# Patient Record
Sex: FEMALE | Race: WHITE | Employment: FULL TIME | URBAN - METROPOLITAN AREA
[De-identification: names, ages, dates, MRNs, and addresses within clinical notes are randomized per-mention and may not be internally consistent; named-entity substitution may affect disease eponyms.]

---

## 2022-10-03 PROBLEM — C79.9 METASTATIC CANCER (HCC): Status: ACTIVE | Noted: 2022-10-03

## 2022-10-10 PROBLEM — C50.919 METASTATIC BREAST CANCER: Status: ACTIVE | Noted: 2022-10-10

## 2022-10-17 PROBLEM — C78.7 LIVER METASTASES: Status: ACTIVE | Noted: 2022-10-17

## 2022-10-17 PROBLEM — R10.84 ABDOMINAL PAIN, GENERALIZED: Status: ACTIVE | Noted: 2022-10-17

## 2022-10-17 PROBLEM — Z79.899 ENCOUNTER FOR MONITORING CARDIOTOXIC DRUG THERAPY: Status: ACTIVE | Noted: 2022-10-17

## 2022-10-17 PROBLEM — N95.9 PREMENOPAUSAL PATIENT: Status: ACTIVE | Noted: 2022-10-17

## 2022-10-17 PROBLEM — R79.89 ELEVATED LFTS: Status: ACTIVE | Noted: 2022-10-17

## 2022-10-17 PROBLEM — C78.02 MALIGNANT NEOPLASM METASTATIC TO BOTH LUNGS (HCC): Status: ACTIVE | Noted: 2022-10-17

## 2022-10-17 PROBLEM — Z51.11 ENCOUNTER FOR ANTINEOPLASTIC CHEMOTHERAPY: Status: ACTIVE | Noted: 2022-10-17

## 2022-10-17 PROBLEM — C78.01 MALIGNANT NEOPLASM METASTATIC TO BOTH LUNGS (HCC): Status: ACTIVE | Noted: 2022-10-17

## 2022-10-17 PROBLEM — Z51.81 ENCOUNTER FOR MONITORING CARDIOTOXIC DRUG THERAPY: Status: ACTIVE | Noted: 2022-10-17

## 2022-10-17 PROBLEM — Z95.828 PORT-A-CATH IN PLACE: Status: ACTIVE | Noted: 2022-10-17

## 2022-10-24 PROBLEM — Z09 CHEMOTHERAPY FOLLOW-UP EXAMINATION: Status: ACTIVE | Noted: 2022-10-24

## 2022-10-24 PROBLEM — G89.3 CANCER RELATED PAIN: Status: ACTIVE | Noted: 2022-10-24

## 2022-10-31 PROBLEM — L27.0 DRUG-INDUCED SKIN RASH: Status: ACTIVE | Noted: 2022-10-31

## 2022-10-31 PROBLEM — R53.83 CHEMOTHERAPY-INDUCED FATIGUE: Status: ACTIVE | Noted: 2022-10-31

## 2022-10-31 PROBLEM — T45.1X5A CHEMOTHERAPY-INDUCED FATIGUE: Status: ACTIVE | Noted: 2022-10-31

## 2022-11-07 PROBLEM — T45.1X5A CHEMOTHERAPY-INDUCED NAUSEA: Status: ACTIVE | Noted: 2022-11-07

## 2022-11-07 PROBLEM — R11.0 CHEMOTHERAPY-INDUCED NAUSEA: Status: ACTIVE | Noted: 2022-11-07

## 2022-11-16 PROBLEM — Z51.11 ENCOUNTER FOR ANTINEOPLASTIC CHEMOTHERAPY: Status: RESOLVED | Noted: 2022-10-17 | Resolved: 2022-11-16

## 2022-11-23 PROBLEM — Z09 CHEMOTHERAPY FOLLOW-UP EXAMINATION: Status: RESOLVED | Noted: 2022-10-24 | Resolved: 2022-11-23

## 2023-01-02 PROBLEM — S72.001A FRACTURE OF FEMORAL NECK, RIGHT (HCC): Status: ACTIVE | Noted: 2023-01-02

## 2023-01-16 PROBLEM — Z87.311: Status: ACTIVE | Noted: 2023-01-16

## 2023-01-16 PROBLEM — C79.51 BONE METASTASES: Status: ACTIVE | Noted: 2023-01-16

## 2023-04-03 PROBLEM — C50.919 PRIMARY MALIGNANT NEOPLASM OF BREAST WITH METASTASIS (HCC): Status: ACTIVE | Noted: 2022-10-10

## 2023-04-10 ENCOUNTER — HOSPITAL ENCOUNTER (OUTPATIENT)
Dept: INFUSION THERAPY | Age: 38
Discharge: HOME | End: 2023-04-10
Payer: COMMERCIAL

## 2023-04-10 VITALS
BODY MASS INDEX: 33.23 KG/M2 | RESPIRATION RATE: 16 BRPM | SYSTOLIC BLOOD PRESSURE: 127 MMHG | TEMPERATURE: 97.9 F | WEIGHT: 180.6 LBS | DIASTOLIC BLOOD PRESSURE: 79 MMHG | HEIGHT: 62 IN | OXYGEN SATURATION: 97 % | HEART RATE: 58 BPM

## 2023-04-10 DIAGNOSIS — C50.919 PRIMARY MALIGNANT NEOPLASM OF BREAST WITH METASTASIS (HCC): Primary | ICD-10-CM

## 2023-04-10 PROBLEM — C79.51 MALIGNANT NEOPLASM METASTATIC TO BONE (HCC): Status: ACTIVE | Noted: 2023-01-16

## 2023-04-10 PROBLEM — M25.551 RIGHT HIP PAIN: Status: ACTIVE | Noted: 2023-04-10

## 2023-04-10 LAB
ALBUMIN SERPL-MCNC: 3.5 G/DL (ref 3.5–5)
ALBUMIN/GLOB SERPL: 1 (ref 1.1–2.2)
ALP SERPL-CCNC: 100 U/L (ref 45–117)
ALT SERPL-CCNC: 28 U/L (ref 12–78)
ANION GAP SERPL CALC-SCNC: 2 MMOL/L (ref 5–15)
AST SERPL-CCNC: 14 U/L (ref 15–37)
BASOPHILS # BLD: 0 K/UL (ref 0–0.1)
BASOPHILS NFR BLD: 0 % (ref 0–1)
BILIRUB SERPL-MCNC: 0.1 MG/DL (ref 0.2–1)
BUN SERPL-MCNC: 12 MG/DL (ref 6–20)
BUN/CREAT SERPL: 16 (ref 12–20)
CALCIUM SERPL-MCNC: 9.3 MG/DL (ref 8.5–10.1)
CHLORIDE SERPL-SCNC: 110 MMOL/L (ref 97–108)
CO2 SERPL-SCNC: 26 MMOL/L (ref 21–32)
CREAT SERPL-MCNC: 0.75 MG/DL (ref 0.55–1.02)
DIFFERENTIAL METHOD BLD: ABNORMAL
EOSINOPHIL # BLD: 0 K/UL (ref 0–0.4)
EOSINOPHIL NFR BLD: 0 % (ref 0–7)
ERYTHROCYTE [DISTWIDTH] IN BLOOD BY AUTOMATED COUNT: 17.5 % (ref 11.5–14.5)
GLOBULIN SER CALC-MCNC: 3.4 G/DL (ref 2–4)
GLUCOSE SERPL-MCNC: 154 MG/DL (ref 65–100)
HCT VFR BLD AUTO: 36.3 % (ref 35–47)
HGB BLD-MCNC: 11.2 G/DL (ref 11.5–16)
IMM GRANULOCYTES # BLD AUTO: 0.4 K/UL (ref 0–0.04)
IMM GRANULOCYTES NFR BLD AUTO: 2 % (ref 0–0.5)
LYMPHOCYTES # BLD: 1.3 K/UL (ref 0.8–3.5)
LYMPHOCYTES NFR BLD: 7 % (ref 12–49)
MCH RBC QN AUTO: 28.7 PG (ref 26–34)
MCHC RBC AUTO-ENTMCNC: 30.9 G/DL (ref 30–36.5)
MCV RBC AUTO: 93.1 FL (ref 80–99)
MONOCYTES # BLD: 0.5 K/UL (ref 0–1)
MONOCYTES NFR BLD: 3 % (ref 5–13)
NEUTS SEG # BLD: 15.8 K/UL (ref 1.8–8)
NEUTS SEG NFR BLD: 88 % (ref 32–75)
NRBC # BLD: 0 K/UL (ref 0–0.01)
NRBC BLD-RTO: 0 PER 100 WBC
PLATELET # BLD AUTO: 395 K/UL (ref 150–400)
PMV BLD AUTO: 9.8 FL (ref 8.9–12.9)
POTASSIUM SERPL-SCNC: 3.9 MMOL/L (ref 3.5–5.1)
PROT SERPL-MCNC: 6.9 G/DL (ref 6.4–8.2)
RBC # BLD AUTO: 3.9 M/UL (ref 3.8–5.2)
SODIUM SERPL-SCNC: 138 MMOL/L (ref 136–145)
WBC # BLD AUTO: 18 K/UL (ref 3.6–11)

## 2023-04-10 PROCEDURE — 74011250636 HC RX REV CODE- 250/636: Performed by: NURSE PRACTITIONER

## 2023-04-10 PROCEDURE — 74011000250 HC RX REV CODE- 250: Performed by: INTERNAL MEDICINE

## 2023-04-10 PROCEDURE — 36415 COLL VENOUS BLD VENIPUNCTURE: CPT

## 2023-04-10 PROCEDURE — 85025 COMPLETE CBC W/AUTO DIFF WBC: CPT

## 2023-04-10 PROCEDURE — 96413 CHEMO IV INFUSION 1 HR: CPT

## 2023-04-10 PROCEDURE — 96375 TX/PRO/DX INJ NEW DRUG ADDON: CPT

## 2023-04-10 PROCEDURE — 74011250636 HC RX REV CODE- 250/636: Performed by: INTERNAL MEDICINE

## 2023-04-10 PROCEDURE — 80053 COMPREHEN METABOLIC PANEL: CPT

## 2023-04-10 PROCEDURE — 96401 CHEMO ANTI-NEOPL SQ/IM: CPT

## 2023-04-10 RX ORDER — SODIUM CHLORIDE 9 MG/ML
5-250 INJECTION, SOLUTION INTRAVENOUS AS NEEDED
Status: DISPENSED | OUTPATIENT
Start: 2023-04-10 | End: 2023-04-10

## 2023-04-10 RX ORDER — ONDANSETRON 2 MG/ML
8 INJECTION INTRAMUSCULAR; INTRAVENOUS AS NEEDED
Status: ACTIVE | OUTPATIENT
Start: 2023-04-10 | End: 2023-04-10

## 2023-04-10 RX ORDER — DIPHENHYDRAMINE HYDROCHLORIDE 50 MG/ML
25 INJECTION, SOLUTION INTRAMUSCULAR; INTRAVENOUS ONCE
Status: COMPLETED | OUTPATIENT
Start: 2023-04-10 | End: 2023-04-10

## 2023-04-10 RX ORDER — EPINEPHRINE 1 MG/ML
0.3 INJECTION, SOLUTION, CONCENTRATE INTRAVENOUS AS NEEDED
Status: ACTIVE | OUTPATIENT
Start: 2023-04-10 | End: 2023-04-10

## 2023-04-10 RX ORDER — HEPARIN 100 UNIT/ML
500 SYRINGE INTRAVENOUS AS NEEDED
Status: ACTIVE | OUTPATIENT
Start: 2023-04-10 | End: 2023-04-10

## 2023-04-10 RX ORDER — SODIUM CHLORIDE 0.9 % (FLUSH) 0.9 %
5-40 SYRINGE (ML) INJECTION AS NEEDED
Status: DISPENSED | OUTPATIENT
Start: 2023-04-10 | End: 2023-04-10

## 2023-04-10 RX ORDER — SODIUM CHLORIDE 9 MG/ML
5-40 INJECTION INTRAVENOUS AS NEEDED
Status: ACTIVE | OUTPATIENT
Start: 2023-04-10 | End: 2023-04-10

## 2023-04-10 RX ORDER — DIPHENHYDRAMINE HYDROCHLORIDE 50 MG/ML
50 INJECTION, SOLUTION INTRAMUSCULAR; INTRAVENOUS AS NEEDED
Status: ACTIVE | OUTPATIENT
Start: 2023-04-10 | End: 2023-04-10

## 2023-04-10 RX ORDER — HYDROCORTISONE SODIUM SUCCINATE 100 MG/2ML
100 INJECTION, POWDER, FOR SOLUTION INTRAMUSCULAR; INTRAVENOUS AS NEEDED
Status: ACTIVE | OUTPATIENT
Start: 2023-04-10 | End: 2023-04-10

## 2023-04-10 RX ORDER — DIPHENHYDRAMINE HYDROCHLORIDE 50 MG/ML
25 INJECTION, SOLUTION INTRAMUSCULAR; INTRAVENOUS AS NEEDED
Status: ACTIVE | OUTPATIENT
Start: 2023-04-10 | End: 2023-04-10

## 2023-04-10 RX ORDER — DEXAMETHASONE SODIUM PHOSPHATE 4 MG/ML
10 INJECTION, SOLUTION INTRA-ARTICULAR; INTRALESIONAL; INTRAMUSCULAR; INTRAVENOUS; SOFT TISSUE ONCE
Status: COMPLETED | OUTPATIENT
Start: 2023-04-10 | End: 2023-04-10

## 2023-04-10 RX ORDER — ALBUTEROL SULFATE 0.83 MG/ML
2.5 SOLUTION RESPIRATORY (INHALATION) AS NEEDED
Status: ACTIVE | OUTPATIENT
Start: 2023-04-10 | End: 2023-04-10

## 2023-04-10 RX ORDER — ACETAMINOPHEN 325 MG/1
650 TABLET ORAL AS NEEDED
Status: ACTIVE | OUTPATIENT
Start: 2023-04-10 | End: 2023-04-10

## 2023-04-10 RX ADMIN — DEXAMETHASONE SODIUM PHOSPHATE 10 MG: 4 INJECTION, SOLUTION INTRAMUSCULAR; INTRAVENOUS at 09:50

## 2023-04-10 RX ADMIN — SODIUM CHLORIDE, PRESERVATIVE FREE 10 ML: 5 INJECTION INTRAVENOUS at 09:50

## 2023-04-10 RX ADMIN — PERTUZUMAB, TRASTUZUMAB, AND HYALURONIDASE-ZZXF 10 ML: 600; 600; 2000 INJECTION, SOLUTION SUBCUTANEOUS at 10:32

## 2023-04-10 RX ADMIN — SODIUM CHLORIDE, PRESERVATIVE FREE 10 ML: 5 INJECTION INTRAVENOUS at 08:10

## 2023-04-10 RX ADMIN — FAMOTIDINE 20 MG: 10 INJECTION, SOLUTION INTRAVENOUS at 09:50

## 2023-04-10 RX ADMIN — SODIUM CHLORIDE 25 ML/HR: 9 INJECTION, SOLUTION INTRAVENOUS at 09:50

## 2023-04-10 RX ADMIN — DOCETAXEL ANHYDROUS 106 MG: 10 INJECTION, SOLUTION INTRAVENOUS at 10:40

## 2023-04-10 RX ADMIN — HEPARIN 500 UNITS: 100 SYRINGE at 11:55

## 2023-04-10 RX ADMIN — SODIUM CHLORIDE, PRESERVATIVE FREE 10 ML: 5 INJECTION INTRAVENOUS at 11:55

## 2023-04-10 RX ADMIN — DIPHENHYDRAMINE HYDROCHLORIDE 25 MG: 50 INJECTION, SOLUTION INTRAMUSCULAR; INTRAVENOUS at 09:50

## 2023-04-10 RX ADMIN — SODIUM CHLORIDE, PRESERVATIVE FREE 10 ML: 5 INJECTION INTRAVENOUS at 08:00

## 2023-04-10 NOTE — PROGRESS NOTES
[facility] Chemotherapy Progress Note  Date: April 10, 2023  Name: Arian Garvin  MRN: [id number]       : 1985    [0750] Pt admit to [facility] for Pertuzumab/Docetaxel   Denies SOB, fever, cough, N/V. Chest port with positive blood return. Labs sent for processing. Ms. Hussein's vitals were reviewed. Patient Vitals for the past 12 hrs:   Temp Pulse Resp BP SpO2   04/10/23 1155 97.9 °F (36.6 °C) (!) 58 16 127/79 97 %   04/10/23 0753 97.3 °F (36.3 °C) (!) 102 17 (!) 149/82 --     Lab results were obtained and reviewed. Recent Results (from the past 12 hour(s))   CBC WITH AUTOMATED DIFF    Collection Time: 04/10/23  7:58 AM   Result Value Ref Range    WBC 18.0 (H) 3.6 - 11.0 K/uL    RBC 3.90 3.80 - 5.20 M/uL    HGB 11.2 (L) 11.5 - 16.0 g/dL    HCT 36.3 35.0 - 47.0 %    MCV 93.1 80.0 - 99.0 FL    MCH 28.7 26.0 - 34.0 PG    MCHC 30.9 30.0 - 36.5 g/dL    RDW 17.5 (H) 11.5 - 14.5 %    PLATELET 179 425 - 476 K/uL    MPV 9.8 8.9 - 12.9 FL    NRBC 0.0 0  WBC    ABSOLUTE NRBC 0.00 0.00 - 0.01 K/uL    NEUTROPHILS 88 (H) 32 - 75 %    LYMPHOCYTES 7 (L) 12 - 49 %    MONOCYTES 3 (L) 5 - 13 %    EOSINOPHILS 0 0 - 7 %    BASOPHILS 0 0 - 1 %    IMMATURE GRANULOCYTES 2 (H) 0.0 - 0.5 %    ABS. NEUTROPHILS 15.8 (H) 1.8 - 8.0 K/UL    ABS. LYMPHOCYTES 1.3 0.8 - 3.5 K/UL    ABS. MONOCYTES 0.5 0.0 - 1.0 K/UL    ABS. EOSINOPHILS 0.0 0.0 - 0.4 K/UL    ABS. BASOPHILS 0.0 0.0 - 0.1 K/UL    ABS. IMM.  GRANS. 0.4 (H) 0.00 - 0.04 K/UL    DF AUTOMATED     METABOLIC PANEL, COMPREHENSIVE    Collection Time: 04/10/23  7:58 AM   Result Value Ref Range    Sodium 138 136 - 145 mmol/L    Potassium 3.9 3.5 - 5.1 mmol/L    Chloride 110 (H) 97 - 108 mmol/L    CO2 26 21 - 32 mmol/L    Anion gap 2 (L) 5 - 15 mmol/L    Glucose 154 (H) 65 - 100 mg/dL    BUN 12 6 - 20 MG/DL    Creatinine 0.75 0.55 - 1.02 MG/DL    BUN/Creatinine ratio 16 12 - 20      eGFR >60 >60 ml/min/1.73m2    Calcium 9.3 8.5 - 10.1 MG/DL    Bilirubin, total 0.1 (L) 0.2 - 1.0 MG/DL ALT (SGPT) 28 12 - 78 U/L    AST (SGOT) 14 (L) 15 - 37 U/L    Alk. phosphatase 100 45 - 117 U/L    Protein, total 6.9 6.4 - 8.2 g/dL    Albumin 3.5 3.5 - 5.0 g/dL    Globulin 3.4 2.0 - 4.0 g/dL    A-G Ratio 1.0 (L) 1.1 - 2.2         Pre-medications  were administered as ordered and chemotherapy was initiated.   Medications Administered       0.9% sodium chloride infusion       Admin Date  04/10/2023 Action  New Bag Dose  25 mL/hr Rate  25 mL/hr Route  IntraVENous Administered By  Bushra Nash RN         0.9% sodium chloride injection 5-40 mL       Admin Date  04/10/2023 Action  Given Dose  10 mL Route  IntraVENous Administered By  Laurita Mejia RN      Admin Date  04/10/2023 Action  Given Dose  10 mL Route  IntraVENous Administered By  Laurita Mejia RN      Admin Date  04/10/2023 Action  Given Dose  10 mL Route  IntraVENous Administered By  Bushra Nash RN      Admin Date  04/10/2023 Action  Given Dose  10 mL Route  IntraVENous Administered By  Bushra Nash RN         dexamethasone (DECADRON) 4 mg/mL injection 10 mg       Admin Date  04/10/2023 Action  Given Dose  10 mg Route  IntraVENous Administered By  Bushra Nash RN         diphenhydrAMINE (BENADRYL) injection 25 mg       Admin Date  04/10/2023 Action  Given Dose  25 mg Route  IntraVENous Administered By  Bushra Nash RN         DOCEtaxeL (TAXOTERE) 106 mg in 0.9% sodium chloride 250 mL, overfill volume 25 mL chemo infusion       Admin Date  04/10/2023 Action  New Bag Dose  106 mg Rate  285.6 mL/hr Route  IntraVENous Administered By  Bushra Nash RN         famotidine (PF) (PEPCID) 20 mg in 0.9% sodium chloride 10 mL injection       Admin Date  04/10/2023 Action  Given Dose  20 mg Route  IntraVENous Administered By  Bushra Nash RN         heparin (porcine) pf 500 Units       Admin Date  04/10/2023 Action  Given Dose  500 Units Route  InterCATHeter Administered By  Bushra Nash RN         pertuzumab 600 mg-trastuzumab 600 mg-hyaluronidase-zzxf 20,000 units/10 mL       Admin Date  04/10/2023 Action  Given Dose  10 mL Route  SubCUTAneous Administered By  Pablo Moulton RN           Prior to chemotherapy/immunotherapy administration the following were verified with a second chemotherapy/immunotherapy certified nurse: Patient name, Patient  or CSN, Drug name, Drug dose, Infusion/drug volume, Rate of administration, Route of administration, Expiration dates/times, Appearance and physical integrity of the drug(s)    Please see MAR for specific drug names and time of administration. Patient was monitored appropriately, and vital signs were obtained. Chest port maintained positive blood return throughout treatment. Flushed, heparinized and de-accessed per protocol. Pt aware of next appointment scheduled. Tolerated infusion well without issue. Declined post infusion monitoring time. Education given and reinforced prior to departure.      Future Appointments   Date Time Provider Kristian Alva   2023  8:00 AM  JOAN LONG [address] [place] [address]   2023  8:15 AM Srikanth Molina,  N Estella Rowe BS AMB     Chase Medina RN  April 10, 2023

## 2023-04-17 ENCOUNTER — HOSPITAL ENCOUNTER (OUTPATIENT)
Dept: NON INVASIVE DIAGNOSTICS | Age: 38
Discharge: HOME | End: 2023-04-17
Attending: INTERNAL MEDICINE
Payer: COMMERCIAL

## 2023-04-17 VITALS
BODY MASS INDEX: 33.13 KG/M2 | HEIGHT: 62 IN | SYSTOLIC BLOOD PRESSURE: 127 MMHG | DIASTOLIC BLOOD PRESSURE: 79 MMHG | WEIGHT: 180 LBS

## 2023-04-17 DIAGNOSIS — C79.81 MALIGNANT NEOPLASM METASTATIC TO BREAST (HCC): ICD-10-CM

## 2023-04-17 LAB
ECHO AO ASC DIAM: 3 CM
ECHO AO ASCENDING AORTA INDEX: 1.64 CM/M2
ECHO AO ROOT DIAM: 2.7 CM
ECHO AO ROOT INDEX: 1.48 CM/M2
ECHO AV AREA PEAK VELOCITY: 2.3 CM2
ECHO AV AREA/BSA PEAK VELOCITY: 1.3 CM2/M2
ECHO AV PEAK GRADIENT: 8 MMHG
ECHO AV PEAK VELOCITY: 1.4 M/S
ECHO AV VELOCITY RATIO: 0.71
ECHO LA DIAMETER INDEX: 1.64 CM/M2
ECHO LA DIAMETER: 3 CM
ECHO LA TO AORTIC ROOT RATIO: 1.11
ECHO LA VOL 2C: 32 ML (ref 22–52)
ECHO LA VOL 4C: 45 ML (ref 22–52)
ECHO LA VOL BP: 39 ML (ref 22–52)
ECHO LA VOL/BSA BIPLANE: 21 ML/M2 (ref 16–34)
ECHO LA VOLUME AREA LENGTH: 41 ML
ECHO LA VOLUME INDEX A2C: 17 ML/M2 (ref 16–34)
ECHO LA VOLUME INDEX A4C: 25 ML/M2 (ref 16–34)
ECHO LA VOLUME INDEX AREA LENGTH: 22 ML/M2 (ref 16–34)
ECHO LV FRACTIONAL SHORTENING: 51 % (ref 28–44)
ECHO LV INTERNAL DIMENSION DIASTOLE INDEX: 2.24 CM/M2
ECHO LV INTERNAL DIMENSION DIASTOLIC: 4.1 CM (ref 3.9–5.3)
ECHO LV INTERNAL DIMENSION SYSTOLIC INDEX: 1.09 CM/M2
ECHO LV INTERNAL DIMENSION SYSTOLIC: 2 CM
ECHO LV IVSD: 0.9 CM (ref 0.6–0.9)
ECHO LV MASS 2D: 123 G (ref 67–162)
ECHO LV MASS INDEX 2D: 67.2 G/M2 (ref 43–95)
ECHO LV POSTERIOR WALL DIASTOLIC: 1 CM (ref 0.6–0.9)
ECHO LV RELATIVE WALL THICKNESS RATIO: 0.49
ECHO LVOT AREA: 3.1 CM2
ECHO LVOT DIAM: 2 CM
ECHO LVOT MEAN GRADIENT: 2 MMHG
ECHO LVOT PEAK GRADIENT: 4 MMHG
ECHO LVOT PEAK VELOCITY: 1 M/S
ECHO LVOT STROKE VOLUME INDEX: 29 ML/M2
ECHO LVOT SV: 53.1 ML
ECHO LVOT VTI: 16.9 CM
ECHO MV A VELOCITY: 0.71 M/S
ECHO MV AREA PHT: 3.4 CM2
ECHO MV E DECELERATION TIME (DT): 223.5 MS
ECHO MV E VELOCITY: 0.7 M/S
ECHO MV E/A RATIO: 0.99
ECHO MV PRESSURE HALF TIME (PHT): 64.8 MS
ECHO PV MAX VELOCITY: 1 M/S
ECHO PV PEAK GRADIENT: 4 MMHG
ECHO RV TAPSE: 1.9 CM (ref 1.7–?)

## 2023-04-17 PROCEDURE — 93306 TTE W/DOPPLER COMPLETE: CPT | Performed by: INTERNAL MEDICINE

## 2023-04-17 PROCEDURE — 93306 TTE W/DOPPLER COMPLETE: CPT

## 2023-04-21 ENCOUNTER — OFFICE VISIT (OUTPATIENT)
Dept: SURGERY | Age: 38
End: 2023-04-21

## 2023-04-21 VITALS — BODY MASS INDEX: 33.13 KG/M2 | HEIGHT: 62 IN | WEIGHT: 180 LBS

## 2023-04-21 DIAGNOSIS — C50.912 INFILTRATING DUCTAL CARCINOMA OF LEFT BREAST (HCC): Primary | ICD-10-CM

## 2023-04-21 NOTE — PROGRESS NOTES
HISTORY OF PRESENT ILLNESS  Patrica Vega is a 45 y.o. female. HPI NEW patient consult referred by Dr. Crow Caballero to discuss BILATERAL palliative mastectomies for stage 4 breast cancer. LEFT breast nodule found by CT when patient went to ER for abdominal pain in October. Patient states she was never able to palpate a mass. Denies breast changes. Currently undergoing neoadjuvant chemotherapy with Dr. Charlene Chin. Plans to take a break in July. Also had XRT of hip/sacrum. History of a bilateral breast reduction in 2004. Family History: mother, skin cancer  Maternal great grandmother, breast cancer        10/04/22: Liver, mass, touch preps and core bx, PATH: Metastatic adenocarcinoma, consistent with breast primary, ER-, [place]-, Her2+, Ki-67(60%). 10/13/22: PAC insertion by Dr. Carolyn Mccollum. 10/17/22 - 10/31/22: Palliative Taxol+Herceptin+Perjeta (Dr. Charlene Chin)  11/7/22 - Current : Switched to Palliative Taxotere+Herceptin+Perjeta    CT C/A/P 12/27/22: Imaging findings consistent with significant interval response to therapy. Significantly diminished pulmonary metastatic disease burden with numerous resolved or nearly resolved pulmonary nodules. Significantly diminished size of hepatic masses. Left breast lesion is not demonstrated on the current exam    Right Hip XRAY 1/2/23: Right basicervical femoral neck fracture with medial angulation    CT Pelv 1/2/23: Re-demonstrated acute right basicervical femoral neck fracture, with findings concerning for pathologic etiology    XR Femur 1/2/23: Mildly displaced right femoral neck fracture    Right Hip IMN with Ortho Dr Radha Raza     Bone Scan 1/5/23: There is expected intense activity at the right hip fracture site. There is focal intense activity in the right sacrum, with correlating subtle osseous lesion on CT. There is small focal mild activity in the posterior approximate left sixth rib without CT correlate.  There is mild activity of the distal right femoral diametaphysis with no radiographic correlate    2/6/23 - 2/10/23: XRT to Right Femur and Sacrum     2/6/23 - current: Xgeva for bones        No prior breast imaging. CT Results (most recent):  Results from Hospital Encounter encounter on 04/05/23    CT ABD PELV W CONT    Narrative  EXAM: CT CHEST W CONT, CT ABD PELV W CONT    INDICATION: 43-year-old woman with metastatic breast cancer    COMPARISON: CT of the chest, abdomen, and pelvis December 27, 2022. CT chest  October 4, 2022. CT abdomen/pelvis October 3, 2022. IV CONTRAST: 100 mL of Iomeron 350. ORAL CONTRAST: Oral contrast was administered to better evaluate the bowel. TECHNIQUE:  Following the uneventful intravenous administration of contrast, thin axial  images were obtained through the chest, abdomen and pelvis. Coronal and sagittal  reformats were generated. CT dose reduction was achieved through use of a  standardized protocol tailored for this examination and automatic exposure  control for dose modulation. FINDINGS:    CHEST WALL: No mass or axillary lymphadenopathy. There is a right-sided  Port-A-Cath in place. Previously seen mass in the inferior aspect of the LEFT  breast in October 2022 is no longer evident by CT.  THYROID: No nodule. MEDIASTINUM: No mass or lymphadenopathy. CHANELLE: No mass or lymphadenopathy. THORACIC AORTA: No dissection or aneurysm. MAIN PULMONARY ARTERY: Normal in caliber. TRACHEA/BRONCHI: Patent. ESOPHAGUS: No wall thickening or dilatation. HEART: Normal in size. PLEURA: No effusion or pneumothorax. Again seen is mild pleural thickening with  nodularity seen along the dependent aspect of the lower lobes which is unchanged  compared to the prior study. LUNGS: There is mild RIGHT lower lobe scarring or atelectasis. No lung  consolidation is seen. On series 3, slice 59, there is a 3 x 5 mm nodular  thickening along the horizontal fissure which is decreased from 4 x 7 mm  previously.  Again noted are scattered punctate nodules of the lungs. There is a  punctate nodule of the RIGHT upper lobe on series 3, slice 42. There is a  punctate nodule of the RIGHT upper lobe on series 3, slice 54. No new lung  nodules are identified. There are small fissural nodes along the LEFT fissure as  seen on series 3, slice 66-19. LIVER: There is continued improvement in appearance of the liver with several  small hypodense lesions which have decreased in size and some of which appear to  have resolved. On series 2, slice 45, there is a 17 x 16 mm subcapsular mass  which is decreased from 20 x 15 mm previously. On series 2, slice 55, there is a  6 x 9 mm mass which is decreased from 13 x 12 mm previously. BILIARY TREE: Gallbladder is within normal limits. CBD is not dilated. SPLEEN: within normal limits. PANCREAS: No mass or ductal dilatation. ADRENALS: Unremarkable. KIDNEYS: There is no hydronephrosis. No complex cystic or solid renal mass is  seen. There are punctate radiodensities of the LEFT kidney middle pole  consistent with stones. STOMACH: Unremarkable. SMALL BOWEL: No dilatation or wall thickening. COLON: No dilatation or wall thickening. APPENDIX: Surgically absent  PERITONEUM: No ascites or pneumoperitoneum. RETROPERITONEUM: No lymphadenopathy or aortic aneurysm. REPRODUCTIVE ORGANS: Status post hysterectomy. No adnexal mass. URINARY BLADDER: No mass or calculus. BONES: Patient has undergone interval RIGHT femur ORIF. IM nail and compression  screw are partially imaged. There is mild heterotopic ossification. No new bony  lytic or blastic lesion is identified. ABDOMINAL WALL: No mass or hernia. ADDITIONAL COMMENTS: N/A    Impression  1. Continued improvement in size and number of multiple hepatic masses with some  small masses which have resolved. A mass in segment 7 of the liver now measures  17 x 16 mm (series 2, slice 45) compared to 20 x 15 mm previously.  A mass in  segment 6 of the liver measures 6 x 9 mm (series 2, slice 55) compared to 13 x  12 mm previously. 2. There are a few scattered residual punctate nodules of the lungs. No new lung  nodules are identified. 3. There is stable, mild pleural thickening and nodularity seen along the lower  lobes of the lungs. 4. Postsurgical changes are seen of the RIGHT femur. No new bony lytic or  blastic lesions are identified. 5. No lymphadenopathy is seen in the chest, abdomen, or pelvis. 6. No CT evidence of breast mass. Consider baseline mammograms. Review of Systems   Constitutional:  Positive for malaise/fatigue. Respiratory:  Positive for cough and shortness of breath. Cardiovascular:  Positive for leg swelling. Gastrointestinal:  Positive for constipation, diarrhea, heartburn and nausea. Musculoskeletal:  Positive for falls and joint pain. Endo/Heme/Allergies:  Bruises/bleeds easily. Psychiatric/Behavioral:  Positive for depression. The patient is nervous/anxious.       Physical Exam    ASSESSMENT and PLAN  {ASSESSMENT/PLAN:[id number]}
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
## 2023-04-23 ENCOUNTER — NURSE NAVIGATOR (OUTPATIENT)
Dept: CASE MANAGEMENT | Age: 38
End: 2023-04-23

## 2023-04-24 ENCOUNTER — TELEPHONE (OUTPATIENT)
Dept: ONCOLOGY | Age: 38
End: 2023-04-24

## 2023-04-25 ENCOUNTER — APPOINTMENT (OUTPATIENT)
Dept: GENERAL RADIOLOGY | Age: 38
DRG: 194 | End: 2023-04-25
Payer: COMMERCIAL

## 2023-04-25 ENCOUNTER — APPOINTMENT (OUTPATIENT)
Dept: VASCULAR SURGERY | Age: 38
DRG: 194 | End: 2023-04-25
Payer: COMMERCIAL

## 2023-04-25 ENCOUNTER — HOSPITAL ENCOUNTER (INPATIENT)
Age: 38
LOS: 3 days | Discharge: HOME | DRG: 194 | End: 2023-04-28
Attending: STUDENT IN AN ORGANIZED HEALTH CARE EDUCATION/TRAINING PROGRAM | Admitting: HOSPITALIST
Payer: COMMERCIAL

## 2023-04-25 ENCOUNTER — APPOINTMENT (OUTPATIENT)
Dept: CT IMAGING | Age: 38
DRG: 194 | End: 2023-04-25
Payer: COMMERCIAL

## 2023-04-25 DIAGNOSIS — J18.9 PNEUMONIA OF RIGHT UPPER LOBE DUE TO INFECTIOUS ORGANISM: Primary | ICD-10-CM

## 2023-04-25 DIAGNOSIS — C79.51 MALIGNANT NEOPLASM METASTATIC TO BONE (HCC): ICD-10-CM

## 2023-04-25 DIAGNOSIS — C78.7 LIVER METASTASES: ICD-10-CM

## 2023-04-25 DIAGNOSIS — C78.01 MALIGNANT NEOPLASM METASTATIC TO BOTH LUNGS (HCC): ICD-10-CM

## 2023-04-25 DIAGNOSIS — M25.551 RIGHT HIP PAIN: ICD-10-CM

## 2023-04-25 DIAGNOSIS — Z87.311: ICD-10-CM

## 2023-04-25 DIAGNOSIS — C78.02 MALIGNANT NEOPLASM METASTATIC TO BOTH LUNGS (HCC): ICD-10-CM

## 2023-04-25 DIAGNOSIS — S72.001S CLOSED FRACTURE OF NECK OF RIGHT FEMUR, SEQUELA: ICD-10-CM

## 2023-04-25 PROBLEM — I63.9 ACUTE ISCHEMIC STROKE (HCC): Status: ACTIVE | Noted: 2023-04-25

## 2023-04-25 LAB
ALBUMIN SERPL-MCNC: 3.3 G/DL (ref 3.5–5)
ALBUMIN/GLOB SERPL: 1.1 (ref 1.1–2.2)
ALP SERPL-CCNC: 109 U/L (ref 45–117)
ALT SERPL-CCNC: 30 U/L (ref 12–78)
ANION GAP SERPL CALC-SCNC: 5 MMOL/L (ref 5–15)
AST SERPL-CCNC: 20 U/L (ref 15–37)
ATRIAL RATE: 100 BPM
BASOPHILS # BLD: 0 K/UL (ref 0–0.1)
BASOPHILS NFR BLD: 1 % (ref 0–1)
BILIRUB SERPL-MCNC: 0.2 MG/DL (ref 0.2–1)
BUN SERPL-MCNC: 6 MG/DL (ref 6–20)
BUN/CREAT SERPL: 8 (ref 12–20)
CALCIUM SERPL-MCNC: 8.7 MG/DL (ref 8.5–10.1)
CALCULATED P AXIS, ECG09: 46 DEGREES
CALCULATED R AXIS, ECG10: 73 DEGREES
CALCULATED T AXIS, ECG11: 53 DEGREES
CHLORIDE SERPL-SCNC: 107 MMOL/L (ref 97–108)
CO2 SERPL-SCNC: 26 MMOL/L (ref 21–32)
CREAT SERPL-MCNC: 0.71 MG/DL (ref 0.55–1.02)
D DIMER PPP FEU-MCNC: 1.68 MG/L FEU (ref 0–0.65)
DIAGNOSIS, 93000: NORMAL
DIFFERENTIAL METHOD BLD: ABNORMAL
EOSINOPHIL # BLD: 0 K/UL (ref 0–0.4)
EOSINOPHIL NFR BLD: 0 % (ref 0–7)
ERYTHROCYTE [DISTWIDTH] IN BLOOD BY AUTOMATED COUNT: 18.3 % (ref 11.5–14.5)
GLOBULIN SER CALC-MCNC: 3.1 G/DL (ref 2–4)
GLUCOSE SERPL-MCNC: 110 MG/DL (ref 65–100)
HCT VFR BLD AUTO: 34.9 % (ref 35–47)
HGB BLD-MCNC: 10.5 G/DL (ref 11.5–16)
IMM GRANULOCYTES # BLD AUTO: 0.1 K/UL (ref 0–0.04)
IMM GRANULOCYTES NFR BLD AUTO: 1 % (ref 0–0.5)
LACTATE SERPL-SCNC: 0.8 MMOL/L (ref 0.4–2)
LYMPHOCYTES # BLD: 1.1 K/UL (ref 0.8–3.5)
LYMPHOCYTES NFR BLD: 21 % (ref 12–49)
MCH RBC QN AUTO: 28.5 PG (ref 26–34)
MCHC RBC AUTO-ENTMCNC: 30.1 G/DL (ref 30–36.5)
MCV RBC AUTO: 94.6 FL (ref 80–99)
MONOCYTES # BLD: 1.1 K/UL (ref 0–1)
MONOCYTES NFR BLD: 20 % (ref 5–13)
NEUTS SEG # BLD: 3 K/UL (ref 1.8–8)
NEUTS SEG NFR BLD: 57 % (ref 32–75)
NRBC # BLD: 0 K/UL (ref 0–0.01)
NRBC BLD-RTO: 0 PER 100 WBC
P-R INTERVAL, ECG05: 132 MS
PLATELET # BLD AUTO: 275 K/UL (ref 150–400)
PMV BLD AUTO: 9.7 FL (ref 8.9–12.9)
POTASSIUM SERPL-SCNC: 3.9 MMOL/L (ref 3.5–5.1)
PROT SERPL-MCNC: 6.4 G/DL (ref 6.4–8.2)
Q-T INTERVAL, ECG07: 320 MS
QRS DURATION, ECG06: 72 MS
QTC CALCULATION (BEZET), ECG08: 412 MS
RBC # BLD AUTO: 3.69 M/UL (ref 3.8–5.2)
SODIUM SERPL-SCNC: 138 MMOL/L (ref 136–145)
TROPONIN I SERPL HS-MCNC: <3 NG/L (ref 0–37)
VENTRICULAR RATE, ECG03: 100 BPM
WBC # BLD AUTO: 5.3 K/UL (ref 3.6–11)

## 2023-04-25 PROCEDURE — 93971 EXTREMITY STUDY: CPT

## 2023-04-25 PROCEDURE — 36415 COLL VENOUS BLD VENIPUNCTURE: CPT

## 2023-04-25 PROCEDURE — 74011000636 HC RX REV CODE- 636: Performed by: STUDENT IN AN ORGANIZED HEALTH CARE EDUCATION/TRAINING PROGRAM

## 2023-04-25 PROCEDURE — 74011250637 HC RX REV CODE- 250/637: Performed by: HOSPITALIST

## 2023-04-25 PROCEDURE — 96374 THER/PROPH/DIAG INJ IV PUSH: CPT

## 2023-04-25 PROCEDURE — 83605 ASSAY OF LACTIC ACID: CPT

## 2023-04-25 PROCEDURE — 84484 ASSAY OF TROPONIN QUANT: CPT

## 2023-04-25 PROCEDURE — 85379 FIBRIN DEGRADATION QUANT: CPT

## 2023-04-25 PROCEDURE — 65270000029 HC RM PRIVATE

## 2023-04-25 PROCEDURE — 74011000250 HC RX REV CODE- 250: Performed by: HOSPITALIST

## 2023-04-25 PROCEDURE — 80053 COMPREHEN METABOLIC PANEL: CPT

## 2023-04-25 PROCEDURE — 74011250636 HC RX REV CODE- 250/636: Performed by: HOSPITALIST

## 2023-04-25 PROCEDURE — 85025 COMPLETE CBC W/AUTO DIFF WBC: CPT

## 2023-04-25 PROCEDURE — 99285 EMERGENCY DEPT VISIT HI MDM: CPT

## 2023-04-25 PROCEDURE — 73502 X-RAY EXAM HIP UNI 2-3 VIEWS: CPT

## 2023-04-25 PROCEDURE — 93005 ELECTROCARDIOGRAM TRACING: CPT

## 2023-04-25 PROCEDURE — 71275 CT ANGIOGRAPHY CHEST: CPT

## 2023-04-25 PROCEDURE — 87040 BLOOD CULTURE FOR BACTERIA: CPT

## 2023-04-25 PROCEDURE — 74011250636 HC RX REV CODE- 250/636

## 2023-04-25 PROCEDURE — 99223 1ST HOSP IP/OBS HIGH 75: CPT | Performed by: INTERNAL MEDICINE

## 2023-04-25 PROCEDURE — 87150 DNA/RNA AMPLIFIED PROBE: CPT

## 2023-04-25 RX ORDER — ONDANSETRON 4 MG/1
4 TABLET, ORALLY DISINTEGRATING ORAL
Status: DISCONTINUED | OUTPATIENT
Start: 2023-04-25 | End: 2023-04-28 | Stop reason: HOSPADM

## 2023-04-25 RX ORDER — BUPROPION HYDROCHLORIDE 150 MG/1
150 TABLET, EXTENDED RELEASE ORAL 2 TIMES DAILY
Status: DISCONTINUED | OUTPATIENT
Start: 2023-04-26 | End: 2023-04-28 | Stop reason: HOSPADM

## 2023-04-25 RX ORDER — NALOXONE HYDROCHLORIDE 0.4 MG/ML
0.4 INJECTION, SOLUTION INTRAMUSCULAR; INTRAVENOUS; SUBCUTANEOUS
Status: DISCONTINUED | OUTPATIENT
Start: 2023-04-25 | End: 2023-04-28 | Stop reason: HOSPADM

## 2023-04-25 RX ORDER — OXYCODONE HYDROCHLORIDE 5 MG/1
10 TABLET ORAL
Status: DISCONTINUED | OUTPATIENT
Start: 2023-04-25 | End: 2023-04-28 | Stop reason: HOSPADM

## 2023-04-25 RX ORDER — KETOROLAC TROMETHAMINE 30 MG/ML
30 INJECTION, SOLUTION INTRAMUSCULAR; INTRAVENOUS
Status: COMPLETED | OUTPATIENT
Start: 2023-04-25 | End: 2023-04-25

## 2023-04-25 RX ORDER — ACETAMINOPHEN 650 MG/1
650 SUPPOSITORY RECTAL
Status: DISCONTINUED | OUTPATIENT
Start: 2023-04-25 | End: 2023-04-28 | Stop reason: HOSPADM

## 2023-04-25 RX ORDER — SODIUM CHLORIDE 0.9 % (FLUSH) 0.9 %
5-40 SYRINGE (ML) INJECTION EVERY 8 HOURS
Status: DISCONTINUED | OUTPATIENT
Start: 2023-04-25 | End: 2023-04-28 | Stop reason: HOSPADM

## 2023-04-25 RX ORDER — ACETAMINOPHEN 325 MG/1
650 TABLET ORAL
Status: DISCONTINUED | OUTPATIENT
Start: 2023-04-25 | End: 2023-04-28 | Stop reason: HOSPADM

## 2023-04-25 RX ORDER — SODIUM CHLORIDE 0.9 % (FLUSH) 0.9 %
5-40 SYRINGE (ML) INJECTION AS NEEDED
Status: DISCONTINUED | OUTPATIENT
Start: 2023-04-25 | End: 2023-04-28 | Stop reason: HOSPADM

## 2023-04-25 RX ORDER — LISINOPRIL 10 MG/1
10 TABLET ORAL DAILY
Status: DISCONTINUED | OUTPATIENT
Start: 2023-04-26 | End: 2023-04-28 | Stop reason: HOSPADM

## 2023-04-25 RX ORDER — POLYETHYLENE GLYCOL 3350 17 G/17G
17 POWDER, FOR SOLUTION ORAL DAILY PRN
Status: DISCONTINUED | OUTPATIENT
Start: 2023-04-25 | End: 2023-04-28 | Stop reason: HOSPADM

## 2023-04-25 RX ORDER — ONDANSETRON 2 MG/ML
4 INJECTION INTRAMUSCULAR; INTRAVENOUS
Status: DISCONTINUED | OUTPATIENT
Start: 2023-04-25 | End: 2023-04-28 | Stop reason: HOSPADM

## 2023-04-25 RX ORDER — ESCITALOPRAM OXALATE 10 MG/1
10 TABLET ORAL DAILY
Status: DISCONTINUED | OUTPATIENT
Start: 2023-04-26 | End: 2023-04-28 | Stop reason: HOSPADM

## 2023-04-25 RX ORDER — SODIUM CHLORIDE 9 MG/ML
75 INJECTION, SOLUTION INTRAVENOUS CONTINUOUS
Status: DISPENSED | OUTPATIENT
Start: 2023-04-25 | End: 2023-04-26

## 2023-04-25 RX ADMIN — KETOROLAC TROMETHAMINE 30 MG: 30 INJECTION, SOLUTION INTRAMUSCULAR at 10:23

## 2023-04-25 RX ADMIN — SODIUM CHLORIDE, PRESERVATIVE FREE 10 ML: 5 INJECTION INTRAVENOUS at 21:57

## 2023-04-25 RX ADMIN — OXYCODONE HYDROCHLORIDE 10 MG: 5 TABLET ORAL at 18:03

## 2023-04-25 RX ADMIN — IOHEXOL 30 ML: 350 INJECTION, SOLUTION INTRAVENOUS at 12:27

## 2023-04-25 RX ADMIN — SODIUM CHLORIDE 1 G: 9 INJECTION INTRAMUSCULAR; INTRAVENOUS; SUBCUTANEOUS at 15:24

## 2023-04-25 RX ADMIN — OXYCODONE HYDROCHLORIDE 10 MG: 5 TABLET ORAL at 22:06

## 2023-04-25 RX ADMIN — IOHEXOL 50 ML: 350 INJECTION, SOLUTION INTRAVENOUS at 12:27

## 2023-04-25 RX ADMIN — SODIUM CHLORIDE 1000 ML: 9 INJECTION, SOLUTION INTRAVENOUS at 15:13
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
## 2023-04-25 NOTE — ED NOTES
TRANSFER - OUT REPORT:    Verbal report given to [facility], RN (name) on Jaymie Munoz  being transferred to [address],   [address] (unit) for routine progression of care       Report consisted of patients Situation, Background, Assessment and   Recommendations(SBAR). Information from the following report(s) SBAR, ED Summary and Procedure Summary was reviewed with the receiving nurse. Lines:   Venous Access Device venous access device, power port 01/02/23 Upper chest (subclavicular area, right (Active)       Venous Access Device port 01/16/23 (Active)       Peripheral IV 04/25/23 Right Forearm (Active)   Site Assessment Clean, dry, & intact 04/25/23 1020   Phlebitis Assessment 0 04/25/23 1020   Infiltration Assessment 0 04/25/23 1020   Dressing Status Clean, dry, & intact 04/25/23 1020   Dressing Type Transparent 04/25/23 1020   Hub Color/Line Status Green;Flushed;Patent 04/25/23 1020   Action Taken Blood drawn 04/25/23 1020        Opportunity for questions and clarification was provided.       Patient transported with:   [employer]

## 2023-04-26 LAB
ACC. NO. FROM MICRO ORDER, ACCP: ABNORMAL
ACINETOBACTER CALCOACETICUS-BAUMANII COMPLEX, ACBCX: NOT DETECTED
ALBUMIN SERPL-MCNC: 2.8 G/DL (ref 3.5–5)
ALBUMIN/GLOB SERPL: 0.9 (ref 1.1–2.2)
ALP SERPL-CCNC: 94 U/L (ref 45–117)
ALT SERPL-CCNC: 21 U/L (ref 12–78)
ANION GAP SERPL CALC-SCNC: 3 MMOL/L (ref 5–15)
AST SERPL-CCNC: 17 U/L (ref 15–37)
B FRAGILIS DNA BLD POS QL NAA+NON-PROBE: NOT DETECTED
BASOPHILS # BLD: 0 K/UL (ref 0–0.1)
BASOPHILS NFR BLD: 1 % (ref 0–1)
BILIRUB SERPL-MCNC: 0.1 MG/DL (ref 0.2–1)
BIOFIRE COMMENT, BCIDPF: ABNORMAL
BUN SERPL-MCNC: 7 MG/DL (ref 6–20)
BUN/CREAT SERPL: 10 (ref 12–20)
C ALBICANS DNA BLD POS QL NAA+NON-PROBE: NOT DETECTED
C AURIS DNA BLD POS QL NAA+NON-PROBE: NOT DETECTED
C GATTII+NEOFOR DNA BLD POS QL NAA+N-PRB: NOT DETECTED
C GLABRATA DNA BLD POS QL NAA+NON-PROBE: NOT DETECTED
C KRUSEI DNA BLD POS QL NAA+NON-PROBE: NOT DETECTED
C PARAP DNA BLD POS QL NAA+NON-PROBE: NOT DETECTED
C TROPICLS DNA BLD POS QL NAA+NON-PROBE: NOT DETECTED
CALCIUM SERPL-MCNC: 8 MG/DL (ref 8.5–10.1)
CHLORIDE SERPL-SCNC: 111 MMOL/L (ref 97–108)
CO2 SERPL-SCNC: 25 MMOL/L (ref 21–32)
CREAT SERPL-MCNC: 0.68 MG/DL (ref 0.55–1.02)
DIFFERENTIAL METHOD BLD: ABNORMAL
E CLOAC COMP DNA BLD POS NAA+NON-PROBE: NOT DETECTED
E COLI DNA BLD POS QL NAA+NON-PROBE: NOT DETECTED
E FAECALIS DNA BLD POS QL NAA+NON-PROBE: NOT DETECTED
E FAECIUM DNA BLD POS QL NAA+NON-PROBE: NOT DETECTED
ENTEROBACTERALES DNA BLD POS NAA+N-PRB: NOT DETECTED
EOSINOPHIL # BLD: 0 K/UL (ref 0–0.4)
EOSINOPHIL NFR BLD: 0 % (ref 0–7)
ERYTHROCYTE [DISTWIDTH] IN BLOOD BY AUTOMATED COUNT: 18.2 % (ref 11.5–14.5)
GLOBULIN SER CALC-MCNC: 3 G/DL (ref 2–4)
GLUCOSE SERPL-MCNC: 103 MG/DL (ref 65–100)
GP B STREP DNA BLD POS QL NAA+NON-PROBE: NOT DETECTED
HAEM INFLU DNA BLD POS QL NAA+NON-PROBE: NOT DETECTED
HCT VFR BLD AUTO: 33.6 % (ref 35–47)
HGB BLD-MCNC: 10.1 G/DL (ref 11.5–16)
IMM GRANULOCYTES # BLD AUTO: 0 K/UL
IMM GRANULOCYTES NFR BLD AUTO: 0 %
K OXYTOCA DNA BLD POS QL NAA+NON-PROBE: NOT DETECTED
KLEBSIELLA SP DNA BLD POS QL NAA+NON-PRB: NOT DETECTED
KLEBSIELLA SP DNA BLD POS QL NAA+NON-PRB: NOT DETECTED
L MONOCYTOG DNA BLD POS QL NAA+NON-PROBE: NOT DETECTED
LYMPHOCYTES # BLD: 1.1 K/UL (ref 0.8–3.5)
LYMPHOCYTES NFR BLD: 32 % (ref 12–49)
MCH RBC QN AUTO: 28.7 PG (ref 26–34)
MCHC RBC AUTO-ENTMCNC: 30.1 G/DL (ref 30–36.5)
MCV RBC AUTO: 95.5 FL (ref 80–99)
MONOCYTES # BLD: 0.8 K/UL (ref 0–1)
MONOCYTES NFR BLD: 23 % (ref 5–13)
N MEN DNA BLD POS QL NAA+NON-PROBE: NOT DETECTED
NEUTS SEG # BLD: 1.6 K/UL (ref 1.8–8)
NEUTS SEG NFR BLD: 44 % (ref 32–75)
NRBC # BLD: 0.02 K/UL (ref 0–0.01)
NRBC BLD-RTO: 0.6 PER 100 WBC
P AERUGINOSA DNA BLD POS NAA+NON-PROBE: NOT DETECTED
PLATELET # BLD AUTO: 269 K/UL (ref 150–400)
PMV BLD AUTO: 9.6 FL (ref 8.9–12.9)
POTASSIUM SERPL-SCNC: 4 MMOL/L (ref 3.5–5.1)
PROT SERPL-MCNC: 5.8 G/DL (ref 6.4–8.2)
PROTEUS SP DNA BLD POS QL NAA+NON-PROBE: NOT DETECTED
RBC # BLD AUTO: 3.52 M/UL (ref 3.8–5.2)
RBC MORPH BLD: ABNORMAL
RESISTANT GENE SPACE, REGENE: ABNORMAL
S AUREUS DNA BLD POS QL NAA+NON-PROBE: NOT DETECTED
S AUREUS+CONS DNA BLD POS NAA+NON-PROBE: DETECTED
S EPIDERMIDIS DNA BLD POS QL NAA+NON-PRB: NOT DETECTED
S LUGDUNENSIS DNA BLD POS QL NAA+NON-PRB: NOT DETECTED
S MALTOPHILIA DNA BLD POS QL NAA+NON-PRB: NOT DETECTED
S MARCESCENS DNA BLD POS NAA+NON-PROBE: NOT DETECTED
S PNEUM DNA BLD POS QL NAA+NON-PROBE: NOT DETECTED
S PYO DNA BLD POS QL NAA+NON-PROBE: NOT DETECTED
SALMONELLA DNA BLD POS QL NAA+NON-PROBE: NOT DETECTED
SODIUM SERPL-SCNC: 139 MMOL/L (ref 136–145)
STREPTOCOCCUS DNA BLD POS NAA+NON-PROBE: NOT DETECTED
WBC # BLD AUTO: 3.5 K/UL (ref 3.6–11)
WBC MORPH BLD: ABNORMAL

## 2023-04-26 PROCEDURE — 74011250636 HC RX REV CODE- 250/636: Performed by: HOSPITALIST

## 2023-04-26 PROCEDURE — 74011000258 HC RX REV CODE- 258: Performed by: HOSPITALIST

## 2023-04-26 PROCEDURE — 80053 COMPREHEN METABOLIC PANEL: CPT

## 2023-04-26 PROCEDURE — 85025 COMPLETE CBC W/AUTO DIFF WBC: CPT

## 2023-04-26 PROCEDURE — 74011250637 HC RX REV CODE- 250/637: Performed by: HOSPITALIST

## 2023-04-26 PROCEDURE — 74011000250 HC RX REV CODE- 250: Performed by: HOSPITALIST

## 2023-04-26 PROCEDURE — 65270000029 HC RM PRIVATE

## 2023-04-26 PROCEDURE — 36415 COLL VENOUS BLD VENIPUNCTURE: CPT

## 2023-04-26 PROCEDURE — 99232 SBSQ HOSP IP/OBS MODERATE 35: CPT | Performed by: INTERNAL MEDICINE

## 2023-04-26 PROCEDURE — 80048 BASIC METABOLIC PNL TOTAL CA: CPT

## 2023-04-26 RX ORDER — VANCOMYCIN 2 GRAM/500 ML IN 0.9 % SODIUM CHLORIDE INTRAVENOUS
2000 ONCE
Status: COMPLETED | OUTPATIENT
Start: 2023-04-26 | End: 2023-04-26

## 2023-04-26 RX ADMIN — OXYCODONE HYDROCHLORIDE 10 MG: 5 TABLET ORAL at 15:08

## 2023-04-26 RX ADMIN — OXYCODONE HYDROCHLORIDE 10 MG: 5 TABLET ORAL at 09:29

## 2023-04-26 RX ADMIN — OXYCODONE HYDROCHLORIDE 10 MG: 5 TABLET ORAL at 20:56

## 2023-04-26 RX ADMIN — LISINOPRIL 10 MG: 10 TABLET ORAL at 09:29

## 2023-04-26 RX ADMIN — SODIUM CHLORIDE, PRESERVATIVE FREE 10 ML: 5 INJECTION INTRAVENOUS at 06:10

## 2023-04-26 RX ADMIN — BUPROPION HYDROCHLORIDE 150 MG: 150 TABLET, EXTENDED RELEASE ORAL at 09:28

## 2023-04-26 RX ADMIN — SODIUM CHLORIDE 75 ML/HR: 9 INJECTION, SOLUTION INTRAVENOUS at 11:20

## 2023-04-26 RX ADMIN — ESCITALOPRAM 10 MG: 10 TABLET, FILM COATED ORAL at 09:29

## 2023-04-26 RX ADMIN — SODIUM CHLORIDE 1 G: 9 INJECTION INTRAMUSCULAR; INTRAVENOUS; SUBCUTANEOUS at 15:08

## 2023-04-26 RX ADMIN — ACETAMINOPHEN 650 MG: 325 TABLET ORAL at 11:26

## 2023-04-26 RX ADMIN — DOXYCYCLINE 100 MG: 100 INJECTION, POWDER, LYOPHILIZED, FOR SOLUTION INTRAVENOUS at 15:09

## 2023-04-26 RX ADMIN — SODIUM CHLORIDE, PRESERVATIVE FREE 10 ML: 5 INJECTION INTRAVENOUS at 15:09

## 2023-04-26 RX ADMIN — OXYCODONE HYDROCHLORIDE 10 MG: 5 TABLET ORAL at 03:34

## 2023-04-26 RX ADMIN — BUPROPION HYDROCHLORIDE 150 MG: 150 TABLET, EXTENDED RELEASE ORAL at 17:34

## 2023-04-26 RX ADMIN — ONDANSETRON 4 MG: 2 INJECTION INTRAMUSCULAR; INTRAVENOUS at 17:35

## 2023-04-26 RX ADMIN — VANCOMYCIN HYDROCHLORIDE 2000 MG: 10 INJECTION, POWDER, LYOPHILIZED, FOR SOLUTION INTRAVENOUS at 17:34

## 2023-04-26 NOTE — PROGRESS NOTES
Problem: Falls - Risk of  Goal: *Absence of Falls  Description: Document Sylvia Benton Fall Risk and appropriate interventions in the flowsheet. Outcome: Progressing Towards Goal  Note: Fall Risk Interventions:        Fall sign on door. Call bell within reach. Bed wheels locked. Gripper socks to bilateral feet when out of bed. Bed alarm on.
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
## 2023-04-26 NOTE — PROGRESS NOTES
[address] [facility]  Hospitalist Group                                                                                          Hospitalist Progress Note  Douglas Brown MD  Answering service: [phone] from in house phone        Date of Service:  2023  NAME:  Phil Ann  :  1985  MRN:  [id number]       Admission Summary:   Phil Ann is a 45 y.o. female who presents with leg pain on the right side. Patient said that recently she had a orthopedic surgery in January and today came to the ED basically because of the pain on that side the right side. She has a past medical history of breast cancer with mets status post chemo every 3 weeks next chemo Monday followed by Dr. Samantha Nunez came with above complaints. CT of chest with and without contrast did not show any PE but found to have right upper lobe infiltrate patient was on chemotherapy so was admitted for further management of the pneumonia because of immunocompromised status       Interval history / Subjective:    Follow-up for pneumonia patient seen by oncology still having knee pain consulted orthopedics     Assessment & Plan:      Bacterial pneumonia community-acquired  -- Patient has immunocompromised status  -- We will admit patient to medical floor  -- Blood cultures --reviewed  STAPMAEGAN will change antibiotic to Vanco and continue ceftriaxone doxycycline and de-escalate as necessary  -- Follow cultures patient is on room air support with nebs as necessary  -- DVT and PE negative  --Orthopedic consult for knee pain     History of breast cancer with mets  -- Follow-up with Dr. Samantha Nunez as an outpatient  -- Undergoing chemo q. 3 weeks     Anxiety depression continue home medication       Code status: Full code  Prophylaxis: SCD  Care Plan discussed with: Patient RN  Anticipated Disposition: 24 hours  Inpatient  Cardiac monitoring: None     Hospital Problems  Date Reviewed: 2023            Codes Class Noted POA Pneumonia ICD-10-CM: J18.9  ICD-9-CM: 962  4/25/2023 Unknown           Social Determinants of Health     Tobacco Use: Low Risk     Smoking Tobacco Use: Never    Smokeless Tobacco Use: Never    Passive Exposure: Not on file   Alcohol Use: Not on file   Financial Resource Strain: Not on file   Food Insecurity: Not on file   Transportation Needs: Not on file   Physical Activity: Not on file   Stress: Not on file   Social Connections: Not on file   Intimate Partner Violence: Not on file   Depression: Not at risk    PHQ-2 Score: 0   Housing Stability: Not on file       Review of Systems:   A comprehensive review of systems was negative. Vital Signs:    Last 24hrs VS reviewed since prior progress note. Most recent are:  Visit Vitals  /67   Pulse 76   Temp 97.6 °F (36.4 °C)   Resp 18   Ht 5' 2\" (1.575 m)   Wt 84.9 kg (187 lb 2.7 oz)   SpO2 97%   BMI 34.23 kg/m²         Intake/Output Summary (Last 24 hours) at 4/26/2023 1824  Last data filed at 4/26/2023 0630  Gross per 24 hour   Intake --   Output 0 ml   Net 0 ml        Physical Examination:     I had a face to face encounter with this patient and independently examined them on 4/26/2023 as outlined below:          General : alert x 3, awake, no acute distress,   HEENT: PEERL, EOMI, moist mucus membrane, TM clear  Neck: supple, no JVD, no meningeal signs  Chest: Clear to auscultation bilaterally   CVS: S1 S2 heard, Capillary refill less than 2 seconds  Abd: soft/ non tender, non distended, BS physiological,   Ext: no clubbing, no cyanosis, no edema, brisk 2+ DP pulses  Neuro/Psych: pleasant mood and affect, CN 2-12 grossly intact, sensory grossly within normal limit, Strength 5/5 in all extremities, DTR 1+ x 4  Skin: warm     Data Review:    I personally reviewed  Image and LABS      I have personally and independently reviewed all pertinent labs, diagnostic studies, imaging, and have provided independent interpretation of the same.      Labs:     Recent Labs 04/26/23  0003 04/25/23  1005   WBC 3.5* 5.3   HGB 10.1* 10.5*   HCT 33.6* 34.9*    275     Recent Labs     04/26/23  0003 04/25/23  1005    138   K 4.0 3.9   * 107   CO2 25 26   BUN 7 6   CREA 0.68 0.71   * 110*   CA 8.0* 8.7     Recent Labs     04/26/23  0003 04/25/23  1005   ALT 21 30   AP 94 109   TBILI 0.1* 0.2   TP 5.8* 6.4   ALB 2.8* 3.3*   GLOB 3.0 3.1     No results for input(s): INR, PTP, APTT, INREXT in the last 72 hours. No results for input(s): FE, TIBC, PSAT, FERR in the last 72 hours. Lab Results   Component Value Date/Time    Folate 9.8 01/03/2023 03:11 AM      No results for input(s): PH, PCO2, PO2 in the last 72 hours. No results for input(s): CPK, CKNDX, TROIQ in the last 72 hours. No lab exists for component: CPKMB  No results found for: CHOL, CHOLX, CHLST, CHOLV, HDL, HDLP, LDL, LDLC, DLDLP, TGLX, TRIGL, TRIGP, CHHD, CHHDX  Lab Results   Component Value Date/Time    Glucose (POC) 85 09/28/2012 02:34 AM    Glucose (POC) 95 09/28/2012 12:26 AM    Glucose (POC) 109 (H) 09/27/2012 09:25 PM     Lab Results   Component Value Date/Time    Color YELLOW/STRAW 01/03/2023 10:31 AM    Appearance CLEAR 01/03/2023 10:31 AM    Specific gravity 1.015 01/03/2023 10:31 AM    pH (UA) 6.0 01/03/2023 10:31 AM    Protein Negative 01/03/2023 10:31 AM    Glucose Negative 01/03/2023 10:31 AM    Ketone Negative 01/03/2023 10:31 AM    Bilirubin Negative 01/03/2023 10:31 AM    Urobilinogen 0.2 01/03/2023 10:31 AM    Nitrites Negative 01/03/2023 10:31 AM    Leukocyte Esterase Negative 01/03/2023 10:31 AM    Epithelial cells FEW 01/03/2023 10:31 AM    Bacteria Negative 01/03/2023 10:31 AM    WBC 0-4 01/03/2023 10:31 AM    RBC 0-5 01/03/2023 10:31 AM       Notes reviewed from all clinical/nonclinical/nursing services involved in patient's clinical care. Care coordination discussions were held with appropriate clinical/nonclinical/ nursing providers based on care coordination needs. Patients current active Medications were reviewed, considered, added and adjusted based on the clinical condition today. Home Medications were reconciled to the best of my ability given all available resources at the time of admission. Route is PO if not otherwise noted.       Admission Status:[id number]:::1}      Medications Reviewed:     Current Facility-Administered Medications   Medication Dose Route Frequency    vancomycin (VANCOCIN) 2000 mg in  ml infusion  2,000 mg IntraVENous ONCE    vancomycin - pharmacy to dose   Other Rx Dosing/Monitoring    [START ON 4/27/2023] vancomycin (VANCOCIN) 1,000 mg in 0.9% sodium chloride 250 mL ([id number])  1,000 mg IntraVENous Q8H    buPROPion SR (WELLBUTRIN SR) tablet 150 mg  150 mg Oral BID    escitalopram oxalate (LEXAPRO) tablet 10 mg  10 mg Oral DAILY    lisinopriL (PRINIVIL, ZESTRIL) tablet 10 mg  10 mg Oral DAILY    sodium chloride (NS) flush 5-40 mL  5-40 mL IntraVENous Q8H    sodium chloride (NS) flush 5-40 mL  5-40 mL IntraVENous PRN    acetaminophen (TYLENOL) tablet 650 mg  650 mg Oral Q6H PRN    Or    acetaminophen (TYLENOL) suppository 650 mg  650 mg Rectal Q6H PRN    polyethylene glycol (MIRALAX) packet 17 g  17 g Oral DAILY PRN    ondansetron (ZOFRAN ODT) tablet 4 mg  4 mg Oral Q8H PRN    Or    ondansetron (ZOFRAN) injection 4 mg  4 mg IntraVENous Q6H PRN    doxycycline (VIBRAMYCIN) 100 mg in 0.9% sodium chloride (MBP/ADV) 100 mL MBP  100 mg IntraVENous Q12H    cefTRIAXone (ROCEPHIN) 1 g in 0.9% sodium chloride 10 mL IV syringe  1 g IntraVENous Q24H    oxyCODONE IR (ROXICODONE) tablet 10 mg  10 mg Oral Q4H PRN    naloxone (NARCAN) injection 0.4 mg  0.4 mg IntraVENous EVERY 2 MINUTES AS NEEDED     ______________________________________________________________________  EXPECTED LENGTH OF STAY: - - -  ACTUAL LENGTH OF STAY:          1                 Mansi Pedersen MD

## 2023-04-26 NOTE — PROGRESS NOTES
TRANSITION OF CARE  RUR--16%  Disposition--Home with family assist.  Consult: medical oncology. Transport--Family    Patient's primary family contact: spouse Fransisco Gramajo Management Interventions  PCP Verified by CM: Yes  Transition of Care Consult (CM Consult): Other  Physical Therapy Consult: No  Occupational Therapy Consult: No  Speech Therapy Consult: No  Support Systems: Spouse/Significant Other  Confirm Follow Up Transport: Family  The Plan for Transition of Care is Related to the Following Treatment Goals : Home with family assistance. Discharge Location  Patient Expects to be Discharged to[de-identified] Home with family assistance    Reason for Admission: Bacterial PNA         History of breast cancer with mets       History of right femoral neck fracture Jan 2023                   RUR Score:          16%              Plan for utilizing home health:        None    PCP: First and Last name:  Cheryl Jackson, [address]   Name of Practice:    Are you a current patient: Yes    Approximate date of last visit:    Can you participate in a virtual visit with your PCP:                     Current Advanced Directive/Advance Care Plan: Full Code    Healthcare Decision Maker:   Click here to complete [address] including selection of the Healthcare Decision Maker Relationship (ie \"Primary\")                         Transition of Care Plan:      CM met with patient. Patient lives with her . Local family support includes 3 siblings. Patient was ambulatory prior to admission. Patient confirmed PCP, health insurance, and prescription coverage. DME :cane, walker, bedside commode.

## 2023-04-26 NOTE — PROGRESS NOTES
MED/ONC  Doppler still pending but looks negative prelim  If negative, would consult Ortho for RLE weakness  And get MRIs if needed

## 2023-04-26 NOTE — PROGRESS NOTES
Pharmacist Note - Vancomycin Dosing    Consult provided for this 45 y.o. female for indication of bacteremia. Antibiotic regimen(s): doxycycline + ceftriaxone  Patient on vancomycin PTA? NO     Recent Labs     23  0003 23  1005   WBC 3.5* 5.3   CREA 0.68 0.71   BUN 7 6     Frequency of BMP: daily x 3   Height: 157.5 cm  Weight: 84.9 kg  Est CrCl: 110 ml/min; UO: -- ml/kg/hr  Temp (24hrs), Av.2 °F (36.8 °C), Min:97.8 °F (36.6 °C), Max:98.8 °F (37.1 °C)    MRSA Swab ordered (if applicable)? N/A    The plan below is expected to result in a target range of AUC/COURTNEY 400-600    Therapy will be initiated with a loading dose of 2000 mg IV x 1 to be followed by a maintenance dose of 1000 mg IV every 8 hours. Pharmacy to follow patient daily and order levels / make dose adjustments as appropriate. *Vancomycin has been dosed used Bayesian kinetics software to target an AUC/COURTNEY of 400-600, which provides adequate exposure for an assumed infection due to MRSA with an COURTNEY of 1 or less while reducing the risk of nephrotoxicity as seen with traditional trough based dosing goals.

## 2023-04-27 LAB
ANION GAP SERPL CALC-SCNC: 4 MMOL/L (ref 5–15)
BACTERIA SPEC CULT: ABNORMAL
BACTERIA SPEC CULT: ABNORMAL
BASOPHILS # BLD: 0 K/UL (ref 0–0.1)
BASOPHILS NFR BLD: 1 % (ref 0–1)
BUN SERPL-MCNC: 7 MG/DL (ref 6–20)
BUN/CREAT SERPL: 11 (ref 12–20)
CALCIUM SERPL-MCNC: 8.8 MG/DL (ref 8.5–10.1)
CHLORIDE SERPL-SCNC: 110 MMOL/L (ref 97–108)
CO2 SERPL-SCNC: 25 MMOL/L (ref 21–32)
CREAT SERPL-MCNC: 0.63 MG/DL (ref 0.55–1.02)
DIFFERENTIAL METHOD BLD: ABNORMAL
EOSINOPHIL # BLD: 0 K/UL (ref 0–0.4)
EOSINOPHIL NFR BLD: 0 % (ref 0–7)
ERYTHROCYTE [DISTWIDTH] IN BLOOD BY AUTOMATED COUNT: 18 % (ref 11.5–14.5)
GLUCOSE SERPL-MCNC: 119 MG/DL (ref 65–100)
HCT VFR BLD AUTO: 33.1 % (ref 35–47)
HGB BLD-MCNC: 10 G/DL (ref 11.5–16)
IMM GRANULOCYTES # BLD AUTO: 0 K/UL
IMM GRANULOCYTES NFR BLD AUTO: 0 %
LYMPHOCYTES # BLD: 1.6 K/UL (ref 0.8–3.5)
LYMPHOCYTES NFR BLD: 40 % (ref 12–49)
MCH RBC QN AUTO: 28.7 PG (ref 26–34)
MCHC RBC AUTO-ENTMCNC: 30.2 G/DL (ref 30–36.5)
MCV RBC AUTO: 95.1 FL (ref 80–99)
MONOCYTES # BLD: 0.7 K/UL (ref 0–1)
MONOCYTES NFR BLD: 17 % (ref 5–13)
NEUTS BAND NFR BLD MANUAL: 4 % (ref 0–6)
NEUTS SEG # BLD: 1.8 K/UL (ref 1.8–8)
NEUTS SEG NFR BLD: 38 % (ref 32–75)
NRBC # BLD: 0.02 K/UL (ref 0–0.01)
NRBC BLD-RTO: 0.5 PER 100 WBC
PLATELET # BLD AUTO: 300 K/UL (ref 150–400)
PMV BLD AUTO: 9.3 FL (ref 8.9–12.9)
POTASSIUM SERPL-SCNC: 4.1 MMOL/L (ref 3.5–5.1)
RBC # BLD AUTO: 3.48 M/UL (ref 3.8–5.2)
RBC MORPH BLD: ABNORMAL
SERVICE CMNT-IMP: ABNORMAL
SODIUM SERPL-SCNC: 139 MMOL/L (ref 136–145)
WBC # BLD AUTO: 4.1 K/UL (ref 3.6–11)

## 2023-04-27 PROCEDURE — 74011250637 HC RX REV CODE- 250/637: Performed by: HOSPITALIST

## 2023-04-27 PROCEDURE — 87040 BLOOD CULTURE FOR BACTERIA: CPT

## 2023-04-27 PROCEDURE — 65270000029 HC RM PRIVATE

## 2023-04-27 PROCEDURE — 77030003560 HC NDL HUBR BARD -A

## 2023-04-27 PROCEDURE — 74011000258 HC RX REV CODE- 258: Performed by: HOSPITALIST

## 2023-04-27 PROCEDURE — 99232 SBSQ HOSP IP/OBS MODERATE 35: CPT | Performed by: INTERNAL MEDICINE

## 2023-04-27 PROCEDURE — 74011000250 HC RX REV CODE- 250: Performed by: HOSPITALIST

## 2023-04-27 PROCEDURE — 74011250636 HC RX REV CODE- 250/636: Performed by: HOSPITALIST

## 2023-04-27 PROCEDURE — 99231 SBSQ HOSP IP/OBS SF/LOW 25: CPT | Performed by: ORTHOPAEDIC SURGERY

## 2023-04-27 RX ADMIN — BUPROPION HYDROCHLORIDE 150 MG: 150 TABLET, EXTENDED RELEASE ORAL at 18:40

## 2023-04-27 RX ADMIN — OXYCODONE HYDROCHLORIDE 10 MG: 5 TABLET ORAL at 02:29

## 2023-04-27 RX ADMIN — VANCOMYCIN HYDROCHLORIDE 1000 MG: 1 INJECTION, POWDER, LYOPHILIZED, FOR SOLUTION INTRAVENOUS at 02:27

## 2023-04-27 RX ADMIN — SODIUM CHLORIDE, PRESERVATIVE FREE 10 ML: 5 INJECTION INTRAVENOUS at 15:26

## 2023-04-27 RX ADMIN — OXYCODONE HYDROCHLORIDE 10 MG: 5 TABLET ORAL at 12:34

## 2023-04-27 RX ADMIN — VANCOMYCIN HYDROCHLORIDE 1000 MG: 1 INJECTION, POWDER, LYOPHILIZED, FOR SOLUTION INTRAVENOUS at 18:41

## 2023-04-27 RX ADMIN — DOXYCYCLINE 100 MG: 100 INJECTION, POWDER, LYOPHILIZED, FOR SOLUTION INTRAVENOUS at 16:50

## 2023-04-27 RX ADMIN — VANCOMYCIN HYDROCHLORIDE 1000 MG: 1 INJECTION, POWDER, LYOPHILIZED, FOR SOLUTION INTRAVENOUS at 09:11

## 2023-04-27 RX ADMIN — SODIUM CHLORIDE 1 G: 9 INJECTION INTRAMUSCULAR; INTRAVENOUS; SUBCUTANEOUS at 15:26

## 2023-04-27 RX ADMIN — ESCITALOPRAM 10 MG: 10 TABLET, FILM COATED ORAL at 09:11

## 2023-04-27 RX ADMIN — DOXYCYCLINE 100 MG: 100 INJECTION, POWDER, LYOPHILIZED, FOR SOLUTION INTRAVENOUS at 04:59

## 2023-04-27 RX ADMIN — LISINOPRIL 10 MG: 10 TABLET ORAL at 09:11

## 2023-04-27 RX ADMIN — OXYCODONE HYDROCHLORIDE 10 MG: 5 TABLET ORAL at 07:50

## 2023-04-27 RX ADMIN — OXYCODONE HYDROCHLORIDE 10 MG: 5 TABLET ORAL at 18:43

## 2023-04-27 RX ADMIN — ACETAMINOPHEN 650 MG: 325 TABLET ORAL at 10:16

## 2023-04-27 RX ADMIN — BUPROPION HYDROCHLORIDE 150 MG: 150 TABLET, EXTENDED RELEASE ORAL at 09:11
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
## 2023-04-27 NOTE — PROGRESS NOTES
Requested order for port access from Attending Oncologist. Patient's IV infiltrated, she requested port access.
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
## 2023-04-27 NOTE — PROGRESS NOTES
[address] [facility]  Hospitalist Group                                                                                          Hospitalist Progress Note  Hamzah Monroy MD  Answering service: [phone] from in house phone        Date of Service:  2023  NAME:  Jaymie Munoz  :  1985  MRN:  [id number]       Admission Summary:   Jaymie Munoz is a 45 y.o. female who presents with leg pain on the right side. Patient said that recently she had a orthopedic surgery in January and today came to the ED basically because of the pain on that side the right side. She has a past medical history of breast cancer with mets status post chemo every 3 weeks next chemo Monday followed by Dr. Lai Landrum came with above complaints. CT of chest with and without contrast did not show any PE but found to have right upper lobe infiltrate patient was on chemotherapy so was admitted for further management of the pneumonia because of immunocompromised status       Interval history / Subjective:   Seen and examined for PNA. Reports hip pain and some cough. No acute events overnight. No acute changes.       Assessment & Plan:      Bacterial pneumonia community-acquired  -- Patient has immunocompromised status  -- We will admit patient to medical floor  -- Blood cultures --reviewed  [facility] will change antibiotic to Vanco and continue ceftriaxone doxycycline and de-escalate as necessary  -- Likely contaminant  -- Follow cultures patient is on room air support with nebs as necessary  -- continue antibiotic  -- potenitally DC on augmentin and doxy when ready     History of breast cancer with mets  -- Follow-up with Dr. Lai Landrum as an outpatient  -- Undergoing chemo q. 3 weeks    Right hip pain s/p surgery in January  Ortho has seen  Pain control  No restrictrions     Anxiety depression continue home medication       Code status: Full code  Prophylaxis: SCD  Care Plan discussed with: Patient RN  Anticipated Disposition: 24 hours  Inpatient  Cardiac monitoring: None     Hospital Problems  Date Reviewed: 4/21/2023            Codes Class Noted POA    Pneumonia ICD-10-CM: J18.9  ICD-9-CM: 486  4/25/2023 Unknown         Social Determinants of Health     Tobacco Use: Low Risk     Smoking Tobacco Use: Never    Smokeless Tobacco Use: Never    Passive Exposure: Not on file   Alcohol Use: Not on file   Financial Resource Strain: Not on file   Food Insecurity: Not on file   Transportation Needs: Not on file   Physical Activity: Not on file   Stress: Not on file   Social Connections: Not on file   Intimate Partner Violence: Not on file   Depression: Not at risk    PHQ-2 Score: 0   Housing Stability: Not on file       Review of Systems:   A comprehensive review of systems was negative. Vital Signs:    Last 24hrs VS reviewed since prior progress note.  Most recent are:  Visit Vitals  /64 (BP 1 Location: Left arm, BP Patient Position: At rest)   Pulse 90   Temp 97.7 °F (36.5 °C)   Resp 18   Ht 5' 2\" (1.575 m)   Wt 84.9 kg (187 lb 2.7 oz)   SpO2 92%   BMI 34.23 kg/m²         Intake/Output Summary (Last 24 hours) at 4/27/2023 1554  Last data filed at 4/27/2023 0800  Gross per 24 hour   Intake 240 ml   Output --   Net 240 ml          Physical Examination:     I had a face to face encounter with this patient and independently examined them on 4/27/2023 as outlined below:          General : alert x 3, awake, no acute distress,   HEENT: PEERL, EOMI, moist mucus membrane, TM clear  Neck: supple, no JVD, no meningeal signs  Chest: Clear to auscultation bilaterally   CVS: S1 S2 heard, Capillary refill less than 2 seconds  Abd: soft/ non tender, non distended, BS physiological,   Ext: no clubbing, no cyanosis, no edema, brisk 2+ DP pulses  Neuro/Psych: pleasant mood and affect, CN 2-12 grossly intact, sensory grossly within normal limit, Strength 5/5 in all extremities, DTR 1+ x 4  Skin: warm     Data Review:    I personally reviewed  Image and LABS      I have personally and independently reviewed all pertinent labs, diagnostic studies, imaging, and have provided independent interpretation of the same. Labs:     Recent Labs     04/26/23 2348 04/26/23  0003   WBC 4.1 3.5*   HGB 10.0* 10.1*   HCT 33.1* 33.6*    269       Recent Labs     04/26/23  2348 04/26/23  0003 04/25/23  1005    139 138   K 4.1 4.0 3.9   * 111* 107   CO2 25 25 26   BUN 7 7 6   CREA 0.63 0.68 0.71   * 103* 110*   CA 8.8 8.0* 8.7       Recent Labs     04/26/23  0003 04/25/23  1005   ALT 21 30   AP 94 109   TBILI 0.1* 0.2   TP 5.8* 6.4   ALB 2.8* 3.3*   GLOB 3.0 3.1       No results for input(s): INR, PTP, APTT, INREXT, INREXT in the last 72 hours. No results for input(s): FE, TIBC, PSAT, FERR in the last 72 hours. Lab Results   Component Value Date/Time    Folate 9.8 01/03/2023 03:11 AM        No results for input(s): PH, PCO2, PO2 in the last 72 hours. No results for input(s): CPK, CKNDX, TROIQ in the last 72 hours.     No lab exists for component: CPKMB  No results found for: CHOL, CHOLX, CHLST, CHOLV, HDL, HDLP, LDL, LDLC, DLDLP, TGLX, TRIGL, TRIGP, CHHD, CHHDX  Lab Results   Component Value Date/Time    Glucose (POC) 85 09/28/2012 02:34 AM    Glucose (POC) 95 09/28/2012 12:26 AM    Glucose (POC) 109 (H) 09/27/2012 09:25 PM     Lab Results   Component Value Date/Time    Color YELLOW/STRAW 01/03/2023 10:31 AM    Appearance CLEAR 01/03/2023 10:31 AM    Specific gravity 1.015 01/03/2023 10:31 AM    pH (UA) 6.0 01/03/2023 10:31 AM    Protein Negative 01/03/2023 10:31 AM    Glucose Negative 01/03/2023 10:31 AM    Ketone Negative 01/03/2023 10:31 AM    Bilirubin Negative 01/03/2023 10:31 AM    Urobilinogen 0.2 01/03/2023 10:31 AM    Nitrites Negative 01/03/2023 10:31 AM    Leukocyte Esterase Negative 01/03/2023 10:31 AM    Epithelial cells FEW 01/03/2023 10:31 AM    Bacteria Negative 01/03/2023 10:31 AM    WBC 0-4 01/03/2023 10:31 AM    RBC 0-5 01/03/2023 10:31 AM       Notes reviewed from all clinical/nonclinical/nursing services involved in patient's clinical care. Care coordination discussions were held with appropriate clinical/nonclinical/ nursing providers based on care coordination needs. Patients current active Medications were reviewed, considered, added and adjusted based on the clinical condition today. Home Medications were reconciled to the best of my ability given all available resources at the time of admission. Route is PO if not otherwise noted.       Admission Status:[id number]:::1}      Medications Reviewed:     Current Facility-Administered Medications   Medication Dose Route Frequency    [START ON 4/28/2023] vanco random level 4/28 @ 6:00 am (draw w/BMP, time for 4+ hr after the last dose of vanco)   Other ONCE    vancomycin - pharmacy to dose   Other Rx Dosing/Monitoring    vancomycin (VANCOCIN) 1,000 mg in 0.9% sodium chloride 250 mL ([id number])  1,000 mg IntraVENous Q8H    buPROPion SR (WELLBUTRIN SR) tablet 150 mg  150 mg Oral BID    escitalopram oxalate (LEXAPRO) tablet 10 mg  10 mg Oral DAILY    lisinopriL (PRINIVIL, ZESTRIL) tablet 10 mg  10 mg Oral DAILY    sodium chloride (NS) flush 5-40 mL  5-40 mL IntraVENous Q8H    sodium chloride (NS) flush 5-40 mL  5-40 mL IntraVENous PRN    acetaminophen (TYLENOL) tablet 650 mg  650 mg Oral Q6H PRN    Or    acetaminophen (TYLENOL) suppository 650 mg  650 mg Rectal Q6H PRN    polyethylene glycol (MIRALAX) packet 17 g  17 g Oral DAILY PRN    ondansetron (ZOFRAN ODT) tablet 4 mg  4 mg Oral Q8H PRN    Or    ondansetron (ZOFRAN) injection 4 mg  4 mg IntraVENous Q6H PRN    doxycycline (VIBRAMYCIN) 100 mg in 0.9% sodium chloride (MBP/ADV) 100 mL MBP  100 mg IntraVENous Q12H    cefTRIAXone (ROCEPHIN) 1 g in 0.9% sodium chloride 10 mL IV syringe  1 g IntraVENous Q24H    oxyCODONE IR (ROXICODONE) tablet 10 mg  10 mg Oral Q4H PRN    naloxone (NARCAN) injection 0.4 mg 0.4 mg IntraVENous EVERY 2 MINUTES AS NEEDED     ______________________________________________________________________  EXPECTED LENGTH OF STAY: 2d 21h  ACTUAL LENGTH OF STAY:          [id number] Marilyn Danielson MD

## 2023-04-28 VITALS
OXYGEN SATURATION: 92 % | DIASTOLIC BLOOD PRESSURE: 75 MMHG | TEMPERATURE: 97.8 F | HEIGHT: 62 IN | RESPIRATION RATE: 16 BRPM | BODY MASS INDEX: 34.44 KG/M2 | HEART RATE: 92 BPM | SYSTOLIC BLOOD PRESSURE: 115 MMHG | WEIGHT: 187.17 LBS

## 2023-04-28 LAB
ANION GAP SERPL CALC-SCNC: 2 MMOL/L (ref 5–15)
BASOPHILS # BLD: 0.1 K/UL (ref 0–0.1)
BASOPHILS NFR BLD: 1 % (ref 0–1)
BUN SERPL-MCNC: 9 MG/DL (ref 6–20)
BUN/CREAT SERPL: 13 (ref 12–20)
CALCIUM SERPL-MCNC: 8.7 MG/DL (ref 8.5–10.1)
CHLORIDE SERPL-SCNC: 110 MMOL/L (ref 97–108)
CO2 SERPL-SCNC: 27 MMOL/L (ref 21–32)
CREAT SERPL-MCNC: 0.67 MG/DL (ref 0.55–1.02)
DIFFERENTIAL METHOD BLD: ABNORMAL
EOSINOPHIL # BLD: 0 K/UL (ref 0–0.4)
EOSINOPHIL NFR BLD: 0 % (ref 0–7)
ERYTHROCYTE [DISTWIDTH] IN BLOOD BY AUTOMATED COUNT: 17.9 % (ref 11.5–14.5)
GLUCOSE SERPL-MCNC: 107 MG/DL (ref 65–100)
HCT VFR BLD AUTO: 32.8 % (ref 35–47)
HGB BLD-MCNC: 10 G/DL (ref 11.5–16)
IMM GRANULOCYTES # BLD AUTO: 0 K/UL
IMM GRANULOCYTES NFR BLD AUTO: 0 %
LYMPHOCYTES # BLD: 1.5 K/UL (ref 0.8–3.5)
LYMPHOCYTES NFR BLD: 31 % (ref 12–49)
MCH RBC QN AUTO: 28.4 PG (ref 26–34)
MCHC RBC AUTO-ENTMCNC: 30.5 G/DL (ref 30–36.5)
MCV RBC AUTO: 93.2 FL (ref 80–99)
METAMYELOCYTES NFR BLD MANUAL: 1 %
MONOCYTES # BLD: 0.4 K/UL (ref 0–1)
MONOCYTES NFR BLD: 8 % (ref 5–13)
MYELOCYTES NFR BLD MANUAL: 1 %
NEUTS SEG # BLD: 2.8 K/UL (ref 1.8–8)
NEUTS SEG NFR BLD: 58 % (ref 32–75)
NRBC # BLD: 0.02 K/UL (ref 0–0.01)
NRBC BLD-RTO: 0.4 PER 100 WBC
PLATELET # BLD AUTO: 319 K/UL (ref 150–400)
PMV BLD AUTO: 9.5 FL (ref 8.9–12.9)
POTASSIUM SERPL-SCNC: 3.8 MMOL/L (ref 3.5–5.1)
RBC # BLD AUTO: 3.52 M/UL (ref 3.8–5.2)
RBC MORPH BLD: ABNORMAL
SODIUM SERPL-SCNC: 139 MMOL/L (ref 136–145)
VANCOMYCIN SERPL-MCNC: 26.1 UG/ML
WBC # BLD AUTO: 4.9 K/UL (ref 3.6–11)
WBC MORPH BLD: ABNORMAL

## 2023-04-28 PROCEDURE — 74011000250 HC RX REV CODE- 250: Performed by: HOSPITALIST

## 2023-04-28 PROCEDURE — 74011000258 HC RX REV CODE- 258: Performed by: HOSPITALIST

## 2023-04-28 PROCEDURE — 74011250636 HC RX REV CODE- 250/636: Performed by: HOSPITALIST

## 2023-04-28 PROCEDURE — 74011250637 HC RX REV CODE- 250/637: Performed by: HOSPITALIST

## 2023-04-28 PROCEDURE — 80202 ASSAY OF VANCOMYCIN: CPT

## 2023-04-28 PROCEDURE — 80048 BASIC METABOLIC PNL TOTAL CA: CPT

## 2023-04-28 PROCEDURE — 36415 COLL VENOUS BLD VENIPUNCTURE: CPT

## 2023-04-28 PROCEDURE — 85025 COMPLETE CBC W/AUTO DIFF WBC: CPT

## 2023-04-28 RX ORDER — AMOXICILLIN AND CLAVULANATE POTASSIUM 875; 125 MG/1; MG/1
1 TABLET, FILM COATED ORAL EVERY 12 HOURS
Qty: 14 TABLET | Refills: 0 | Status: SHIPPED | OUTPATIENT
Start: 2023-04-28 | End: 2023-05-05

## 2023-04-28 RX ORDER — OXYCODONE HYDROCHLORIDE 10 MG/1
10 TABLET ORAL
Qty: 10 TABLET | Refills: 0 | Status: SHIPPED | OUTPATIENT
Start: 2023-04-28 | End: 2023-05-01

## 2023-04-28 RX ORDER — DOXYCYCLINE 100 MG/1
100 CAPSULE ORAL 2 TIMES DAILY
Qty: 14 CAPSULE | Refills: 0 | Status: SHIPPED | OUTPATIENT
Start: 2023-04-28 | End: 2023-05-05

## 2023-04-28 RX ORDER — HEPARIN 100 UNIT/ML
300 SYRINGE INTRAVENOUS AS NEEDED
Status: DISCONTINUED | OUTPATIENT
Start: 2023-04-28 | End: 2023-04-28 | Stop reason: HOSPADM

## 2023-04-28 RX ORDER — BACITRACIN 500 UNIT/G
PACKET (EA) TOPICAL
Status: DISCONTINUED
Start: 2023-04-28 | End: 2023-04-28 | Stop reason: HOSPADM

## 2023-04-28 RX ADMIN — OXYCODONE HYDROCHLORIDE 10 MG: 5 TABLET ORAL at 00:12

## 2023-04-28 RX ADMIN — LISINOPRIL 10 MG: 10 TABLET ORAL at 09:24

## 2023-04-28 RX ADMIN — ESCITALOPRAM 10 MG: 10 TABLET, FILM COATED ORAL at 09:24

## 2023-04-28 RX ADMIN — VANCOMYCIN HYDROCHLORIDE 1000 MG: 1 INJECTION, POWDER, LYOPHILIZED, FOR SOLUTION INTRAVENOUS at 02:33

## 2023-04-28 RX ADMIN — DOXYCYCLINE 100 MG: 100 INJECTION, POWDER, LYOPHILIZED, FOR SOLUTION INTRAVENOUS at 04:10

## 2023-04-28 RX ADMIN — SODIUM CHLORIDE, PRESERVATIVE FREE 10 ML: 5 INJECTION INTRAVENOUS at 00:12

## 2023-04-28 RX ADMIN — BUPROPION HYDROCHLORIDE 150 MG: 150 TABLET, EXTENDED RELEASE ORAL at 09:24

## 2023-04-28 RX ADMIN — OXYCODONE HYDROCHLORIDE 10 MG: 5 TABLET ORAL at 06:28

## 2023-04-28 RX ADMIN — SODIUM CHLORIDE, PRESERVATIVE FREE 10 ML: 5 INJECTION INTRAVENOUS at 06:24

## 2023-04-28 NOTE — DISCHARGE SUMMARY
Physician Discharge Summary     Patient ID:    Inessa Durán  [id number]  78 yo   1985    Admit date: 4/25/2023    Inessa Durán is a 45 y.o. female who presents with leg pain on the right side. Patient said that recently she had a orthopedic surgery in January and today came to the ED basically because of the pain on that side the right side. She has a past medical history of breast cancer with mets status post chemo every 3 weeks next chemo Monday followed by Dr. Samra Valdes came with above complaints. CT of chest with and without contrast did not show any PE but found to have right upper lobe infiltrate patient was on chemotherapy so was admitted for further management of the pneumonia because of immunocompromised status. Bacterial pneumonia community-acquired  -- Patient has immunocompromised status  -- We will admit patient to medical floor  -- Blood cultures and continue antibiotics ceftriaxone doxycycline  -- Follow cultures patient is on room air support with nebs as necessary  -- We will also rule out DVT although PE negative     History of breast cancer with mets  -- Follow-up with Dr. Samra Valdes as an outpatient  -- Undergoing chemo q. 3 weeks     Anxiety depression continue home medication    Discharge date and time: 4/28/2023      DISCHARGE CONDITION: Good        Hospital Diagnoses and Treatment Rendered:       Treated for PNA. Seen by orthopedic surgery. Cyrus Tompkins had one culture bottle grow coag neg staph but no signs or symptoms, deemed contaminant. Responded well to PNA treatment and was discharged on antibiotics.      Bacterial pneumonia community-acquired  -- Finish Augmentin and Doxy     History of breast cancer with mets  -- Follow-up with Dr. Samra Valdes as an outpatient  -- Undergoing chemo q. 3 weeks     Right hip pain s/p surgery in January  NSAIDs  No restrictions    One bottle growing coag neg staph  - Looks like contaminant  - Second culture negative so far Anxiety depression continue home medication      Chronic Diagnoses:    Problem List as of 4/28/2023 Date Reviewed: 4/21/2023            Codes Class Noted - Resolved    Acute ischemic stroke [facility]) ICD-10-CM: I63.9  ICD-9-CM: 434.91  4/25/2023 - Present        Pneumonia ICD-10-CM: J18.9  ICD-9-CM: 486  4/25/2023 - Present        Right hip pain ICD-10-CM: M25.551  ICD-9-CM: 719.45  4/10/2023 - Present        History of pathological fracture of hip ICD-10-CM: C06.017  ICD-9-CM: V13.51  1/16/2023 - Present        Malignant neoplasm metastatic to bone [facility]) ICD-10-CM: C79.51  ICD-9-CM: 198.5  1/16/2023 - Present        Fracture of femoral neck, right ([address] ICD-10-CM: S72.001A  ICD-9-CM: 820.8  1/2/2023 - Present        Chemotherapy-induced nausea ICD-10-CM: R11.0, T45.1X5A  ICD-9-CM: 787.02, E933.1  11/7/2022 - Present        Drug-induced skin rash ICD-10-CM: L27.0  ICD-9-CM: 693.0  10/31/2022 - Present        Chemotherapy-induced fatigue ICD-10-CM: R53.83, T45.1X5A  ICD-9-CM: 780.79, E933.1  10/31/2022 - Present        Chemotherapy follow-up examination ICD-10-CM: B06  ICD-9-CM: V67.2  10/24/2022 - Present        Cancer related pain ICD-10-CM: G89.3  ICD-9-CM: 338.3  10/24/2022 - Present        Liver metastases ICD-10-CM: C78.7  ICD-9-CM: 197.7  10/17/2022 - Present        Port-A-Cath in place ICD-10-CM: Z95.828  ICD-9-CM: V45.89  10/17/2022 - Present        Encounter for antineoplastic chemotherapy ICD-10-CM: Z51.11  ICD-9-CM: V58.11  10/17/2022 - Present        Encounter for monitoring cardiotoxic drug therapy ICD-10-CM: Z51.81, Z79.899  ICD-9-CM: V58.83, V58.69  10/17/2022 - Present        Abdominal pain, generalized ICD-10-CM: R10.84  ICD-9-CM: 789.07  10/17/2022 - Present        Elevated LFTs ICD-10-CM: R79.89  ICD-9-CM: 790.6  10/17/2022 - Present        Premenopausal patient ICD-10-CM: N95.9  ICD-9-CM: 627.2  10/17/2022 - Present        Malignant neoplasm metastatic to both lungs (HCC) ICD-10-CM: C78.01, C78.02  ICD-9-CM: 197.0  10/17/2022 - Present        Primary malignant neoplasm of breast with metastasis ([facility] ICD-10-CM: C50.919  ICD-9-CM: 174.9  10/10/2022 - Present        Metastatic cancer ([facility] ICD-10-CM: C79.9  ICD-9-CM: 199.1  10/3/2022 - Present    Overview Signed 4/21/2023 12:18 PM by Armani Morton     10/04/22: Liver, mass, touch preps and core bx, PATH: Metastatic adenocarcinoma, consistent with breast primary, ER-, OK-, Her2+, Ki-67(60%). Discharge Medications:       Good  Current Discharge Medication List        START taking these medications    Details   amoxicillin-clavulanate (Augmentin) 875-125 mg per tablet Take 1 Tablet by mouth every twelve (12) hours for 7 days. Qty: 14 Tablet, Refills: 0  Start date: 4/28/2023, End date: 5/5/2023      doxycycline (MONODOX) 100 mg capsule Take 1 Capsule by mouth two (2) times a day for 7 days. Qty: 14 Capsule, Refills: 0  Start date: 4/28/2023, End date: 5/5/2023      oxyCODONE IR (ROXICODONE) 10 mg tab immediate release tablet Take 1 Tablet by mouth every six (6) hours as needed for Pain for up to 3 days. Max Daily Amount: 40 mg.  Qty: 10 Tablet, Refills: 0  Start date: 4/28/2023, End date: 5/1/2023    Associated Diagnoses: Right hip pain; Closed fracture of neck of right femur, sequela           CONTINUE these medications which have NOT CHANGED    Details   ondansetron hcl (Zofran) 4 mg tablet Take 1-2 Tablets by mouth every eight (8) hours as needed for Nausea or Vomiting. Qty: 60 Tablet, Refills: 1    Associated Diagnoses: Metastatic breast cancer; Chemotherapy-induced nausea      buPROPion XL (WELLBUTRIN XL) 300 mg XL tablet Take 1 Tablet by mouth daily. escitalopram oxalate (LEXAPRO) 10 mg tablet Take 1 Tablet by mouth daily. lisinopriL (PRINIVIL, ZESTRIL) 10 mg tablet Take 1 Tablet by mouth daily.       prochlorperazine (COMPAZINE) 5 mg tablet TAKE 1 TABLET BY MOUTH EVERY 6 HOURS AS NEEDED FOR NAUSEA OR VOMITING  Qty: 30 Tablet, Refills: 4    Associated Diagnoses: Metastatic breast cancer; Chemotherapy-induced nausea      calcium-vitamin D (OS-WENDY +D3) 500 mg-200 unit per tablet Take 1 Tablet by mouth three (3) times daily (with meals). Qty: 90 Tablet, Refills: 0      lidocaine (LIDODERM) 5 % 1 Patch by TransDERmal route every twenty-four (24) hours. Apply patch to the affected area for 12 hours a day and remove for 12 hours a day. Qty: 10 Each, Refills: 0    Associated Diagnoses: Metastatic breast cancer      dexAMETHasone (DECADRON) 4 mg tablet Take 2 tabs (8mg) by mouth twice daily the day before chemo and the day after chemo  Qty: 60 Tablet, Refills: 1    Associated Diagnoses: Metastatic breast cancer      lidocaine-prilocaine (EMLA) topical cream Apply thin layer to port a cath 30-60 minutes prior to port access with each chemotherapy session  Qty: 30 g, Refills: 0    Associated Diagnoses: Metastatic breast cancer      levothyroxine sodium (LEVOTHROID PO) Take 50 mcg by mouth daily. Follow up Care:    1. Asaf Borjas in 1-2 weeks. Please call to set up an appointment shortly after discharge. Diet:  Regular Diet    Disposition:  Home. Advanced Directive:   FULL X   DNR      Discharge Exam:  General : alert x 3, awake, no acute distress,   HEENT: PEERL, EOMI, moist mucus membrane, TM clear  Neck: supple, no JVD, no meningeal signs  Chest: Clear to auscultation bilaterally   CVS: S1 S2 heard, Capillary refill less than 2 seconds  Abd: soft/ non tender, non distended, BS physiological,   Ext: no clubbing, no cyanosis, no edema, brisk 2+ DP pulses  Neuro/Psych: pleasant mood and affect, CN 2-12 grossly intact, sensory grossly within normal limit, Strength 5/5 in all extremities, DTR 1+ x 4  Skin: warm     CONSULTATIONS: Orthopedic Surgery    Significant Diagnostic Studies:   4/25/2023: BUN 6 MG/DL (Ref range: 6 - 20 MG/DL);  Calcium 8.7 MG/DL (Ref range: 8.5 - 10.1 MG/DL); CO2 26 mmol/L (Ref range: 21 - 32 mmol/L); Creatinine 0.71 MG/DL (Ref range: 0.55 - 1.02 MG/DL); Glucose 110 mg/dL (H; Ref range: 65 - 100 mg/dL); HCT 34.9 % (L; Ref range: 35.0 - 47.0 %); HGB 10.5 g/dL (L; Ref range: 11.5 - 16.0 g/dL); Potassium 3.9 mmol/L (Ref range: 3.5 - 5.1 mmol/L); Sodium 138 mmol/L (Ref range: 136 - 145 mmol/L)  4/26/2023: BUN 7 MG/DL (Ref range: 6 - 20 MG/DL); BUN 7 MG/DL (Ref range: 6 - 20 MG/DL); Calcium 8.0 MG/DL (L; Ref range: 8.5 - 10.1 MG/DL); Calcium 8.8 MG/DL (Ref range: 8.5 - 10.1 MG/DL); CO2 25 mmol/L (Ref range: 21 - 32 mmol/L); CO2 25 mmol/L (Ref range: 21 - 32 mmol/L); Creatinine 0.68 MG/DL (Ref range: 0.55 - 1.02 MG/DL); Creatinine 0.63 MG/DL (Ref range: 0.55 - 1.02 MG/DL); Glucose 103 mg/dL (H; Ref range: 65 - 100 mg/dL); Glucose 119 mg/dL (H; Ref range: 65 - 100 mg/dL); HCT 33.6 % (L; Ref range: 35.0 - 47.0 %); HCT 33.1 % (L; Ref range: 35.0 - 47.0 %); HGB 10.1 g/dL (L; Ref range: 11.5 - 16.0 g/dL); HGB 10.0 g/dL (L; Ref range: 11.5 - 16.0 g/dL); Potassium 4.0 mmol/L (Ref range: 3.5 - 5.1 mmol/L); Potassium 4.1 mmol/L (Ref range: 3.5 - 5.1 mmol/L); Sodium 139 mmol/L (Ref range: 136 - 145 mmol/L); Sodium 139 mmol/L (Ref range: 136 - 145 mmol/L)  Recent Labs     04/28/23  0624 04/26/23 2348   WBC 4.9 4.1   HGB 10.0* 10.0*   HCT 32.8* 33.1*    300     Recent Labs     04/28/23  0624 04/26/23  2348 04/26/23  0003    139 139   K 3.8 4.1 4.0   * 110* 111*   CO2 27 25 25   BUN 9 7 7   CREA 0.67 0.63 0.68   * 119* 103*   CA 8.7 8.8 8.0*     Recent Labs     04/26/23  0003   AP 94   TP 5.8*   ALB 2.8*   GLOB 3.0     No results for input(s): INR, PTP, APTT, INREXT in the last 72 hours. No results for input(s): FE, TIBC, PSAT, FERR in the last 72 hours. No results for input(s): PH, PCO2, PO2 in the last 72 hours. No results for input(s): CPK, CKMB in the last 72 hours.     No lab exists for component: TROPONINI  No components found for: [place]    Greater than 30 minutes spent on discharge.        Signed:  Yazmin Shepard MD  4/28/2023  1:44 PM

## 2023-04-28 NOTE — PROGRESS NOTES
Problem: Falls - Risk of  Goal: *Absence of Falls  Description: Document Zahra Garcia Fall Risk and appropriate interventions in the flowsheet.   Outcome: Progressing Towards Goal  Note: Fall Risk Interventions:            Medication Interventions: Teach patient to arise slowly                   Problem: Patient Education: Go to Patient Education Activity  Goal: Patient/Family Education  Outcome: Progressing Towards Goal     Problem: Pain  Goal: *Control of Pain  Outcome: Progressing Towards Goal

## 2023-04-28 NOTE — PROGRESS NOTES
Patient's port de-accessed and flushed with saline and heparin prior to discharge. Patient ambulatory and alert and oriented x4. Patient refused the wheelchair stroll out.

## 2023-04-28 NOTE — PROGRESS NOTES
Bedside and Verbal shift change report given to Estefanía Chan RN (oncoming nurse) by Clementina Torrez RN (offgoing nurse). Report included the following information SBAR, Kardex, Intake/Output, MAR and Recent Results.

## 2023-04-28 NOTE — PROGRESS NOTES
Pharmacist Note - Vancomycin Dosing  Therapy day 3  Indication: possible bacteremia   Current regimen: 1000 mg IV Q8H    Recent Labs     04/28/23  0624 04/26/23  2348 04/26/23  0003   WBC 4.9 4.1 3.5*   CREA 0.67 0.63 0.68   BUN 9 7 7       A random vancomycin level of 26.1 mcg/mL was obtained and from this level, the patient's AUC24 is calculated to be 622 with the current regimen. Goal target range AUC/COURTNEY 400-600      Plan: Change to 750 mg IV Q8H. Pharmacy will continue to monitor this patient daily for changes in clinical status and renal function. *Random vancomycin levels are used to calculate AUC/COURTNEY, this level should not be interpreted as a trough. Vancomycin has been dosed using Bayesian kinetics software to target an AUC24:COURTNEY of 400-600, which provides adequate exposure for as assumed infection due to MRSA with an COURTNEY of 1 or less while reducing the risk of nephrotoxicity as seen with traditional trough based dosing goals.

## 2023-04-30 LAB
BACTERIA SPEC CULT: NORMAL
SERVICE CMNT-IMP: NORMAL

## 2023-05-01 ENCOUNTER — OFFICE VISIT (OUTPATIENT)
Dept: ONCOLOGY | Age: 38
End: 2023-05-01
Payer: COMMERCIAL

## 2023-05-01 ENCOUNTER — HOSPITAL ENCOUNTER (OUTPATIENT)
Dept: INFUSION THERAPY | Age: 38
Discharge: HOME | End: 2023-05-01
Payer: COMMERCIAL

## 2023-05-01 VITALS
WEIGHT: 181 LBS | SYSTOLIC BLOOD PRESSURE: 125 MMHG | DIASTOLIC BLOOD PRESSURE: 70 MMHG | OXYGEN SATURATION: 95 % | TEMPERATURE: 97.6 F | BODY MASS INDEX: 33.11 KG/M2 | RESPIRATION RATE: 16 BRPM | HEART RATE: 101 BPM

## 2023-05-01 VITALS
SYSTOLIC BLOOD PRESSURE: 118 MMHG | OXYGEN SATURATION: 95 % | HEART RATE: 73 BPM | BODY MASS INDEX: 33.27 KG/M2 | DIASTOLIC BLOOD PRESSURE: 84 MMHG | WEIGHT: 181.88 LBS | RESPIRATION RATE: 16 BRPM | TEMPERATURE: 97.6 F

## 2023-05-01 DIAGNOSIS — C78.02 MALIGNANT NEOPLASM METASTATIC TO BOTH LUNGS (HCC): Primary | ICD-10-CM

## 2023-05-01 DIAGNOSIS — Z87.311: ICD-10-CM

## 2023-05-01 DIAGNOSIS — J18.9 PNEUMONIA OF RIGHT UPPER LOBE DUE TO INFECTIOUS ORGANISM: ICD-10-CM

## 2023-05-01 DIAGNOSIS — C78.01 MALIGNANT NEOPLASM METASTATIC TO BOTH LUNGS (HCC): Primary | ICD-10-CM

## 2023-05-01 DIAGNOSIS — C78.02 MALIGNANT NEOPLASM METASTATIC TO BOTH LUNGS (HCC): ICD-10-CM

## 2023-05-01 DIAGNOSIS — Z09 CHEMOTHERAPY FOLLOW-UP EXAMINATION: ICD-10-CM

## 2023-05-01 DIAGNOSIS — C79.51 MALIGNANT NEOPLASM METASTATIC TO BONE (HCC): ICD-10-CM

## 2023-05-01 DIAGNOSIS — C50.919 PRIMARY MALIGNANT NEOPLASM OF BREAST WITH METASTASIS (HCC): Primary | ICD-10-CM

## 2023-05-01 DIAGNOSIS — M25.551 RIGHT HIP PAIN: ICD-10-CM

## 2023-05-01 DIAGNOSIS — C78.01 MALIGNANT NEOPLASM METASTATIC TO BOTH LUNGS (HCC): ICD-10-CM

## 2023-05-01 DIAGNOSIS — J18.9 PNEUMONIA DUE TO INFECTIOUS ORGANISM, UNSPECIFIED LATERALITY, UNSPECIFIED PART OF LUNG: ICD-10-CM

## 2023-05-01 DIAGNOSIS — Z79.899 ENCOUNTER FOR MONITORING CARDIOTOXIC DRUG THERAPY: ICD-10-CM

## 2023-05-01 DIAGNOSIS — G89.3 CANCER RELATED PAIN: ICD-10-CM

## 2023-05-01 DIAGNOSIS — Z51.81 ENCOUNTER FOR MONITORING CARDIOTOXIC DRUG THERAPY: ICD-10-CM

## 2023-05-01 DIAGNOSIS — C79.51 MALIGNANT NEOPLASM METASTATIC TO BONE (HCC): Primary | ICD-10-CM

## 2023-05-01 DIAGNOSIS — Z95.828 PORT-A-CATH IN PLACE: ICD-10-CM

## 2023-05-01 LAB
ALBUMIN SERPL-MCNC: 3.7 G/DL (ref 3.5–5)
ALBUMIN/GLOB SERPL: 1.2 (ref 1.1–2.2)
ALP SERPL-CCNC: 104 U/L (ref 45–117)
ALT SERPL-CCNC: 26 U/L (ref 12–78)
ANION GAP SERPL CALC-SCNC: 5 MMOL/L (ref 5–15)
AST SERPL-CCNC: 15 U/L (ref 15–37)
BASOPHILS # BLD: 0 K/UL (ref 0–0.1)
BASOPHILS NFR BLD: 0 % (ref 0–1)
BILIRUB SERPL-MCNC: 0.2 MG/DL (ref 0.2–1)
BUN SERPL-MCNC: 17 MG/DL (ref 6–20)
BUN/CREAT SERPL: 21 (ref 12–20)
CALCIUM SERPL-MCNC: 9.1 MG/DL (ref 8.5–10.1)
CHLORIDE SERPL-SCNC: 108 MMOL/L (ref 97–108)
CO2 SERPL-SCNC: 23 MMOL/L (ref 21–32)
CREAT SERPL-MCNC: 0.8 MG/DL (ref 0.55–1.02)
DIFFERENTIAL METHOD BLD: ABNORMAL
EOSINOPHIL # BLD: 0 K/UL (ref 0–0.4)
EOSINOPHIL NFR BLD: 0 % (ref 0–7)
ERYTHROCYTE [DISTWIDTH] IN BLOOD BY AUTOMATED COUNT: 18.5 % (ref 11.5–14.5)
GLOBULIN SER CALC-MCNC: 3 G/DL (ref 2–4)
GLUCOSE SERPL-MCNC: 160 MG/DL (ref 65–100)
HCT VFR BLD AUTO: 34.6 % (ref 35–47)
HGB BLD-MCNC: 10.6 G/DL (ref 11.5–16)
IMM GRANULOCYTES # BLD AUTO: 0 K/UL
IMM GRANULOCYTES NFR BLD AUTO: 0 %
LYMPHOCYTES # BLD: 2.2 K/UL (ref 0.8–3.5)
LYMPHOCYTES NFR BLD: 12 % (ref 12–49)
MCH RBC QN AUTO: 28.4 PG (ref 26–34)
MCHC RBC AUTO-ENTMCNC: 30.6 G/DL (ref 30–36.5)
MCV RBC AUTO: 92.8 FL (ref 80–99)
MONOCYTES # BLD: 0.4 K/UL (ref 0–1)
MONOCYTES NFR BLD: 2 % (ref 5–13)
MYELOCYTES NFR BLD MANUAL: 1 %
NEUTS SEG # BLD: 15.9 K/UL (ref 1.8–8)
NEUTS SEG NFR BLD: 85 % (ref 32–75)
NRBC # BLD: 0 K/UL (ref 0–0.01)
NRBC BLD-RTO: 0 PER 100 WBC
PLATELET # BLD AUTO: 477 K/UL (ref 150–400)
PMV BLD AUTO: 9.3 FL (ref 8.9–12.9)
POTASSIUM SERPL-SCNC: 4 MMOL/L (ref 3.5–5.1)
PROT SERPL-MCNC: 6.7 G/DL (ref 6.4–8.2)
RBC # BLD AUTO: 3.73 M/UL (ref 3.8–5.2)
RBC MORPH BLD: ABNORMAL
SODIUM SERPL-SCNC: 136 MMOL/L (ref 136–145)
WBC # BLD AUTO: 18.7 K/UL (ref 3.6–11)

## 2023-05-01 PROCEDURE — 74011250636 HC RX REV CODE- 250/636: Performed by: NURSE PRACTITIONER

## 2023-05-01 PROCEDURE — 74011000250 HC RX REV CODE- 250: Performed by: NURSE PRACTITIONER

## 2023-05-01 PROCEDURE — 36415 COLL VENOUS BLD VENIPUNCTURE: CPT

## 2023-05-01 PROCEDURE — 96372 THER/PROPH/DIAG INJ SC/IM: CPT

## 2023-05-01 PROCEDURE — 96413 CHEMO IV INFUSION 1 HR: CPT

## 2023-05-01 PROCEDURE — 96375 TX/PRO/DX INJ NEW DRUG ADDON: CPT

## 2023-05-01 PROCEDURE — 99215 OFFICE O/P EST HI 40 MIN: CPT | Performed by: INTERNAL MEDICINE

## 2023-05-01 PROCEDURE — 85025 COMPLETE CBC W/AUTO DIFF WBC: CPT

## 2023-05-01 PROCEDURE — 96411 CHEMO IV PUSH ADDL DRUG: CPT

## 2023-05-01 PROCEDURE — 77030012965 HC NDL HUBR BBMI -A

## 2023-05-01 PROCEDURE — 80053 COMPREHEN METABOLIC PANEL: CPT

## 2023-05-01 RX ORDER — SODIUM CHLORIDE 0.9 % (FLUSH) 0.9 %
5-40 SYRINGE (ML) INJECTION AS NEEDED
Status: DISPENSED | OUTPATIENT
Start: 2023-05-01 | End: 2023-05-01

## 2023-05-01 RX ORDER — EPINEPHRINE 1 MG/ML
0.3 INJECTION, SOLUTION, CONCENTRATE INTRAVENOUS AS NEEDED
Status: ACTIVE | OUTPATIENT
Start: 2023-05-01 | End: 2023-05-01

## 2023-05-01 RX ORDER — SODIUM CHLORIDE 9 MG/ML
5-250 INJECTION, SOLUTION INTRAVENOUS PRN
OUTPATIENT
Start: 2023-05-22

## 2023-05-01 RX ORDER — DIPHENHYDRAMINE HYDROCHLORIDE 50 MG/ML
50 INJECTION, SOLUTION INTRAMUSCULAR; INTRAVENOUS AS NEEDED
Status: ACTIVE | OUTPATIENT
Start: 2023-05-01 | End: 2023-05-01

## 2023-05-01 RX ORDER — ACETAMINOPHEN 325 MG/1
650 TABLET ORAL
OUTPATIENT
Start: 2023-05-22

## 2023-05-01 RX ORDER — EPINEPHRINE 1 MG/ML
0.3 INJECTION, SOLUTION, CONCENTRATE INTRAVENOUS PRN
OUTPATIENT
Start: 2023-06-12

## 2023-05-01 RX ORDER — ONDANSETRON 2 MG/ML
8 INJECTION INTRAMUSCULAR; INTRAVENOUS
OUTPATIENT
Start: 2023-05-22

## 2023-05-01 RX ORDER — DIPHENHYDRAMINE HYDROCHLORIDE 50 MG/ML
50 INJECTION INTRAMUSCULAR; INTRAVENOUS
OUTPATIENT
Start: 2023-05-22

## 2023-05-01 RX ORDER — SODIUM CHLORIDE 9 MG/ML
5-40 INJECTION INTRAVENOUS AS NEEDED
Status: ACTIVE | OUTPATIENT
Start: 2023-05-01 | End: 2023-05-01

## 2023-05-01 RX ORDER — ACETAMINOPHEN 325 MG/1
650 TABLET ORAL AS NEEDED
Status: ACTIVE | OUTPATIENT
Start: 2023-05-01 | End: 2023-05-01

## 2023-05-01 RX ORDER — ACETAMINOPHEN 325 MG/1
650 TABLET ORAL
OUTPATIENT
Start: 2023-06-12

## 2023-05-01 RX ORDER — SODIUM CHLORIDE 0.9 % (FLUSH) 0.9 %
5-40 SYRINGE (ML) INJECTION PRN
OUTPATIENT
Start: 2023-05-22

## 2023-05-01 RX ORDER — ONDANSETRON 2 MG/ML
8 INJECTION INTRAMUSCULAR; INTRAVENOUS AS NEEDED
Status: ACTIVE | OUTPATIENT
Start: 2023-05-01 | End: 2023-05-01

## 2023-05-01 RX ORDER — SODIUM CHLORIDE 9 MG/ML
5-250 INJECTION, SOLUTION INTRAVENOUS AS NEEDED
Status: DISPENSED | OUTPATIENT
Start: 2023-05-01 | End: 2023-05-01

## 2023-05-01 RX ORDER — MEPERIDINE HYDROCHLORIDE 25 MG/ML
12.5 INJECTION INTRAMUSCULAR; INTRAVENOUS; SUBCUTANEOUS PRN
OUTPATIENT
Start: 2023-05-22

## 2023-05-01 RX ORDER — DEXAMETHASONE SODIUM PHOSPHATE 10 MG/ML
10 INJECTION, SOLUTION INTRAMUSCULAR; INTRAVENOUS ONCE
OUTPATIENT
Start: 2023-05-22 | End: 2023-05-22

## 2023-05-01 RX ORDER — ONDANSETRON 2 MG/ML
8 INJECTION INTRAMUSCULAR; INTRAVENOUS
OUTPATIENT
Start: 2023-06-12

## 2023-05-01 RX ORDER — DEXAMETHASONE SODIUM PHOSPHATE 10 MG/ML
10 INJECTION INTRAMUSCULAR; INTRAVENOUS ONCE
Status: COMPLETED | OUTPATIENT
Start: 2023-05-01 | End: 2023-05-01

## 2023-05-01 RX ORDER — SODIUM CHLORIDE 9 MG/ML
INJECTION, SOLUTION INTRAVENOUS CONTINUOUS
OUTPATIENT
Start: 2023-05-22

## 2023-05-01 RX ORDER — SODIUM CHLORIDE 9 MG/ML
INJECTION, SOLUTION INTRAVENOUS CONTINUOUS
OUTPATIENT
Start: 2023-06-12

## 2023-05-01 RX ORDER — HEPARIN 100 UNIT/ML
500 SYRINGE INTRAVENOUS AS NEEDED
Status: ACTIVE | OUTPATIENT
Start: 2023-05-01 | End: 2023-05-01

## 2023-05-01 RX ORDER — DIPHENHYDRAMINE HYDROCHLORIDE 50 MG/ML
25 INJECTION INTRAMUSCULAR; INTRAVENOUS ONCE
Start: 2023-05-22 | End: 2023-05-22

## 2023-05-01 RX ORDER — ALBUTEROL SULFATE 90 UG/1
4 AEROSOL, METERED RESPIRATORY (INHALATION) PRN
OUTPATIENT
Start: 2023-05-22

## 2023-05-01 RX ORDER — DIPHENHYDRAMINE HYDROCHLORIDE 50 MG/ML
50 INJECTION INTRAMUSCULAR; INTRAVENOUS
OUTPATIENT
Start: 2023-06-12

## 2023-05-01 RX ORDER — HYDROCORTISONE SODIUM SUCCINATE 100 MG/2ML
100 INJECTION, POWDER, FOR SOLUTION INTRAMUSCULAR; INTRAVENOUS AS NEEDED
Status: ACTIVE | OUTPATIENT
Start: 2023-05-01 | End: 2023-05-01

## 2023-05-01 RX ORDER — ALBUTEROL SULFATE 90 UG/1
4 AEROSOL, METERED RESPIRATORY (INHALATION) PRN
OUTPATIENT
Start: 2023-06-12

## 2023-05-01 RX ORDER — ALBUTEROL SULFATE 0.83 MG/ML
2.5 SOLUTION RESPIRATORY (INHALATION) AS NEEDED
Status: ACTIVE | OUTPATIENT
Start: 2023-05-01 | End: 2023-05-01

## 2023-05-01 RX ORDER — HEPARIN SODIUM (PORCINE) LOCK FLUSH IV SOLN 100 UNIT/ML 100 UNIT/ML
500 SOLUTION INTRAVENOUS PRN
OUTPATIENT
Start: 2023-05-22

## 2023-05-01 RX ORDER — DIPHENHYDRAMINE HYDROCHLORIDE 50 MG/ML
25 INJECTION, SOLUTION INTRAMUSCULAR; INTRAVENOUS AS NEEDED
Status: ACTIVE | OUTPATIENT
Start: 2023-05-01 | End: 2023-05-01

## 2023-05-01 RX ORDER — EPINEPHRINE 1 MG/ML
0.3 INJECTION, SOLUTION, CONCENTRATE INTRAVENOUS PRN
OUTPATIENT
Start: 2023-05-22

## 2023-05-01 RX ORDER — DIPHENHYDRAMINE HYDROCHLORIDE 50 MG/ML
25 INJECTION, SOLUTION INTRAMUSCULAR; INTRAVENOUS ONCE
Status: COMPLETED | OUTPATIENT
Start: 2023-05-01 | End: 2023-05-01

## 2023-05-01 RX ADMIN — DEXAMETHASONE SODIUM PHOSPHATE 10 MG: 10 INJECTION, SOLUTION INTRAMUSCULAR; INTRAVENOUS at 09:30

## 2023-05-01 RX ADMIN — PERTUZUMAB, TRASTUZUMAB, AND HYALURONIDASE-ZZXF 10 ML: 600; 600; 2000 INJECTION, SOLUTION SUBCUTANEOUS at 09:58

## 2023-05-01 RX ADMIN — SODIUM CHLORIDE 25 ML/HR: 9 INJECTION, SOLUTION INTRAVENOUS at 09:26

## 2023-05-01 RX ADMIN — DIPHENHYDRAMINE HYDROCHLORIDE 25 MG: 50 INJECTION, SOLUTION INTRAMUSCULAR; INTRAVENOUS at 09:35

## 2023-05-01 RX ADMIN — DOCETAXEL ANHYDROUS 106 MG: 10 INJECTION, SOLUTION INTRAVENOUS at 10:26

## 2023-05-01 RX ADMIN — FAMOTIDINE 20 MG: 10 INJECTION, SOLUTION INTRAVENOUS at 09:26

## 2023-05-01 RX ADMIN — HEPARIN 500 UNITS: 100 SYRINGE at 11:30

## 2023-05-01 RX ADMIN — DENOSUMAB 120 MG: 120 INJECTION SUBCUTANEOUS at 11:42

## 2023-05-01 NOTE — PROGRESS NOTES
[facility] Chemotherapy Progress Note    Date: May 1, 2023    Name: Prete Mejia    MRN: [id number]         : 1985      0800 Pt admit to [facility] for C9 [place]/Docetaxel/Xgeva ambulatory in stable condition. Assessment completed. No new concerns voiced. Port with positive blood return. Labs sent for processing. Chemotherapy Flowsheet 2023   Cycle C9   Date 2023   Drug / Regimen Phesgo   Pre Meds given   Notes Left Thigh/Docetaxel/Xgeva         Ms. Hussein's vitals were reviewed. Patient Vitals for the past 12 hrs:   Temp Pulse Resp BP SpO2   23 1147 -- 73 -- 118/84 --   23 0740 97.6 °F (36.4 °C) (!) 101 16 125/70 95 %         Lab results were obtained and reviewed. Recent Results (from the past 12 hour(s))   CBC WITH AUTOMATED DIFF    Collection Time: 23  7:45 AM   Result Value Ref Range    WBC 18.7 (H) 3.6 - 11.0 K/uL    RBC 3.73 (L) 3.80 - 5.20 M/uL    HGB 10.6 (L) 11.5 - 16.0 g/dL    HCT 34.6 (L) 35.0 - 47.0 %    MCV 92.8 80.0 - 99.0 FL    MCH 28.4 26.0 - 34.0 PG    MCHC 30.6 30.0 - 36.5 g/dL    RDW 18.5 (H) 11.5 - 14.5 %    PLATELET 369 (H) 606 - 400 K/uL    MPV 9.3 8.9 - 12.9 FL    NRBC 0.0 0  WBC    ABSOLUTE NRBC 0.00 0.00 - 0.01 K/uL    NEUTROPHILS 85 (H) 32 - 75 %    LYMPHOCYTES 12 12 - 49 %    MONOCYTES 2 (L) 5 - 13 %    EOSINOPHILS 0 0 - 7 %    BASOPHILS 0 0 - 1 %    MYELOCYTES 1 (H) 0 %    IMMATURE GRANULOCYTES 0 %    ABS. NEUTROPHILS 15.9 (H) 1.8 - 8.0 K/UL    ABS. LYMPHOCYTES 2.2 0.8 - 3.5 K/UL    ABS. MONOCYTES 0.4 0.0 - 1.0 K/UL    ABS. EOSINOPHILS 0.0 0.0 - 0.4 K/UL    ABS. BASOPHILS 0.0 0.0 - 0.1 K/UL    ABS. IMM.  GRANS. 0.0 K/UL    DF MANUAL      RBC COMMENTS ANISOCYTOSIS  1+       METABOLIC PANEL, COMPREHENSIVE    Collection Time: 23  7:45 AM   Result Value Ref Range    Sodium 136 136 - 145 mmol/L    Potassium 4.0 3.5 - 5.1 mmol/L    Chloride 108 97 - 108 mmol/L    CO2 23 21 - 32 mmol/L    Anion gap 5 5 - 15 mmol/L    Glucose 160 (H) 65 - 100 mg/dL    BUN 17 6 - 20 MG/DL    Creatinine 0.80 0.55 - 1.02 MG/DL    BUN/Creatinine ratio 21 (H) 12 - 20      eGFR >60 >60 ml/min/1.73m2    Calcium 9.1 8.5 - 10.1 MG/DL    Bilirubin, total 0.2 0.2 - 1.0 MG/DL    ALT (SGPT) 26 12 - 78 U/L    AST (SGOT) 15 15 - 37 U/L    Alk. phosphatase 104 45 - 117 U/L    Protein, total 6.7 6.4 - 8.2 g/dL    Albumin 3.7 3.5 - 5.0 g/dL    Globulin 3.0 2.0 - 4.0 g/dL    A-G Ratio 1.2 1.1 - 2.2         Pre-medications  were administered as ordered and chemotherapy was initiated.   Medications Administered       0.9% sodium chloride infusion       Admin Date  05/01/2023 Action  New Bag Dose  25 mL/hr Rate  25 mL/hr Route  IntraVENous Administered By  Walter Fernandez RN              denosumab (XGEVA) injection 120 mg       Admin Date  05/01/2023 Action  Given Dose  120 mg Route  SubCUTAneous Administered By  Walter Fernandez RN              dexamethasone (PF) (DECADRON) 10 mg/mL injection 10 mg       Admin Date  05/01/2023 Action  Given Dose  10 mg Route  IntraVENous Administered By  Walter Fernandez RN              diphenhydrAMINE (BENADRYL) injection 25 mg       Admin Date  05/01/2023 Action  Given Dose  25 mg Route  IntraVENous Administered By  Walter Fernandez RN              DOCEtaxeL (TAXOTERE) 106 mg in 0.9% sodium chloride 250 mL, overfill volume 25 mL chemo infusion       Admin Date  05/01/2023 Action  New Bag Dose  106 mg Rate  285.6 mL/hr Route  IntraVENous Administered By  Walter Fernandez RN              famotidine (PF) (PEPCID) 20 mg in 0.9% sodium chloride 10 mL injection       Admin Date  05/01/2023 Action  Given Dose  20 mg Route  IntraVENous Administered By  Walter Fernandez RN              heparin (porcine) pf 500 Units       Admin Date  05/01/2023 Action  Given Dose  500 Units Route  InterCATHeter Administered By  Walter Fernandez RN              pertuzumab 600 mg-trastuzumab 600 mg-hyaluronidase-zzxf 20,000 units/10 mL       Admin Date  05/01/2023 Action  Given Dose  10 mL Route  SubCUTAneous Administered By  Brisa Jimenez, RN                    Two nurses verified prior to administering:Drug name, Drug doseInfusion volume or drug volume when prepared in a syringe, Rate of administration, Route of administration, Expiration dates and/or times, Appearance and physical integrity of the drugs, Rate set on infusion pump, when used, Sequencing of drug administration. 1150 Pt tolerated treatment well. Port maintained positive blood return throughout treatment. Flushed, heparinized and de-accessed per protocol. D/c home ambulatory in no distress. Pt aware of next appointment scheduled for .     Future Appointments   Date Time Provider Kristian Alva   5/22/2023  8:00 AM [facility]   6/12/2023  8:00 AM F1 JOAN LONG [address] [facility], ALBERTO  May 1, 2023

## 2023-05-01 NOTE — PROGRESS NOTES
Martin Nash is a 45 y.o. female    Chief Complaint   Patient presents with    Follow-up     Metastatic Breast Cancer. 1. Have you been to the ER, urgent care clinic since your last visit? Hospitalized since your last visit? Yes, [facility]     2. Have you seen or consulted any other health care providers outside of the [address] [place] [address] since your last visit? Include any pap smears or colon screening.  No

## 2023-05-02 LAB
BACTERIA SPEC CULT: NORMAL
SERVICE CMNT-IMP: NORMAL

## 2023-05-09 ENCOUNTER — TRANSCRIBE ORDERS (OUTPATIENT)
Facility: HOSPITAL | Age: 38
End: 2023-05-09

## 2023-05-09 DIAGNOSIS — C50.912 INFILTRATING DUCTAL CARCINOMA OF LEFT BREAST (HCC): Primary | ICD-10-CM

## 2023-05-09 DIAGNOSIS — C50.919 MALIGNANT NEOPLASM OF BREAST METASTATIC TO BONE (HCC): Primary | ICD-10-CM

## 2023-05-09 DIAGNOSIS — C79.51 MALIGNANT NEOPLASM OF BREAST METASTATIC TO BONE (HCC): Primary | ICD-10-CM

## 2023-05-17 DIAGNOSIS — C78.7 MALIGNANT NEOPLASM OF BREAST METASTATIC TO LIVER (HCC): Primary | ICD-10-CM

## 2023-05-17 DIAGNOSIS — C50.919 MALIGNANT NEOPLASM OF BREAST METASTATIC TO LIVER (HCC): Primary | ICD-10-CM

## 2023-05-17 RX ORDER — DEXAMETHASONE 4 MG/1
TABLET ORAL
Qty: 60 TABLET | Refills: 1 | Status: SHIPPED | OUTPATIENT
Start: 2023-05-17

## 2023-05-22 ENCOUNTER — OFFICE VISIT (OUTPATIENT)
Age: 38
End: 2023-05-22
Payer: COMMERCIAL

## 2023-05-22 ENCOUNTER — APPOINTMENT (OUTPATIENT)
Dept: INFUSION THERAPY | Age: 38
End: 2023-05-22

## 2023-05-22 ENCOUNTER — HOSPITAL ENCOUNTER (OUTPATIENT)
Facility: HOSPITAL | Age: 38
Setting detail: INFUSION SERIES
End: 2023-05-22
Payer: COMMERCIAL

## 2023-05-22 ENCOUNTER — HOSPITAL ENCOUNTER (OUTPATIENT)
Facility: HOSPITAL | Age: 38
Discharge: HOME | End: 2023-05-25
Payer: COMMERCIAL

## 2023-05-22 ENCOUNTER — TELEPHONE (OUTPATIENT)
Age: 38
End: 2023-05-22

## 2023-05-22 VITALS
HEART RATE: 64 BPM | RESPIRATION RATE: 18 BRPM | SYSTOLIC BLOOD PRESSURE: 135 MMHG | WEIGHT: 184 LBS | DIASTOLIC BLOOD PRESSURE: 84 MMHG | TEMPERATURE: 98.7 F | BODY MASS INDEX: 33.65 KG/M2 | OXYGEN SATURATION: 95 %

## 2023-05-22 VITALS
HEART RATE: 78 BPM | BODY MASS INDEX: 33.86 KG/M2 | RESPIRATION RATE: 18 BRPM | HEIGHT: 62 IN | DIASTOLIC BLOOD PRESSURE: 84 MMHG | TEMPERATURE: 98.7 F | WEIGHT: 184 LBS | OXYGEN SATURATION: 95 % | SYSTOLIC BLOOD PRESSURE: 137 MMHG

## 2023-05-22 DIAGNOSIS — M25.551 RIGHT HIP PAIN: ICD-10-CM

## 2023-05-22 DIAGNOSIS — G89.3 CANCER RELATED PAIN: ICD-10-CM

## 2023-05-22 DIAGNOSIS — R53.83 CHEMOTHERAPY-INDUCED FATIGUE: ICD-10-CM

## 2023-05-22 DIAGNOSIS — C78.01 MALIGNANT NEOPLASM METASTATIC TO BOTH LUNGS (HCC): ICD-10-CM

## 2023-05-22 DIAGNOSIS — M79.10 MUSCLE ACHE: ICD-10-CM

## 2023-05-22 DIAGNOSIS — C79.51 MALIGNANT NEOPLASM METASTATIC TO BONE (HCC): Primary | ICD-10-CM

## 2023-05-22 DIAGNOSIS — C50.919 MALIGNANT NEOPLASM OF BREAST METASTATIC TO BONE (HCC): ICD-10-CM

## 2023-05-22 DIAGNOSIS — Z09 CHEMOTHERAPY FOLLOW-UP EXAMINATION: ICD-10-CM

## 2023-05-22 DIAGNOSIS — C78.7 MALIGNANT NEOPLASM OF BREAST METASTATIC TO LIVER (HCC): Primary | ICD-10-CM

## 2023-05-22 DIAGNOSIS — T45.1X5A CHEMOTHERAPY-INDUCED NAUSEA: ICD-10-CM

## 2023-05-22 DIAGNOSIS — C78.02 MALIGNANT NEOPLASM METASTATIC TO BOTH LUNGS (HCC): ICD-10-CM

## 2023-05-22 DIAGNOSIS — R11.0 CHEMOTHERAPY-INDUCED NAUSEA: ICD-10-CM

## 2023-05-22 DIAGNOSIS — Z51.81 ENCOUNTER FOR MONITORING CARDIOTOXIC DRUG THERAPY: ICD-10-CM

## 2023-05-22 DIAGNOSIS — Z79.899 ENCOUNTER FOR MONITORING CARDIOTOXIC DRUG THERAPY: ICD-10-CM

## 2023-05-22 DIAGNOSIS — C79.51 MALIGNANT NEOPLASM OF BREAST METASTATIC TO BONE (HCC): ICD-10-CM

## 2023-05-22 DIAGNOSIS — Z87.311 HISTORY OF PATHOLOGIC FRACTURE: ICD-10-CM

## 2023-05-22 DIAGNOSIS — G56.92 NEUROPATHY OF FINGER OF LEFT HAND: ICD-10-CM

## 2023-05-22 DIAGNOSIS — T45.1X5A CHEMOTHERAPY-INDUCED FATIGUE: ICD-10-CM

## 2023-05-22 DIAGNOSIS — C50.919 MALIGNANT NEOPLASM OF BREAST METASTATIC TO LIVER (HCC): Primary | ICD-10-CM

## 2023-05-22 DIAGNOSIS — Z95.828 PORT-A-CATH IN PLACE: ICD-10-CM

## 2023-05-22 LAB
ALBUMIN SERPL-MCNC: 3.5 G/DL (ref 3.5–5)
ALBUMIN/GLOB SERPL: 1.3 (ref 1.1–2.2)
ALP SERPL-CCNC: 101 U/L (ref 45–117)
ALT SERPL-CCNC: 26 U/L (ref 12–78)
ANION GAP SERPL CALC-SCNC: 4 MMOL/L (ref 5–15)
AST SERPL-CCNC: 16 U/L (ref 15–37)
BASOPHILS # BLD: 0 K/UL (ref 0–0.1)
BASOPHILS NFR BLD: 0 % (ref 0–1)
BILIRUB SERPL-MCNC: 0.2 MG/DL (ref 0.2–1)
BUN SERPL-MCNC: 14 MG/DL (ref 6–20)
BUN/CREAT SERPL: 16 (ref 12–20)
CALCIUM SERPL-MCNC: 8.9 MG/DL (ref 8.5–10.1)
CHLORIDE SERPL-SCNC: 110 MMOL/L (ref 97–108)
CO2 SERPL-SCNC: 24 MMOL/L (ref 21–32)
CREAT SERPL-MCNC: 0.88 MG/DL (ref 0.55–1.02)
DIFFERENTIAL METHOD BLD: ABNORMAL
EOSINOPHIL # BLD: 0 K/UL (ref 0–0.4)
EOSINOPHIL NFR BLD: 0 % (ref 0–7)
ERYTHROCYTE [DISTWIDTH] IN BLOOD BY AUTOMATED COUNT: 17.7 % (ref 11.5–14.5)
GLOBULIN SER CALC-MCNC: 2.8 G/DL (ref 2–4)
GLUCOSE SERPL-MCNC: 216 MG/DL (ref 65–100)
HCT VFR BLD AUTO: 35.3 % (ref 35–47)
HGB BLD-MCNC: 10.9 G/DL (ref 11.5–16)
IMM GRANULOCYTES # BLD AUTO: 0.2 K/UL (ref 0–0.04)
IMM GRANULOCYTES NFR BLD AUTO: 2 % (ref 0–0.5)
LYMPHOCYTES # BLD: 1 K/UL (ref 0.8–3.5)
LYMPHOCYTES NFR BLD: 8 % (ref 12–49)
MCH RBC QN AUTO: 28.8 PG (ref 26–34)
MCHC RBC AUTO-ENTMCNC: 30.9 G/DL (ref 30–36.5)
MCV RBC AUTO: 93.4 FL (ref 80–99)
MONOCYTES # BLD: 0.3 K/UL (ref 0–1)
MONOCYTES NFR BLD: 2 % (ref 5–13)
NEUTS SEG # BLD: 10.4 K/UL (ref 1.8–8)
NEUTS SEG NFR BLD: 88 % (ref 32–75)
NRBC # BLD: 0 K/UL (ref 0–0.01)
NRBC BLD-RTO: 0 PER 100 WBC
PLATELET # BLD AUTO: 340 K/UL (ref 150–400)
PMV BLD AUTO: 9.2 FL (ref 8.9–12.9)
POTASSIUM SERPL-SCNC: 4.1 MMOL/L (ref 3.5–5.1)
PROT SERPL-MCNC: 6.3 G/DL (ref 6.4–8.2)
RBC # BLD AUTO: 3.78 M/UL (ref 3.8–5.2)
SODIUM SERPL-SCNC: 138 MMOL/L (ref 136–145)
WBC # BLD AUTO: 11.8 K/UL (ref 3.6–11)

## 2023-05-22 PROCEDURE — 80053 COMPREHEN METABOLIC PANEL: CPT

## 2023-05-22 PROCEDURE — 6360000002 HC RX W HCPCS: Performed by: INTERNAL MEDICINE

## 2023-05-22 PROCEDURE — 6360000004 HC RX CONTRAST MEDICATION: Performed by: RADIOLOGY

## 2023-05-22 PROCEDURE — 85025 COMPLETE CBC W/AUTO DIFF WBC: CPT

## 2023-05-22 PROCEDURE — A4216 STERILE WATER/SALINE, 10 ML: HCPCS | Performed by: INTERNAL MEDICINE

## 2023-05-22 PROCEDURE — 96375 TX/PRO/DX INJ NEW DRUG ADDON: CPT

## 2023-05-22 PROCEDURE — 96413 CHEMO IV INFUSION 1 HR: CPT

## 2023-05-22 PROCEDURE — 99214 OFFICE O/P EST MOD 30 MIN: CPT | Performed by: INTERNAL MEDICINE

## 2023-05-22 PROCEDURE — 73701 CT LOWER EXTREMITY W/DYE: CPT

## 2023-05-22 PROCEDURE — 2580000003 HC RX 258: Performed by: INTERNAL MEDICINE

## 2023-05-22 PROCEDURE — 96401 CHEMO ANTI-NEOPL SQ/IM: CPT

## 2023-05-22 PROCEDURE — 36415 COLL VENOUS BLD VENIPUNCTURE: CPT

## 2023-05-22 PROCEDURE — 2500000003 HC RX 250 WO HCPCS: Performed by: INTERNAL MEDICINE

## 2023-05-22 RX ORDER — SODIUM CHLORIDE 0.9 % (FLUSH) 0.9 %
5-40 SYRINGE (ML) INJECTION PRN
Status: DISCONTINUED | OUTPATIENT
Start: 2023-05-22 | End: 2023-05-23 | Stop reason: HOSPADM

## 2023-05-22 RX ORDER — ACETAMINOPHEN 325 MG/1
650 TABLET ORAL
Status: DISCONTINUED | OUTPATIENT
Start: 2023-05-22 | End: 2023-05-23 | Stop reason: HOSPADM

## 2023-05-22 RX ORDER — DIPHENHYDRAMINE HYDROCHLORIDE 50 MG/ML
25 INJECTION INTRAMUSCULAR; INTRAVENOUS ONCE
Status: COMPLETED | OUTPATIENT
Start: 2023-05-22 | End: 2023-05-22

## 2023-05-22 RX ORDER — DEXAMETHASONE SODIUM PHOSPHATE 10 MG/ML
10 INJECTION, SOLUTION INTRAMUSCULAR; INTRAVENOUS ONCE
Status: COMPLETED | OUTPATIENT
Start: 2023-05-22 | End: 2023-05-22

## 2023-05-22 RX ORDER — SODIUM CHLORIDE 9 MG/ML
5-250 INJECTION, SOLUTION INTRAVENOUS PRN
Status: DISCONTINUED | OUTPATIENT
Start: 2023-05-22 | End: 2023-05-23 | Stop reason: HOSPADM

## 2023-05-22 RX ORDER — HEPARIN SODIUM (PORCINE) LOCK FLUSH IV SOLN 100 UNIT/ML 100 UNIT/ML
500 SOLUTION INTRAVENOUS PRN
Status: DISCONTINUED | OUTPATIENT
Start: 2023-05-22 | End: 2023-05-23 | Stop reason: HOSPADM

## 2023-05-22 RX ORDER — CYCLOBENZAPRINE HCL 5 MG
5 TABLET ORAL 3 TIMES DAILY PRN
Qty: 60 TABLET | Refills: 0 | Status: SHIPPED | OUTPATIENT
Start: 2023-05-22 | End: 2023-06-11

## 2023-05-22 RX ADMIN — SODIUM CHLORIDE, PRESERVATIVE FREE 10 ML: 5 INJECTION INTRAVENOUS at 10:50

## 2023-05-22 RX ADMIN — DOCETAXEL ANHYDROUS 106 MG: 10 INJECTION, SOLUTION INTRAVENOUS at 10:45

## 2023-05-22 RX ADMIN — HEPARIN 500 UNITS: 100 SYRINGE at 10:50

## 2023-05-22 RX ADMIN — IOPAMIDOL 100 ML: 612 INJECTION, SOLUTION INTRAVENOUS at 17:00

## 2023-05-22 RX ADMIN — FAMOTIDINE 20 MG: 10 INJECTION, SOLUTION INTRAVENOUS at 10:13

## 2023-05-22 RX ADMIN — PERTUZUMAB, TRASTUZUMAB, AND HYALURONIDASE-ZZXF 10 ML: 600; 600; 2000 INJECTION, SOLUTION SUBCUTANEOUS at 10:36

## 2023-05-22 RX ADMIN — DEXAMETHASONE SODIUM PHOSPHATE 10 MG: 10 INJECTION, SOLUTION INTRAMUSCULAR; INTRAVENOUS at 10:07

## 2023-05-22 RX ADMIN — DIPHENHYDRAMINE HYDROCHLORIDE 25 MG: 50 INJECTION, SOLUTION INTRAMUSCULAR; INTRAVENOUS at 10:09

## 2023-05-22 RX ADMIN — SODIUM CHLORIDE 25 ML/HR: 9 INJECTION, SOLUTION INTRAVENOUS at 10:05

## 2023-05-22 NOTE — PROGRESS NOTES
8/14/2023  8:00 AM F1 MOE LONG [address] [facility] [place]   9/5/2023  8:00 AM F1 MOE LONG [address] [facility] [address] [place] [address] 190, RN  May 22, 2023

## 2023-05-22 NOTE — PROGRESS NOTES
Roney Joyce is a 45 y.o. female    Chief Complaint   Patient presents with    Follow-up     Metastatic Breast Cancer. 1. Have you been to the ER, urgent care clinic since your last visit? Hospitalized since your last visit? No    2. Have you seen or consulted any other health care providers outside of the [address] [place] [address] since your last visit? Include any pap smears or colon screening.  No

## 2023-05-23 ENCOUNTER — HOSPITAL ENCOUNTER (OUTPATIENT)
Facility: HOSPITAL | Age: 38
Discharge: HOME | End: 2023-05-26
Payer: COMMERCIAL

## 2023-05-23 VITALS — WEIGHT: 181 LBS | BODY MASS INDEX: 33.31 KG/M2 | HEIGHT: 62 IN

## 2023-05-23 DIAGNOSIS — C50.912 INFILTRATING DUCTAL CARCINOMA OF LEFT BREAST (HCC): ICD-10-CM

## 2023-05-23 PROCEDURE — C8908 MRI W/O FOL W/CONT, BREAST,: HCPCS

## 2023-05-23 PROCEDURE — A9585 GADOBUTROL INJECTION: HCPCS

## 2023-05-23 PROCEDURE — 6360000004 HC RX CONTRAST MEDICATION

## 2023-05-23 RX ADMIN — GADOBUTROL 8 ML: 604.72 INJECTION INTRAVENOUS at 13:06

## 2023-05-25 ENCOUNTER — TELEPHONE (OUTPATIENT)
Age: 38
End: 2023-05-25

## 2023-05-26 ENCOUNTER — TELEPHONE (OUTPATIENT)
Age: 38
End: 2023-05-26

## 2023-05-26 NOTE — TELEPHONE ENCOUNTER
[facility] Outpatient Palliative Medicine Office  Nursing Note  [phone]  Fax [phone]     Name:  Paula Eastman  YOB: 1985     Call # 2 to patient to follow up on outpatient Palliative Medicine referral.  No answer, left message requesting patient call our office at [phone] if she would like to schedule an appointment.     JOSE MoncadaN, RN  Palliative Medicine  [phone]

## 2023-05-26 NOTE — TELEPHONE ENCOUNTER
[facility] Palliative Medicine Office  Nursing Note  [phone]  Fax [phone]      Name:  Moises Wolf  YOB: 1985    Received outpatient Palliative Medicine referral from Slim Pollard NP to see patient for symptom management and supportive care. Chart  reviewed. Moises Wolf is a 45 y.o. female with breast cancer with mets to liver and lungs. Patient's most recent office visit with Dr. Ke Harrell was on 5/22/23. See her note for complete HPI, treatment history, and plan. ACP:     No CAP documents on file    Nurse called patient to explain [address] outpatient Palliative Medicine services and to schedule appointment. No answer, nurse left message requesting call back.      DAPHNE Martínez, RN-BC, [facility]

## 2023-05-31 ENCOUNTER — TELEPHONE (OUTPATIENT)
Age: 38
End: 2023-05-31

## 2023-05-31 NOTE — TELEPHONE ENCOUNTER
[facility] Outpatient Palliative Medicine Office  Nursing Note  [phone]  Fax [phone]     Name:  Yoli Maldonado  YOB: 1985     Patient returned call to schedule outpatient Palliative Medicine appointment. Patient referred by  Matt Mcdonnell NP. Yoli Maldonado is a 45 y.o. female with breast cancer with mets to liver and lungs. Patient's most recent office visit with Dr. Edil Chowdhury was on 5/22/23. See her note for complete HPI, treatment history, and plan. ACP:     No AMD on file    Appointment scheduled for 6/8/23 at 10:00am with Dr. Georges Espinosa. This nurse instructed patient to bring any pain medications that she is taking in the original container.       JOSE MckenzieN, RN-BC, [facility]

## 2023-06-02 DIAGNOSIS — C78.7 MALIGNANT NEOPLASM OF BREAST METASTATIC TO LIVER (HCC): Primary | ICD-10-CM

## 2023-06-02 DIAGNOSIS — C50.919 MALIGNANT NEOPLASM OF BREAST METASTATIC TO LIVER (HCC): Primary | ICD-10-CM

## 2023-06-02 DIAGNOSIS — G89.3 CANCER RELATED PAIN: ICD-10-CM

## 2023-06-02 RX ORDER — OXYCODONE HYDROCHLORIDE 10 MG/1
10 TABLET ORAL EVERY 4 HOURS PRN
Qty: 120 TABLET | Refills: 0 | Status: SHIPPED | OUTPATIENT
Start: 2023-06-02 | End: 2023-07-02

## 2023-06-08 ENCOUNTER — TELEPHONE (OUTPATIENT)
Age: 38
End: 2023-06-08

## 2023-06-08 NOTE — TELEPHONE ENCOUNTER
Adonay Rodriguez with [facility] left a message on voicemail. He stated the patient received Oxycodone a few days ago. Wants to confirm it is ok to fill Oxycodone ER 10mg.  # [id number].

## 2023-06-12 ENCOUNTER — TELEPHONE (OUTPATIENT)
Age: 38
End: 2023-06-12

## 2023-06-13 ENCOUNTER — HOSPITAL ENCOUNTER (OUTPATIENT)
Facility: HOSPITAL | Age: 38
Setting detail: OBSERVATION
Discharge: HOME | End: 2023-06-14
Attending: EMERGENCY MEDICINE | Admitting: HOSPITALIST
Payer: COMMERCIAL

## 2023-06-13 ENCOUNTER — HOSPITAL ENCOUNTER (OUTPATIENT)
Facility: HOSPITAL | Age: 38
Setting detail: INFUSION SERIES
Discharge: HOME | End: 2023-06-13
Payer: COMMERCIAL

## 2023-06-13 ENCOUNTER — APPOINTMENT (OUTPATIENT)
Facility: HOSPITAL | Age: 38
End: 2023-06-13
Payer: COMMERCIAL

## 2023-06-13 VITALS
RESPIRATION RATE: 16 BRPM | HEART RATE: 103 BPM | SYSTOLIC BLOOD PRESSURE: 117 MMHG | WEIGHT: 180.8 LBS | DIASTOLIC BLOOD PRESSURE: 77 MMHG | TEMPERATURE: 97.8 F | BODY MASS INDEX: 33.27 KG/M2 | HEIGHT: 62 IN

## 2023-06-13 DIAGNOSIS — C78.02 MALIGNANT NEOPLASM METASTATIC TO BOTH LUNGS (HCC): ICD-10-CM

## 2023-06-13 DIAGNOSIS — H53.9 VISUAL CHANGES: ICD-10-CM

## 2023-06-13 DIAGNOSIS — R51.9 NONINTRACTABLE HEADACHE, UNSPECIFIED CHRONICITY PATTERN, UNSPECIFIED HEADACHE TYPE: Primary | ICD-10-CM

## 2023-06-13 DIAGNOSIS — C79.51 MALIGNANT NEOPLASM METASTATIC TO BONE (HCC): Primary | ICD-10-CM

## 2023-06-13 DIAGNOSIS — C50.919 CARCINOMA OF BREAST METASTATIC TO MULTIPLE SITES, UNSPECIFIED LATERALITY (HCC): ICD-10-CM

## 2023-06-13 DIAGNOSIS — C78.01 MALIGNANT NEOPLASM METASTATIC TO BOTH LUNGS (HCC): ICD-10-CM

## 2023-06-13 PROBLEM — R79.89 ELEVATED LFTS: Status: RESOLVED | Noted: 2022-10-17 | Resolved: 2023-06-13

## 2023-06-13 PROBLEM — F43.22 ADJUSTMENT DISORDER WITH ANXIOUS MOOD: Status: ACTIVE | Noted: 2023-06-13

## 2023-06-13 PROBLEM — H53.2 DOUBLE VISION: Status: ACTIVE | Noted: 2023-06-13

## 2023-06-13 PROBLEM — R06.83 SNORING: Status: ACTIVE | Noted: 2023-06-13

## 2023-06-13 PROBLEM — R10.84 ABDOMINAL PAIN, GENERALIZED: Status: RESOLVED | Noted: 2022-10-17 | Resolved: 2023-06-13

## 2023-06-13 PROBLEM — H53.2 DOUBLE VISION: Status: RESOLVED | Noted: 2023-06-13 | Resolved: 2023-06-13

## 2023-06-13 LAB
ALBUMIN SERPL-MCNC: 3.8 G/DL (ref 3.5–5)
ALBUMIN SERPL-MCNC: 3.8 G/DL (ref 3.5–5)
ALBUMIN/GLOB SERPL: 1.3 (ref 1.1–2.2)
ALBUMIN/GLOB SERPL: 1.3 (ref 1.1–2.2)
ALP SERPL-CCNC: 83 U/L (ref 45–117)
ALP SERPL-CCNC: 88 U/L (ref 45–117)
ALT SERPL-CCNC: 22 U/L (ref 12–78)
ALT SERPL-CCNC: 23 U/L (ref 12–78)
ANION GAP SERPL CALC-SCNC: 7 MMOL/L (ref 5–15)
ANION GAP SERPL CALC-SCNC: 7 MMOL/L (ref 5–15)
AST SERPL-CCNC: 11 U/L (ref 15–37)
AST SERPL-CCNC: 11 U/L (ref 15–37)
BASOPHILS # BLD: 0 K/UL (ref 0–0.1)
BASOPHILS # BLD: 0 K/UL (ref 0–0.1)
BASOPHILS NFR BLD: 0 % (ref 0–1)
BASOPHILS NFR BLD: 0 % (ref 0–1)
BILIRUB SERPL-MCNC: 0.2 MG/DL (ref 0.2–1)
BILIRUB SERPL-MCNC: 0.2 MG/DL (ref 0.2–1)
BUN SERPL-MCNC: 12 MG/DL (ref 6–20)
BUN SERPL-MCNC: 12 MG/DL (ref 6–20)
BUN/CREAT SERPL: 16 (ref 12–20)
BUN/CREAT SERPL: 18 (ref 12–20)
CALCIUM SERPL-MCNC: 9.1 MG/DL (ref 8.5–10.1)
CALCIUM SERPL-MCNC: 9.3 MG/DL (ref 8.5–10.1)
CHLORIDE SERPL-SCNC: 109 MMOL/L (ref 97–108)
CHLORIDE SERPL-SCNC: 113 MMOL/L (ref 97–108)
CO2 SERPL-SCNC: 23 MMOL/L (ref 21–32)
CO2 SERPL-SCNC: 24 MMOL/L (ref 21–32)
COMMENT:: NORMAL
CREAT SERPL-MCNC: 0.67 MG/DL (ref 0.55–1.02)
CREAT SERPL-MCNC: 0.73 MG/DL (ref 0.55–1.02)
DIFFERENTIAL METHOD BLD: ABNORMAL
DIFFERENTIAL METHOD BLD: ABNORMAL
EOSINOPHIL # BLD: 0 K/UL (ref 0–0.4)
EOSINOPHIL # BLD: 0 K/UL (ref 0–0.4)
EOSINOPHIL NFR BLD: 0 % (ref 0–7)
EOSINOPHIL NFR BLD: 0 % (ref 0–7)
ERYTHROCYTE [DISTWIDTH] IN BLOOD BY AUTOMATED COUNT: 17.2 % (ref 11.5–14.5)
ERYTHROCYTE [DISTWIDTH] IN BLOOD BY AUTOMATED COUNT: 17.2 % (ref 11.5–14.5)
GLOBULIN SER CALC-MCNC: 3 G/DL (ref 2–4)
GLOBULIN SER CALC-MCNC: 3 G/DL (ref 2–4)
GLUCOSE SERPL-MCNC: 181 MG/DL (ref 65–100)
GLUCOSE SERPL-MCNC: 204 MG/DL (ref 65–100)
HCT VFR BLD AUTO: 37.5 % (ref 35–47)
HCT VFR BLD AUTO: 37.8 % (ref 35–47)
HGB BLD-MCNC: 11.6 G/DL (ref 11.5–16)
HGB BLD-MCNC: 11.8 G/DL (ref 11.5–16)
IMM GRANULOCYTES # BLD AUTO: 0.2 K/UL (ref 0–0.04)
IMM GRANULOCYTES # BLD AUTO: 0.2 K/UL (ref 0–0.04)
IMM GRANULOCYTES NFR BLD AUTO: 1 % (ref 0–0.5)
IMM GRANULOCYTES NFR BLD AUTO: 1 % (ref 0–0.5)
LYMPHOCYTES # BLD: 1 K/UL (ref 0.8–3.5)
LYMPHOCYTES # BLD: 1.3 K/UL (ref 0.8–3.5)
LYMPHOCYTES NFR BLD: 6 % (ref 12–49)
LYMPHOCYTES NFR BLD: 7 % (ref 12–49)
MAGNESIUM SERPL-MCNC: 2.5 MG/DL (ref 1.6–2.4)
MCH RBC QN AUTO: 28.5 PG (ref 26–34)
MCH RBC QN AUTO: 28.9 PG (ref 26–34)
MCHC RBC AUTO-ENTMCNC: 30.9 G/DL (ref 30–36.5)
MCHC RBC AUTO-ENTMCNC: 31.2 G/DL (ref 30–36.5)
MCV RBC AUTO: 91.3 FL (ref 80–99)
MCV RBC AUTO: 93.3 FL (ref 80–99)
MONOCYTES # BLD: 0.3 K/UL (ref 0–1)
MONOCYTES # BLD: 0.6 K/UL (ref 0–1)
MONOCYTES NFR BLD: 2 % (ref 5–13)
MONOCYTES NFR BLD: 4 % (ref 5–13)
NEUTS SEG # BLD: 15.5 K/UL (ref 1.8–8)
NEUTS SEG # BLD: 15.5 K/UL (ref 1.8–8)
NEUTS SEG NFR BLD: 88 % (ref 32–75)
NEUTS SEG NFR BLD: 91 % (ref 32–75)
NRBC # BLD: 0 K/UL (ref 0–0.01)
NRBC # BLD: 0 K/UL (ref 0–0.01)
NRBC BLD-RTO: 0 PER 100 WBC
NRBC BLD-RTO: 0 PER 100 WBC
PHOSPHATE SERPL-MCNC: 3 MG/DL (ref 2.6–4.7)
PLATELET # BLD AUTO: 371 K/UL (ref 150–400)
PLATELET # BLD AUTO: 375 K/UL (ref 150–400)
PMV BLD AUTO: 9.2 FL (ref 8.9–12.9)
PMV BLD AUTO: 9.7 FL (ref 8.9–12.9)
POTASSIUM SERPL-SCNC: 3.8 MMOL/L (ref 3.5–5.1)
POTASSIUM SERPL-SCNC: 3.9 MMOL/L (ref 3.5–5.1)
PROT SERPL-MCNC: 6.8 G/DL (ref 6.4–8.2)
PROT SERPL-MCNC: 6.8 G/DL (ref 6.4–8.2)
RBC # BLD AUTO: 4.02 M/UL (ref 3.8–5.2)
RBC # BLD AUTO: 4.14 M/UL (ref 3.8–5.2)
RBC MORPH BLD: ABNORMAL
SODIUM SERPL-SCNC: 140 MMOL/L (ref 136–145)
SODIUM SERPL-SCNC: 143 MMOL/L (ref 136–145)
SPECIMEN HOLD: NORMAL
WBC # BLD AUTO: 17 K/UL (ref 3.6–11)
WBC # BLD AUTO: 17.6 K/UL (ref 3.6–11)

## 2023-06-13 PROCEDURE — A4216 STERILE WATER/SALINE, 10 ML: HCPCS | Performed by: INTERNAL MEDICINE

## 2023-06-13 PROCEDURE — 96372 THER/PROPH/DIAG INJ SC/IM: CPT

## 2023-06-13 PROCEDURE — 70450 CT HEAD/BRAIN W/O DYE: CPT

## 2023-06-13 PROCEDURE — 99285 EMERGENCY DEPT VISIT HI MDM: CPT

## 2023-06-13 PROCEDURE — 6360000002 HC RX W HCPCS: Performed by: HOSPITALIST

## 2023-06-13 PROCEDURE — 36415 COLL VENOUS BLD VENIPUNCTURE: CPT

## 2023-06-13 PROCEDURE — 70553 MRI BRAIN STEM W/O & W/DYE: CPT

## 2023-06-13 PROCEDURE — A9579 GAD-BASE MR CONTRAST NOS,1ML: HCPCS

## 2023-06-13 PROCEDURE — G0378 HOSPITAL OBSERVATION PER HR: HCPCS

## 2023-06-13 PROCEDURE — 96401 CHEMO ANTI-NEOPL SQ/IM: CPT

## 2023-06-13 PROCEDURE — 6370000000 HC RX 637 (ALT 250 FOR IP): Performed by: HOSPITALIST

## 2023-06-13 PROCEDURE — 96413 CHEMO IV INFUSION 1 HR: CPT

## 2023-06-13 PROCEDURE — 80053 COMPREHEN METABOLIC PANEL: CPT

## 2023-06-13 PROCEDURE — 6360000004 HC RX CONTRAST MEDICATION

## 2023-06-13 PROCEDURE — 85025 COMPLETE CBC W/AUTO DIFF WBC: CPT

## 2023-06-13 PROCEDURE — 84100 ASSAY OF PHOSPHORUS: CPT

## 2023-06-13 PROCEDURE — 2580000003 HC RX 258: Performed by: INTERNAL MEDICINE

## 2023-06-13 PROCEDURE — 96375 TX/PRO/DX INJ NEW DRUG ADDON: CPT

## 2023-06-13 PROCEDURE — 6360000002 HC RX W HCPCS: Performed by: INTERNAL MEDICINE

## 2023-06-13 PROCEDURE — 2580000003 HC RX 258: Performed by: HOSPITALIST

## 2023-06-13 PROCEDURE — 2500000003 HC RX 250 WO HCPCS: Performed by: INTERNAL MEDICINE

## 2023-06-13 PROCEDURE — 83735 ASSAY OF MAGNESIUM: CPT

## 2023-06-13 RX ORDER — OXYCODONE HYDROCHLORIDE 5 MG/1
10 TABLET ORAL EVERY 4 HOURS PRN
Status: DISCONTINUED | OUTPATIENT
Start: 2023-06-13 | End: 2023-06-14 | Stop reason: HOSPADM

## 2023-06-13 RX ORDER — DIPHENHYDRAMINE HYDROCHLORIDE 50 MG/ML
50 INJECTION INTRAMUSCULAR; INTRAVENOUS
Status: CANCELLED | OUTPATIENT
Start: 2023-07-25

## 2023-06-13 RX ORDER — ONDANSETRON 4 MG/1
4 TABLET, FILM COATED ORAL EVERY 8 HOURS PRN
Status: DISCONTINUED | OUTPATIENT
Start: 2023-06-13 | End: 2023-06-13

## 2023-06-13 RX ORDER — BUPROPION HYDROCHLORIDE 300 MG/1
300 TABLET ORAL DAILY
Status: DISCONTINUED | OUTPATIENT
Start: 2023-06-13 | End: 2023-06-13

## 2023-06-13 RX ORDER — OXYCODONE HCL 10 MG/1
10 TABLET, FILM COATED, EXTENDED RELEASE ORAL EVERY 12 HOURS
Status: DISCONTINUED | OUTPATIENT
Start: 2023-06-13 | End: 2023-06-14 | Stop reason: HOSPADM

## 2023-06-13 RX ORDER — ONDANSETRON 2 MG/ML
4 INJECTION INTRAMUSCULAR; INTRAVENOUS EVERY 6 HOURS PRN
Status: DISCONTINUED | OUTPATIENT
Start: 2023-06-13 | End: 2023-06-14 | Stop reason: HOSPADM

## 2023-06-13 RX ORDER — ACETAMINOPHEN 325 MG/1
650 TABLET ORAL EVERY 6 HOURS PRN
Status: DISCONTINUED | OUTPATIENT
Start: 2023-06-13 | End: 2023-06-14 | Stop reason: HOSPADM

## 2023-06-13 RX ORDER — ALBUTEROL SULFATE 90 UG/1
4 AEROSOL, METERED RESPIRATORY (INHALATION) PRN
Status: CANCELLED | OUTPATIENT
Start: 2023-07-25

## 2023-06-13 RX ORDER — SODIUM CHLORIDE 0.9 % (FLUSH) 0.9 %
5-40 SYRINGE (ML) INJECTION EVERY 12 HOURS SCHEDULED
Status: DISCONTINUED | OUTPATIENT
Start: 2023-06-13 | End: 2023-06-14 | Stop reason: HOSPADM

## 2023-06-13 RX ORDER — ESCITALOPRAM OXALATE 10 MG/1
10 TABLET ORAL DAILY
Status: DISCONTINUED | OUTPATIENT
Start: 2023-06-14 | End: 2023-06-14 | Stop reason: HOSPADM

## 2023-06-13 RX ORDER — POLYETHYLENE GLYCOL 3350 17 G/17G
17 POWDER, FOR SOLUTION ORAL DAILY PRN
Status: DISCONTINUED | OUTPATIENT
Start: 2023-06-13 | End: 2023-06-14 | Stop reason: HOSPADM

## 2023-06-13 RX ORDER — HEPARIN 100 UNIT/ML
500 SYRINGE INTRAVENOUS PRN
Status: DISCONTINUED | OUTPATIENT
Start: 2023-06-13 | End: 2023-06-14 | Stop reason: HOSPADM

## 2023-06-13 RX ORDER — BUPROPION HYDROCHLORIDE 150 MG/1
150 TABLET, EXTENDED RELEASE ORAL 2 TIMES DAILY
Status: DISCONTINUED | OUTPATIENT
Start: 2023-06-14 | End: 2023-06-14 | Stop reason: HOSPADM

## 2023-06-13 RX ORDER — SODIUM CHLORIDE 9 MG/ML
INJECTION, SOLUTION INTRAVENOUS PRN
Status: DISCONTINUED | OUTPATIENT
Start: 2023-06-13 | End: 2023-06-14 | Stop reason: HOSPADM

## 2023-06-13 RX ORDER — DIPHENHYDRAMINE HYDROCHLORIDE 50 MG/ML
25 INJECTION INTRAMUSCULAR; INTRAVENOUS ONCE
Status: COMPLETED | OUTPATIENT
Start: 2023-06-13 | End: 2023-06-13

## 2023-06-13 RX ORDER — LISINOPRIL 10 MG/1
10 TABLET ORAL DAILY
Status: DISCONTINUED | OUTPATIENT
Start: 2023-06-13 | End: 2023-06-14 | Stop reason: HOSPADM

## 2023-06-13 RX ORDER — ACETAMINOPHEN 650 MG/1
650 SUPPOSITORY RECTAL EVERY 6 HOURS PRN
Status: DISCONTINUED | OUTPATIENT
Start: 2023-06-13 | End: 2023-06-14 | Stop reason: HOSPADM

## 2023-06-13 RX ORDER — DEXAMETHASONE 4 MG/1
8 TABLET ORAL DAILY
Status: COMPLETED | OUTPATIENT
Start: 2023-06-13 | End: 2023-06-14

## 2023-06-13 RX ORDER — ONDANSETRON 2 MG/ML
8 INJECTION INTRAMUSCULAR; INTRAVENOUS
Status: CANCELLED | OUTPATIENT
Start: 2023-07-25

## 2023-06-13 RX ORDER — SODIUM CHLORIDE 0.9 % (FLUSH) 0.9 %
5-40 SYRINGE (ML) INJECTION PRN
Status: DISCONTINUED | OUTPATIENT
Start: 2023-06-13 | End: 2023-06-14 | Stop reason: HOSPADM

## 2023-06-13 RX ORDER — PROCHLORPERAZINE MALEATE 5 MG/1
5 TABLET ORAL EVERY 6 HOURS PRN
Status: DISCONTINUED | OUTPATIENT
Start: 2023-06-13 | End: 2023-06-14 | Stop reason: HOSPADM

## 2023-06-13 RX ORDER — ONDANSETRON 4 MG/1
4 TABLET, ORALLY DISINTEGRATING ORAL EVERY 8 HOURS PRN
Status: DISCONTINUED | OUTPATIENT
Start: 2023-06-13 | End: 2023-06-14 | Stop reason: HOSPADM

## 2023-06-13 RX ORDER — SODIUM CHLORIDE 9 MG/ML
5-250 INJECTION, SOLUTION INTRAVENOUS PRN
Status: DISCONTINUED | OUTPATIENT
Start: 2023-06-13 | End: 2023-06-14 | Stop reason: HOSPADM

## 2023-06-13 RX ORDER — SODIUM CHLORIDE 9 MG/ML
INJECTION, SOLUTION INTRAVENOUS CONTINUOUS
Status: DISCONTINUED | OUTPATIENT
Start: 2023-06-13 | End: 2023-06-14

## 2023-06-13 RX ORDER — ENOXAPARIN SODIUM 100 MG/ML
40 INJECTION SUBCUTANEOUS DAILY
Status: DISCONTINUED | OUTPATIENT
Start: 2023-06-13 | End: 2023-06-14 | Stop reason: HOSPADM

## 2023-06-13 RX ORDER — SODIUM CHLORIDE 9 MG/ML
INJECTION, SOLUTION INTRAVENOUS CONTINUOUS
Status: CANCELLED | OUTPATIENT
Start: 2023-07-25

## 2023-06-13 RX ORDER — EPINEPHRINE 1 MG/ML
0.3 INJECTION, SOLUTION, CONCENTRATE INTRAVENOUS PRN
Status: CANCELLED | OUTPATIENT
Start: 2023-07-25

## 2023-06-13 RX ORDER — NALOXONE HYDROCHLORIDE 0.4 MG/ML
0.4 INJECTION, SOLUTION INTRAMUSCULAR; INTRAVENOUS; SUBCUTANEOUS PRN
Status: DISCONTINUED | OUTPATIENT
Start: 2023-06-13 | End: 2023-06-14 | Stop reason: HOSPADM

## 2023-06-13 RX ORDER — LIDOCAINE AND PRILOCAINE 25; 25 MG/G; MG/G
CREAM TOPICAL PRN
Status: DISCONTINUED | OUTPATIENT
Start: 2023-06-13 | End: 2023-06-14 | Stop reason: HOSPADM

## 2023-06-13 RX ORDER — B-COMPLEX WITH VITAMIN C
1 TABLET ORAL
Status: DISCONTINUED | OUTPATIENT
Start: 2023-06-13 | End: 2023-06-13

## 2023-06-13 RX ORDER — DEXAMETHASONE SODIUM PHOSPHATE 10 MG/ML
10 INJECTION, SOLUTION INTRAMUSCULAR; INTRAVENOUS ONCE
Status: COMPLETED | OUTPATIENT
Start: 2023-06-13 | End: 2023-06-13

## 2023-06-13 RX ORDER — ACETAMINOPHEN 325 MG/1
650 TABLET ORAL
Status: CANCELLED | OUTPATIENT
Start: 2023-07-25

## 2023-06-13 RX ADMIN — GADOTERIDOL 15 ML: 279.3 INJECTION, SOLUTION INTRAVENOUS at 16:55

## 2023-06-13 RX ADMIN — ENOXAPARIN SODIUM 40 MG: 100 INJECTION SUBCUTANEOUS at 19:04

## 2023-06-13 RX ADMIN — DEXAMETHASONE 8 MG: 4 TABLET ORAL at 19:03

## 2023-06-13 RX ADMIN — DEXAMETHASONE SODIUM PHOSPHATE 10 MG: 10 INJECTION, SOLUTION INTRAMUSCULAR; INTRAVENOUS at 10:26

## 2023-06-13 RX ADMIN — FAMOTIDINE 20 MG: 10 INJECTION, SOLUTION INTRAVENOUS at 10:23

## 2023-06-13 RX ADMIN — SODIUM CHLORIDE: 9 INJECTION, SOLUTION INTRAVENOUS at 15:40

## 2023-06-13 RX ADMIN — SODIUM CHLORIDE 25 ML/HR: 9 INJECTION, SOLUTION INTRAVENOUS at 09:30

## 2023-06-13 RX ADMIN — SODIUM CHLORIDE, PRESERVATIVE FREE 10 ML: 5 INJECTION INTRAVENOUS at 12:26

## 2023-06-13 RX ADMIN — PERTUZUMAB, TRASTUZUMAB, AND HYALURONIDASE-ZZXF 10 ML: 600; 600; 2000 INJECTION, SOLUTION SUBCUTANEOUS at 10:10

## 2023-06-13 RX ADMIN — OXYCODONE HYDROCHLORIDE 10 MG: 5 TABLET ORAL at 19:02

## 2023-06-13 RX ADMIN — DENOSUMAB 120 MG: 120 INJECTION SUBCUTANEOUS at 12:22

## 2023-06-13 RX ADMIN — SODIUM CHLORIDE, PRESERVATIVE FREE 10 ML: 5 INJECTION INTRAVENOUS at 12:25

## 2023-06-13 RX ADMIN — DOCETAXEL ANHYDROUS 106 MG: 10 INJECTION, SOLUTION INTRAVENOUS at 11:18

## 2023-06-13 RX ADMIN — DIPHENHYDRAMINE HYDROCHLORIDE 25 MG: 50 INJECTION, SOLUTION INTRAMUSCULAR; INTRAVENOUS at 10:30

## 2023-06-13 ASSESSMENT — ENCOUNTER SYMPTOMS
EYE DISCHARGE: 0
SHORTNESS OF BREATH: 0
ABDOMINAL PAIN: 0

## 2023-06-13 ASSESSMENT — PAIN SCALES - GENERAL: PAINLEVEL_OUTOF10: 7

## 2023-06-13 NOTE — ED NOTES
JEROME Moyer requesting port access for labs. GILBERTO Carlos to obtain needle for access as it is currently out of stock in ED.        Eleonora Chapman RN  06/13/23 3358

## 2023-06-13 NOTE — ED PROVIDER NOTES
Negative for shortness of breath. Cardiovascular:  Negative for chest pain. Gastrointestinal:  Negative for abdominal pain. Genitourinary:  Negative for dysuria. Skin:  Negative for wound. Neurological:  Positive for headaches. Negative for seizures. Psychiatric/Behavioral:  Negative for suicidal ideas. Vitals:    06/13/23 1240 06/13/23 1745   BP: (!) 141/87 (!) 147/91   Pulse: 96 94   Resp: 16 16   Temp: 97.2 °F (36.2 °C) 98.1 °F (36.7 °C)   TempSrc: Temporal Temporal   SpO2: 97% 100%            Physical Exam  Vitals and nursing note reviewed. Constitutional:       General: She is not in acute distress. Appearance: Normal appearance. She is not ill-appearing, toxic-appearing or diaphoretic. HENT:      Head: Normocephalic and atraumatic. Right Ear: External ear normal.      Left Ear: External ear normal.      Nose: Nose normal.      Mouth/Throat:      Mouth: Mucous membranes are moist.   Eyes:      General:         Right eye: No discharge. Left eye: No discharge. Extraocular Movements: Extraocular movements intact. Conjunctiva/sclera: Conjunctivae normal.      Pupils: Pupils are equal, round, and reactive to light. Cardiovascular:      Rate and Rhythm: Normal rate and regular rhythm. Pulmonary:      Effort: Pulmonary effort is normal. No respiratory distress. Abdominal:      General: Abdomen is flat. Musculoskeletal:         General: No deformity. Cervical back: Neck supple. Skin:     General: Skin is warm. Capillary Refill: Capillary refill takes less than 2 seconds. Neurological:      General: No focal deficit present. Mental Status: She is alert. Mental status is at baseline. Psychiatric:         Mood and Affect: Mood normal.         Behavior: Behavior normal. Behavior is cooperative. Thought Content:  Thought content normal.            LABORATORY RESULTS:  Recent Results (from the past 24 hour(s))   Magnesium    Collection Time:

## 2023-06-13 NOTE — PROGRESS NOTES
[facility] Chemotherapy Progress Note    Date: 2023    Name: Kady Cao    MRN: [id number]         : 1985      0800 Pt admit to [facility] for [facility] [place]/Docetaxel/Xgeva ambulatory in stable condition. Assessment completed. No new concerns voiced. Port with positive blood return. Labs sent for processing. Ms. King's vitals were reviewed. Patient Vitals for the past 12 hrs:   Temp Pulse Resp BP   23 1215 -- -- -- 117/77   23 0801 97.8 °F (36.6 °C) (!) 103 16 133/88         Lab results were obtained and reviewed.   Recent Results (from the past 12 hour(s))   Magnesium    Collection Time: 23  8:03 AM   Result Value Ref Range    Magnesium 2.5 (H) 1.6 - 2.4 mg/dL   Phosphorus    Collection Time: 23  8:03 AM   Result Value Ref Range    Phosphorus 3.0 2.6 - 4.7 MG/DL   CBC with Auto Differential    Collection Time: 23  8:03 AM   Result Value Ref Range    WBC 17.6 (H) 3.6 - 11.0 K/uL    RBC 4.14 3.80 - 5.20 M/uL    Hemoglobin 11.8 11.5 - 16.0 g/dL    Hematocrit 37.8 35.0 - 47.0 %    MCV 91.3 80.0 - 99.0 FL    MCH 28.5 26.0 - 34.0 PG    MCHC 31.2 30.0 - 36.5 g/dL    RDW 17.2 (H) 11.5 - 14.5 %    Platelets 717 386 - 543 K/uL    MPV 9.2 8.9 - 12.9 FL    Nucleated RBCs 0.0 0  WBC    nRBC 0.00 0.00 - 0.01 K/uL    Neutrophils % 88 (H) 32 - 75 %    Lymphocytes % 7 (L) 12 - 49 %    Monocytes % 4 (L) 5 - 13 %    Eosinophils % 0 0 - 7 %    Basophils % 0 0 - 1 %    Immature Granulocytes 1 (H) 0.0 - 0.5 %    Neutrophils Absolute 15.5 (H) 1.8 - 8.0 K/UL    Lymphocytes Absolute 1.3 0.8 - 3.5 K/UL    Monocytes Absolute 0.6 0.0 - 1.0 K/UL    Eosinophils Absolute 0.0 0.0 - 0.4 K/UL    Basophils Absolute 0.0 0.0 - 0.1 K/UL    Absolute Immature Granulocyte 0.2 (H) 0.00 - 0.04 K/UL    Differential Type AUTOMATED     Comprehensive metabolic panel    Collection Time: 23  8:03 AM   Result Value Ref Range    Sodium 140 136 - 145 mmol/L    Potassium 3.9 3.5 - 5.1 mmol/L

## 2023-06-13 NOTE — ED TRIAGE NOTES
Pt c/o double vision and headaches for a few weeks, denies fever, no blood thinners, no head trauma, hx of cancer , denies n/v
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
## 2023-06-14 VITALS
WEIGHT: 192.46 LBS | BODY MASS INDEX: 35.2 KG/M2 | SYSTOLIC BLOOD PRESSURE: 140 MMHG | HEART RATE: 89 BPM | TEMPERATURE: 98 F | RESPIRATION RATE: 19 BRPM | OXYGEN SATURATION: 99 % | DIASTOLIC BLOOD PRESSURE: 87 MMHG

## 2023-06-14 LAB
ALBUMIN SERPL-MCNC: 3.7 G/DL (ref 3.5–5)
ALBUMIN/GLOB SERPL: 1.4 (ref 1.1–2.2)
ALP SERPL-CCNC: 88 U/L (ref 45–117)
ALT SERPL-CCNC: 23 U/L (ref 12–78)
ANION GAP SERPL CALC-SCNC: 5 MMOL/L (ref 5–15)
AST SERPL-CCNC: 10 U/L (ref 15–37)
BASOPHILS # BLD: 0 K/UL (ref 0–0.1)
BASOPHILS NFR BLD: 0 % (ref 0–1)
BILIRUB SERPL-MCNC: 0.1 MG/DL (ref 0.2–1)
BUN SERPL-MCNC: 15 MG/DL (ref 6–20)
BUN/CREAT SERPL: 25 (ref 12–20)
CALCIUM SERPL-MCNC: 8.9 MG/DL (ref 8.5–10.1)
CHLORIDE SERPL-SCNC: 111 MMOL/L (ref 97–108)
CO2 SERPL-SCNC: 24 MMOL/L (ref 21–32)
CREAT SERPL-MCNC: 0.61 MG/DL (ref 0.55–1.02)
DIFFERENTIAL METHOD BLD: ABNORMAL
EOSINOPHIL # BLD: 0 K/UL (ref 0–0.4)
EOSINOPHIL NFR BLD: 0 % (ref 0–7)
ERYTHROCYTE [DISTWIDTH] IN BLOOD BY AUTOMATED COUNT: 17.2 % (ref 11.5–14.5)
EST. AVERAGE GLUCOSE BLD GHB EST-MCNC: 120 MG/DL
GLOBULIN SER CALC-MCNC: 2.6 G/DL (ref 2–4)
GLUCOSE SERPL-MCNC: 187 MG/DL (ref 65–100)
HBA1C MFR BLD: 5.8 % (ref 4–5.6)
HCT VFR BLD AUTO: 36 % (ref 35–47)
HGB BLD-MCNC: 11.1 G/DL (ref 11.5–16)
IMM GRANULOCYTES # BLD AUTO: 0.2 K/UL (ref 0–0.04)
IMM GRANULOCYTES NFR BLD AUTO: 1 % (ref 0–0.5)
LYMPHOCYTES # BLD: 0.8 K/UL (ref 0.8–3.5)
LYMPHOCYTES NFR BLD: 5 % (ref 12–49)
MCH RBC QN AUTO: 29.1 PG (ref 26–34)
MCHC RBC AUTO-ENTMCNC: 30.8 G/DL (ref 30–36.5)
MCV RBC AUTO: 94.2 FL (ref 80–99)
MONOCYTES # BLD: 0.3 K/UL (ref 0–1)
MONOCYTES NFR BLD: 2 % (ref 5–13)
NEUTS SEG # BLD: 15.3 K/UL (ref 1.8–8)
NEUTS SEG NFR BLD: 92 % (ref 32–75)
NRBC # BLD: 0 K/UL (ref 0–0.01)
NRBC BLD-RTO: 0 PER 100 WBC
PLATELET # BLD AUTO: 322 K/UL (ref 150–400)
PMV BLD AUTO: 9.7 FL (ref 8.9–12.9)
POTASSIUM SERPL-SCNC: 4.2 MMOL/L (ref 3.5–5.1)
PROT SERPL-MCNC: 6.3 G/DL (ref 6.4–8.2)
RBC # BLD AUTO: 3.82 M/UL (ref 3.8–5.2)
RBC MORPH BLD: ABNORMAL
RBC MORPH BLD: ABNORMAL
SODIUM SERPL-SCNC: 140 MMOL/L (ref 136–145)
WBC # BLD AUTO: 16.6 K/UL (ref 3.6–11)

## 2023-06-14 PROCEDURE — 83036 HEMOGLOBIN GLYCOSYLATED A1C: CPT

## 2023-06-14 PROCEDURE — 36415 COLL VENOUS BLD VENIPUNCTURE: CPT

## 2023-06-14 PROCEDURE — 6370000000 HC RX 637 (ALT 250 FOR IP): Performed by: HOSPITALIST

## 2023-06-14 PROCEDURE — 2580000003 HC RX 258: Performed by: HOSPITALIST

## 2023-06-14 PROCEDURE — 96372 THER/PROPH/DIAG INJ SC/IM: CPT

## 2023-06-14 PROCEDURE — 80053 COMPREHEN METABOLIC PANEL: CPT

## 2023-06-14 PROCEDURE — G0378 HOSPITAL OBSERVATION PER HR: HCPCS

## 2023-06-14 PROCEDURE — 6360000002 HC RX W HCPCS: Performed by: HOSPITALIST

## 2023-06-14 PROCEDURE — 85025 COMPLETE CBC W/AUTO DIFF WBC: CPT

## 2023-06-14 RX ADMIN — DEXAMETHASONE 8 MG: 4 TABLET ORAL at 08:15

## 2023-06-14 RX ADMIN — SODIUM CHLORIDE, PRESERVATIVE FREE 10 ML: 5 INJECTION INTRAVENOUS at 08:23

## 2023-06-14 RX ADMIN — OXYCODONE HYDROCHLORIDE 10 MG: 5 TABLET ORAL at 01:47

## 2023-06-14 RX ADMIN — OXYCODONE HYDROCHLORIDE 10 MG: 10 TABLET, FILM COATED, EXTENDED RELEASE ORAL at 08:15

## 2023-06-14 RX ADMIN — BUPROPION HYDROCHLORIDE 150 MG: 150 TABLET, EXTENDED RELEASE ORAL at 08:15

## 2023-06-14 RX ADMIN — ESCITALOPRAM OXALATE 10 MG: 10 TABLET ORAL at 08:15

## 2023-06-14 RX ADMIN — SODIUM CHLORIDE: 9 INJECTION, SOLUTION INTRAVENOUS at 08:19

## 2023-06-14 RX ADMIN — ENOXAPARIN SODIUM 40 MG: 100 INJECTION SUBCUTANEOUS at 08:14

## 2023-06-14 RX ADMIN — LISINOPRIL 10 MG: 10 TABLET ORAL at 08:15

## 2023-06-14 ASSESSMENT — PAIN SCALES - GENERAL: PAINLEVEL_OUTOF10: 6

## 2023-06-14 ASSESSMENT — PAIN DESCRIPTION - LOCATION: LOCATION: LEG

## 2023-06-14 NOTE — PLAN OF CARE
Problem: Discharge Planning  Goal: Discharge to home or other facility with appropriate resources  6/14/2023 1008 by Devan Jean RN  Outcome: Progressing  6/13/2023 2358 by Guru Paulino RN  Outcome: Progressing  Flowsheets  Taken 6/13/2023 2000 by Guru Paulino, RN  Discharge to home or other facility with appropriate resources: Identify barriers to discharge with patient and caregiver  Taken 6/13/2023 1845 by Laquita Zapata RN  Discharge to home or other facility with appropriate resources: Identify barriers to discharge with patient and caregiver     Problem: Safety - Adult  Goal: Free from fall injury  6/14/2023 1008 by Devan Jean RN  Outcome: Progressing  6/13/2023 2358 by Guru Paulino, RN  Outcome: Progressing
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
## 2023-06-14 NOTE — PLAN OF CARE
Problem: Discharge Planning  Goal: Discharge to home or other facility with appropriate resources  6/14/2023 1205 by Janneth Hart RN  Outcome: [facility] Resolved Met  6/14/2023 1008 by Janneth Hart RN  Outcome: Progressing  6/13/2023 2358 by Radha Whitten RN  Outcome: Progressing  Flowsheets  Taken 6/13/2023 2000 by Radha Whitten, RN  Discharge to home or other facility with appropriate resources: Identify barriers to discharge with patient and caregiver  Taken 6/13/2023 1845 by Flor Schultz RN  Discharge to home or other facility with appropriate resources: Identify barriers to discharge with patient and caregiver     Problem: Safety - Adult  Goal: Free from fall injury  6/14/2023 1205 by Janneth Hart RN  Outcome: [facility] Resolved Met  6/14/2023 1008 by Janneth Hart RN  Outcome: Progressing  6/13/2023 2358 by Radha Whitten RN  Outcome: Progressing

## 2023-06-14 NOTE — DISCHARGE INSTRUCTIONS
Discharge Instructions       PATIENT ID: Ivan Moreno  MRN: [id number]   YOB: 1985    DATE OF ADMISSION: [unfilled]    DATE OF DISCHARGE: 6/14/2023    PRIMARY CARE PROVIDER: @PCP@     ATTENDING PHYSICIAN: @HO@  DISCHARGING PROVIDER: Ashvin El MD    To contact this individual call [phone] and ask the  to page. If unavailable ask to be transferred the Adult Hospitalist Department. DISCHARGE DIAGNOSES Headache/blurriness of vision    CONSULTATIONS: None    PROCEDURES/SURGERIES: * No surgery found *    PENDING TEST RESULTS:   At the time of discharge the following test results are still pending: none    FOLLOW UP APPOINTMENTS:   PCP  Oncology    ADDITIONAL CARE RECOMMENDATIONS: as above    DIET: regular diet    ACTIVITY: activity as tolerated    DISCHARGE MEDICATIONS:   See Medication Reconciliation Form    It is important that you take the medication exactly as they are prescribed. Keep your medication in the bottles provided by the pharmacist and keep a list of the medication names, dosages, and times to be taken in your wallet. Do not take other medications without consulting your doctor. NOTIFY YOUR PHYSICIAN FOR ANY OF THE FOLLOWING:   Fever over 101 degrees for 24 hours. Chest pain, shortness of breath, fever, chills, nausea, vomiting, diarrhea, change in mentation, falling, weakness, bleeding. Severe pain or pain not relieved by medications. Or, any other signs or symptoms that you may have questions about.       DISPOSITION:  x  Home With:   OT  PT  HH  RN       SNF/Inpatient Rehab/LTAC    Independent/assisted living    Hospice    Other:     CDMP Checked:   Yes x     PROBLEM LIST Updated:  Yes x       Signed:   Ashvin El MD  6/14/2023  10:58 AM

## 2023-06-14 NOTE — PROGRESS NOTES
[address] [facility] Adult  Hospitalist Group                                                                                          Hospitalist Progress Note  Destin Ernst MD  Office Phone: [phone]        Date of Service:  2023  NAME:  Ethan Young  :  1985  MRN:  [id number]       Admission Summary:   Ethan Young is a 45 y.o. female who presents with headache and visual changes. 45 yr old female with pmh of stage breast cancer ER/OR negative HER2 3+ presents with 4-6 weeks of generalized headache and blurriness of vision which got worse in the last 2-3 days. The patient says that her chemotherapy started 10/2022 but it was only since last about 2 months that she noticed headache and blurriness. Her headache is generalized about 4-5/10 in intensity non radiating associated with intermittent dizziness. In that regard the patient has seen palliative care recently. She did not get a chance to take the regimen prescribed by them, so does not know if it works or not. She got another chemotherapy today but because of her symptoms, her oncologist sent to the ER for further evaluation and management. The patient denies any headache, blurry vision, sore throat, trouble swallowing, trouble with speech, chest pain, SOB, cough, fever, chills, N/V/D, abd pain, urinary symptoms, constipation, recent travels, sick contacts, focal or generalized neurological symptoms, falls, injuries, rashes, contact with COVID-19 diagnosed patients, hematemesis, melena, hemoptysis, hematuria, rashes, denies starting any new medications and denies any other concerns or problems besides as mentioned above. Interval history / Subjective:    Follow up headache   Feels better  No headache or dizziness or blurriness of vision  Assessment & Plan:     Headache/dizziness/blurriness of vision  -unclear etiology  -?complex migraine  -CT head unremarkable  -MRI brain, unremarkable  -Pain regimen  -Appreciate

## 2023-06-14 NOTE — DISCHARGE SUMMARY
Discharge Summary       PATIENT ID: Emily Jeong  MRN: [id number]   YOB: 1985    DATE OF ADMISSION: 6/13/2023  1:00 PM    DATE OF DISCHARGE: 6/14/2023   PRIMARY CARE PROVIDER: AYE Terry     ATTENDING PHYSICIAN: Dr Bella Hilliard  DISCHARGING PROVIDER: Bella Hilliard MD    To contact this individual call [phone] and ask the  to page. If unavailable ask to be transferred the Adult Hospitalist Department.     CONSULTATIONS: IP CONSULT TO HOSPITALIST    PROCEDURES/SURGERIES: * No surgery found *    ADMITTING DIAGNOSES & HOSPITAL COURSE:   Headache/dizziness/blurriness of vision  -unclear etiology  -?complex migraine  -CT head unremarkable  -MRI brain, unremarkable  -Pain regimen  -Appreciate discussion with kim Thorpe to discharge  -Outpatient follow up with Neurology if needed     Breast cancer, stage IV, mets to lung liver  -ER/FL negative, HER2 Positive  -receiving chemotherapy  -Follow up with oncology as outpatient  -says she has good appetite   -Confirms that she has good social support of the family      PENDING TEST RESULTS:   At the time of discharge the following test results are still pending: none    FOLLOW UP APPOINTMENTS:    PCP  Oncology    ADDITIONAL CARE RECOMMENDATIONS: as above    DIET: regular diet    ACTIVITY: activity as tolerated      DISCHARGE MEDICATIONS:     Medication List        CONTINUE taking these medications      buPROPion 300 MG extended release tablet  Commonly known as: WELLBUTRIN XL     dexamethasone 4 MG tablet  Commonly known as: DECADRON  Take 2 tabs (8mg) by mouth twice daily the day before chemo and the day after chemo     escitalopram 10 MG tablet  Commonly known as: LEXAPRO  Take 1 tablet by mouth daily     lidocaine-prilocaine 2.5-2.5 % cream  Commonly known as: EMLA     lisinopril 10 MG tablet  Commonly known as: PRINIVIL;ZESTRIL     naloxone 4 MG/0.1ML Liqd nasal spray  1 spray by Nasal route as needed for Opioid Reversal

## 2023-06-14 NOTE — PLAN OF CARE
Problem: Discharge Planning  Goal: Discharge to home or other facility with appropriate resources  Outcome: Progressing  Flowsheets  Taken 6/13/2023 2000 by Garrick Goldmann, RN  Discharge to home or other facility with appropriate resources: Identify barriers to discharge with patient and caregiver  Taken 6/13/2023 1845 by Papo Eller RN  Discharge to home or other facility with appropriate resources: Identify barriers to discharge with patient and caregiver     Problem: Discharge Planning  Goal: Discharge to home or other facility with appropriate resources  Outcome: Progressing  Flowsheets  Taken 6/13/2023 2000 by Garrick Goldmann, RN  Discharge to home or other facility with appropriate resources: Identify barriers to discharge with patient and caregiver  Taken 6/13/2023 1845 by Papo Eller RN  Discharge to home or other facility with appropriate resources: Identify barriers to discharge with patient and caregiver     Problem: Safety - Adult  Goal: Free from fall injury  Outcome: Progressing

## 2023-06-21 PROBLEM — Z09 CHEMOTHERAPY FOLLOW-UP EXAMINATION: Status: RESOLVED | Noted: 2023-05-22 | Resolved: 2023-06-21

## 2023-06-27 RX ORDER — SODIUM CHLORIDE 9 MG/ML
INJECTION, SOLUTION INTRAVENOUS CONTINUOUS
Status: CANCELLED | OUTPATIENT
Start: 2023-07-03

## 2023-06-27 RX ORDER — EPINEPHRINE 1 MG/ML
0.3 INJECTION, SOLUTION, CONCENTRATE INTRAVENOUS PRN
Status: CANCELLED | OUTPATIENT
Start: 2023-07-03

## 2023-06-27 RX ORDER — ACETAMINOPHEN 325 MG/1
650 TABLET ORAL
Status: CANCELLED | OUTPATIENT
Start: 2023-07-03

## 2023-06-27 RX ORDER — ALBUTEROL SULFATE 90 UG/1
4 AEROSOL, METERED RESPIRATORY (INHALATION) PRN
Status: CANCELLED | OUTPATIENT
Start: 2023-07-03

## 2023-06-27 RX ORDER — ONDANSETRON 2 MG/ML
8 INJECTION INTRAMUSCULAR; INTRAVENOUS
Status: CANCELLED | OUTPATIENT
Start: 2023-07-03

## 2023-06-27 RX ORDER — MEPERIDINE HYDROCHLORIDE 25 MG/ML
12.5 INJECTION INTRAMUSCULAR; INTRAVENOUS; SUBCUTANEOUS PRN
Status: CANCELLED | OUTPATIENT
Start: 2023-07-03

## 2023-06-27 RX ORDER — DIPHENHYDRAMINE HYDROCHLORIDE 50 MG/ML
50 INJECTION INTRAMUSCULAR; INTRAVENOUS
Status: CANCELLED | OUTPATIENT
Start: 2023-07-03

## 2023-07-03 ENCOUNTER — HOSPITAL ENCOUNTER (OUTPATIENT)
Facility: HOSPITAL | Age: 38
Setting detail: INFUSION SERIES
End: 2023-07-03
Payer: COMMERCIAL

## 2023-07-03 ENCOUNTER — OFFICE VISIT (OUTPATIENT)
Age: 38
End: 2023-07-03
Payer: COMMERCIAL

## 2023-07-03 ENCOUNTER — TELEPHONE (OUTPATIENT)
Age: 38
End: 2023-07-03

## 2023-07-03 VITALS
OXYGEN SATURATION: 95 % | BODY MASS INDEX: 33.11 KG/M2 | RESPIRATION RATE: 18 BRPM | WEIGHT: 181 LBS | DIASTOLIC BLOOD PRESSURE: 76 MMHG | SYSTOLIC BLOOD PRESSURE: 126 MMHG | TEMPERATURE: 97.5 F | HEART RATE: 71 BPM

## 2023-07-03 VITALS
TEMPERATURE: 97.5 F | WEIGHT: 181.6 LBS | HEART RATE: 79 BPM | OXYGEN SATURATION: 95 % | SYSTOLIC BLOOD PRESSURE: 144 MMHG | BODY MASS INDEX: 33.22 KG/M2 | DIASTOLIC BLOOD PRESSURE: 70 MMHG

## 2023-07-03 DIAGNOSIS — Z09 CHEMOTHERAPY FOLLOW-UP EXAMINATION: ICD-10-CM

## 2023-07-03 DIAGNOSIS — C78.01 MALIGNANT NEOPLASM METASTATIC TO BOTH LUNGS (HCC): ICD-10-CM

## 2023-07-03 DIAGNOSIS — R11.0 CHEMOTHERAPY-INDUCED NAUSEA: ICD-10-CM

## 2023-07-03 DIAGNOSIS — C79.51 MALIGNANT NEOPLASM METASTATIC TO BONE (HCC): Primary | ICD-10-CM

## 2023-07-03 DIAGNOSIS — C78.7 MALIGNANT NEOPLASM OF BREAST METASTATIC TO LIVER (HCC): Primary | ICD-10-CM

## 2023-07-03 DIAGNOSIS — G89.3 CANCER RELATED PAIN: ICD-10-CM

## 2023-07-03 DIAGNOSIS — T45.1X5A CHEMOTHERAPY-INDUCED NAUSEA: ICD-10-CM

## 2023-07-03 DIAGNOSIS — C50.919 MALIGNANT NEOPLASM OF BREAST METASTATIC TO LIVER (HCC): Primary | ICD-10-CM

## 2023-07-03 DIAGNOSIS — Z51.81 ENCOUNTER FOR MONITORING CARDIOTOXIC DRUG THERAPY: ICD-10-CM

## 2023-07-03 DIAGNOSIS — M25.551 RIGHT HIP PAIN: ICD-10-CM

## 2023-07-03 DIAGNOSIS — Z87.311 HISTORY OF PATHOLOGIC FRACTURE: ICD-10-CM

## 2023-07-03 DIAGNOSIS — T45.1X5A CHEMOTHERAPY-INDUCED FATIGUE: ICD-10-CM

## 2023-07-03 DIAGNOSIS — C78.02 MALIGNANT NEOPLASM METASTATIC TO BOTH LUNGS (HCC): ICD-10-CM

## 2023-07-03 DIAGNOSIS — Z79.899 ENCOUNTER FOR MONITORING CARDIOTOXIC DRUG THERAPY: ICD-10-CM

## 2023-07-03 DIAGNOSIS — Z95.828 PORT-A-CATH IN PLACE: ICD-10-CM

## 2023-07-03 DIAGNOSIS — R53.83 CHEMOTHERAPY-INDUCED FATIGUE: ICD-10-CM

## 2023-07-03 DIAGNOSIS — C79.51 MALIGNANT NEOPLASM METASTATIC TO BONE (HCC): ICD-10-CM

## 2023-07-03 LAB
ALBUMIN SERPL-MCNC: 3.6 G/DL (ref 3.5–5)
ALBUMIN/GLOB SERPL: 1.2 (ref 1.1–2.2)
ALP SERPL-CCNC: 118 U/L (ref 45–117)
ALT SERPL-CCNC: 29 U/L (ref 12–78)
ANION GAP SERPL CALC-SCNC: 7 MMOL/L (ref 5–15)
AST SERPL-CCNC: 15 U/L (ref 15–37)
BASOPHILS # BLD: 0 K/UL (ref 0–0.1)
BASOPHILS NFR BLD: 0 % (ref 0–1)
BILIRUB SERPL-MCNC: 0.1 MG/DL (ref 0.2–1)
BUN SERPL-MCNC: 10 MG/DL (ref 6–20)
BUN/CREAT SERPL: 11 (ref 12–20)
CALCIUM SERPL-MCNC: 9.3 MG/DL (ref 8.5–10.1)
CHLORIDE SERPL-SCNC: 108 MMOL/L (ref 97–108)
CO2 SERPL-SCNC: 23 MMOL/L (ref 21–32)
CREAT SERPL-MCNC: 0.95 MG/DL (ref 0.55–1.02)
DIFFERENTIAL METHOD BLD: ABNORMAL
EOSINOPHIL # BLD: 0 K/UL (ref 0–0.4)
EOSINOPHIL NFR BLD: 0 % (ref 0–7)
ERYTHROCYTE [DISTWIDTH] IN BLOOD BY AUTOMATED COUNT: 15.8 % (ref 11.5–14.5)
GLOBULIN SER CALC-MCNC: 2.9 G/DL (ref 2–4)
GLUCOSE SERPL-MCNC: 229 MG/DL (ref 65–100)
HCT VFR BLD AUTO: 38.3 % (ref 35–47)
HGB BLD-MCNC: 11.6 G/DL (ref 11.5–16)
IMM GRANULOCYTES # BLD AUTO: 0.3 K/UL (ref 0–0.04)
IMM GRANULOCYTES NFR BLD AUTO: 2 % (ref 0–0.5)
LYMPHOCYTES # BLD: 1.6 K/UL (ref 0.8–3.5)
LYMPHOCYTES NFR BLD: 10 % (ref 12–49)
MCH RBC QN AUTO: 28.5 PG (ref 26–34)
MCHC RBC AUTO-ENTMCNC: 30.3 G/DL (ref 30–36.5)
MCV RBC AUTO: 94.1 FL (ref 80–99)
MONOCYTES # BLD: 0.5 K/UL (ref 0–1)
MONOCYTES NFR BLD: 3 % (ref 5–13)
NEUTS SEG # BLD: 13.5 K/UL (ref 1.8–8)
NEUTS SEG NFR BLD: 85 % (ref 32–75)
NRBC # BLD: 0 K/UL (ref 0–0.01)
NRBC BLD-RTO: 0 PER 100 WBC
PLATELET # BLD AUTO: 462 K/UL (ref 150–400)
PMV BLD AUTO: 9.4 FL (ref 8.9–12.9)
POTASSIUM SERPL-SCNC: 4.3 MMOL/L (ref 3.5–5.1)
PROT SERPL-MCNC: 6.5 G/DL (ref 6.4–8.2)
RBC # BLD AUTO: 4.07 M/UL (ref 3.8–5.2)
RBC MORPH BLD: ABNORMAL
SODIUM SERPL-SCNC: 138 MMOL/L (ref 136–145)
WBC # BLD AUTO: 15.9 K/UL (ref 3.6–11)

## 2023-07-03 PROCEDURE — 6360000002 HC RX W HCPCS: Performed by: INTERNAL MEDICINE

## 2023-07-03 PROCEDURE — 96375 TX/PRO/DX INJ NEW DRUG ADDON: CPT

## 2023-07-03 PROCEDURE — 96413 CHEMO IV INFUSION 1 HR: CPT

## 2023-07-03 PROCEDURE — A4216 STERILE WATER/SALINE, 10 ML: HCPCS | Performed by: INTERNAL MEDICINE

## 2023-07-03 PROCEDURE — 96401 CHEMO ANTI-NEOPL SQ/IM: CPT

## 2023-07-03 PROCEDURE — 2580000003 HC RX 258: Performed by: INTERNAL MEDICINE

## 2023-07-03 PROCEDURE — 80053 COMPREHEN METABOLIC PANEL: CPT

## 2023-07-03 PROCEDURE — 2500000003 HC RX 250 WO HCPCS: Performed by: INTERNAL MEDICINE

## 2023-07-03 PROCEDURE — 36415 COLL VENOUS BLD VENIPUNCTURE: CPT

## 2023-07-03 PROCEDURE — 99213 OFFICE O/P EST LOW 20 MIN: CPT | Performed by: NURSE PRACTITIONER

## 2023-07-03 PROCEDURE — 85025 COMPLETE CBC W/AUTO DIFF WBC: CPT

## 2023-07-03 RX ORDER — SODIUM CHLORIDE 9 MG/ML
5-250 INJECTION, SOLUTION INTRAVENOUS PRN
OUTPATIENT
Start: 2023-08-07

## 2023-07-03 RX ORDER — EPINEPHRINE 1 MG/ML
0.3 INJECTION, SOLUTION, CONCENTRATE INTRAVENOUS PRN
OUTPATIENT
Start: 2023-08-07

## 2023-07-03 RX ORDER — DIPHENHYDRAMINE HYDROCHLORIDE 50 MG/ML
25 INJECTION INTRAMUSCULAR; INTRAVENOUS ONCE
Status: COMPLETED | OUTPATIENT
Start: 2023-07-03 | End: 2023-07-03

## 2023-07-03 RX ORDER — HEPARIN 100 UNIT/ML
500 SYRINGE INTRAVENOUS PRN
Status: DISCONTINUED | OUTPATIENT
Start: 2023-07-03 | End: 2023-07-04 | Stop reason: HOSPADM

## 2023-07-03 RX ORDER — ALBUTEROL SULFATE 90 UG/1
4 AEROSOL, METERED RESPIRATORY (INHALATION) PRN
OUTPATIENT
Start: 2023-08-07

## 2023-07-03 RX ORDER — SODIUM CHLORIDE 0.9 % (FLUSH) 0.9 %
5-40 SYRINGE (ML) INJECTION PRN
OUTPATIENT
Start: 2023-08-07

## 2023-07-03 RX ORDER — SODIUM CHLORIDE 9 MG/ML
5-250 INJECTION, SOLUTION INTRAVENOUS PRN
Status: DISCONTINUED | OUTPATIENT
Start: 2023-07-03 | End: 2023-07-04 | Stop reason: HOSPADM

## 2023-07-03 RX ORDER — FAMOTIDINE 10 MG/ML
20 INJECTION, SOLUTION INTRAVENOUS
OUTPATIENT
Start: 2023-08-07

## 2023-07-03 RX ORDER — DIPHENHYDRAMINE HYDROCHLORIDE 50 MG/ML
25 INJECTION INTRAMUSCULAR; INTRAVENOUS ONCE
Start: 2023-08-07 | End: 2023-08-07

## 2023-07-03 RX ORDER — ACETAMINOPHEN 325 MG/1
650 TABLET ORAL
OUTPATIENT
Start: 2023-08-07

## 2023-07-03 RX ORDER — DIPHENHYDRAMINE HYDROCHLORIDE 50 MG/ML
50 INJECTION INTRAMUSCULAR; INTRAVENOUS
OUTPATIENT
Start: 2023-08-07

## 2023-07-03 RX ORDER — SODIUM CHLORIDE 0.9 % (FLUSH) 0.9 %
5-40 SYRINGE (ML) INJECTION PRN
Status: DISCONTINUED | OUTPATIENT
Start: 2023-07-03 | End: 2023-07-04 | Stop reason: HOSPADM

## 2023-07-03 RX ORDER — FAMOTIDINE 10 MG/ML
20 INJECTION, SOLUTION INTRAVENOUS ONCE
Start: 2023-08-07 | End: 2023-08-07

## 2023-07-03 RX ORDER — ONDANSETRON 2 MG/ML
8 INJECTION INTRAMUSCULAR; INTRAVENOUS
OUTPATIENT
Start: 2023-08-07

## 2023-07-03 RX ORDER — HEPARIN SODIUM (PORCINE) LOCK FLUSH IV SOLN 100 UNIT/ML 100 UNIT/ML
500 SOLUTION INTRAVENOUS PRN
OUTPATIENT
Start: 2023-08-07

## 2023-07-03 RX ORDER — ACETAMINOPHEN 325 MG/1
650 TABLET ORAL
Status: DISCONTINUED | OUTPATIENT
Start: 2023-07-03 | End: 2023-07-04 | Stop reason: HOSPADM

## 2023-07-03 RX ORDER — SODIUM CHLORIDE 9 MG/ML
INJECTION, SOLUTION INTRAVENOUS CONTINUOUS
OUTPATIENT
Start: 2023-08-07

## 2023-07-03 RX ORDER — DEXAMETHASONE SODIUM PHOSPHATE 10 MG/ML
10 INJECTION, SOLUTION INTRAMUSCULAR; INTRAVENOUS ONCE
Status: COMPLETED | OUTPATIENT
Start: 2023-07-03 | End: 2023-07-03

## 2023-07-03 RX ORDER — MEPERIDINE HYDROCHLORIDE 50 MG/ML
12.5 INJECTION INTRAMUSCULAR; INTRAVENOUS; SUBCUTANEOUS PRN
OUTPATIENT
Start: 2023-08-07

## 2023-07-03 RX ADMIN — PERTUZUMAB, TRASTUZUMAB, AND HYALURONIDASE-ZZXF 10 ML: 600; 600; 2000 INJECTION, SOLUTION SUBCUTANEOUS at 11:15

## 2023-07-03 RX ADMIN — DEXAMETHASONE SODIUM PHOSPHATE 10 MG: 10 INJECTION INTRAMUSCULAR; INTRAVENOUS at 10:50

## 2023-07-03 RX ADMIN — DIPHENHYDRAMINE HYDROCHLORIDE 25 MG: 50 INJECTION, SOLUTION INTRAMUSCULAR; INTRAVENOUS at 10:53

## 2023-07-03 RX ADMIN — FAMOTIDINE 20 MG: 10 INJECTION, SOLUTION INTRAVENOUS at 10:46

## 2023-07-03 RX ADMIN — DOCETAXEL ANHYDROUS 106 MG: 10 INJECTION, SOLUTION INTRAVENOUS at 11:20

## 2023-07-03 RX ADMIN — SODIUM CHLORIDE 50 ML/HR: 9 INJECTION, SOLUTION INTRAVENOUS at 10:45

## 2023-07-03 NOTE — PROGRESS NOTES
Roosevelt Montano is a 45 y.o. female    Chief Complaint   Patient presents with    Follow-up     Metastatic Breast Cancer       1. Have you been to the ER, urgent care clinic since your last visit? Hospitalized since your last visit? Yes, [facility] ER 6/13/23    2. Have you seen or consulted any other health care providers outside of the [address] [place] [address] Avenue since your last visit? Include any pap smears or colon screening.  No

## 2023-07-03 NOTE — PROGRESS NOTES
[facility] [place] at [address], [address] [place] [address], [address] [place] [address]: [phone]  F: [phone]    Reason for Visit:   Laureen Ortiz is a 45 y.o. female seen today in office for follow up of Metastatic Breast Cancer. Treatment History:   Abd US 10/3/22: There are multiple liver masses with 2 representative masses in  the right liver measuring 2.4 x 2.6 cm and 1.6 x 2.1 cm  CT A/P 10/3/22: Spiculated left breast mass suspicious for malignancy. Multiple lung and liver metastases. Multiple top normal size retroperitoneal lymph nodes are nonspecific with metastatic disease  not excluded  CT Chest 10/4/22: Indeterminate 1.4 x 1.9 cm left breast nodule. Primary malignancy not excluded. Mammographic correlation advised. Diffuse bilateral pulmonary nodules most compatible with metastatic disease. Partially imaged hepatic hypodensities concerning  for metastatic disease. Indeterminate 6 mm T10 lytic lesion, possibly benign. Attention on follow-up  Liver Biopsy 10/4/22: PATH - Metastatic adenocarcinoma, consistent with breast primary  ER/MI negative, Her2 3+  Port placed on 10/13/2   Palliative Taxol+Herceptin+Perjeta 10/17/22 - 10/31/22  Switched to Palliative Taxotere+Herceptin+Perjeta on 11/7/22 - Current   DR Taxotere to 60 mg/m2 with Cycle 8  CT C/A/P 12/27/22: Imaging findings consistent with significant interval response to therapy. Significantly diminished pulmonary metastatic disease  burden with numerous resolved or nearly resolved pulmonary nodules. Significantly diminished size of hepatic masses.  Left breast lesion is not demonstrated on the current exam  Right Hip XRAY 1/2/23: Right basicervical femoral neck fracture with medial angulation  CT Pelv 1/2/23: Re-demonstrated acute right basicervical femoral neck fracture, with findings concerning for pathologic etiology  XR Femur 1/2/23: Mildly displaced right femoral neck fracture  Right Hip IMN with Ortho Dr Abhay Lopez

## 2023-07-06 ENCOUNTER — OFFICE VISIT (OUTPATIENT)
Age: 38
End: 2023-07-06
Payer: COMMERCIAL

## 2023-07-06 VITALS
BODY MASS INDEX: 33.13 KG/M2 | TEMPERATURE: 98.4 F | HEIGHT: 62 IN | WEIGHT: 180 LBS | SYSTOLIC BLOOD PRESSURE: 119 MMHG | OXYGEN SATURATION: 95 % | DIASTOLIC BLOOD PRESSURE: 85 MMHG | HEART RATE: 112 BPM | RESPIRATION RATE: 20 BRPM

## 2023-07-06 DIAGNOSIS — C78.7 MALIGNANT NEOPLASM OF BREAST METASTATIC TO LIVER (HCC): ICD-10-CM

## 2023-07-06 DIAGNOSIS — G89.3 CANCER RELATED PAIN: ICD-10-CM

## 2023-07-06 DIAGNOSIS — C50.919 MALIGNANT NEOPLASM OF BREAST METASTATIC TO LIVER (HCC): ICD-10-CM

## 2023-07-06 PROCEDURE — 99215 OFFICE O/P EST HI 40 MIN: CPT | Performed by: INTERNAL MEDICINE

## 2023-07-06 RX ORDER — OXYCODONE HYDROCHLORIDE 10 MG/1
10 TABLET ORAL EVERY 4 HOURS PRN
Qty: 90 TABLET | Refills: 0 | Status: SHIPPED | OUTPATIENT
Start: 2023-07-06 | End: 2023-07-21

## 2023-07-06 RX ORDER — OXYCODONE HCL 10 MG/1
TABLET, FILM COATED, EXTENDED RELEASE ORAL
Qty: 60 TABLET | Refills: 0 | Status: SHIPPED | OUTPATIENT
Start: 2023-07-06 | End: 2023-08-05

## 2023-07-06 ASSESSMENT — PATIENT HEALTH QUESTIONNAIRE - PHQ9
SUM OF ALL RESPONSES TO PHQ QUESTIONS 1-9: 0
2. FEELING DOWN, DEPRESSED OR HOPELESS: 0
SUM OF ALL RESPONSES TO PHQ QUESTIONS 1-9: 0
1. LITTLE INTEREST OR PLEASURE IN DOING THINGS: 0
SUM OF ALL RESPONSES TO PHQ QUESTIONS 1-9: 0
SUM OF ALL RESPONSES TO PHQ QUESTIONS 1-9: 0
SUM OF ALL RESPONSES TO PHQ9 QUESTIONS 1 & 2: 0

## 2023-07-06 NOTE — PROGRESS NOTES
Palliative Medicine Office Visit  Palliative Medicine Nurse Check In  [phone]    Patient Name: Bhumika Oneil  YOB: 1985      Date of Office Visit: 7/6/2023    Patient states: \"  \"    From Check In Sheet (scanned in Media):  Has a medical provider talked with you about cardiopulmonary resuscitation (CPR)? [x] Yes   [] No   [] Unable to obtain    Nurse reminder to complete or update ACP FlowSheet:    Is ACP on the Problem List?    [] Yes    [x] No  IF ACP Document is ON FILE; Nurse to place ACP on Problem List     Is there an ACP Note in Chart Review/Note? [] Yes    [x] No   If NO: ALERT PROVIDER       Primary Decision Maker: Omid Bhatt - [phone]  Demographics 8/2/2023   Marital Status         Is there anything that we should know about you as a person in order to provide you the best care possible? Have you been to the ER, urgent care clinic since your last visit? [] Yes   [x] No   [] Unable to obtain    Have you been hospitalized since your last visit? [] Yes   [x] No   [] Unable to obtain    Have you seen or consulted any other health care providers outside of the [address] [place] [address] since your last visit? [] Yes   [x] No   [] Unable to obtain    Functional status (describe):         Last BM: 7/5/2023     accessed (date):      Bottle review (for opioid pain medication):  Medication 1:   Date filled:   Directions:   # filled:   # left:   # pills taking per day:  Last dose taken:    Medication 2:   Date filled:   Directions:   # filled:   # left:   # pills taking per day:  Last dose taken:    Medication 3:   Date filled:   Directions:   # filled:   # left:   # pills taking per day:  Last dose taken:    Medication 4:   Date filled:   Directions:   # filled:   # left:   # pills taking per day:  Last dose taken:

## 2023-07-06 NOTE — PATIENT INSTRUCTIONS
Dear Silvana Mccoy ,    It was a pleasure seeing you today at The [address] [place] [address] Rd. Return in about 6 weeks (around 8/17/2023) for follow up for pain and symptom management. If labs or imaging tests have been ordered for you today, please call the office  at [phone] 48 hours after completion to obtain the results. This is the plan we talked about:    Your pain:  - Continue the morning dose of long acting oxycodone (Oxycontin) 10 mg  - Increase the evening dose of long acting oxycodone (Oxycontin) to 20 mg (two x 10 mg tablets)  - Continue using the oxycodone short release (oxycodone IR) 10 mg every 4 hours as needed during the day. The goal is to require less of these as the long acting takes effect. Your sleep:  - You have a sleep consultation on August 2nd with Dr. David Baron Team is here to support you and your family.        Sincerely,      Cristal Blanco MD

## 2023-07-06 NOTE — PROGRESS NOTES
Palliative Medicine Outpatient Services  Stuartrosangela: [phone]    Patient Name: Avila Campos  YOB: 1985    Date of Current Visit: 23  Location of Current Visit:    [x] [facility] [place] Office  [] [facility] Office  [] [address] [place] [address] Office  [] Home  [] Synchronous real-time A/V virtual visit    Date of Initial Palliative Medicine Visit: 23   Referral from: Dr. Darcella Apgar:   45year old woman with [address] here for follow up pain and symptom management. FOLLOW UP:   Return in about 6 weeks (around 2023) for follow up for pain and symptom management. ASSESSMENT AND PLAN:   1. Cancer related pain- borderline control  - Continue Oxycontin 10 mg qam   - Increase Oxycontin from 10 -> 20 mg QHS due to end of dose failure  - Continue oxycodone IR 10 mg every 4 hours during the day- renewed 15 days supply today; but anticipate this to last longer considering infrequent nighttime use. - she reports no constipation, not on a bowel regimen at this point. Education provided about importance of regular Bms while on opioids and good fluid intake. 2. Adjustment disorder with anxious mood  - controlled, continue current medications of Lexapro 10 mg daily and Wellbutrin  mg daily    3. Snoring  - Concern for undiagnosed sleep apnea as family reports longstanding loud snoring and daytime fatigue  - referral placed to [facility] last visit --- intake scheduled for Aug 2nd    4. Primary malignant neoplasm of breast with metastasis ([address])  - with lung, liver mets  - CTs 2023 show good response to palliative chemo. - recent brain MRI 2023 neg for mets as well.     - stable, followed by Dr. Joanie Castillo, tolerating palliative Taxol, Herceptin, Perjeta    ADVANCE CARE PLANNING / TREATMENT PREFERENCES:     Patient has a completed advance medical directive:  [] YES - available in EMR  [] YES -  not on file    [x] NO        IF Yes-  The patient has named the

## 2023-07-10 ENCOUNTER — TELEPHONE (OUTPATIENT)
Age: 38
End: 2023-07-10

## 2023-07-10 DIAGNOSIS — G89.3 CANCER RELATED PAIN: ICD-10-CM

## 2023-07-10 RX ORDER — OXYCODONE HCL 10 MG/1
TABLET, FILM COATED, EXTENDED RELEASE ORAL
Qty: 90 TABLET | Refills: 0 | Status: SHIPPED | OUTPATIENT
Start: 2023-07-10 | End: 2023-08-09

## 2023-07-10 NOTE — TELEPHONE ENCOUNTER
Ronald Bagley with [facility] is calling to clarify directions for Oxycodone 10 mg.  # [phone]

## 2023-07-10 NOTE — TELEPHONE ENCOUNTER
Pharmacist is requiring a new script because prescription quantity needs to be adjusted from #60 to #90    Script pending

## 2023-07-12 ENCOUNTER — TELEPHONE (OUTPATIENT)
Age: 38
End: 2023-07-12

## 2023-07-12 NOTE — TELEPHONE ENCOUNTER
Palliative Medicine  Nursing Note  [phone]  Fax [phone]      Telephone Call  Patient Name: Nely Milner  YOB: 1985    7/12/2023        Primary Decision Maker: Jose Bose - Spouse - [phone]   Demographics 8/29/2023   Marital Status      Jax placed to [employer] to inquire about Oxycontin prescription. Pharmacist states it needs a prior auth due to quantity limit exceeded of 2 tabs daily. Prior auth initiated via cover my meds. Received approval - [place] # [id number], start day 7/12/23 - 12/31/2039. Call placed to pharmacy and informed of the above. Medication went through insurance without issue and they will process for patient. Call placed to patient and left voice mail message regarding the above.      Man Katz, RN  Palliative Medicine

## 2023-07-12 NOTE — TELEPHONE ENCOUNTER
The patient states the pharmacy is pending Oxycodone refill. Waiting on information from the physician.

## 2023-07-14 ENCOUNTER — HOSPITAL ENCOUNTER (OUTPATIENT)
Facility: HOSPITAL | Age: 38
Discharge: HOME | End: 2023-07-14
Payer: COMMERCIAL

## 2023-07-14 VITALS — WEIGHT: 180 LBS | BODY MASS INDEX: 33.13 KG/M2 | HEIGHT: 62 IN

## 2023-07-14 DIAGNOSIS — C78.7 MALIGNANT NEOPLASM OF BREAST METASTATIC TO LIVER (HCC): ICD-10-CM

## 2023-07-14 DIAGNOSIS — Z79.899 ENCOUNTER FOR MONITORING CARDIOTOXIC DRUG THERAPY: ICD-10-CM

## 2023-07-14 DIAGNOSIS — C50.919 MALIGNANT NEOPLASM OF BREAST METASTATIC TO LIVER (HCC): ICD-10-CM

## 2023-07-14 DIAGNOSIS — Z51.81 ENCOUNTER FOR MONITORING CARDIOTOXIC DRUG THERAPY: ICD-10-CM

## 2023-07-14 LAB
ECHO AV AREA PEAK VELOCITY: 1.9 CM2
ECHO AV AREA VTI: 1.8 CM2
ECHO AV AREA/BSA PEAK VELOCITY: 1 CM2/M2
ECHO AV AREA/BSA VTI: 1 CM2/M2
ECHO AV MEAN GRADIENT: 4 MMHG
ECHO AV MEAN VELOCITY: 0.9 M/S
ECHO AV PEAK GRADIENT: 6 MMHG
ECHO AV PEAK VELOCITY: 1.2 M/S
ECHO AV VELOCITY RATIO: 0.75
ECHO AV VTI: 24.8 CM
ECHO BSA: 1.89 M2
ECHO LA DIAMETER INDEX: 1.53 CM/M2
ECHO LA DIAMETER: 2.8 CM
ECHO LA VOL 4C: 28 ML (ref 22–52)
ECHO LA VOL 4C: 29 ML (ref 22–52)
ECHO LV E' LATERAL VELOCITY: 10 CM/S
ECHO LV E' SEPTAL VELOCITY: 8 CM/S
ECHO LV FRACTIONAL SHORTENING: 30 % (ref 28–44)
ECHO LV INTERNAL DIMENSION DIASTOLE INDEX: 2.57 CM/M2
ECHO LV INTERNAL DIMENSION DIASTOLIC: 4.7 CM (ref 3.9–5.3)
ECHO LV INTERNAL DIMENSION SYSTOLIC INDEX: 1.8 CM/M2
ECHO LV INTERNAL DIMENSION SYSTOLIC: 3.3 CM
ECHO LV IVSD: 0.9 CM (ref 0.6–0.9)
ECHO LV MASS 2D: 153.4 G (ref 67–162)
ECHO LV MASS INDEX 2D: 83.8 G/M2 (ref 43–95)
ECHO LV POSTERIOR WALL DIASTOLIC: 1 CM (ref 0.6–0.9)
ECHO LV RELATIVE WALL THICKNESS RATIO: 0.43
ECHO LVOT AREA: 2.5 CM2
ECHO LVOT AV VTI INDEX: 0.69
ECHO LVOT DIAM: 1.8 CM
ECHO LVOT MEAN GRADIENT: 2 MMHG
ECHO LVOT PEAK GRADIENT: 3 MMHG
ECHO LVOT PEAK VELOCITY: 0.9 M/S
ECHO LVOT STROKE VOLUME INDEX: 23.8 ML/M2
ECHO LVOT SV: 43.5 ML
ECHO LVOT VTI: 17.1 CM
ECHO MV A VELOCITY: 0.59 M/S
ECHO MV E DECELERATION TIME (DT): 176.7 MS
ECHO MV E VELOCITY: 0.61 M/S
ECHO MV E/A RATIO: 1.03
ECHO MV E/E' LATERAL: 6.1
ECHO MV E/E' RATIO (AVERAGED): 6.86
ECHO MV E/E' SEPTAL: 7.63
ECHO PV MAX VELOCITY: 0.7 M/S
ECHO PV PEAK GRADIENT: 2 MMHG
ECHO PVEIN PEAK D VELOCITY: 0.7 M/S
ECHO PVEIN PEAK S VELOCITY: 0.6 M/S
ECHO PVEIN S/D RATIO: 0.9
ECHO RV TAPSE: 1.9 CM (ref 1.7–?)
ECHO TV REGURGITANT MAX VELOCITY: 2.04 M/S
ECHO TV REGURGITANT PEAK GRADIENT: 17 MMHG

## 2023-07-14 PROCEDURE — 93306 TTE W/DOPPLER COMPLETE: CPT

## 2023-07-21 ENCOUNTER — HOSPITAL ENCOUNTER (OUTPATIENT)
Facility: HOSPITAL | Age: 38
Discharge: HOME | End: 2023-07-21
Payer: COMMERCIAL

## 2023-07-21 DIAGNOSIS — C78.7 MALIGNANT NEOPLASM OF BREAST METASTATIC TO LIVER (HCC): ICD-10-CM

## 2023-07-21 DIAGNOSIS — C50.919 MALIGNANT NEOPLASM OF BREAST METASTATIC TO LIVER (HCC): ICD-10-CM

## 2023-07-21 PROCEDURE — 74177 CT ABD & PELVIS W/CONTRAST: CPT

## 2023-07-21 PROCEDURE — 6360000004 HC RX CONTRAST MEDICATION: Performed by: RADIOLOGY

## 2023-07-21 RX ORDER — HEPARIN 100 UNIT/ML
SYRINGE INTRAVENOUS
Status: DISPENSED
Start: 2023-07-21 | End: 2023-07-21

## 2023-07-21 RX ADMIN — IOPAMIDOL 100 ML: 755 INJECTION, SOLUTION INTRAVENOUS at 09:34

## 2023-07-24 DIAGNOSIS — G89.3 CANCER RELATED PAIN: ICD-10-CM

## 2023-07-24 DIAGNOSIS — C78.7 MALIGNANT NEOPLASM OF BREAST METASTATIC TO LIVER (HCC): ICD-10-CM

## 2023-07-24 DIAGNOSIS — C50.919 MALIGNANT NEOPLASM OF BREAST METASTATIC TO LIVER (HCC): ICD-10-CM

## 2023-07-24 NOTE — TELEPHONE ENCOUNTER
Palliative Medicine  Nursing Note  [phone])  Fax [phone]      Telephone Call  Patient Name: Antonina Currie  YOB: 1985    7/24/2023        Primary Decision Maker: Samm Brandon - Spouse - [phone]   Demographics 8/29/2023   Marital Status      Call returned to Ms. Jerry Trujillo who stated that she has increased pain to her sacral area/low back, right leg, and to a lesser degree her left leg when more active. She reports that on bad days her pain gets to 7/10 and on average her pain remains around 4-5/10, which she finds acceptable. She mentioned that she feels pain relief with the 2 tabs of Oxycontin at night and she prefers how it makes her feel versus the Oxy IR used during the day. She is wondering if she could take 2 tabs in the AM to see if that would help her pain. Currently she takes Oxy IR every 4 hours regularly during the day. She is in need of a refill for Oxy IR, only has 2 days left. Discussed the above with Dr. Claudetta Limber who authorized increase in Oxycontin to 20mg every 12 hours. Triage for Controlled Substance Refill Request    Pain Diagnosis: Breast cancer with mets    Last Outpatient Visit: 7/6/23    Next Outpatient Visit: 8/17/23    Reason for refill needed outside of office visit-  Appointment not scheduled prior to need for scheduled refill. Any Reported Side effects: none    Last dose taken: today    [facility]      Medication:  Oxy IR 10mg   Dose and directions: 1 every 4 hours prn  Number dispensed: 90  Date filled ( or Pharmacy): 7/6/23  #left: 2 days (approx 12 tabs)    Medication:  Oxycontin 20mg (increase per Dr. Claudetta Limber) Previously Oxycontin 10mg # tabs daily. Dose and directions: 1 tab every 12 hours  Number dispensed: will need 60 ( had #90 10 mg tabs on 7/10/23)  Date filled ( or Pharmacy): 7/10/23  #left: #45 10 mg - but will increase  to 2 tabs every 12 hours so will only last 10 days.

## 2023-07-25 ENCOUNTER — TELEPHONE (OUTPATIENT)
Age: 38
End: 2023-07-25

## 2023-07-25 RX ORDER — OXYCODONE HYDROCHLORIDE 10 MG/1
10 TABLET ORAL EVERY 4 HOURS PRN
Qty: 90 TABLET | Refills: 0 | Status: SHIPPED | OUTPATIENT
Start: 2023-07-25 | End: 2023-08-09

## 2023-07-25 RX ORDER — OXYCODONE HCL 20 MG/1
20 TABLET, FILM COATED, EXTENDED RELEASE ORAL 2 TIMES DAILY
Qty: 60 TABLET | Refills: 0 | Status: SHIPPED | OUTPATIENT
Start: 2023-08-04 | End: 2023-09-03

## 2023-07-25 NOTE — TELEPHONE ENCOUNTER
Palliative Medicine  Nursing Note  [phone])  Fax [phone]      Telephone Call  Patient Name: Carnella Nyhan  YOB: 1985    7/25/2023        Primary Decision Maker: April Bear - Spouse - [phone]   Demographics 8/29/2023   Marital Status      Call placed to Ms. Desirae Vasquez and informed that Dr. Cooper Erickson sent in a refill for her Oxy IR and changed her dosing for the Oxycontin to 20mg every 12 hours. She can use her current prescription to take 2 tabs every 12 hours and then start the new script when that runs out. She may require a prior auth for the new dose increase so encouraged her to call the pharmacy a few days prior to verify and to allow for the insurance to approve before she runs out. She verbalized understanding and is appreciative.     Jerry Fields RN  Palliative Medicine

## 2023-07-26 ENCOUNTER — TELEPHONE (OUTPATIENT)
Age: 38
End: 2023-07-26

## 2023-07-26 NOTE — TELEPHONE ENCOUNTER
Palliative Medicine  Nursing Note  [phone])  Fax [phone]      Telephone Call  Patient Name: Jesika Posey  YOB: 1985    7/26/2023        Primary Decision Maker: Padmini Hightower - Spouse - [phone]   Demographics 8/29/2023   Marital Status      Received iSTARt message from Ms. Enrique Liu who was unable to  her Oxycodone IR today as pharmacy had said it was too early. Call placed to the pharmacy and informed that the previous script on 7/6/23 was for a 15 day supply, though it also stated that it was intended for 30 days due to a default mechanism in our new system. She is able to make the change to 15 days on their end and fill her new prescription today. Informed that the  does show it was for a 30 day supply and this may hinder insurance approval of filling the new script. She stated they will work on that to allow patient to use her insurance for her medication. Appreciate her help and understanding.      Mame Redmond, RN  Palliative Medicine

## 2023-08-02 PROBLEM — Z09 CHEMOTHERAPY FOLLOW-UP EXAMINATION: Status: RESOLVED | Noted: 2023-07-03 | Resolved: 2023-08-02

## 2023-08-04 NOTE — PROGRESS NOTES
[facility] [place] at [address], [facility], [address] [place], [place] [address]way: [phone]  F: [phone]    Reason for Visit:   Leah Chou is a 45 y.o. female seen today in office for follow up of Metastatic Breast Cancer. Treatment History:   Abd US 10/3/22: There are multiple liver masses with 2 representative masses in  the right liver measuring 2.4 x 2.6 cm and 1.6 x 2.1 cm  CT A/P 10/3/22: Spiculated left breast mass suspicious for malignancy. Multiple lung and liver metastases. Multiple top normal size retroperitoneal lymph nodes are nonspecific with metastatic disease  not excluded  CT Chest 10/4/22: Indeterminate 1.4 x 1.9 cm left breast nodule. Primary malignancy not excluded. Mammographic correlation advised. Diffuse bilateral pulmonary nodules most compatible with metastatic disease. Partially imaged hepatic hypodensities concerning  for metastatic disease. Indeterminate 6 mm T10 lytic lesion, possibly benign. Attention on follow-up  Liver Biopsy 10/4/22: PATH - Metastatic adenocarcinoma, consistent with breast primary  ER/VA negative, Her2 3+  Port placed on 10/13/2   Palliative Taxol+Herceptin+Perjeta 10/17/22 - 10/31/22  Switched to Palliative Taxotere+Herceptin+Perjeta on 11/7/22 - Current   DR Taxotere to 60 mg/m2 with Cycle 8  CT C/A/P 12/27/22: Imaging findings consistent with significant interval response to therapy. Significantly diminished pulmonary metastatic disease  burden with numerous resolved or nearly resolved pulmonary nodules. Significantly diminished size of hepatic masses.  Left breast lesion is not demonstrated on the current exam  Right Hip XRAY 1/2/23: Right basicervical femoral neck fracture with medial angulation  CT Pelv 1/2/23: Re-demonstrated acute right basicervical femoral neck fracture, with findings concerning for pathologic etiology  XR Femur 1/2/23: Mildly displaced right femoral neck fracture  Right Hip IMN with Ortho Dr Linnette Edmonds

## 2023-08-07 ENCOUNTER — OFFICE VISIT (OUTPATIENT)
Age: 38
End: 2023-08-07
Payer: COMMERCIAL

## 2023-08-07 ENCOUNTER — HOSPITAL ENCOUNTER (OUTPATIENT)
Facility: HOSPITAL | Age: 38
Setting detail: INFUSION SERIES
End: 2023-08-07
Payer: COMMERCIAL

## 2023-08-07 ENCOUNTER — HOSPITAL ENCOUNTER (OUTPATIENT)
Facility: HOSPITAL | Age: 38
Discharge: HOME | End: 2023-08-10
Payer: COMMERCIAL

## 2023-08-07 VITALS
DIASTOLIC BLOOD PRESSURE: 84 MMHG | HEART RATE: 85 BPM | OXYGEN SATURATION: 97 % | SYSTOLIC BLOOD PRESSURE: 128 MMHG | WEIGHT: 185 LBS | RESPIRATION RATE: 18 BRPM | BODY MASS INDEX: 33.84 KG/M2 | TEMPERATURE: 97 F

## 2023-08-07 VITALS
WEIGHT: 185 LBS | RESPIRATION RATE: 18 BRPM | DIASTOLIC BLOOD PRESSURE: 64 MMHG | BODY MASS INDEX: 34.04 KG/M2 | HEART RATE: 85 BPM | TEMPERATURE: 97 F | SYSTOLIC BLOOD PRESSURE: 112 MMHG | HEIGHT: 62 IN

## 2023-08-07 DIAGNOSIS — C78.7 MALIGNANT NEOPLASM OF BREAST METASTATIC TO LIVER (HCC): Primary | ICD-10-CM

## 2023-08-07 DIAGNOSIS — Z87.311 HISTORY OF PATHOLOGIC FRACTURE: ICD-10-CM

## 2023-08-07 DIAGNOSIS — Z09 CHEMOTHERAPY FOLLOW-UP EXAMINATION: ICD-10-CM

## 2023-08-07 DIAGNOSIS — T45.1X5A CHEMOTHERAPY-INDUCED FATIGUE: ICD-10-CM

## 2023-08-07 DIAGNOSIS — C50.919 MALIGNANT NEOPLASM OF BREAST METASTATIC TO LIVER (HCC): ICD-10-CM

## 2023-08-07 DIAGNOSIS — R06.02 SOB (SHORTNESS OF BREATH): ICD-10-CM

## 2023-08-07 DIAGNOSIS — M25.551 RIGHT HIP PAIN: ICD-10-CM

## 2023-08-07 DIAGNOSIS — T45.1X5A CHEMOTHERAPY-INDUCED NAUSEA: ICD-10-CM

## 2023-08-07 DIAGNOSIS — G56.92 NEUROPATHY OF FINGER OF LEFT HAND: ICD-10-CM

## 2023-08-07 DIAGNOSIS — C78.7 MALIGNANT NEOPLASM OF BREAST METASTATIC TO LIVER (HCC): ICD-10-CM

## 2023-08-07 DIAGNOSIS — Z95.828 PORT-A-CATH IN PLACE: ICD-10-CM

## 2023-08-07 DIAGNOSIS — G89.3 CANCER RELATED PAIN: ICD-10-CM

## 2023-08-07 DIAGNOSIS — R11.0 CHEMOTHERAPY-INDUCED NAUSEA: ICD-10-CM

## 2023-08-07 DIAGNOSIS — R53.83 CHEMOTHERAPY-INDUCED FATIGUE: ICD-10-CM

## 2023-08-07 DIAGNOSIS — C78.01 MALIGNANT NEOPLASM METASTATIC TO BOTH LUNGS (HCC): ICD-10-CM

## 2023-08-07 DIAGNOSIS — C50.919 MALIGNANT NEOPLASM OF BREAST METASTATIC TO LIVER (HCC): Primary | ICD-10-CM

## 2023-08-07 DIAGNOSIS — Z79.899 ENCOUNTER FOR MONITORING CARDIOTOXIC DRUG THERAPY: ICD-10-CM

## 2023-08-07 DIAGNOSIS — Z51.81 ENCOUNTER FOR MONITORING CARDIOTOXIC DRUG THERAPY: ICD-10-CM

## 2023-08-07 DIAGNOSIS — C79.51 MALIGNANT NEOPLASM METASTATIC TO BONE (HCC): Primary | ICD-10-CM

## 2023-08-07 DIAGNOSIS — C78.02 MALIGNANT NEOPLASM METASTATIC TO BOTH LUNGS (HCC): ICD-10-CM

## 2023-08-07 LAB
BASOPHILS # BLD: 0 K/UL (ref 0–0.1)
BASOPHILS NFR BLD: 0 % (ref 0–1)
DIFFERENTIAL METHOD BLD: ABNORMAL
EOSINOPHIL # BLD: 0 K/UL (ref 0–0.4)
EOSINOPHIL NFR BLD: 0 % (ref 0–7)
ERYTHROCYTE [DISTWIDTH] IN BLOOD BY AUTOMATED COUNT: 16.5 % (ref 11.5–14.5)
HCT VFR BLD AUTO: 36.4 % (ref 35–47)
HGB BLD-MCNC: 11.3 G/DL (ref 11.5–16)
IMM GRANULOCYTES # BLD AUTO: 0.1 K/UL (ref 0–0.04)
IMM GRANULOCYTES NFR BLD AUTO: 1 % (ref 0–0.5)
LYMPHOCYTES # BLD: 1 K/UL (ref 0.8–3.5)
LYMPHOCYTES NFR BLD: 10 % (ref 12–49)
MAGNESIUM SERPL-MCNC: 2.4 MG/DL (ref 1.6–2.4)
MCH RBC QN AUTO: 28.4 PG (ref 26–34)
MCHC RBC AUTO-ENTMCNC: 31 G/DL (ref 30–36.5)
MCV RBC AUTO: 91.5 FL (ref 80–99)
MONOCYTES # BLD: 0.3 K/UL (ref 0–1)
MONOCYTES NFR BLD: 3 % (ref 5–13)
NEUTS SEG # BLD: 9 K/UL (ref 1.8–8)
NEUTS SEG NFR BLD: 86 % (ref 32–75)
NRBC # BLD: 0 K/UL (ref 0–0.01)
NRBC BLD-RTO: 0 PER 100 WBC
PHOSPHATE SERPL-MCNC: 3.3 MG/DL (ref 2.6–4.7)
PLATELET # BLD AUTO: 349 K/UL (ref 150–400)
PMV BLD AUTO: 9.6 FL (ref 8.9–12.9)
RBC # BLD AUTO: 3.98 M/UL (ref 3.8–5.2)
T4 FREE SERPL-MCNC: 0.7 NG/DL (ref 0.8–1.5)
TSH SERPL DL<=0.05 MIU/L-ACNC: 0.81 UIU/ML (ref 0.36–3.74)
WBC # BLD AUTO: 10.3 K/UL (ref 3.6–11)

## 2023-08-07 PROCEDURE — 96375 TX/PRO/DX INJ NEW DRUG ADDON: CPT

## 2023-08-07 PROCEDURE — 99215 OFFICE O/P EST HI 40 MIN: CPT | Performed by: INTERNAL MEDICINE

## 2023-08-07 PROCEDURE — 85025 COMPLETE CBC W/AUTO DIFF WBC: CPT

## 2023-08-07 PROCEDURE — 36415 COLL VENOUS BLD VENIPUNCTURE: CPT

## 2023-08-07 PROCEDURE — 80053 COMPREHEN METABOLIC PANEL: CPT

## 2023-08-07 PROCEDURE — 6360000002 HC RX W HCPCS: Performed by: NURSE PRACTITIONER

## 2023-08-07 PROCEDURE — 96372 THER/PROPH/DIAG INJ SC/IM: CPT

## 2023-08-07 PROCEDURE — 84100 ASSAY OF PHOSPHORUS: CPT

## 2023-08-07 PROCEDURE — 96401 CHEMO ANTI-NEOPL SQ/IM: CPT

## 2023-08-07 PROCEDURE — 84443 ASSAY THYROID STIM HORMONE: CPT

## 2023-08-07 PROCEDURE — 84439 ASSAY OF FREE THYROXINE: CPT

## 2023-08-07 PROCEDURE — 96413 CHEMO IV INFUSION 1 HR: CPT

## 2023-08-07 PROCEDURE — 2500000003 HC RX 250 WO HCPCS: Performed by: NURSE PRACTITIONER

## 2023-08-07 PROCEDURE — 71046 X-RAY EXAM CHEST 2 VIEWS: CPT

## 2023-08-07 PROCEDURE — A4216 STERILE WATER/SALINE, 10 ML: HCPCS | Performed by: NURSE PRACTITIONER

## 2023-08-07 PROCEDURE — 83735 ASSAY OF MAGNESIUM: CPT

## 2023-08-07 PROCEDURE — 2580000003 HC RX 258: Performed by: NURSE PRACTITIONER

## 2023-08-07 RX ORDER — DEXAMETHASONE SODIUM PHOSPHATE 10 MG/ML
10 INJECTION, SOLUTION INTRAMUSCULAR; INTRAVENOUS ONCE
Status: COMPLETED | OUTPATIENT
Start: 2023-08-07 | End: 2023-08-07

## 2023-08-07 RX ORDER — ACETAMINOPHEN 325 MG/1
650 TABLET ORAL
Status: CANCELLED | OUTPATIENT
Start: 2023-09-18

## 2023-08-07 RX ORDER — DIPHENHYDRAMINE HYDROCHLORIDE 50 MG/ML
25 INJECTION INTRAMUSCULAR; INTRAVENOUS ONCE
Status: COMPLETED | OUTPATIENT
Start: 2023-08-07 | End: 2023-08-07

## 2023-08-07 RX ORDER — SODIUM CHLORIDE 9 MG/ML
5-250 INJECTION, SOLUTION INTRAVENOUS PRN
Status: DISCONTINUED | OUTPATIENT
Start: 2023-08-07 | End: 2023-08-08 | Stop reason: HOSPADM

## 2023-08-07 RX ORDER — ALBUTEROL SULFATE 90 UG/1
4 AEROSOL, METERED RESPIRATORY (INHALATION) PRN
Status: CANCELLED | OUTPATIENT
Start: 2023-09-18

## 2023-08-07 RX ORDER — ONDANSETRON 2 MG/ML
8 INJECTION INTRAMUSCULAR; INTRAVENOUS
Status: CANCELLED | OUTPATIENT
Start: 2023-09-18

## 2023-08-07 RX ORDER — DIPHENHYDRAMINE HYDROCHLORIDE 50 MG/ML
50 INJECTION INTRAMUSCULAR; INTRAVENOUS
Status: CANCELLED | OUTPATIENT
Start: 2023-09-18

## 2023-08-07 RX ORDER — EPINEPHRINE 1 MG/ML
0.3 INJECTION, SOLUTION, CONCENTRATE INTRAVENOUS PRN
Status: CANCELLED | OUTPATIENT
Start: 2023-09-18

## 2023-08-07 RX ORDER — SODIUM CHLORIDE 0.9 % (FLUSH) 0.9 %
5-40 SYRINGE (ML) INJECTION PRN
Status: DISCONTINUED | OUTPATIENT
Start: 2023-08-07 | End: 2023-08-08 | Stop reason: HOSPADM

## 2023-08-07 RX ORDER — SODIUM CHLORIDE 9 MG/ML
INJECTION, SOLUTION INTRAVENOUS CONTINUOUS
Status: CANCELLED | OUTPATIENT
Start: 2023-09-18

## 2023-08-07 RX ADMIN — DEXAMETHASONE SODIUM PHOSPHATE 10 MG: 10 INJECTION INTRAMUSCULAR; INTRAVENOUS at 10:20

## 2023-08-07 RX ADMIN — DOCETAXEL ANHYDROUS 106 MG: 10 INJECTION, SOLUTION INTRAVENOUS at 10:44

## 2023-08-07 RX ADMIN — FAMOTIDINE 20 MG: 10 INJECTION, SOLUTION INTRAVENOUS at 10:22

## 2023-08-07 RX ADMIN — DIPHENHYDRAMINE HYDROCHLORIDE 25 MG: 50 INJECTION INTRAMUSCULAR; INTRAVENOUS at 10:24

## 2023-08-07 RX ADMIN — PERTUZUMAB, TRASTUZUMAB, AND HYALURONIDASE-ZZXF 10 ML: 600; 600; 2000 INJECTION, SOLUTION SUBCUTANEOUS at 10:09

## 2023-08-07 RX ADMIN — DENOSUMAB 120 MG: 120 INJECTION SUBCUTANEOUS at 10:17

## 2023-08-07 NOTE — PROGRESS NOTES
Estefanía Craft is a 45 y.o. female    Chief Complaint   Patient presents with    Follow-up      Metastatic Breast Cancer. 1. Have you been to the ER, urgent care clinic since your last visit? Hospitalized since your last visit? No    2. Have you seen or consulted any other health care providers outside of the [address] [place] [address] Avenue since your last visit? Include any pap smears or colon screening.  No      Pt reports SOB for the past few days, constant

## 2023-08-09 DIAGNOSIS — G89.3 CANCER RELATED PAIN: ICD-10-CM

## 2023-08-09 DIAGNOSIS — C78.7 MALIGNANT NEOPLASM OF BREAST METASTATIC TO LIVER (HCC): ICD-10-CM

## 2023-08-09 DIAGNOSIS — C50.919 MALIGNANT NEOPLASM OF BREAST METASTATIC TO LIVER (HCC): ICD-10-CM

## 2023-08-09 LAB
ALBUMIN SERPL-MCNC: 3.6 G/DL (ref 3.5–5)
ALBUMIN/GLOB SERPL: 1 (ref 1.1–2.2)
ALP SERPL-CCNC: 131 U/L (ref 45–117)
ALT SERPL-CCNC: 29 U/L (ref 12–78)
ANION GAP SERPL CALC-SCNC: 9 MMOL/L (ref 5–15)
AST SERPL-CCNC: 21 U/L (ref 15–37)
BILIRUB SERPL-MCNC: 0.2 MG/DL (ref 0.2–1)
BUN SERPL-MCNC: 15 MG/DL (ref 6–20)
BUN/CREAT SERPL: 17 (ref 12–20)
CALCIUM SERPL-MCNC: 9.1 MG/DL (ref 8.5–10.1)
CHLORIDE SERPL-SCNC: 106 MMOL/L (ref 97–108)
CO2 SERPL-SCNC: 23 MMOL/L (ref 21–32)
CREAT SERPL-MCNC: 0.86 MG/DL (ref 0.55–1.02)
GLOBULIN SER CALC-MCNC: 3.6 G/DL (ref 2–4)
GLUCOSE SERPL-MCNC: 191 MG/DL (ref 65–100)
POTASSIUM SERPL-SCNC: 3.7 MMOL/L (ref 3.5–5.1)
PROT SERPL-MCNC: 7.2 G/DL (ref 6.4–8.2)
SODIUM SERPL-SCNC: 138 MMOL/L (ref 136–145)

## 2023-08-09 RX ORDER — OXYCODONE HCL 20 MG/1
20 TABLET, FILM COATED, EXTENDED RELEASE ORAL 2 TIMES DAILY
Qty: 60 TABLET | Refills: 0 | Status: SHIPPED | OUTPATIENT
Start: 2023-08-11 | End: 2023-09-10

## 2023-08-09 NOTE — TELEPHONE ENCOUNTER
Triage for Controlled Substance Refill Request    Pain Diagnosis: _Malignant neoplasm of breast metastatic to liver (HCC) (C50.919 , C78.7); Cancer related pain (G89.3)    Last Outpatient Visit: _7/6/2023    Next Outpatient Visit: _8/17/2023    Reason for refill needed outside of office visit? Appointment not scheduled prior to need for scheduled refill      Pharmacy: [id number], [address]      Medication:oxyCODONE (OXYCONTIN) 20 MG extended release tablet    Dose and directions: Take 1 tablet by mouth 2 times daily for 30 days.    Number dispensed:60  Date filled ( or Pharmacy):7/12/2023  #left:     reviewed: _yes 8/9/2023    Date of Urine Drug Screen:  _none    Opioid Safety Handout given:  _yes    Appropriate for refill:  _Yes    Action:  _ Medication pending

## 2023-08-15 ENCOUNTER — TELEPHONE (OUTPATIENT)
Age: 38
End: 2023-08-15

## 2023-08-15 NOTE — TELEPHONE ENCOUNTER
Called patient to advise/confirm upcoming appt with Dr. Jose Angel Brewer on 8/17/23 at 9:00 at [address] [place] [address],[address]. Left a voicemail.

## 2023-08-17 ENCOUNTER — OFFICE VISIT (OUTPATIENT)
Age: 38
End: 2023-08-17
Payer: COMMERCIAL

## 2023-08-17 VITALS
BODY MASS INDEX: 33.25 KG/M2 | DIASTOLIC BLOOD PRESSURE: 83 MMHG | SYSTOLIC BLOOD PRESSURE: 127 MMHG | OXYGEN SATURATION: 96 % | RESPIRATION RATE: 18 BRPM | WEIGHT: 181.8 LBS | HEART RATE: 100 BPM

## 2023-08-17 DIAGNOSIS — R25.2 MUSCLE CRAMPS: ICD-10-CM

## 2023-08-17 DIAGNOSIS — C50.919 MALIGNANT NEOPLASM OF BREAST METASTATIC TO LIVER (HCC): ICD-10-CM

## 2023-08-17 DIAGNOSIS — R53.0 NEOPLASTIC (MALIGNANT) RELATED FATIGUE: ICD-10-CM

## 2023-08-17 DIAGNOSIS — G89.3 CANCER RELATED PAIN: Primary | ICD-10-CM

## 2023-08-17 DIAGNOSIS — Z79.891 LONG TERM CURRENT USE OF OPIATE ANALGESIC: Primary | ICD-10-CM

## 2023-08-17 DIAGNOSIS — F43.22 ADJUSTMENT DISORDER WITH ANXIOUS MOOD: ICD-10-CM

## 2023-08-17 DIAGNOSIS — C78.7 MALIGNANT NEOPLASM OF BREAST METASTATIC TO LIVER (HCC): ICD-10-CM

## 2023-08-17 PROCEDURE — 99214 OFFICE O/P EST MOD 30 MIN: CPT | Performed by: INTERNAL MEDICINE

## 2023-08-17 RX ORDER — OXYCODONE HCL 20 MG/1
20 TABLET, FILM COATED, EXTENDED RELEASE ORAL 2 TIMES DAILY
Qty: 60 TABLET | Refills: 0 | Status: SHIPPED | OUTPATIENT
Start: 2023-09-07 | End: 2023-10-07

## 2023-08-17 RX ORDER — OXYCODONE HYDROCHLORIDE 10 MG/1
10 TABLET ORAL EVERY 4 HOURS PRN
Qty: 90 TABLET | Refills: 0 | Status: SHIPPED | OUTPATIENT
Start: 2023-08-21 | End: 2023-09-05

## 2023-08-17 RX ORDER — BACLOFEN 10 MG/1
10 TABLET ORAL 2 TIMES DAILY
Qty: 20 TABLET | Refills: 0 | Status: SHIPPED | OUTPATIENT
Start: 2023-08-17

## 2023-08-17 NOTE — PROGRESS NOTES
Palliative Medicine Office Visit  Palliative Medicine Nurse Check In  ([phone])    Patient Name: Nely Milner  YOB: 1985      Date of Office Visit:  8/17/23    Patient states: \" follow up \"    From Check In Sheet (scanned in Media):  Has a medical provider talked with you about cardiopulmonary resuscitation (CPR)? [] Yes   [x] No   [] Unable to obtain    Nurse reminder to complete or update ACP FlowSheet:    Is ACP on the Problem List?    [] Yes    [x] No  IF ACP Document is ON FILE; Nurse to place ACP on Problem List     Is there an ACP Note in Chart Review/Note? [] Yes    [x] No   If NO: ALERT PROVIDER     Demographics 8/29/2023   Marital Status        Is there anything that we should know about you as a person in order to provide you the best care possible? Have you been to the ER, urgent care clinic since your last visit? [] Yes   [x] No   [] Unable to obtain    Have you been hospitalized since your last visit? [] Yes   [x] No   [] Unable to obtain    Have you seen or consulted any other health care providers outside of the [address] [place] [address] since your last visit? [] Yes   [x] No   [] Unable to obtain    Functional status (describe): Independent      Last BM:  Yesterday     accessed (date):      Bottle review (for opioid pain medication):  Medication 1:  Oxycontin 20mg  Date filled:   Directions: 1 every 12 hours  # filled:   # left:   # pills taking per day: 2  Last dose taken:    Oxycodone IR

## 2023-08-17 NOTE — PATIENT INSTRUCTIONS
Dear Gil Aguirre ,    It was a pleasure seeing you today at The [address] Return in about 6 weeks (around 9/28/2023) for pain and symptom management. If labs or imaging tests have been ordered for you today, please call the office  at [phone] 48 hours after completion to obtain the results. This is the plan we talked about:    For your severe muscle cramps - add magnesium 400 mg once daily    Can use baclofen 10 mg at onset of the cramp. Use 2nd dose if needed. The Palliative Medicine Team is here to support you and your family.        Sincerely,      Maynor Moncada MD

## 2023-08-17 NOTE — PROGRESS NOTES
Per Dr. Narciso Joe, urine drug screen ordered.  Patient provided urine specimen and this was taken to [facility] [place] RN  Palliative Medicine

## 2023-08-17 NOTE — PROGRESS NOTES
Palliative Medicine Outpatient Services  [place]uart[place]ela: [phone] ([phone]    Patient Name: Armida Steve  YOB: 1985    Date of Current Visit: 08/17/23  Location of Current Visit:    [x] [facility] Office  [] [facility] Office  [] [address] [place] [address] Office  [] Home  [] Synchronous real-time A/V virtual visit    Date of Initial Palliative Medicine Visit: 6/8/23   Referral from: Dr. Virgen Abo:   45year old woman with [address] here for follow up pain and symptom management. FOLLOW UP:   Return in about 6 weeks (around 9/28/2023) for pain and symptom management. ASSESSMENT AND PLAN:   # Cancer related pain- controlled  - In interim since last visit we initially increased PM dose Oxycontin to 20 mg, then increased AM dose as well. - Right now she is doing really well on Oxycontin 20 mg every 12 hours as it allows for more steady state control of her pain. - She estimates a 50% reduction in her use of oxy IR, continue oxycodone 10 mg every 4 hours as needed, estimates use of 3 tabs per day. - Toxassure sent today in accordance with safe prescribing practices  - Continues to have regular bowel movements without need for routine bowel regimen    #.  Bilateral thigh cramps, R > L  - related to her hip lesion,occurs few days per week, severe, lasts for hours then resolves. She is confined to bed when they occur. Failed Flexeril trial in the past.   - recommend daily Magnesium supplement 400 mg / day  - Trial of Baclofen 10 mg, may repeat x 1 for total of 20 mg dose at cramp onset. Do not anticipate need for daily use. # Adjustment disorder with anxious mood  - controlled, continue current medications of Lexapro 10 mg daily and Wellbutrin  mg daily    # Snoring  - Concern for undiagnosed sleep apnea as family reports longstanding loud snoring and daytime fatigue  - referral placed to [facility] Moreno last visit --- intake was rescheduled for next week 8/29.     # Primary

## 2023-08-21 LAB — DRUGS UR: NORMAL

## 2023-08-21 RX ORDER — ACETAMINOPHEN 325 MG/1
650 TABLET ORAL
Status: CANCELLED | OUTPATIENT
Start: 2023-08-28

## 2023-08-21 RX ORDER — ALBUTEROL SULFATE 90 UG/1
4 AEROSOL, METERED RESPIRATORY (INHALATION) PRN
Status: CANCELLED | OUTPATIENT
Start: 2023-08-28

## 2023-08-21 RX ORDER — MEPERIDINE HYDROCHLORIDE 25 MG/ML
12.5 INJECTION INTRAMUSCULAR; INTRAVENOUS; SUBCUTANEOUS PRN
Status: CANCELLED | OUTPATIENT
Start: 2023-08-28

## 2023-08-21 RX ORDER — ONDANSETRON 2 MG/ML
8 INJECTION INTRAMUSCULAR; INTRAVENOUS
Status: CANCELLED | OUTPATIENT
Start: 2023-08-28

## 2023-08-21 RX ORDER — EPINEPHRINE 1 MG/ML
0.3 INJECTION, SOLUTION, CONCENTRATE INTRAVENOUS PRN
Status: CANCELLED | OUTPATIENT
Start: 2023-08-28

## 2023-08-21 RX ORDER — DIPHENHYDRAMINE HYDROCHLORIDE 50 MG/ML
50 INJECTION INTRAMUSCULAR; INTRAVENOUS
Status: CANCELLED | OUTPATIENT
Start: 2023-08-28

## 2023-08-21 RX ORDER — SODIUM CHLORIDE 9 MG/ML
INJECTION, SOLUTION INTRAVENOUS CONTINUOUS
Status: CANCELLED | OUTPATIENT
Start: 2023-08-28

## 2023-08-26 ASSESSMENT — SLEEP AND FATIGUE QUESTIONNAIRES
SELECT ANY OF THE FOLLOWING BEHAVIORS OBSERVED WHILE YOU ARE ASLEEP: PAUSES IN BREATHING
AVERAGE NUMBER OF SLEEP HOURS: 7
ARE YOU BOTHERED BY WAKING UP TOO EARLY AND NOT BEING ABLE TO GET BACK TO SLEEP: IS
HOW LIKELY ARE YOU TO NOD OFF OR FALL ASLEEP IN A CAR, WHILE STOPPED FOR A FEW MINUTES IN TRAFFIC: WOULD NEVER DOZE
SELECT ANY OF THE FOLLOWING BEHAVIORS OBSERVED WHILE YOU ARE ASLEEP: TWITCHING OF LEGS OR FEET
HOW LIKELY ARE YOU TO NOD OFF OR FALL ASLEEP WHILE SITTING AND TALKING TO SOMEONE: WOULD NEVER DOZE
HOW LIKELY ARE YOU TO NOD OFF OR FALL ASLEEP WHEN YOU ARE A PASSENGER IN A CAR FOR AN HOUR WITHOUT A BREAK: SLIGHT CHANCE OF DOZING
DO YOU HAVE DIFFICULTY OPERATING A MOTOR VEHICLE FOR SHORT DISTANCES (LESS THAN 100 MILES) BECAUSE YOU BECOME SLEEPY: NO
HOW LIKELY ARE YOU TO NOD OFF OR FALL ASLEEP WHILE SITTING QUIETLY AFTER LUNCH WITHOUT ALCOHOL: HIGH CHANCE OF DOZING
SELECT ANY OF THE FOLLOWING BEHAVIORS OBSERVED WHILE YOU ARE ASLEEP: LIGHT SNORING
HOW LIKELY ARE YOU TO NOD OFF OR FALL ASLEEP WHILE SITTING AND READING: 2
HOW LIKELY ARE YOU TO NOD OFF OR FALL ASLEEP WHEN YOU ARE A PASSENGER IN A CAR FOR AN HOUR WITHOUT A BREAK: 1
DO YOU WORK SHIFTS: NO
HOW LIKELY ARE YOU TO NOD OFF OR FALL ASLEEP WHILE SITTING AND READING: MODERATE CHANCE OF DOZING
HOW LIKELY ARE YOU TO NOD OFF OR FALL ASLEEP WHILE SITTING QUIETLY AFTER LUNCH WITHOUT ALCOHOL: 3
ESS TOTAL SCORE: 11
DO YOU GET TOO LITTLE SLEEP AT NIGHT: YES
SELECT ANY OF THE FOLLOWING BEHAVIORS OBSERVED WHILE YOU ARE ASLEEP: LOUD SNORING
SELECT ANY OF THE FOLLOWING BEHAVIORS OBSERVED WHILE YOU ARE ASLEEP: BECOMING VERY RIGID AND/OR SHAKING
HOW LIKELY ARE YOU TO NOD OFF OR FALL ASLEEP WHILE SITTING INACTIVE IN A PUBLIC PLACE: WOULD NEVER DOZE
AVERAGE NUMBER OF SLEEP HOURS: 7
DO YOU HAVE PROBLEMS WITH FREQUENT AWAKENINGS AT NIGHT: YES
NECK CIRCUMFERENCE (INCHES): 15
NUMBER OF TIMES YOU WAKE PER NIGHT: 3
HOW LIKELY ARE YOU TO NOD OFF OR FALL ASLEEP WHILE WATCHING TV: MODERATE CHANCE OF DOZING
WHAT TIME DO YOU USUALLY WAKE UP: 06:30
HOW LIKELY ARE YOU TO NOD OFF OR FALL ASLEEP WHILE LYING DOWN TO REST IN THE AFTERNOON WHEN CIRCUMSTANCES PERMIT: 3
HOW LONG DO YOU NAP: 45 MINUTES TO 1 HOUR
HOW LIKELY ARE YOU TO NOD OFF OR FALL ASLEEP WHILE SITTING INACTIVE IN A PUBLIC PLACE: 0
HOW LIKELY ARE YOU TO NOD OFF OR FALL ASLEEP WHILE WATCHING TV: 2
DO YOU HAVE DIFFICULTY WATCHING A MOVIE OR VIDEO BECAUSE YOU BECOME SLEEPY OR TIRED: YES, MODERATE
DO YOU HAVE DIFFICULTY BEING AS ACTIVE AS YOU WANT TO BE IN THE EVENING BECAUSE YOU ARE SLEEPY OR TIRED: YES, EXTREME
HOW MANY NAPS DO YOU TAKE PER WEEK: 5
HOW LIKELY ARE YOU TO NOD OFF OR FALL ASLEEP WHILE SITTING AND TALKING TO SOMEONE: 0
HAS YOUR MOOD BEEN AFFECTED BECAUSE YOU ARE SLEEPY OR TIRED: YES, EXTREME
HOW LIKELY ARE YOU TO NOD OFF OR FALL ASLEEP IN A CAR, WHILE STOPPED FOR A FEW MINUTES IN TRAFFIC: 0
HAS YOUR RELATIONSHIP WITH FAMILY, FRIENDS OR WORK COLLEAGUES BEEN AFFECTED BECAUSE YOU ARE SLEEPY OR TIRED: YES, EXTREME
DO YOU GENERALLY HAVE DIFFICULTY REMEMBERING THINGS BECAUSE YOU ARE SLEEPY OR TIRED: YES, MODERATE
DO YOU HAVE DIFFICULTY VISITING YOUR FAMILY OR FRIENDS IN THEIR HOME BECAUSE YOU BECOME SLEEPY OR TIRED: NO
DO YOU TAKE NAPS: YES
HOW LONG DO YOU NAP: OTHER
DO YOU HAVE DIFFICULTY OPERATING A MOTOR VEHICLE FOR LONG DISTANCES (GREATER THAN 100 MILES) BECAUSE YOU BECOME SLEEPY: NO
FOSQ SCORE: 11
HOW LIKELY ARE YOU TO NOD OFF OR FALL ASLEEP WHILE LYING DOWN TO REST IN THE AFTERNOON WHEN CIRCUMSTANCES PERMIT: HIGH CHANCE OF DOZING
SELECT ANY OF THE FOLLOWING BEHAVIORS OBSERVED WHILE YOU ARE ASLEEP: SLEEP TALKING
ARE YOU BOTHERED BY WAKING UP TOO EARLY AND NOT BEING ABLE TO GET BACK TO SLEEP: YES
DO YOU HAVE DIFFICULTY CONCENTRATING ON THE THINGS YOU DO BECAUSE YOU ARE SLEEPY OR TIRED: YES, EXTREME
DO YOU HAVE DIFFICULTY BEING AS ACTIVE AS YOU WANT TO BE IN THE MORNING BECAUSE YOU ARE SLEEPY OR TIRED: YES, MODERATE
DO YOU GET TOO LITTLE SLEEP AT NIGHT: DOES

## 2023-08-26 NOTE — PROGRESS NOTES
[facility] [place] at [address], [address] [place], [place] [address]e, [address] [place] [address]: [phone]  F: [phone]    Reason for Visit:   Jerson Peters is a 45 y.o. female seen today in office for follow up of Metastatic Breast Cancer. Treatment History:   Abd US 10/3/22: There are multiple liver masses with 2 representative masses in  the right liver measuring 2.4 x 2.6 cm and 1.6 x 2.1 cm  CT A/P 10/3/22: Spiculated left breast mass suspicious for malignancy. Multiple lung and liver metastases. Multiple top normal size retroperitoneal lymph nodes are nonspecific with metastatic disease  not excluded  CT Chest 10/4/22: Indeterminate 1.4 x 1.9 cm left breast nodule. Primary malignancy not excluded. Mammographic correlation advised. Diffuse bilateral pulmonary nodules most compatible with metastatic disease. Partially imaged hepatic hypodensities concerning  for metastatic disease. Indeterminate 6 mm T10 lytic lesion, possibly benign. Attention on follow-up  Liver Biopsy 10/4/22: PATH - Metastatic adenocarcinoma, consistent with breast primary  ER/SD negative, Her2 3+  Port placed on 10/13/2   Palliative Taxol+Herceptin+Perjeta 10/17/22 - 10/31/22  Switched to Palliative Taxotere+Herceptin+Perjeta on 11/7/22 - Current    Taxotere to 60 mg/m2 with Cycle 8  CT C/A/P 12/27/22: Imaging findings consistent with significant interval response to therapy. Significantly diminished pulmonary metastatic disease  burden with numerous resolved or nearly resolved pulmonary nodules. Significantly diminished size of hepatic masses.  Left breast lesion is not demonstrated on the current exam  Right Hip XRAY 1/2/23: Right basicervical femoral neck fracture with medial angulation  CT Pelv 1/2/23: Re-demonstrated acute right basicervical femoral neck fracture, with findings concerning for pathologic etiology  XR Femur 1/2/23: Mildly displaced right femoral neck fracture  Right Hip IMN with Ortho Dr Mayberry Code

## 2023-08-28 ENCOUNTER — OFFICE VISIT (OUTPATIENT)
Age: 38
End: 2023-08-28
Payer: COMMERCIAL

## 2023-08-28 ENCOUNTER — HOSPITAL ENCOUNTER (OUTPATIENT)
Facility: HOSPITAL | Age: 38
Setting detail: INFUSION SERIES
End: 2023-08-28
Payer: COMMERCIAL

## 2023-08-28 VITALS
HEART RATE: 96 BPM | BODY MASS INDEX: 32.92 KG/M2 | TEMPERATURE: 97.4 F | SYSTOLIC BLOOD PRESSURE: 127 MMHG | DIASTOLIC BLOOD PRESSURE: 78 MMHG | WEIGHT: 180 LBS | OXYGEN SATURATION: 97 % | RESPIRATION RATE: 18 BRPM

## 2023-08-28 VITALS
WEIGHT: 180.4 LBS | DIASTOLIC BLOOD PRESSURE: 84 MMHG | HEART RATE: 60 BPM | RESPIRATION RATE: 18 BRPM | SYSTOLIC BLOOD PRESSURE: 133 MMHG | TEMPERATURE: 97.4 F | BODY MASS INDEX: 33 KG/M2 | OXYGEN SATURATION: 97 %

## 2023-08-28 DIAGNOSIS — C50.919 MALIGNANT NEOPLASM OF BREAST METASTATIC TO LIVER (HCC): Primary | ICD-10-CM

## 2023-08-28 DIAGNOSIS — C78.02 MALIGNANT NEOPLASM METASTATIC TO BOTH LUNGS (HCC): ICD-10-CM

## 2023-08-28 DIAGNOSIS — C78.01 MALIGNANT NEOPLASM METASTATIC TO BOTH LUNGS (HCC): ICD-10-CM

## 2023-08-28 DIAGNOSIS — C78.7 MALIGNANT NEOPLASM METASTATIC TO LIVER (HCC): ICD-10-CM

## 2023-08-28 DIAGNOSIS — C79.51 MALIGNANT NEOPLASM METASTATIC TO BONE (HCC): Primary | ICD-10-CM

## 2023-08-28 DIAGNOSIS — C78.7 MALIGNANT NEOPLASM OF BREAST METASTATIC TO LIVER (HCC): Primary | ICD-10-CM

## 2023-08-28 DIAGNOSIS — T45.1X5A CHEMOTHERAPY-INDUCED NAUSEA: ICD-10-CM

## 2023-08-28 DIAGNOSIS — R11.0 CHEMOTHERAPY-INDUCED NAUSEA: ICD-10-CM

## 2023-08-28 DIAGNOSIS — G89.3 CANCER RELATED PAIN: ICD-10-CM

## 2023-08-28 DIAGNOSIS — Z95.828 PORT-A-CATH IN PLACE: ICD-10-CM

## 2023-08-28 LAB
ALBUMIN SERPL-MCNC: 3.7 G/DL (ref 3.5–5)
ALBUMIN/GLOB SERPL: 1.2 (ref 1.1–2.2)
ALP SERPL-CCNC: 106 U/L (ref 45–117)
ALT SERPL-CCNC: 25 U/L (ref 12–78)
ANION GAP SERPL CALC-SCNC: 7 MMOL/L (ref 5–15)
AST SERPL-CCNC: 7 U/L (ref 15–37)
BASOPHILS # BLD: 0 K/UL (ref 0–0.1)
BASOPHILS NFR BLD: 0 % (ref 0–1)
BILIRUB SERPL-MCNC: 0.2 MG/DL (ref 0.2–1)
BUN SERPL-MCNC: 9 MG/DL (ref 6–20)
BUN/CREAT SERPL: 10 (ref 12–20)
CALCIUM SERPL-MCNC: 9.1 MG/DL (ref 8.5–10.1)
CHLORIDE SERPL-SCNC: 109 MMOL/L (ref 97–108)
CO2 SERPL-SCNC: 23 MMOL/L (ref 21–32)
CREAT SERPL-MCNC: 0.91 MG/DL (ref 0.55–1.02)
DIFFERENTIAL METHOD BLD: ABNORMAL
EOSINOPHIL # BLD: 0 K/UL (ref 0–0.4)
EOSINOPHIL NFR BLD: 0 % (ref 0–7)
ERYTHROCYTE [DISTWIDTH] IN BLOOD BY AUTOMATED COUNT: 16.7 % (ref 11.5–14.5)
GLOBULIN SER CALC-MCNC: 3 G/DL (ref 2–4)
GLUCOSE SERPL-MCNC: 253 MG/DL (ref 65–100)
HCT VFR BLD AUTO: 36 % (ref 35–47)
HGB BLD-MCNC: 11.1 G/DL (ref 11.5–16)
IMM GRANULOCYTES # BLD AUTO: 0.3 K/UL (ref 0–0.04)
IMM GRANULOCYTES NFR BLD AUTO: 2 % (ref 0–0.5)
LYMPHOCYTES # BLD: 1.2 K/UL (ref 0.8–3.5)
LYMPHOCYTES NFR BLD: 7 % (ref 12–49)
MCH RBC QN AUTO: 28.1 PG (ref 26–34)
MCHC RBC AUTO-ENTMCNC: 30.8 G/DL (ref 30–36.5)
MCV RBC AUTO: 91.1 FL (ref 80–99)
MONOCYTES # BLD: 0.5 K/UL (ref 0–1)
MONOCYTES NFR BLD: 3 % (ref 5–13)
NEUTS SEG # BLD: 14.8 K/UL (ref 1.8–8)
NEUTS SEG NFR BLD: 88 % (ref 32–75)
NRBC # BLD: 0 K/UL (ref 0–0.01)
NRBC BLD-RTO: 0 PER 100 WBC
PLATELET # BLD AUTO: 394 K/UL (ref 150–400)
PMV BLD AUTO: 9.4 FL (ref 8.9–12.9)
POTASSIUM SERPL-SCNC: 4 MMOL/L (ref 3.5–5.1)
PROT SERPL-MCNC: 6.7 G/DL (ref 6.4–8.2)
RBC # BLD AUTO: 3.95 M/UL (ref 3.8–5.2)
SODIUM SERPL-SCNC: 139 MMOL/L (ref 136–145)
WBC # BLD AUTO: 16.8 K/UL (ref 3.6–11)

## 2023-08-28 PROCEDURE — 36415 COLL VENOUS BLD VENIPUNCTURE: CPT

## 2023-08-28 PROCEDURE — 80053 COMPREHEN METABOLIC PANEL: CPT

## 2023-08-28 PROCEDURE — 6360000002 HC RX W HCPCS: Performed by: INTERNAL MEDICINE

## 2023-08-28 PROCEDURE — 96413 CHEMO IV INFUSION 1 HR: CPT

## 2023-08-28 PROCEDURE — 99214 OFFICE O/P EST MOD 30 MIN: CPT | Performed by: INTERNAL MEDICINE

## 2023-08-28 PROCEDURE — 96401 CHEMO ANTI-NEOPL SQ/IM: CPT

## 2023-08-28 PROCEDURE — 85025 COMPLETE CBC W/AUTO DIFF WBC: CPT

## 2023-08-28 PROCEDURE — 2500000003 HC RX 250 WO HCPCS: Performed by: INTERNAL MEDICINE

## 2023-08-28 PROCEDURE — A4216 STERILE WATER/SALINE, 10 ML: HCPCS | Performed by: INTERNAL MEDICINE

## 2023-08-28 PROCEDURE — 2580000003 HC RX 258: Performed by: INTERNAL MEDICINE

## 2023-08-28 PROCEDURE — 96375 TX/PRO/DX INJ NEW DRUG ADDON: CPT

## 2023-08-28 PROCEDURE — 96402 CHEMO HORMON ANTINEOPL SQ/IM: CPT

## 2023-08-28 RX ORDER — SODIUM CHLORIDE 9 MG/ML
5-250 INJECTION, SOLUTION INTRAVENOUS PRN
Status: DISCONTINUED | OUTPATIENT
Start: 2023-08-28 | End: 2023-08-29 | Stop reason: HOSPADM

## 2023-08-28 RX ORDER — DEXAMETHASONE SODIUM PHOSPHATE 10 MG/ML
10 INJECTION, SOLUTION INTRAMUSCULAR; INTRAVENOUS ONCE
Status: COMPLETED | OUTPATIENT
Start: 2023-08-28 | End: 2023-08-28

## 2023-08-28 RX ORDER — HEPARIN 100 UNIT/ML
500 SYRINGE INTRAVENOUS PRN
Status: DISCONTINUED | OUTPATIENT
Start: 2023-08-28 | End: 2023-08-29 | Stop reason: HOSPADM

## 2023-08-28 RX ORDER — SODIUM CHLORIDE 0.9 % (FLUSH) 0.9 %
5-40 SYRINGE (ML) INJECTION PRN
Status: DISCONTINUED | OUTPATIENT
Start: 2023-08-28 | End: 2023-08-29 | Stop reason: HOSPADM

## 2023-08-28 RX ORDER — ACETAMINOPHEN 325 MG/1
650 TABLET ORAL
Status: DISCONTINUED | OUTPATIENT
Start: 2023-08-28 | End: 2023-08-29 | Stop reason: HOSPADM

## 2023-08-28 RX ORDER — DIPHENHYDRAMINE HYDROCHLORIDE 50 MG/ML
25 INJECTION INTRAMUSCULAR; INTRAVENOUS ONCE
Status: COMPLETED | OUTPATIENT
Start: 2023-08-28 | End: 2023-08-28

## 2023-08-28 RX ADMIN — PERTUZUMAB, TRASTUZUMAB, AND HYALURONIDASE-ZZXF 10 ML: 600; 600; 2000 INJECTION, SOLUTION SUBCUTANEOUS at 10:12

## 2023-08-28 RX ADMIN — SODIUM CHLORIDE, PRESERVATIVE FREE 20 ML: 5 INJECTION INTRAVENOUS at 11:45

## 2023-08-28 RX ADMIN — FAMOTIDINE 20 MG: 10 INJECTION, SOLUTION INTRAVENOUS at 09:23

## 2023-08-28 RX ADMIN — DIPHENHYDRAMINE HYDROCHLORIDE 25 MG: 50 INJECTION, SOLUTION INTRAMUSCULAR; INTRAVENOUS at 09:27

## 2023-08-28 RX ADMIN — DEXAMETHASONE SODIUM PHOSPHATE 10 MG: 10 INJECTION INTRAMUSCULAR; INTRAVENOUS at 09:18

## 2023-08-28 RX ADMIN — DOCETAXEL ANHYDROUS 106 MG: 10 INJECTION, SOLUTION INTRAVENOUS at 10:41

## 2023-08-28 RX ADMIN — SODIUM CHLORIDE 250 ML/HR: 9 INJECTION, SOLUTION INTRAVENOUS at 09:15

## 2023-08-28 NOTE — PROGRESS NOTES
Mickey Keen is a 45 y.o. female    Chief Complaint   Patient presents with    Follow-up     Metastatic Breast Cancer. 1. Have you been to the ER, urgent care clinic since your last visit? Hospitalized since your last visit? No    2. Have you seen or consulted any other health care providers outside of the [address] [place] [address] since your last visit? Include any pap smears or colon screening.  No

## 2023-08-29 ENCOUNTER — OFFICE VISIT (OUTPATIENT)
Age: 38
End: 2023-08-29
Payer: COMMERCIAL

## 2023-08-29 VITALS
OXYGEN SATURATION: 96 % | DIASTOLIC BLOOD PRESSURE: 82 MMHG | WEIGHT: 180 LBS | HEIGHT: 62 IN | BODY MASS INDEX: 33.13 KG/M2 | SYSTOLIC BLOOD PRESSURE: 123 MMHG | HEART RATE: 62 BPM

## 2023-08-29 DIAGNOSIS — G47.33 OBSTRUCTIVE SLEEP APNEA (ADULT) (PEDIATRIC): Primary | ICD-10-CM

## 2023-08-29 PROCEDURE — 99203 OFFICE O/P NEW LOW 30 MIN: CPT | Performed by: INTERNAL MEDICINE

## 2023-08-29 NOTE — PROGRESS NOTES
[address] [facility] Akash Jain, [address]  Tel.  [phone]  Fax. [address] [facility], [address] [place] [address]  Tel.  [phone]  Fax. [phone] [facility], [address] [facility]  Tel.  [phone]  Fax. [phone]         Subjective:      Ashley Jaquez is an 45 y.o. female referred for evaluation for a sleep disorder. She complains of snoring, periods of not breathing associated with excessive daytime sleepiness. Symptoms began 1 year ago, gradually worsening since that time. She usually can fall asleep in 60 minutes. Family or house members note snoring, periods of not breathing. She denies falling asleep while driving. Ashley Jaquez (P) does wake up frequently at night. She (P) is bothered by waking up too early and left unable to get back to sleep. She actually sleeps about (P) 7 hours at night and wakes up about (P) 3 times during the night. She (P) does not work shifts: Charly Saldana indicates she (P) does get too little sleep at night. Her bedtime is (P) 2330. She awakens at (P) 0630. She (P) does take naps. She takes (P) 5 naps a week lasting (P) 45 min to an hour, Other. She has the following observed behaviors: (P) Loud snoring, Light snoring, Twitching of legs or feet, Pauses in breathing, Sleep talking, Becoming very rigid and/or shaking;  . Other remarks:      Sleep Medicine 8/26/2023   Sitting and reading 2   Watching TV 2   Sitting, inactive in a public place (e.g. a theatre or a meeting) 0   As a passenger in a car for an hour without a break 1   Lying down to rest in the afternoon when circumstances permit 3   Sitting and talking to someone 0   Sitting quietly after a lunch without alcohol 3   In a car, while stopped for a few minutes in traffic 0   Adel Sleepiness Score 11   Neck circumference (Inches) 15    which reflect mild daytime drowsiness.     No Known Allergies      Current Outpatient Medications:     baclofen (LIORESAL) 10 MG

## 2023-08-29 NOTE — PATIENT INSTRUCTIONS
[address] [facility].[place], [address]  Tel.  [phone]  Fax. [phone] [facility], [facility] [place]  Tel.  [phone]  Fax. [phone] [facility] [address] [facility]  Tel.  [phone]  Fax. [phone]     Sleep Apnea: After Your Visit  Your Care Instructions  Sleep apnea occurs when you frequently stop breathing for 10 seconds or longer during sleep. It can be mild to severe, based on the number of times per hour that you stop breathing or have slowed breathing. Blocked or narrowed airways in your nose, mouth, or throat can cause sleep apnea. Your airway can become blocked when your throat muscles and tongue relax during sleep. Sleep apnea is common, occurring in 1 out of 20 individuals. Individuals having any of the following characteristics should be evaluated and treated right away due to high risk and detrimental consequences from untreated sleep apnea:  Obesity  Congestive Heart failure  Atrial Fibrillation  Uncontrolled Hypertension  Type II Diabetes  Night-time Arrhythmias  Stroke  Pulmonary Hypertension  High-risk Driving Populations (pilots, truck drivers, etc.)  Patients Considering Weight-loss Surgery    How do you know you have sleep apnea? You probably have sleep apnea if you answer 'yes' to 3 or more of the following questions:  S - Have you been told that you Snore? T - Are you often Tired during the day? O - Has anyone Observed you stop breathing while sleeping? P- Do you have (or are being treated for) high blood Pressure? B - Are you obese (Body Mass Index > 35)? A - Is your Age 48years old or older? N - Is your Neck size greater than 16 inches? G - Are you male Gender? A sleep physician can prescribe a breathing device that prevents tissues in the throat from blocking your airway. Or your doctor may recommend using a dental device (oral breathing device) to help keep your airway open.  In some cases, surgery may

## 2023-09-11 DIAGNOSIS — C78.7 MALIGNANT NEOPLASM OF BREAST METASTATIC TO LIVER (HCC): ICD-10-CM

## 2023-09-11 DIAGNOSIS — G89.3 CANCER RELATED PAIN: ICD-10-CM

## 2023-09-11 DIAGNOSIS — C50.919 MALIGNANT NEOPLASM OF BREAST METASTATIC TO LIVER (HCC): ICD-10-CM

## 2023-09-11 RX ORDER — ALBUTEROL SULFATE 90 UG/1
4 AEROSOL, METERED RESPIRATORY (INHALATION) PRN
Status: CANCELLED | OUTPATIENT
Start: 2023-09-18

## 2023-09-11 RX ORDER — ESCITALOPRAM OXALATE 10 MG/1
10 TABLET ORAL DAILY
Qty: 30 TABLET | Refills: 2 | Status: SHIPPED | OUTPATIENT
Start: 2023-09-11

## 2023-09-11 RX ORDER — MEPERIDINE HYDROCHLORIDE 25 MG/ML
12.5 INJECTION INTRAMUSCULAR; INTRAVENOUS; SUBCUTANEOUS PRN
Status: CANCELLED | OUTPATIENT
Start: 2023-09-18

## 2023-09-11 RX ORDER — EPINEPHRINE 1 MG/ML
0.3 INJECTION, SOLUTION, CONCENTRATE INTRAVENOUS PRN
Status: CANCELLED | OUTPATIENT
Start: 2023-09-18

## 2023-09-11 RX ORDER — ACETAMINOPHEN 325 MG/1
650 TABLET ORAL
Status: CANCELLED | OUTPATIENT
Start: 2023-09-18

## 2023-09-11 RX ORDER — SODIUM CHLORIDE 9 MG/ML
INJECTION, SOLUTION INTRAVENOUS CONTINUOUS
Status: CANCELLED | OUTPATIENT
Start: 2023-09-18

## 2023-09-11 RX ORDER — OXYCODONE HCL 20 MG/1
20 TABLET, FILM COATED, EXTENDED RELEASE ORAL 2 TIMES DAILY
Qty: 60 TABLET | Refills: 0 | Status: CANCELLED | OUTPATIENT
Start: 2023-09-11 | End: 2023-10-11

## 2023-09-11 RX ORDER — DIPHENHYDRAMINE HYDROCHLORIDE 50 MG/ML
50 INJECTION INTRAMUSCULAR; INTRAVENOUS
Status: CANCELLED | OUTPATIENT
Start: 2023-09-18

## 2023-09-11 RX ORDER — ONDANSETRON 2 MG/ML
8 INJECTION INTRAMUSCULAR; INTRAVENOUS
Status: CANCELLED | OUTPATIENT
Start: 2023-09-18

## 2023-09-11 NOTE — TELEPHONE ENCOUNTER
Palliative Medicine  Nursing Note  [phone])  Fax [phone]      Telephone Call  Patient Name: Nannette Cuello  YOB: 1985    9/11/2023        Primary Decision Maker: Marcus Marsh Spouse - [phone]       9/11/2023    10:59 AM   Demographics   Marital Status      Call placed to [facility] and verified they received the script for Oxycontin 20 mg by Dr. Nick Thomas. They do not have it in stock and it will be available by Thursday 9/14/23. Call placed to Ms. Jose De Jesus Daily and left voice mail message that the Oxycontin would not be available until Thursday and asked that she call palliative if she will run out before that.      Lucy Navarrete, RN  Palliative Medicine

## 2023-09-15 ENCOUNTER — TELEPHONE (OUTPATIENT)
Age: 38
End: 2023-09-15

## 2023-09-15 DIAGNOSIS — C50.919 MALIGNANT NEOPLASM OF BREAST METASTATIC TO LIVER (HCC): ICD-10-CM

## 2023-09-15 DIAGNOSIS — C78.7 MALIGNANT NEOPLASM OF BREAST METASTATIC TO LIVER (HCC): ICD-10-CM

## 2023-09-15 DIAGNOSIS — G89.3 CANCER RELATED PAIN: ICD-10-CM

## 2023-09-15 RX ORDER — OXYCODONE HYDROCHLORIDE 10 MG/1
10 TABLET ORAL EVERY 4 HOURS PRN
Qty: 90 TABLET | Refills: 0 | Status: SHIPPED | OUTPATIENT
Start: 2023-09-15 | End: 2023-09-30

## 2023-09-15 NOTE — TELEPHONE ENCOUNTER
Palliative Medicine  Nursing Note  [phone])  Fax [phone]      Telephone Call  Patient Name: Mayank Holbrook  YOB: 1985    9/15/2023        Primary Decision Maker: Ana Maria Mccarthy - Spouse - [phone]       9/15/2023    10:17 AM   Demographics   Marital Status      Patient requests refill on Oxy IR 10mg.     Triage for Controlled Substance Refill Request    Pain Diagnosis: lung cancer    Last Outpatient Visit: 8/17/23    Next Outpatient Visit: 9/26/23    Reason for refill needed outside of office visit-  Appointment not scheduled prior to need for scheduled refill,     Any Reported Side effects: none    Last dose taken:     [facility]    Medication: Oxy IR 10 mg  Dose and directions: 1 every 4 prn  Number dispensed: 90 for 15 day supply  Date filled ( or Pharmacy): 8/21/23   # left:     reviewed: yes    Date of Urine Drug Screen:  8/17/23    Opioid Safety Handout given:  Yes    Appropriate for refill:  Yes    Action:  pended for Dr. Tee Degroot, RN  Palliative Medicine

## 2023-09-15 NOTE — TELEPHONE ENCOUNTER
Prior Auth required for Oxy IR 10 mg #90 for 15 days prescription. This was initiated via Cover my meds.   Waiting for approval.    Ana Magallon RN  Palliative Medicine

## 2023-09-18 ENCOUNTER — OFFICE VISIT (OUTPATIENT)
Age: 38
End: 2023-09-18
Payer: COMMERCIAL

## 2023-09-18 ENCOUNTER — HOSPITAL ENCOUNTER (OUTPATIENT)
Facility: HOSPITAL | Age: 38
Setting detail: INFUSION SERIES
End: 2023-09-18
Payer: COMMERCIAL

## 2023-09-18 VITALS
WEIGHT: 182.2 LBS | BODY MASS INDEX: 33.53 KG/M2 | RESPIRATION RATE: 12 BRPM | SYSTOLIC BLOOD PRESSURE: 115 MMHG | DIASTOLIC BLOOD PRESSURE: 79 MMHG | HEIGHT: 62 IN | HEART RATE: 70 BPM | TEMPERATURE: 97.5 F

## 2023-09-18 VITALS
TEMPERATURE: 97.5 F | SYSTOLIC BLOOD PRESSURE: 122 MMHG | BODY MASS INDEX: 33.28 KG/M2 | RESPIRATION RATE: 12 BRPM | WEIGHT: 182 LBS | HEART RATE: 91 BPM | DIASTOLIC BLOOD PRESSURE: 77 MMHG | OXYGEN SATURATION: 96 %

## 2023-09-18 DIAGNOSIS — C79.51 MALIGNANT NEOPLASM METASTATIC TO BONE (HCC): Primary | ICD-10-CM

## 2023-09-18 DIAGNOSIS — C78.7 MALIGNANT NEOPLASM METASTATIC TO LIVER (HCC): ICD-10-CM

## 2023-09-18 DIAGNOSIS — C78.01 MALIGNANT NEOPLASM METASTATIC TO BOTH LUNGS (HCC): ICD-10-CM

## 2023-09-18 DIAGNOSIS — G89.3 CANCER RELATED PAIN: ICD-10-CM

## 2023-09-18 DIAGNOSIS — Z51.81 ENCOUNTER FOR MONITORING CARDIOTOXIC DRUG THERAPY: ICD-10-CM

## 2023-09-18 DIAGNOSIS — T45.1X5A CHEMOTHERAPY-INDUCED FATIGUE: ICD-10-CM

## 2023-09-18 DIAGNOSIS — Z79.899 ENCOUNTER FOR MONITORING CARDIOTOXIC DRUG THERAPY: ICD-10-CM

## 2023-09-18 DIAGNOSIS — R11.0 CHEMOTHERAPY-INDUCED NAUSEA: ICD-10-CM

## 2023-09-18 DIAGNOSIS — C78.7 MALIGNANT NEOPLASM OF BREAST METASTATIC TO LIVER (HCC): Primary | ICD-10-CM

## 2023-09-18 DIAGNOSIS — Z87.311 HISTORY OF PATHOLOGIC FRACTURE: ICD-10-CM

## 2023-09-18 DIAGNOSIS — T45.1X5A CHEMOTHERAPY-INDUCED NAUSEA: ICD-10-CM

## 2023-09-18 DIAGNOSIS — C50.919 MALIGNANT NEOPLASM OF BREAST METASTATIC TO LIVER (HCC): Primary | ICD-10-CM

## 2023-09-18 DIAGNOSIS — R53.83 CHEMOTHERAPY-INDUCED FATIGUE: ICD-10-CM

## 2023-09-18 DIAGNOSIS — M25.551 RIGHT HIP PAIN: ICD-10-CM

## 2023-09-18 DIAGNOSIS — C78.02 MALIGNANT NEOPLASM METASTATIC TO BOTH LUNGS (HCC): ICD-10-CM

## 2023-09-18 DIAGNOSIS — Z95.828 PORT-A-CATH IN PLACE: ICD-10-CM

## 2023-09-18 DIAGNOSIS — Z09 CHEMOTHERAPY FOLLOW-UP EXAMINATION: ICD-10-CM

## 2023-09-18 LAB
ALBUMIN SERPL-MCNC: 3.7 G/DL (ref 3.5–5)
ALBUMIN/GLOB SERPL: 1.3 (ref 1.1–2.2)
ALP SERPL-CCNC: 91 U/L (ref 45–117)
ALT SERPL-CCNC: 25 U/L (ref 12–78)
ANION GAP SERPL CALC-SCNC: 4 MMOL/L (ref 5–15)
AST SERPL-CCNC: 9 U/L (ref 15–37)
BASO+EOS+MONOS # BLD AUTO: 0.4 K/UL (ref 0.2–1.2)
BASO+EOS+MONOS NFR BLD AUTO: 2 % (ref 3.2–16.9)
BILIRUB SERPL-MCNC: 0.2 MG/DL (ref 0.2–1)
BUN SERPL-MCNC: 14 MG/DL (ref 6–20)
BUN/CREAT SERPL: 17 (ref 12–20)
CALCIUM SERPL-MCNC: 9.2 MG/DL (ref 8.5–10.1)
CHLORIDE SERPL-SCNC: 108 MMOL/L (ref 97–108)
CO2 SERPL-SCNC: 26 MMOL/L (ref 21–32)
CREAT SERPL-MCNC: 0.82 MG/DL (ref 0.55–1.02)
DIFFERENTIAL METHOD BLD: ABNORMAL
ERYTHROCYTE [DISTWIDTH] IN BLOOD BY AUTOMATED COUNT: 17.2 % (ref 11.8–15.8)
GLOBULIN SER CALC-MCNC: 2.9 G/DL (ref 2–4)
GLUCOSE SERPL-MCNC: 177 MG/DL (ref 65–100)
HCT VFR BLD AUTO: 35.4 % (ref 35–47)
HGB BLD-MCNC: 11.7 G/DL (ref 11.5–16)
LYMPHOCYTES # BLD: 1.4 K/UL (ref 0.8–3.5)
LYMPHOCYTES NFR BLD: 8 % (ref 12–49)
MCH RBC QN AUTO: 28.8 PG (ref 26–34)
MCHC RBC AUTO-ENTMCNC: 33.1 G/DL (ref 30–36.5)
MCV RBC AUTO: 87.2 FL (ref 80–99)
NEUTS SEG # BLD: 16.1 K/UL (ref 1.8–8)
NEUTS SEG NFR BLD: 90 % (ref 32–75)
PLATELET # BLD AUTO: 409 K/UL (ref 150–400)
POTASSIUM SERPL-SCNC: 3.9 MMOL/L (ref 3.5–5.1)
PROT SERPL-MCNC: 6.6 G/DL (ref 6.4–8.2)
RBC # BLD AUTO: 4.06 M/UL (ref 3.8–5.2)
SODIUM SERPL-SCNC: 138 MMOL/L (ref 136–145)
WBC # BLD AUTO: 17.9 K/UL (ref 3.6–11)

## 2023-09-18 PROCEDURE — 85025 COMPLETE CBC W/AUTO DIFF WBC: CPT

## 2023-09-18 PROCEDURE — 96402 CHEMO HORMON ANTINEOPL SQ/IM: CPT

## 2023-09-18 PROCEDURE — 2580000003 HC RX 258: Performed by: INTERNAL MEDICINE

## 2023-09-18 PROCEDURE — 96413 CHEMO IV INFUSION 1 HR: CPT

## 2023-09-18 PROCEDURE — 96401 CHEMO ANTI-NEOPL SQ/IM: CPT

## 2023-09-18 PROCEDURE — 99214 OFFICE O/P EST MOD 30 MIN: CPT | Performed by: INTERNAL MEDICINE

## 2023-09-18 PROCEDURE — 80053 COMPREHEN METABOLIC PANEL: CPT

## 2023-09-18 PROCEDURE — 6360000002 HC RX W HCPCS: Performed by: INTERNAL MEDICINE

## 2023-09-18 PROCEDURE — A4216 STERILE WATER/SALINE, 10 ML: HCPCS | Performed by: INTERNAL MEDICINE

## 2023-09-18 PROCEDURE — 2500000003 HC RX 250 WO HCPCS: Performed by: INTERNAL MEDICINE

## 2023-09-18 PROCEDURE — 36415 COLL VENOUS BLD VENIPUNCTURE: CPT

## 2023-09-18 PROCEDURE — 96375 TX/PRO/DX INJ NEW DRUG ADDON: CPT

## 2023-09-18 RX ORDER — DEXAMETHASONE SODIUM PHOSPHATE 10 MG/ML
10 INJECTION, SOLUTION INTRAMUSCULAR; INTRAVENOUS ONCE
Status: COMPLETED | OUTPATIENT
Start: 2023-09-18 | End: 2023-09-18

## 2023-09-18 RX ORDER — SODIUM CHLORIDE 9 MG/ML
5-250 INJECTION, SOLUTION INTRAVENOUS PRN
Status: DISCONTINUED | OUTPATIENT
Start: 2023-09-18 | End: 2023-09-19 | Stop reason: HOSPADM

## 2023-09-18 RX ORDER — SODIUM CHLORIDE 0.9 % (FLUSH) 0.9 %
5-40 SYRINGE (ML) INJECTION PRN
Status: DISCONTINUED | OUTPATIENT
Start: 2023-09-18 | End: 2023-09-19 | Stop reason: HOSPADM

## 2023-09-18 RX ORDER — DIPHENHYDRAMINE HYDROCHLORIDE 50 MG/ML
25 INJECTION INTRAMUSCULAR; INTRAVENOUS ONCE
Status: COMPLETED | OUTPATIENT
Start: 2023-09-18 | End: 2023-09-18

## 2023-09-18 RX ORDER — HEPARIN 100 UNIT/ML
500 SYRINGE INTRAVENOUS PRN
Status: DISCONTINUED | OUTPATIENT
Start: 2023-09-18 | End: 2023-09-19 | Stop reason: HOSPADM

## 2023-09-18 RX ADMIN — DEXAMETHASONE SODIUM PHOSPHATE 10 MG: 10 INJECTION INTRAMUSCULAR; INTRAVENOUS at 10:56

## 2023-09-18 RX ADMIN — DOCETAXEL ANHYDROUS 106 MG: 10 INJECTION, SOLUTION INTRAVENOUS at 11:29

## 2023-09-18 RX ADMIN — DIPHENHYDRAMINE HYDROCHLORIDE 25 MG: 50 INJECTION, SOLUTION INTRAMUSCULAR; INTRAVENOUS at 10:58

## 2023-09-18 RX ADMIN — PERTUZUMAB, TRASTUZUMAB, AND HYALURONIDASE-ZZXF 10 ML: 600; 600; 2000 INJECTION, SOLUTION SUBCUTANEOUS at 10:40

## 2023-09-18 RX ADMIN — FAMOTIDINE 20 MG: 10 INJECTION, SOLUTION INTRAVENOUS at 10:53

## 2023-09-18 RX ADMIN — SODIUM CHLORIDE 25 ML/HR: 9 INJECTION, SOLUTION INTRAVENOUS at 10:53

## 2023-09-18 NOTE — PROGRESS NOTES
Edil Babin is a 45 y.o. female    Chief Complaint   Patient presents with    Follow-up     Metastatic Breast Cancer       1. Have you been to the ER, urgent care clinic since your last visit? Hospitalized since your last visit? No    2. Have you seen or consulted any other health care providers outside of the [address] [place] [address] Avenue since your last visit? Include any pap smears or colon screening.  No
16.  Patient agrees with plan. 2) Hx of Right Hip Pain  S/p right femur fracture. S/p IMN with Ortho Dr Ursula Waldron on 1/3/23. XRT 2/6/23 - 2/10/23. Ultrasound negative for DVT. Pain was currently not well controlled on Clare 5-10 mg Q4H PRN. Pain management per Palliative Medicine. 3) Pulmonary Nodules on CT Chest  Likely mets. Significantly improved   She has been referred to Pulmonary - she is holding off on this for now. 4) ADD    Management per PCP. 5) Headaches/Double Vision  Brain MRI 6/13/23 negative for brain mets. ?migraine. Can refer to Neurology if needed. 6) Management of High Risk Medications - Chemotherapy   Toxicities include Grade 1-2 Fatigue and Grade 1 Neuropathy. Lowered Taxotere/extend steroids due to side effects. Continue ECHOs every 3 months; next due in 10/23 and ordered today. Labs (CBC and CMP) reviewed today. Will continue to monitor for side effects. 7) Psychosocial  Mood good, coping well. She has good family support. She recently got , has a daughter. SW/NN support as needed. Call if questions. Follow up in 3 weeks with Cycle 16. I personally saw and evaluated the patient and performed the key components of medical decision making. The history, physical exam, and documentation were performed by Erika Lozano NP. I reviewed and verified the above documentation and modified it as needed. [address] on palliative chemo  Tolerating chemo well. No new issues. Labs and imaging reviewed  For fu Cts  Continue with chemo as ordered    I appreciate the opportunity to participate in Ms. Ashley Dean King's care.     Signed By: Urvashi Lopez DO

## 2023-09-21 ENCOUNTER — TELEPHONE (OUTPATIENT)
Age: 38
End: 2023-09-21

## 2023-09-21 NOTE — TELEPHONE ENCOUNTER
Called patient to advise/confirm upcoming appt with Dr. Justin De Leon on 9/26/23 at 10:00  at [facility]. Got voicemail. Left message.

## 2023-09-28 ENCOUNTER — TELEPHONE (OUTPATIENT)
Age: 38
End: 2023-09-28

## 2023-09-28 NOTE — TELEPHONE ENCOUNTER
Called patient to advise/confirm upcoming appt with Dr. Lilliam Mcintosh on 10/2/23 at 9:00  at  [facility]. Spoke with Jamie Parra and confirmed appointment.

## 2023-10-02 ENCOUNTER — TELEPHONE (OUTPATIENT)
Age: 38
End: 2023-10-02

## 2023-10-02 RX ORDER — ALBUTEROL SULFATE 90 UG/1
4 AEROSOL, METERED RESPIRATORY (INHALATION) PRN
Status: CANCELLED | OUTPATIENT
Start: 2023-10-09

## 2023-10-02 RX ORDER — MEPERIDINE HYDROCHLORIDE 25 MG/ML
12.5 INJECTION INTRAMUSCULAR; INTRAVENOUS; SUBCUTANEOUS PRN
Status: CANCELLED | OUTPATIENT
Start: 2023-10-09

## 2023-10-02 RX ORDER — EPINEPHRINE 1 MG/ML
0.3 INJECTION, SOLUTION, CONCENTRATE INTRAVENOUS PRN
Status: CANCELLED | OUTPATIENT
Start: 2023-10-09

## 2023-10-02 RX ORDER — ONDANSETRON 2 MG/ML
8 INJECTION INTRAMUSCULAR; INTRAVENOUS
Status: CANCELLED | OUTPATIENT
Start: 2023-10-09

## 2023-10-02 RX ORDER — DIPHENHYDRAMINE HYDROCHLORIDE 50 MG/ML
50 INJECTION INTRAMUSCULAR; INTRAVENOUS
Status: CANCELLED | OUTPATIENT
Start: 2023-10-09

## 2023-10-02 RX ORDER — SODIUM CHLORIDE 9 MG/ML
INJECTION, SOLUTION INTRAVENOUS CONTINUOUS
Status: CANCELLED | OUTPATIENT
Start: 2023-10-09

## 2023-10-02 RX ORDER — ACETAMINOPHEN 325 MG/1
650 TABLET ORAL
Status: CANCELLED | OUTPATIENT
Start: 2023-10-09

## 2023-10-02 NOTE — TELEPHONE ENCOUNTER
The patient called this morning to cancel appt scheduled w/ Dr. Lily Mace at 9:00 am. Rescheduled to 10/5/23 at 3:00

## 2023-10-02 NOTE — TELEPHONE ENCOUNTER
Message noted that patient canceled and rescheduled for 10/5/23.      Ghulam Kolb RN  Palliative Medicine

## 2023-10-03 ENCOUNTER — TELEPHONE (OUTPATIENT)
Age: 38
End: 2023-10-03

## 2023-10-03 NOTE — TELEPHONE ENCOUNTER
Called patient to advise/confirm upcoming appt with Dr. Josefina Rivera on 10/5/23 at 3:00  at [facility]. Left a voicemail.

## 2023-10-05 ENCOUNTER — OFFICE VISIT (OUTPATIENT)
Age: 38
End: 2023-10-05
Payer: COMMERCIAL

## 2023-10-05 VITALS
HEART RATE: 87 BPM | OXYGEN SATURATION: 94 % | WEIGHT: 181.5 LBS | BODY MASS INDEX: 33.19 KG/M2 | SYSTOLIC BLOOD PRESSURE: 123 MMHG | DIASTOLIC BLOOD PRESSURE: 83 MMHG | RESPIRATION RATE: 18 BRPM

## 2023-10-05 DIAGNOSIS — G89.3 CANCER RELATED PAIN: Primary | ICD-10-CM

## 2023-10-05 DIAGNOSIS — C50.919 MALIGNANT NEOPLASM OF BREAST METASTATIC TO LIVER (HCC): ICD-10-CM

## 2023-10-05 DIAGNOSIS — R06.83 SNORING: ICD-10-CM

## 2023-10-05 DIAGNOSIS — R25.2 MUSCLE CRAMPS: ICD-10-CM

## 2023-10-05 DIAGNOSIS — F43.22 ADJUSTMENT DISORDER WITH ANXIOUS MOOD: ICD-10-CM

## 2023-10-05 DIAGNOSIS — C78.7 MALIGNANT NEOPLASM OF BREAST METASTATIC TO LIVER (HCC): ICD-10-CM

## 2023-10-05 PROCEDURE — 99214 OFFICE O/P EST MOD 30 MIN: CPT | Performed by: INTERNAL MEDICINE

## 2023-10-05 RX ORDER — OXYCODONE HCL 20 MG/1
20 TABLET, FILM COATED, EXTENDED RELEASE ORAL 2 TIMES DAILY
Qty: 60 TABLET | Refills: 0 | Status: SHIPPED | OUTPATIENT
Start: 2023-10-10 | End: 2023-11-09

## 2023-10-05 RX ORDER — PREGABALIN 25 MG/1
25 CAPSULE ORAL 2 TIMES DAILY
Qty: 60 CAPSULE | Refills: 0 | Status: SHIPPED | OUTPATIENT
Start: 2023-10-05 | End: 2023-11-04

## 2023-10-05 RX ORDER — OXYCODONE HYDROCHLORIDE 10 MG/1
10 TABLET ORAL EVERY 4 HOURS PRN
Qty: 90 TABLET | Refills: 0 | Status: SHIPPED | OUTPATIENT
Start: 2023-10-05 | End: 2023-10-20

## 2023-10-05 NOTE — PROGRESS NOTES
Palliative Medicine Office Visit  Palliative Medicine Nurse Check In  [phone])    Patient Name: Rubio Lozano  YOB: 1985      Date of Office Visit:  10/5/23    Patient states: \" F/U \"    From Check In Sheet (scanned in Media):  Has a medical provider talked with you about cardiopulmonary resuscitation (CPR)? [] Yes   [x] No   [] Unable to obtain    Nurse reminder to complete or update ACP FlowSheet:    Is ACP on the Problem List?    [x] Yes    [] No  IF ACP Document is ON FILE; Nurse to place ACP on Problem List     Is there an ACP Note in Chart Review/Note? [x] Yes    [] No   If NO: ALERT PROVIDER         10/5/2023     3:00 PM   Demographics   Marital Status        Is there anything that we should know about you as a person in order to provide you the best care possible? Have you been to the ER, urgent care clinic since your last visit? [] Yes   [x] No   [] Unable to obtain    Have you been hospitalized since your last visit? [] Yes   [x] No   [] Unable to obtain    Have you seen or consulted any other health care providers outside of the [address] [place] [address] since your last visit? [] Yes   [x] No   [] Unable to obtain    Functional status (describe): Independent      Last BM: regular - yesterday     accessed (date):      Bottle review (for opioid pain medication):  Medication 1: Oxycontin 20 mg  Date filled:   Directions: 1 every 12 hours  # filled:   # left:   # pills taking per day: 2  Last dose taken:    Medication 2: Oxy IR 10 mg  Date filled:   Directions: 1 every 4 prn  # filled:   # left:   # pills taking per day: 5-6 a day  Last dose taken:
Testing within last 6 months:  [] Yes   [x] No  History of or new aberrant medication taking behaviors:  [] Yes   [x] No  Has Narcan been prescribed [x] Yes   [] No            Only check if applicable and billing time based rather than MDM    []   The total encounter time on this service date was - minutes which was spent performing a face-to-face encounter and personally completing the provider-level activities documented in the note. This includes time spent prior to the visit and after the visit in direct care of the patient. This time does not include time spent in any separately reportable services.

## 2023-10-05 NOTE — PATIENT INSTRUCTIONS
Dear Dung Gallego ,    It was a pleasure seeing you today at The [address] [place] [address] Rd. Return in about 6 weeks (around 11/16/2023) for 6-8 weeks for pain and symptom management. If labs or imaging tests have been ordered for you today, please call the office  at [phone] 48 hours after completion to obtain the results. This is the plan we talked about:    Hip pain:  - ADD Lyrica (pregabalin) 25 mg - take one capsule at bedtime  - You may increase this to 50 mg at bedtime by Monday if no effect with the 25 mg.   - You may also increase to twice daily if you need more control during the day. - Please call with questions about the Lyrica before your next visit. The Palliative Medicine Team is here to support you and your family.        Sincerely,      Tony Stewart MD

## 2023-10-09 ENCOUNTER — OFFICE VISIT (OUTPATIENT)
Age: 38
End: 2023-10-09
Payer: COMMERCIAL

## 2023-10-09 ENCOUNTER — HOSPITAL ENCOUNTER (OUTPATIENT)
Facility: HOSPITAL | Age: 38
Setting detail: INFUSION SERIES
End: 2023-10-09
Payer: COMMERCIAL

## 2023-10-09 VITALS
DIASTOLIC BLOOD PRESSURE: 86 MMHG | BODY MASS INDEX: 34.29 KG/M2 | WEIGHT: 181.6 LBS | RESPIRATION RATE: 18 BRPM | TEMPERATURE: 98.9 F | SYSTOLIC BLOOD PRESSURE: 135 MMHG | HEIGHT: 61 IN | HEART RATE: 77 BPM

## 2023-10-09 VITALS
SYSTOLIC BLOOD PRESSURE: 132 MMHG | DIASTOLIC BLOOD PRESSURE: 81 MMHG | HEART RATE: 91 BPM | BODY MASS INDEX: 34.2 KG/M2 | TEMPERATURE: 98.9 F | WEIGHT: 181 LBS | OXYGEN SATURATION: 94 % | RESPIRATION RATE: 18 BRPM

## 2023-10-09 DIAGNOSIS — G89.3 CANCER RELATED PAIN: ICD-10-CM

## 2023-10-09 DIAGNOSIS — C79.51 MALIGNANT NEOPLASM METASTATIC TO BONE (HCC): Primary | ICD-10-CM

## 2023-10-09 DIAGNOSIS — M54.50 LUMBAR BACK PAIN: ICD-10-CM

## 2023-10-09 DIAGNOSIS — C78.01 MALIGNANT NEOPLASM METASTATIC TO BOTH LUNGS (HCC): ICD-10-CM

## 2023-10-09 DIAGNOSIS — C78.7 MALIGNANT NEOPLASM METASTATIC TO LIVER (HCC): ICD-10-CM

## 2023-10-09 DIAGNOSIS — M53.3 SACRAL PAIN: ICD-10-CM

## 2023-10-09 DIAGNOSIS — G56.92 NEUROPATHY OF FINGER OF LEFT HAND: ICD-10-CM

## 2023-10-09 DIAGNOSIS — C78.02 MALIGNANT NEOPLASM METASTATIC TO BOTH LUNGS (HCC): ICD-10-CM

## 2023-10-09 DIAGNOSIS — Z51.81 ENCOUNTER FOR MONITORING CARDIOTOXIC DRUG THERAPY: ICD-10-CM

## 2023-10-09 DIAGNOSIS — M25.551 RIGHT HIP PAIN: ICD-10-CM

## 2023-10-09 DIAGNOSIS — Z79.899 ENCOUNTER FOR MONITORING CARDIOTOXIC DRUG THERAPY: ICD-10-CM

## 2023-10-09 DIAGNOSIS — Z09 CHEMOTHERAPY FOLLOW-UP EXAMINATION: ICD-10-CM

## 2023-10-09 DIAGNOSIS — C50.919 MALIGNANT NEOPLASM OF BREAST METASTATIC TO LIVER (HCC): Primary | ICD-10-CM

## 2023-10-09 DIAGNOSIS — Z95.828 PORT-A-CATH IN PLACE: ICD-10-CM

## 2023-10-09 DIAGNOSIS — Z87.311 HISTORY OF PATHOLOGIC FRACTURE: ICD-10-CM

## 2023-10-09 DIAGNOSIS — C78.7 MALIGNANT NEOPLASM OF BREAST METASTATIC TO LIVER (HCC): Primary | ICD-10-CM

## 2023-10-09 LAB
ALBUMIN SERPL-MCNC: 3.8 G/DL (ref 3.5–5)
ALBUMIN/GLOB SERPL: 1.3 (ref 1.1–2.2)
ALP SERPL-CCNC: 100 U/L (ref 45–117)
ALT SERPL-CCNC: 21 U/L (ref 12–78)
ANION GAP SERPL CALC-SCNC: 5 MMOL/L (ref 5–15)
AST SERPL-CCNC: 12 U/L (ref 15–37)
BASO+EOS+MONOS # BLD AUTO: 0.8 K/UL (ref 0.2–1.2)
BASO+EOS+MONOS NFR BLD AUTO: 5 % (ref 3.2–16.9)
BILIRUB SERPL-MCNC: 0.2 MG/DL (ref 0.2–1)
BUN SERPL-MCNC: 11 MG/DL (ref 6–20)
BUN/CREAT SERPL: 16 (ref 12–20)
CALCIUM SERPL-MCNC: 9.2 MG/DL (ref 8.5–10.1)
CHLORIDE SERPL-SCNC: 107 MMOL/L (ref 97–108)
CO2 SERPL-SCNC: 27 MMOL/L (ref 21–32)
CREAT SERPL-MCNC: 0.68 MG/DL (ref 0.55–1.02)
DIFFERENTIAL METHOD BLD: ABNORMAL
ERYTHROCYTE [DISTWIDTH] IN BLOOD BY AUTOMATED COUNT: 17.2 % (ref 11.8–15.8)
GLOBULIN SER CALC-MCNC: 3 G/DL (ref 2–4)
GLUCOSE SERPL-MCNC: 165 MG/DL (ref 65–100)
HCT VFR BLD AUTO: 34.8 % (ref 35–47)
HGB BLD-MCNC: 11.1 G/DL (ref 11.5–16)
LYMPHOCYTES # BLD: 1.5 K/UL (ref 0.8–3.5)
LYMPHOCYTES NFR BLD: 9 % (ref 12–49)
MAGNESIUM SERPL-MCNC: 2.2 MG/DL (ref 1.6–2.4)
MCH RBC QN AUTO: 28.1 PG (ref 26–34)
MCHC RBC AUTO-ENTMCNC: 31.9 G/DL (ref 30–36.5)
MCV RBC AUTO: 88.1 FL (ref 80–99)
NEUTS SEG # BLD: 14.5 K/UL (ref 1.8–8)
NEUTS SEG NFR BLD: 86 % (ref 32–75)
PHOSPHATE SERPL-MCNC: 2.6 MG/DL (ref 2.6–4.7)
PLATELET # BLD AUTO: 454 K/UL (ref 150–400)
POTASSIUM SERPL-SCNC: 4 MMOL/L (ref 3.5–5.1)
PROT SERPL-MCNC: 6.8 G/DL (ref 6.4–8.2)
RBC # BLD AUTO: 3.95 M/UL (ref 3.8–5.2)
SODIUM SERPL-SCNC: 139 MMOL/L (ref 136–145)
WBC # BLD AUTO: 16.8 K/UL (ref 3.6–11)

## 2023-10-09 PROCEDURE — 2500000003 HC RX 250 WO HCPCS: Performed by: INTERNAL MEDICINE

## 2023-10-09 PROCEDURE — 96413 CHEMO IV INFUSION 1 HR: CPT

## 2023-10-09 PROCEDURE — 85025 COMPLETE CBC W/AUTO DIFF WBC: CPT

## 2023-10-09 PROCEDURE — 36415 COLL VENOUS BLD VENIPUNCTURE: CPT

## 2023-10-09 PROCEDURE — 6360000002 HC RX W HCPCS: Performed by: INTERNAL MEDICINE

## 2023-10-09 PROCEDURE — A4216 STERILE WATER/SALINE, 10 ML: HCPCS | Performed by: INTERNAL MEDICINE

## 2023-10-09 PROCEDURE — 2580000003 HC RX 258: Performed by: INTERNAL MEDICINE

## 2023-10-09 PROCEDURE — 84100 ASSAY OF PHOSPHORUS: CPT

## 2023-10-09 PROCEDURE — 83735 ASSAY OF MAGNESIUM: CPT

## 2023-10-09 PROCEDURE — 96375 TX/PRO/DX INJ NEW DRUG ADDON: CPT

## 2023-10-09 PROCEDURE — 80053 COMPREHEN METABOLIC PANEL: CPT

## 2023-10-09 PROCEDURE — 99214 OFFICE O/P EST MOD 30 MIN: CPT | Performed by: INTERNAL MEDICINE

## 2023-10-09 PROCEDURE — 96372 THER/PROPH/DIAG INJ SC/IM: CPT

## 2023-10-09 PROCEDURE — 96401 CHEMO ANTI-NEOPL SQ/IM: CPT

## 2023-10-09 RX ORDER — ACETAMINOPHEN 325 MG/1
650 TABLET ORAL
OUTPATIENT
Start: 2023-10-30

## 2023-10-09 RX ORDER — SODIUM CHLORIDE 0.9 % (FLUSH) 0.9 %
5-40 SYRINGE (ML) INJECTION PRN
Status: DISCONTINUED | OUTPATIENT
Start: 2023-10-09 | End: 2023-10-10 | Stop reason: HOSPADM

## 2023-10-09 RX ORDER — DIPHENHYDRAMINE HYDROCHLORIDE 50 MG/ML
50 INJECTION INTRAMUSCULAR; INTRAVENOUS
OUTPATIENT
Start: 2023-10-30

## 2023-10-09 RX ORDER — SODIUM CHLORIDE 9 MG/ML
5-250 INJECTION, SOLUTION INTRAVENOUS PRN
Status: DISCONTINUED | OUTPATIENT
Start: 2023-10-09 | End: 2023-10-10 | Stop reason: HOSPADM

## 2023-10-09 RX ORDER — DEXAMETHASONE SODIUM PHOSPHATE 10 MG/ML
10 INJECTION, SOLUTION INTRAMUSCULAR; INTRAVENOUS ONCE
Status: COMPLETED | OUTPATIENT
Start: 2023-10-09 | End: 2023-10-09

## 2023-10-09 RX ORDER — ALBUTEROL SULFATE 90 UG/1
4 AEROSOL, METERED RESPIRATORY (INHALATION) PRN
OUTPATIENT
Start: 2023-10-30

## 2023-10-09 RX ORDER — ONDANSETRON 2 MG/ML
8 INJECTION INTRAMUSCULAR; INTRAVENOUS
OUTPATIENT
Start: 2023-10-30

## 2023-10-09 RX ORDER — ACETAMINOPHEN 325 MG/1
650 TABLET ORAL
Status: DISCONTINUED | OUTPATIENT
Start: 2023-10-09 | End: 2023-10-10 | Stop reason: HOSPADM

## 2023-10-09 RX ORDER — SODIUM CHLORIDE 9 MG/ML
INJECTION, SOLUTION INTRAVENOUS CONTINUOUS
OUTPATIENT
Start: 2023-10-30

## 2023-10-09 RX ORDER — DIPHENHYDRAMINE HYDROCHLORIDE 50 MG/ML
25 INJECTION INTRAMUSCULAR; INTRAVENOUS ONCE
Status: COMPLETED | OUTPATIENT
Start: 2023-10-09 | End: 2023-10-09

## 2023-10-09 RX ORDER — EPINEPHRINE 1 MG/ML
0.3 INJECTION, SOLUTION, CONCENTRATE INTRAVENOUS PRN
OUTPATIENT
Start: 2023-10-30

## 2023-10-09 RX ORDER — HEPARIN 100 UNIT/ML
500 SYRINGE INTRAVENOUS PRN
Status: DISCONTINUED | OUTPATIENT
Start: 2023-10-09 | End: 2023-10-10 | Stop reason: HOSPADM

## 2023-10-09 RX ADMIN — SODIUM CHLORIDE 50 ML/HR: 9 INJECTION, SOLUTION INTRAVENOUS at 11:15

## 2023-10-09 RX ADMIN — DIPHENHYDRAMINE HYDROCHLORIDE 25 MG: 50 INJECTION INTRAMUSCULAR; INTRAVENOUS at 11:31

## 2023-10-09 RX ADMIN — DOCETAXEL ANHYDROUS 106 MG: 10 INJECTION, SOLUTION INTRAVENOUS at 12:14

## 2023-10-09 RX ADMIN — PERTUZUMAB, TRASTUZUMAB, AND HYALURONIDASE-ZZXF 10 ML: 600; 600; 2000 INJECTION, SOLUTION SUBCUTANEOUS at 12:03

## 2023-10-09 RX ADMIN — FAMOTIDINE 20 MG: 10 INJECTION, SOLUTION INTRAVENOUS at 11:07

## 2023-10-09 RX ADMIN — DENOSUMAB 120 MG: 120 INJECTION SUBCUTANEOUS at 13:21

## 2023-10-09 RX ADMIN — DEXAMETHASONE SODIUM PHOSPHATE 10 MG: 10 INJECTION, SOLUTION INTRAMUSCULAR; INTRAVENOUS at 11:08

## 2023-10-09 ASSESSMENT — PAIN SCALES - GENERAL: PAINLEVEL_OUTOF10: 0

## 2023-10-09 NOTE — PROGRESS NOTES
Hayder Calloway is a 45 y.o. female    Chief Complaint   Patient presents with    Follow-up     Metastatic Breast Cancer       1. Have you been to the ER, urgent care clinic since your last visit? Hospitalized since your last visit? No    2. Have you seen or consulted any other health care providers outside of the [address] [place] [address] since your last visit? Include any pap smears or colon screening.  No
No adnexal mass. URINARY BLADDER: No mass or calculus. BONES: Patient has undergone interval RIGHT femur ORIF. IM nail and compression  screw are partially imaged. There is mild heterotopic ossification. No lytic or  blastic lesions are demonstrated. ABDOMINAL WALL: No mass or hernia. ADDITIONAL COMMENTS: N/A    Impression  Imaging findings consistent with interval response to therapy. Slightly  diminished hepatic mass lesions. Small right-sided pleural effusion is new. No new suspicious pulmonary mass or nodule. .    Records reviewed and summarized above. Pathology report(s) reviewed above. Radiology report(s) reviewed above. Assessment:/PLAN     1) MBC ER/WA Negative Her2 3+ post Liver Biopsy 10/4/22  CT 10/3 and 10/4/22 showed a breast mass/multiple lung/liver metastases. Liver biopsy 10/4/22. No hormonal therapy options since ER/WA negative. She initially had elevated LFTs but Bili was normal.   Decided to start with weekly Taxol for 3 weeks then if LFTs normalize, will switch to Taxotere/HP. Pre chemo ECHO 10/11/22 reviewed and good with EF 55-60%. Port placed on 52/6394 with Dr. Guido Found. She started Palliative Taxol+Herceptin+Perjeta on 10/17/22  LFTs improved/normalized on Taxol+Herceptin+Perjeta. Decided to switch to Taxotere since LFTs normalized. She started palliative Taxotere+Herceptin+Perjeta on 11/7/22. She had follow up CT C/A/P on 12/27/22 that showed significant   She completed XRT to hip/sacrum from 2/6/23 - 2/10/23. She was having headaches/double vision; went to ER and Brain MRI negative for mets. Follow up CTs 4/23 with good response to chemo. Patient is here today for Cycle 16 of palliative Taxotere+Herceptin+Perjeta. She had follow up CTs 7/23 that showed interval response to treatment. She is clinically stable and doing well overall and has been tolerating chemo well overall with some fatigue/mild neuropathy.    She continues to have cancer related pain;

## 2023-10-12 ENCOUNTER — HOSPITAL ENCOUNTER (OUTPATIENT)
Facility: HOSPITAL | Age: 38
Discharge: HOME | End: 2023-10-14
Payer: COMMERCIAL

## 2023-10-12 DIAGNOSIS — C78.7 MALIGNANT NEOPLASM OF BREAST METASTATIC TO LIVER (HCC): ICD-10-CM

## 2023-10-12 DIAGNOSIS — C50.919 MALIGNANT NEOPLASM OF BREAST METASTATIC TO LIVER (HCC): ICD-10-CM

## 2023-10-12 DIAGNOSIS — Z51.81 ENCOUNTER FOR MONITORING CARDIOTOXIC DRUG THERAPY: ICD-10-CM

## 2023-10-12 DIAGNOSIS — Z79.899 ENCOUNTER FOR MONITORING CARDIOTOXIC DRUG THERAPY: ICD-10-CM

## 2023-10-12 LAB
ECHO AO ROOT DIAM: 2.1 CM
ECHO AR MAX VEL PISA: 2 M/S
ECHO AV AREA PLAN: 1.8 CM2
ECHO AV REGURGITANT PHT: 608.2 MILLISECOND
ECHO EST RA PRESSURE: 5 MMHG
ECHO LA DIAMETER: 2.6 CM
ECHO LA TO AORTIC ROOT RATIO: 1.24
ECHO LA VOL 4C: 17 ML (ref 22–52)
ECHO LV EDV A4C: 61 ML
ECHO LV EJECTION FRACTION A4C: 64 %
ECHO LV ESV A4C: 22 ML
ECHO LV FRACTIONAL SHORTENING: 34 % (ref 28–44)
ECHO LV INTERNAL DIMENSION DIASTOLIC: 4.1 CM (ref 3.9–5.3)
ECHO LV INTERNAL DIMENSION SYSTOLIC: 2.7 CM
ECHO LV IVSD: 1.1 CM (ref 0.6–0.9)
ECHO LV MASS 2D: 161.4 G (ref 67–162)
ECHO LV POSTERIOR WALL DIASTOLIC: 1.2 CM (ref 0.6–0.9)
ECHO LV RELATIVE WALL THICKNESS RATIO: 0.59
ECHO LVOT AREA: 2.8 CM2
ECHO LVOT DIAM: 1.9 CM
ECHO LVOT PEAK GRADIENT: 3 MMHG
ECHO LVOT PEAK VELOCITY: 0.9 M/S
ECHO MV A VELOCITY: 0.78 M/S
ECHO MV AREA PHT: 6.8 CM2
ECHO MV E DECELERATION TIME (DT): 111.3 MS
ECHO MV E VELOCITY: 0.58 M/S
ECHO MV E/A RATIO: 0.74
ECHO MV MAX VELOCITY: 0.9 M/S
ECHO MV MEAN GRADIENT: 2 MMHG
ECHO MV MEAN VELOCITY: 0.6 M/S
ECHO MV PEAK GRADIENT: 3 MMHG
ECHO MV PRESSURE HALF TIME (PHT): 32.3 MS
ECHO MV REGURGITANT PEAK GRADIENT: 34 MMHG
ECHO MV REGURGITANT PEAK VELOCITY: 2.9 M/S
ECHO MV VTI: 16.9 CM
ECHO PV MAX VELOCITY: 1 M/S
ECHO PV PEAK GRADIENT: 4 MMHG
ECHO RIGHT VENTRICULAR SYSTOLIC PRESSURE (RVSP): 41 MMHG
ECHO TV REGURGITANT MAX VELOCITY: 3.01 M/S
ECHO TV REGURGITANT PEAK GRADIENT: 36 MMHG

## 2023-10-12 PROCEDURE — 93306 TTE W/DOPPLER COMPLETE: CPT

## 2023-10-16 ENCOUNTER — PROCEDURE VISIT (OUTPATIENT)
Age: 38
End: 2023-10-16

## 2023-10-16 DIAGNOSIS — G47.33 OBSTRUCTIVE SLEEP APNEA (ADULT) (PEDIATRIC): Primary | ICD-10-CM

## 2023-10-17 ENCOUNTER — TELEPHONE (OUTPATIENT)
Age: 38
End: 2023-10-17

## 2023-10-17 NOTE — TELEPHONE ENCOUNTER
Patient called and stated that she did not turn on the [place]. Patient was instructed to complete the study on 10/17 and return the [place] on 10/18.

## 2023-10-18 ENCOUNTER — HOSPITAL ENCOUNTER (OUTPATIENT)
Facility: HOSPITAL | Age: 38
Discharge: HOME | End: 2023-10-21
Payer: COMMERCIAL

## 2023-10-18 DIAGNOSIS — C78.7 MALIGNANT NEOPLASM OF BREAST METASTATIC TO LIVER (HCC): ICD-10-CM

## 2023-10-18 DIAGNOSIS — M54.50 LUMBAR BACK PAIN: ICD-10-CM

## 2023-10-18 DIAGNOSIS — M53.3 SACRAL PAIN: ICD-10-CM

## 2023-10-18 DIAGNOSIS — C50.919 MALIGNANT NEOPLASM OF BREAST METASTATIC TO LIVER (HCC): ICD-10-CM

## 2023-10-18 PROCEDURE — 74177 CT ABD & PELVIS W/CONTRAST: CPT

## 2023-10-18 PROCEDURE — 6360000004 HC RX CONTRAST MEDICATION

## 2023-10-18 RX ADMIN — IOPAMIDOL 100 ML: 755 INJECTION, SOLUTION INTRAVENOUS at 13:49

## 2023-10-20 ENCOUNTER — TELEPHONE (OUTPATIENT)
Age: 38
End: 2023-10-20

## 2023-10-20 DIAGNOSIS — G89.3 CANCER RELATED PAIN: ICD-10-CM

## 2023-10-20 DIAGNOSIS — R25.2 MUSCLE CRAMPS: ICD-10-CM

## 2023-10-20 RX ORDER — PREGABALIN 75 MG/1
75 CAPSULE ORAL 2 TIMES DAILY
Qty: 30 CAPSULE | Refills: 0 | Status: SHIPPED | OUTPATIENT
Start: 2023-10-20 | End: 2023-11-04

## 2023-10-20 NOTE — TELEPHONE ENCOUNTER
See MyChart discussion; plan increase in Lyrica to 75 mg BID. Renewed script to pharmacy at the higher dose today for 15 days supply.

## 2023-10-20 NOTE — TELEPHONE ENCOUNTER
----- Message from Vaughn Hassan sent at 10/20/2023 12:55 AM EDT -----  Regarding: [facility]  Contact: [phone]  I have been taking 2 in the morning and 2 at night. So 50 mgs twice a day.

## 2023-10-20 NOTE — TELEPHONE ENCOUNTER
[phone] [address] 72 called to relay [id number] information regarding pt's MRI.      Dr. Keeley Barton should call [phone] at [phone] and use ref EO#[id number]

## 2023-10-23 ENCOUNTER — CLINICAL DOCUMENTATION (OUTPATIENT)
Age: 38
End: 2023-10-23

## 2023-10-23 DIAGNOSIS — G89.3 CANCER RELATED PAIN: ICD-10-CM

## 2023-10-23 DIAGNOSIS — C50.919 MALIGNANT NEOPLASM OF BREAST METASTATIC TO LIVER (HCC): ICD-10-CM

## 2023-10-23 DIAGNOSIS — C78.7 MALIGNANT NEOPLASM OF BREAST METASTATIC TO LIVER (HCC): ICD-10-CM

## 2023-10-23 RX ORDER — EPINEPHRINE 1 MG/ML
0.3 INJECTION, SOLUTION, CONCENTRATE INTRAVENOUS PRN
Status: CANCELLED | OUTPATIENT
Start: 2023-10-30

## 2023-10-23 RX ORDER — OXYCODONE HYDROCHLORIDE 10 MG/1
10 TABLET ORAL EVERY 4 HOURS PRN
Qty: 90 TABLET | Refills: 0 | Status: SHIPPED | OUTPATIENT
Start: 2023-10-24 | End: 2023-11-08

## 2023-10-23 RX ORDER — ALBUTEROL SULFATE 90 UG/1
4 AEROSOL, METERED RESPIRATORY (INHALATION) PRN
Status: CANCELLED | OUTPATIENT
Start: 2023-10-30

## 2023-10-23 RX ORDER — ONDANSETRON 2 MG/ML
8 INJECTION INTRAMUSCULAR; INTRAVENOUS
Status: CANCELLED | OUTPATIENT
Start: 2023-10-30

## 2023-10-23 RX ORDER — ACETAMINOPHEN 325 MG/1
650 TABLET ORAL
Status: CANCELLED | OUTPATIENT
Start: 2023-10-30

## 2023-10-23 RX ORDER — DIPHENHYDRAMINE HYDROCHLORIDE 50 MG/ML
50 INJECTION INTRAMUSCULAR; INTRAVENOUS
Status: CANCELLED | OUTPATIENT
Start: 2023-10-30

## 2023-10-23 RX ORDER — MEPERIDINE HYDROCHLORIDE 25 MG/ML
12.5 INJECTION INTRAMUSCULAR; INTRAVENOUS; SUBCUTANEOUS PRN
Status: CANCELLED | OUTPATIENT
Start: 2023-10-30

## 2023-10-23 RX ORDER — SODIUM CHLORIDE 9 MG/ML
INJECTION, SOLUTION INTRAVENOUS CONTINUOUS
Status: CANCELLED | OUTPATIENT
Start: 2023-10-30

## 2023-10-23 NOTE — TELEPHONE ENCOUNTER
Palliative Medicine  Nursing Note  [phone])  Fax [phone]      Telephone Call  Patient Name: Jerson Peters  YOB: 1985    10/23/2023        Primary Decision Maker: Miquel Barahona - Spouse - [phone]       10/23/2023    11:32 AM   Demographics   Marital Status      Patient requesting refill on Oxy IR 10 mg. Has a few days left.      Triage for Controlled Substance Refill Request    Pain Diagnosis: Breast cancer    Last Outpatient Visit: 10/5/23    Next Outpatient Visit: 11/28/23    Reason for refill needed outside of office visit-  Appointment not scheduled prior to need for scheduled refill,     Any Reported Side effects: none    Last dose taken: has several days    [facility]    Medication: Oxy IR 10 mg  Dose and directions: 1 every 4 prn  Number dispensed: 90 for 15 day supply  Date filled ( or Pharmacy): 10/5/23  # left: few days       reviewed: Yes    Date of Urine Drug Screen:      Opioid Safety Handout given:  Yes    Appropriate for refill:  Yes    Action:  Pended for Dr. Steve Lagunas, RN  Palliative Medicine

## 2023-10-24 NOTE — TELEPHONE ENCOUNTER
Call to [phone] [facility],[phone] [facility], [phone], spoke with Jaci, patient ID verified. Relayed to Jaci that P2P for MRIs was completed by Provider and approved. Auth number to be available for both scans in portal on 10/25/23. Understanding verbalized, will attach approval to scans when received. Update to Provider.

## 2023-10-24 NOTE — TELEPHONE ENCOUNTER
Called #[phone] to complete Peer to Peer. MRI Lumbar Spine has already been approved. Peer to Peer completed for  MRI Pelvis and approved. Per MD reviewer, new authorization # will be available in the portal tomorrow. Please let Scheduling Department know.

## 2023-10-25 ENCOUNTER — TELEPHONE (OUTPATIENT)
Age: 38
End: 2023-10-25

## 2023-10-25 ENCOUNTER — CLINICAL DOCUMENTATION (OUTPATIENT)
Age: 38
End: 2023-10-25

## 2023-10-25 NOTE — TELEPHONE ENCOUNTER
Returned patient's VM regarding her order with [employer]. Spoke with Phani Erazo, and he stated that he has her order. Someone will reach out to her today. Patient notified.

## 2023-10-30 ENCOUNTER — HOSPITAL ENCOUNTER (OUTPATIENT)
Facility: HOSPITAL | Age: 38
Setting detail: INFUSION SERIES
Discharge: HOME | End: 2023-10-30
Payer: COMMERCIAL

## 2023-10-30 ENCOUNTER — OFFICE VISIT (OUTPATIENT)
Age: 38
End: 2023-10-30
Payer: COMMERCIAL

## 2023-10-30 VITALS
TEMPERATURE: 98.5 F | HEART RATE: 79 BPM | DIASTOLIC BLOOD PRESSURE: 77 MMHG | BODY MASS INDEX: 34.96 KG/M2 | OXYGEN SATURATION: 95 % | RESPIRATION RATE: 16 BRPM | SYSTOLIC BLOOD PRESSURE: 115 MMHG | WEIGHT: 185 LBS

## 2023-10-30 VITALS
RESPIRATION RATE: 18 BRPM | HEIGHT: 61 IN | DIASTOLIC BLOOD PRESSURE: 89 MMHG | WEIGHT: 185.2 LBS | TEMPERATURE: 98.5 F | HEART RATE: 67 BPM | BODY MASS INDEX: 34.97 KG/M2 | SYSTOLIC BLOOD PRESSURE: 131 MMHG

## 2023-10-30 DIAGNOSIS — Z87.311 HISTORY OF PATHOLOGIC FRACTURE: ICD-10-CM

## 2023-10-30 DIAGNOSIS — R11.0 CHEMOTHERAPY-INDUCED NAUSEA: ICD-10-CM

## 2023-10-30 DIAGNOSIS — Z95.828 PORT-A-CATH IN PLACE: ICD-10-CM

## 2023-10-30 DIAGNOSIS — M25.551 RIGHT HIP PAIN: ICD-10-CM

## 2023-10-30 DIAGNOSIS — C78.01 MALIGNANT NEOPLASM METASTATIC TO BOTH LUNGS (HCC): ICD-10-CM

## 2023-10-30 DIAGNOSIS — T45.1X5A CHEMOTHERAPY-INDUCED FATIGUE: ICD-10-CM

## 2023-10-30 DIAGNOSIS — Z09 CHEMOTHERAPY FOLLOW-UP EXAMINATION: ICD-10-CM

## 2023-10-30 DIAGNOSIS — T45.1X5A CHEMOTHERAPY-INDUCED NAUSEA: ICD-10-CM

## 2023-10-30 DIAGNOSIS — R53.83 CHEMOTHERAPY-INDUCED FATIGUE: ICD-10-CM

## 2023-10-30 DIAGNOSIS — C50.919 MALIGNANT NEOPLASM OF BREAST METASTATIC TO LIVER (HCC): Primary | ICD-10-CM

## 2023-10-30 DIAGNOSIS — M53.3 SACRAL PAIN: ICD-10-CM

## 2023-10-30 DIAGNOSIS — C79.51 MALIGNANT NEOPLASM METASTATIC TO BONE (HCC): ICD-10-CM

## 2023-10-30 DIAGNOSIS — C78.02 MALIGNANT NEOPLASM METASTATIC TO BOTH LUNGS (HCC): ICD-10-CM

## 2023-10-30 DIAGNOSIS — Z79.899 ENCOUNTER FOR MONITORING CARDIOTOXIC DRUG THERAPY: ICD-10-CM

## 2023-10-30 DIAGNOSIS — G89.3 CANCER RELATED PAIN: ICD-10-CM

## 2023-10-30 DIAGNOSIS — Z51.81 ENCOUNTER FOR MONITORING CARDIOTOXIC DRUG THERAPY: ICD-10-CM

## 2023-10-30 DIAGNOSIS — C78.7 MALIGNANT NEOPLASM OF BREAST METASTATIC TO LIVER (HCC): Primary | ICD-10-CM

## 2023-10-30 DIAGNOSIS — G56.92 NEUROPATHY OF FINGER OF LEFT HAND: ICD-10-CM

## 2023-10-30 DIAGNOSIS — C79.51 MALIGNANT NEOPLASM METASTATIC TO BONE (HCC): Primary | ICD-10-CM

## 2023-10-30 DIAGNOSIS — M54.50 LUMBAR BACK PAIN: ICD-10-CM

## 2023-10-30 LAB
ALBUMIN SERPL-MCNC: 3.5 G/DL (ref 3.5–5)
ALBUMIN/GLOB SERPL: 1.1 (ref 1.1–2.2)
ALP SERPL-CCNC: 98 U/L (ref 45–117)
ALT SERPL-CCNC: 22 U/L (ref 12–78)
ANION GAP SERPL CALC-SCNC: 5 MMOL/L (ref 5–15)
AST SERPL-CCNC: 10 U/L (ref 15–37)
BASO+EOS+MONOS # BLD AUTO: 0.2 K/UL (ref 0.2–1.2)
BASO+EOS+MONOS NFR BLD AUTO: 1 % (ref 3.2–16.9)
BILIRUB SERPL-MCNC: 0.2 MG/DL (ref 0.2–1)
BUN SERPL-MCNC: 14 MG/DL (ref 6–20)
BUN/CREAT SERPL: 18 (ref 12–20)
CALCIUM SERPL-MCNC: 8.8 MG/DL (ref 8.5–10.1)
CHLORIDE SERPL-SCNC: 110 MMOL/L (ref 97–108)
CO2 SERPL-SCNC: 24 MMOL/L (ref 21–32)
CREAT SERPL-MCNC: 0.78 MG/DL (ref 0.55–1.02)
DIFFERENTIAL METHOD BLD: ABNORMAL
ERYTHROCYTE [DISTWIDTH] IN BLOOD BY AUTOMATED COUNT: 17.2 % (ref 11.8–15.8)
GLOBULIN SER CALC-MCNC: 3.1 G/DL (ref 2–4)
GLUCOSE SERPL-MCNC: 226 MG/DL (ref 65–100)
HCT VFR BLD AUTO: 33.8 % (ref 35–47)
HGB BLD-MCNC: 10.9 G/DL (ref 11.5–16)
LYMPHOCYTES # BLD: 1.2 K/UL (ref 0.8–3.5)
LYMPHOCYTES NFR BLD: 7 % (ref 12–49)
MAGNESIUM SERPL-MCNC: 2.4 MG/DL (ref 1.6–2.4)
MCH RBC QN AUTO: 28.5 PG (ref 26–34)
MCHC RBC AUTO-ENTMCNC: 32.2 G/DL (ref 30–36.5)
MCV RBC AUTO: 88.5 FL (ref 80–99)
NEUTS SEG # BLD: 15.2 K/UL (ref 1.8–8)
NEUTS SEG NFR BLD: 91 % (ref 32–75)
PHOSPHATE SERPL-MCNC: 2.1 MG/DL (ref 2.6–4.7)
PLATELET # BLD AUTO: 410 K/UL (ref 150–400)
POTASSIUM SERPL-SCNC: 4.2 MMOL/L (ref 3.5–5.1)
PROT SERPL-MCNC: 6.6 G/DL (ref 6.4–8.2)
RBC # BLD AUTO: 3.82 M/UL (ref 3.8–5.2)
SODIUM SERPL-SCNC: 139 MMOL/L (ref 136–145)
WBC # BLD AUTO: 16.6 K/UL (ref 3.6–11)

## 2023-10-30 PROCEDURE — 83735 ASSAY OF MAGNESIUM: CPT

## 2023-10-30 PROCEDURE — 2580000003 HC RX 258: Performed by: INTERNAL MEDICINE

## 2023-10-30 PROCEDURE — 96401 CHEMO ANTI-NEOPL SQ/IM: CPT

## 2023-10-30 PROCEDURE — 96413 CHEMO IV INFUSION 1 HR: CPT

## 2023-10-30 PROCEDURE — 36415 COLL VENOUS BLD VENIPUNCTURE: CPT

## 2023-10-30 PROCEDURE — 85025 COMPLETE CBC W/AUTO DIFF WBC: CPT

## 2023-10-30 PROCEDURE — 6360000002 HC RX W HCPCS: Performed by: INTERNAL MEDICINE

## 2023-10-30 PROCEDURE — 99215 OFFICE O/P EST HI 40 MIN: CPT | Performed by: INTERNAL MEDICINE

## 2023-10-30 PROCEDURE — 6370000000 HC RX 637 (ALT 250 FOR IP): Performed by: NURSE PRACTITIONER

## 2023-10-30 PROCEDURE — 96375 TX/PRO/DX INJ NEW DRUG ADDON: CPT

## 2023-10-30 PROCEDURE — A4216 STERILE WATER/SALINE, 10 ML: HCPCS | Performed by: INTERNAL MEDICINE

## 2023-10-30 PROCEDURE — 84100 ASSAY OF PHOSPHORUS: CPT

## 2023-10-30 PROCEDURE — 80053 COMPREHEN METABOLIC PANEL: CPT

## 2023-10-30 PROCEDURE — 2500000003 HC RX 250 WO HCPCS: Performed by: INTERNAL MEDICINE

## 2023-10-30 RX ORDER — SODIUM CHLORIDE 0.9 % (FLUSH) 0.9 %
5-40 SYRINGE (ML) INJECTION PRN
Status: DISCONTINUED | OUTPATIENT
Start: 2023-10-30 | End: 2023-10-31 | Stop reason: HOSPADM

## 2023-10-30 RX ORDER — ACETAMINOPHEN 325 MG/1
650 TABLET ORAL
Status: DISCONTINUED | OUTPATIENT
Start: 2023-10-30 | End: 2023-10-31 | Stop reason: HOSPADM

## 2023-10-30 RX ORDER — DIPHENHYDRAMINE HYDROCHLORIDE 50 MG/ML
50 INJECTION INTRAMUSCULAR; INTRAVENOUS
OUTPATIENT
Start: 2023-11-20

## 2023-10-30 RX ORDER — ONDANSETRON 2 MG/ML
8 INJECTION INTRAMUSCULAR; INTRAVENOUS
OUTPATIENT
Start: 2023-11-20

## 2023-10-30 RX ORDER — DEXAMETHASONE SODIUM PHOSPHATE 10 MG/ML
10 INJECTION, SOLUTION INTRAMUSCULAR; INTRAVENOUS ONCE
Status: COMPLETED | OUTPATIENT
Start: 2023-10-30 | End: 2023-10-30

## 2023-10-30 RX ORDER — SODIUM CHLORIDE 9 MG/ML
5-250 INJECTION, SOLUTION INTRAVENOUS PRN
Status: DISCONTINUED | OUTPATIENT
Start: 2023-10-30 | End: 2023-10-31 | Stop reason: HOSPADM

## 2023-10-30 RX ORDER — EPINEPHRINE 1 MG/ML
0.3 INJECTION, SOLUTION, CONCENTRATE INTRAVENOUS PRN
OUTPATIENT
Start: 2023-11-20

## 2023-10-30 RX ORDER — ALBUTEROL SULFATE 90 UG/1
4 AEROSOL, METERED RESPIRATORY (INHALATION) PRN
OUTPATIENT
Start: 2023-11-20

## 2023-10-30 RX ORDER — HEPARIN 100 UNIT/ML
500 SYRINGE INTRAVENOUS PRN
Status: DISCONTINUED | OUTPATIENT
Start: 2023-10-30 | End: 2023-10-31 | Stop reason: HOSPADM

## 2023-10-30 RX ORDER — ACETAMINOPHEN 325 MG/1
650 TABLET ORAL
OUTPATIENT
Start: 2023-11-20

## 2023-10-30 RX ORDER — SODIUM CHLORIDE 9 MG/ML
INJECTION, SOLUTION INTRAVENOUS CONTINUOUS
OUTPATIENT
Start: 2023-11-20

## 2023-10-30 RX ORDER — DIPHENHYDRAMINE HYDROCHLORIDE 50 MG/ML
25 INJECTION INTRAMUSCULAR; INTRAVENOUS ONCE
Status: COMPLETED | OUTPATIENT
Start: 2023-10-30 | End: 2023-10-30

## 2023-10-30 RX ADMIN — SODIUM CHLORIDE 25 ML/HR: 9 INJECTION, SOLUTION INTRAVENOUS at 10:10

## 2023-10-30 RX ADMIN — DIPHENHYDRAMINE HYDROCHLORIDE 25 MG: 50 INJECTION, SOLUTION INTRAMUSCULAR; INTRAVENOUS at 11:49

## 2023-10-30 RX ADMIN — PERTUZUMAB, TRASTUZUMAB, AND HYALURONIDASE-ZZXF 10 ML: 600; 600; 2000 INJECTION, SOLUTION SUBCUTANEOUS at 11:16

## 2023-10-30 RX ADMIN — DOCETAXEL ANHYDROUS 106 MG: 10 INJECTION, SOLUTION INTRAVENOUS at 12:31

## 2023-10-30 RX ADMIN — DIBASIC SODIUM PHOSPHATE, MONOBASIC POTASSIUM PHOSPHATE AND MONOBASIC SODIUM PHOSPHATE 2 TABLET: 852; 155; 130 TABLET ORAL at 10:30

## 2023-10-30 RX ADMIN — FAMOTIDINE 20 MG: 10 INJECTION, SOLUTION INTRAVENOUS at 11:28

## 2023-10-30 RX ADMIN — SODIUM CHLORIDE, PRESERVATIVE FREE 20 ML: 5 INJECTION INTRAVENOUS at 13:45

## 2023-10-30 RX ADMIN — DEXAMETHASONE SODIUM PHOSPHATE 10 MG: 10 INJECTION, SOLUTION INTRAMUSCULAR; INTRAVENOUS at 11:32

## 2023-10-30 ASSESSMENT — PAIN SCALES - GENERAL: PAINLEVEL_OUTOF10: 0

## 2023-10-30 NOTE — PROGRESS NOTES
Avila Campos is a 45 y.o. female    Chief Complaint   Patient presents with    Follow-up     Breast Cancer         1. Have you been to the ER, urgent care clinic since your last visit? Hospitalized since your last visit? No    2. Have you seen or consulted any other health care providers outside of the [address] [place] [address] since your last visit? Include any pap smears or colon screening.  No
it as needed. [facility] on palliative chemo  Tolerating chemo well. Labs and imaging reviewed  Cts good/ reviewed today. Continue with chemo as ordered   Continue to side effects/ progression/ or pt needs a break  Got a new puppy    I appreciate the opportunity to participate in Ms. Norris Edwardsmons's care.     Signed By: Hank Valle, DO

## 2023-10-31 ENCOUNTER — TELEPHONE (OUTPATIENT)
Facility: HOSPITAL | Age: 38
End: 2023-10-31

## 2023-11-06 DIAGNOSIS — G89.3 CANCER RELATED PAIN: ICD-10-CM

## 2023-11-06 DIAGNOSIS — R25.2 MUSCLE CRAMPS: ICD-10-CM

## 2023-11-07 RX ORDER — PREGABALIN 75 MG/1
75 CAPSULE ORAL 2 TIMES DAILY
Qty: 30 CAPSULE | Refills: 0 | Status: SHIPPED | OUTPATIENT
Start: 2023-11-07 | End: 2023-11-22

## 2023-11-07 NOTE — TELEPHONE ENCOUNTER
Palliative Medicine Clinic   [place]: [phone]    Patient Name: Dung Gallego  YOB: 1985    Medication Refill Request    Patient is scheduled for follow up:  [x]  YES  []   NO    PDMP reviewed:  [x] YES   []  System down / Dick Alcantar  []  NO- Patient fills out of state    Medication: Pregabalin 75 mg  Dose and directions: 1 twice daily  Number dispensed: 30 for 15 day supply  Date filled ([address] [place] [address] or Pharmacy): 10/20/23  # left: none      Appropriate for refill:  [x]  YES  []  Early Request - Requires MD/NP Review      Other pertinent information for prescriber:

## 2023-11-09 RX ORDER — PREGABALIN 75 MG/1
75 CAPSULE ORAL 2 TIMES DAILY
Qty: 30 CAPSULE | Refills: 0 | OUTPATIENT
Start: 2023-11-09 | End: 2023-11-24

## 2023-11-10 ENCOUNTER — TELEPHONE (OUTPATIENT)
Age: 38
End: 2023-11-10

## 2023-11-10 NOTE — TELEPHONE ENCOUNTER
[phone]  Call to pt, ID verified x2, updated pt ok to cancel 11/20 [facility]/OV per provider, discussed will add OV to next [facility] on 12/11, understanding verbalized of all, no questions/concerns at this time, pt appreciative of call. Arian/Sharon: please make sure to cancel 11/20 [facility]/OV and add OV to [facility] 12/11 appt, thanks!

## 2023-11-12 ENCOUNTER — HOSPITAL ENCOUNTER (OUTPATIENT)
Facility: HOSPITAL | Age: 38
Discharge: HOME | End: 2023-11-15
Payer: COMMERCIAL

## 2023-11-12 DIAGNOSIS — G89.3 CANCER RELATED PAIN: ICD-10-CM

## 2023-11-12 DIAGNOSIS — C50.919 MALIGNANT NEOPLASM OF BREAST METASTATIC TO LIVER (HCC): ICD-10-CM

## 2023-11-12 DIAGNOSIS — M53.3 SACRAL PAIN: ICD-10-CM

## 2023-11-12 DIAGNOSIS — C78.7 MALIGNANT NEOPLASM OF BREAST METASTATIC TO LIVER (HCC): ICD-10-CM

## 2023-11-12 DIAGNOSIS — M54.50 LUMBAR BACK PAIN: ICD-10-CM

## 2023-11-12 PROCEDURE — 72158 MRI LUMBAR SPINE W/O & W/DYE: CPT

## 2023-11-12 PROCEDURE — 6360000004 HC RX CONTRAST MEDICATION

## 2023-11-12 PROCEDURE — A9579 GAD-BASE MR CONTRAST NOS,1ML: HCPCS

## 2023-11-12 PROCEDURE — 72197 MRI PELVIS W/O & W/DYE: CPT

## 2023-11-12 RX ORDER — OXYCODONE HCL 20 MG/1
20 TABLET, FILM COATED, EXTENDED RELEASE ORAL 2 TIMES DAILY
Qty: 60 TABLET | Refills: 0 | Status: CANCELLED | OUTPATIENT
Start: 2023-11-12 | End: 2023-12-12

## 2023-11-12 RX ORDER — OXYCODONE HYDROCHLORIDE 10 MG/1
10 TABLET ORAL EVERY 4 HOURS PRN
Qty: 90 TABLET | Refills: 0 | Status: CANCELLED | OUTPATIENT
Start: 2023-11-12 | End: 2023-11-27

## 2023-11-12 RX ADMIN — GADOTERIDOL 20 ML: 279.3 INJECTION, SOLUTION INTRAVENOUS at 11:56

## 2023-11-13 ENCOUNTER — TELEPHONE (OUTPATIENT)
Age: 38
End: 2023-11-13

## 2023-11-13 DIAGNOSIS — C78.7 MALIGNANT NEOPLASM OF BREAST METASTATIC TO LIVER (HCC): ICD-10-CM

## 2023-11-13 DIAGNOSIS — C50.919 MALIGNANT NEOPLASM OF BREAST METASTATIC TO LIVER (HCC): ICD-10-CM

## 2023-11-13 DIAGNOSIS — G89.3 CANCER RELATED PAIN: ICD-10-CM

## 2023-11-13 RX ORDER — OXYCODONE HCL 20 MG/1
20 TABLET, FILM COATED, EXTENDED RELEASE ORAL 2 TIMES DAILY
Qty: 60 TABLET | Refills: 0 | Status: SHIPPED | OUTPATIENT
Start: 2023-11-13 | End: 2023-11-14 | Stop reason: SDUPTHER

## 2023-11-13 RX ORDER — OXYCODONE HYDROCHLORIDE 10 MG/1
10 TABLET ORAL EVERY 4 HOURS PRN
Qty: 90 TABLET | Refills: 0 | Status: SHIPPED | OUTPATIENT
Start: 2023-11-13 | End: 2023-11-28

## 2023-11-13 RX ORDER — OXYCODONE HCL 20 MG/1
20 TABLET, FILM COATED, EXTENDED RELEASE ORAL 2 TIMES DAILY
Qty: 60 TABLET | Refills: 0 | Status: CANCELLED | OUTPATIENT
Start: 2023-11-23 | End: 2023-12-23

## 2023-11-13 NOTE — TELEPHONE ENCOUNTER
Palliative Medicine  Nursing Note  [phone])  Fax [phone]      Telephone Call  Patient Name: Tigre Cid  YOB: 1985    11/13/2023        Primary Decision Maker: Bertin Caraballo - Spouse - [phone]       11/13/2023     5:38 PM   Demographics   Marital Status      Call placed to [facility] and verified that they received the new prescriptions for Oxy IR and Oxy ER and will be able to fill today. Patient was only able to fill #20 tabs of Oxycontin 20 mg due to stock issue so she will need a new script in 10 days. New prescription is pended for Dr. Danita Benjamin with a fill date of 11/23/23.     Jerome Upton RN  Palliative Medicine

## 2023-11-13 NOTE — TELEPHONE ENCOUNTER
Patient is f/u on status of Oxycodone (Oxycontin) 20 mg and Oxycodone IR 10 mg. She has enough for today.

## 2023-11-13 NOTE — TELEPHONE ENCOUNTER
Patient is f/u on Mychart request for Oxycodone (Oxycontin) and Oxycodone IR. She will be out by today.

## 2023-11-13 NOTE — TELEPHONE ENCOUNTER
Palliative Medicine Clinic   [place]: [phone])    Patient Name: Mickey Keen  YOB: 1985    Medication Refill Request    Patient is scheduled for follow up per PSR note:  [x]  YES  []   NO    PDMP reviewed:  [x] YES   []  System down / Bryon Scales  []  NO- Patient fills out of state    Medication: Oxy IR 10 mg  Dose and directions:1 every 4 prn  Number dispensed: 90 for 15 days  Date filled ([facility] or Pharmacy): 10/24/23  # left: none    Medication: Oxycontin 20 mg  Dose and directions: 1 every 12   Number dispensed: 60 for 30 days  Date filled ([facility] or Pharmacy): 10/13/23  #left: none    Appropriate for refill:  [x]  YES  []  Early Request - Requires MD/NP Review      Other pertinent information for prescriber:  Penelope Diaz RN  Palliative Medicine

## 2023-11-14 RX ORDER — OXYCODONE HCL 20 MG/1
20 TABLET, FILM COATED, EXTENDED RELEASE ORAL 2 TIMES DAILY
Qty: 60 TABLET | Refills: 0 | Status: SHIPPED | OUTPATIENT
Start: 2023-11-23 | End: 2023-12-23

## 2023-11-22 ENCOUNTER — TELEPHONE (OUTPATIENT)
Age: 38
End: 2023-11-22

## 2023-11-22 NOTE — TELEPHONE ENCOUNTER
Called patient to advise/confirm upcoming appt with Dr. Parmjit Collins on 11/28/23 at 12:00  at  [facility]. Spoke with Roosevelt Montano and confirmed appointment.

## 2023-11-28 ENCOUNTER — OFFICE VISIT (OUTPATIENT)
Age: 38
End: 2023-11-28
Payer: COMMERCIAL

## 2023-11-28 VITALS
OXYGEN SATURATION: 95 % | DIASTOLIC BLOOD PRESSURE: 78 MMHG | HEART RATE: 94 BPM | BODY MASS INDEX: 35.11 KG/M2 | WEIGHT: 185.8 LBS | SYSTOLIC BLOOD PRESSURE: 133 MMHG | RESPIRATION RATE: 20 BRPM

## 2023-11-28 DIAGNOSIS — F43.22 ADJUSTMENT DISORDER WITH ANXIOUS MOOD: ICD-10-CM

## 2023-11-28 DIAGNOSIS — C78.7 MALIGNANT NEOPLASM OF BREAST METASTATIC TO LIVER (HCC): ICD-10-CM

## 2023-11-28 DIAGNOSIS — R93.5 ABNORMAL MRI, PELVIS: ICD-10-CM

## 2023-11-28 DIAGNOSIS — C50.919 MALIGNANT NEOPLASM OF BREAST METASTATIC TO LIVER (HCC): ICD-10-CM

## 2023-11-28 DIAGNOSIS — G89.3 CANCER RELATED PAIN: Primary | ICD-10-CM

## 2023-11-28 DIAGNOSIS — M79.2 NEUROPATHIC PAIN: ICD-10-CM

## 2023-11-28 PROCEDURE — 99215 OFFICE O/P EST HI 40 MIN: CPT | Performed by: INTERNAL MEDICINE

## 2023-11-28 RX ORDER — OXYCODONE HYDROCHLORIDE 15 MG/1
15 TABLET ORAL EVERY 4 HOURS PRN
Qty: 90 TABLET | Refills: 0 | Status: SHIPPED | OUTPATIENT
Start: 2023-11-28 | End: 2023-12-13

## 2023-11-28 RX ORDER — PREGABALIN 100 MG/1
100 CAPSULE ORAL 2 TIMES DAILY
Qty: 60 CAPSULE | Refills: 2 | Status: SHIPPED | OUTPATIENT
Start: 2023-11-28 | End: 2024-02-26

## 2023-11-28 RX ORDER — MORPHINE SULFATE 30 MG/1
30 TABLET, FILM COATED, EXTENDED RELEASE ORAL 2 TIMES DAILY
Qty: 30 TABLET | Refills: 0 | Status: SHIPPED | OUTPATIENT
Start: 2023-12-08 | End: 2023-12-23

## 2023-11-28 NOTE — PROGRESS NOTES
Palliative Medicine Office Visit  Palliative Medicine Nurse Check In  [phone]    Patient Name: Liss Moses  YOB: 1985      Date of Office Visit: 11/28/23    Patient states: \" F/U \"    From Check In Sheet (scanned in Media):  Has a medical provider talked with you about cardiopulmonary resuscitation (CPR)? [] Yes   [x] No   [] Unable to obtain    Nurse reminder to complete or update ACP FlowSheet:    Is ACP on the Problem List?    [x] Yes    [] No  IF ACP Document is ON FILE; Nurse to place ACP on Problem List     Is there an ACP Note in Chart Review/Note? [x] Yes    [] No   If NO: ALERT PROVIDER         11/28/2023    12:00 PM   Demographics   Marital Status        Is there anything that we should know about you as a person in order to provide you the best care possible? Have you been to the ER, urgent care clinic since your last visit? [] Yes   [x] No   [] Unable to obtain    Have you been hospitalized since your last visit? [] Yes   [x] No   [] Unable to obtain    Have you seen or consulted any other health care providers outside of the [address] [place] [address] since your last visit? [] Yes   [x] No   [] Unable to obtain    Functional status (describe): Independent      Last BM: yesterday      accessed (date):      Bottle review (for opioid pain medication):  Medication 1: Oxycontin 20mg  Date filled:   Directions: 1 every 12   # filled:   # left:   # pills taking per day: 2  Last dose taken:    Medication 2: Oxycodone   Date filled:   Directions: 1 every 4 prn  # filled:   # left:   # pills taking per day: 6  Last dose taken:
95 %.  Last bowel movement: See Nursing Note    Constitutional: well nourished generally well appearing woman  Eyes: sclerae anicteric  ENMT: no nasal discharge, moist mucous membranes  Respiratory: breathing not labored at rest on RA  Skin: warm, dry. No erythema, lesion or wound over right hip or upper leg. Neurologic: cognition intact, following commands, moving all extremities  Psychiatric: full affect    DATA REVIEWED:   CT Result (most recent):  CT CHEST ABDOMEN PELVIS W CONTRAST 10/18/2023    Narrative  INDICATION:Malignant neoplasm of unspecified site of unspecified female breast;  Secondary malignant neoplasm of liver and intrahepatic bile duct; Low back pain,  unspecified; Sacrococcygeal disorders, not elsewhere classified    CT of the chest, abdomen and pelvis is performed with 5 mm collimation. Study is  performed with 100 cc of nonionic IV Isovue-370. Sagittal and coronal  reformatted images were also performed. CT dose reduction was achieved with the use of the standardized protocol  tailored for this examination and automatic exposure control for dose  modulation. Direct comparison is made to prior CT dated July 2023 and prior bone scan dated  April 2023. Chest:    Tubes and lines: Central venous port catheter extends to the distal SVC. Lungs: There is very mild bibasilar scarring. There is no new pulmonary nodule  or mass. Lymph nodes: There is no mediastinal, hilar or axillary lymphadenopathy. Pleura: There is minimal right pleural fluid, decreased in quantity. There is no  left pleural fluid. Heart: The heart is normal in size and there is no pericardial fluid. Bones: The osseous structures are stable. Abdomen/pelvis:    Liver: There has been continued interval decrease in size, number and  conspicuity of patient's known hepatic lesions.  For example, within the medial  right hepatic lobe, there is a 7 mm x 4 mm hypodensity, measuring 1.1 cm x 7 mm  on prior CT dated

## 2023-11-29 PROBLEM — Z09 CHEMOTHERAPY FOLLOW-UP EXAMINATION: Status: RESOLVED | Noted: 2022-10-24 | Resolved: 2023-11-29

## 2023-12-04 RX ORDER — HEPARIN 100 UNIT/ML
500 SYRINGE INTRAVENOUS PRN
OUTPATIENT
Start: 2023-12-11

## 2023-12-04 RX ORDER — ACETAMINOPHEN 325 MG/1
650 TABLET ORAL
OUTPATIENT
Start: 2023-12-11

## 2023-12-04 RX ORDER — MEPERIDINE HYDROCHLORIDE 25 MG/ML
12.5 INJECTION INTRAMUSCULAR; INTRAVENOUS; SUBCUTANEOUS PRN
OUTPATIENT
Start: 2023-12-11

## 2023-12-04 RX ORDER — SODIUM CHLORIDE 9 MG/ML
5-250 INJECTION, SOLUTION INTRAVENOUS PRN
OUTPATIENT
Start: 2023-12-11

## 2023-12-04 RX ORDER — ONDANSETRON 2 MG/ML
8 INJECTION INTRAMUSCULAR; INTRAVENOUS
OUTPATIENT
Start: 2023-12-11

## 2023-12-04 RX ORDER — EPINEPHRINE 1 MG/ML
0.3 INJECTION, SOLUTION INTRAMUSCULAR; SUBCUTANEOUS PRN
OUTPATIENT
Start: 2023-12-11

## 2023-12-04 RX ORDER — DIPHENHYDRAMINE HYDROCHLORIDE 50 MG/ML
50 INJECTION INTRAMUSCULAR; INTRAVENOUS
OUTPATIENT
Start: 2023-12-11

## 2023-12-04 RX ORDER — SODIUM CHLORIDE 9 MG/ML
INJECTION, SOLUTION INTRAVENOUS CONTINUOUS
OUTPATIENT
Start: 2023-12-11

## 2023-12-04 RX ORDER — ALBUTEROL SULFATE 90 UG/1
4 AEROSOL, METERED RESPIRATORY (INHALATION) PRN
OUTPATIENT
Start: 2023-12-11

## 2023-12-08 DIAGNOSIS — G89.3 CANCER RELATED PAIN: ICD-10-CM

## 2023-12-08 DIAGNOSIS — F43.22 ADJUSTMENT DISORDER WITH ANXIOUS MOOD: Primary | ICD-10-CM

## 2023-12-08 RX ORDER — MORPHINE SULFATE 30 MG/1
30 TABLET, FILM COATED, EXTENDED RELEASE ORAL 2 TIMES DAILY
Qty: 30 TABLET | Refills: 0 | Status: CANCELLED | OUTPATIENT
Start: 2023-12-08 | End: 2023-12-23

## 2023-12-08 RX ORDER — BUPROPION HYDROCHLORIDE 300 MG/1
300 TABLET ORAL DAILY
Qty: 30 TABLET | Refills: 0 | Status: SHIPPED | OUTPATIENT
Start: 2023-12-08

## 2023-12-08 NOTE — PROGRESS NOTES
[facility] [place] at [address], [address] [place], [place] [address]way: [phone]  F: [phone]    Reason for Visit:   Jean-Pierre Brooke is a 45 y.o. female seen today in office for follow up of Metastatic Breast Cancer. Treatment History:   Abd US 10/3/22: There are multiple liver masses with 2 representative masses in  the right liver measuring 2.4 x 2.6 cm and 1.6 x 2.1 cm  CT A/P 10/3/22: Spiculated left breast mass suspicious for malignancy. Multiple lung and liver metastases. Multiple top normal size retroperitoneal lymph nodes are nonspecific with metastatic disease  not excluded  CT Chest 10/4/22: Indeterminate 1.4 x 1.9 cm left breast nodule. Primary malignancy not excluded. Mammographic correlation advised. Diffuse bilateral pulmonary nodules most compatible with metastatic disease. Partially imaged hepatic hypodensities concerning  for metastatic disease. Indeterminate 6 mm T10 lytic lesion, possibly benign. Attention on follow-up  Liver Biopsy 10/4/22: PATH - Metastatic adenocarcinoma, consistent with breast primary  ER/LA negative, Her2 3+  Port placed on 10/13/2   Palliative Taxol+Herceptin+Perjeta 10/17/22 - 10/31/22  Switched to Palliative Taxotere+Herceptin+Perjeta on 11/7/22 - Current   DR Taxotere to 60 mg/m2 with Cycle 8  CT C/A/P 12/27/22: Imaging findings consistent with significant interval response to therapy. Significantly diminished pulmonary metastatic disease  burden with numerous resolved or nearly resolved pulmonary nodules. Significantly diminished size of hepatic masses.  Left breast lesion is not demonstrated on the current exam  Right Hip XRAY 1/2/23: Right basicervical femoral neck fracture with medial angulation  CT Pelv 1/2/23: Re-demonstrated acute right basicervical femoral neck fracture, with findings concerning for pathologic etiology  XR Femur 1/2/23: Mildly displaced right femoral neck fracture  Right Hip IMN with Ortho Dr Omega Jimenez

## 2023-12-08 NOTE — TELEPHONE ENCOUNTER
Palliative Medicine Clinic   [place]: [phone])    Patient Name: Jermain Nova  YOB: 1985    Medication Refill Request    Patient sent Fredio message asking if she can start the new prescription for MS Contin 30 mg 1 every 12 hours or should she wait until 11/24/23 when out of Oxycontin.     Patient is scheduled for follow up per PSR note:  [x]  YES  []   NO    PDMP reviewed:  N/A    Medication: Bupropion 100 mg  Dose and directions: 1 daily  Number dispensed: 30 for 30 days  Date filled (PDMP or Pharmacy):   # left:      Appropriate for refill:  [x]  YES  []  Early Request - Requires MD/NP Review      Other pertinent information for prescriber:

## 2023-12-09 DIAGNOSIS — C50.919 MALIGNANT NEOPLASM OF BREAST METASTATIC TO LIVER (HCC): ICD-10-CM

## 2023-12-09 DIAGNOSIS — G89.3 CANCER RELATED PAIN: ICD-10-CM

## 2023-12-09 DIAGNOSIS — C78.7 MALIGNANT NEOPLASM OF BREAST METASTATIC TO LIVER (HCC): ICD-10-CM

## 2023-12-09 RX ORDER — OXYCODONE HYDROCHLORIDE 15 MG/1
15 TABLET ORAL EVERY 4 HOURS PRN
Qty: 90 TABLET | Refills: 0 | Status: CANCELLED | OUTPATIENT
Start: 2023-12-09 | End: 2023-12-24

## 2023-12-11 ENCOUNTER — HOSPITAL ENCOUNTER (OUTPATIENT)
Facility: HOSPITAL | Age: 38
Setting detail: INFUSION SERIES
Discharge: HOME | End: 2023-12-11
Payer: COMMERCIAL

## 2023-12-11 ENCOUNTER — TELEPHONE (OUTPATIENT)
Age: 38
End: 2023-12-11

## 2023-12-11 ENCOUNTER — OFFICE VISIT (OUTPATIENT)
Age: 38
End: 2023-12-11
Payer: COMMERCIAL

## 2023-12-11 ENCOUNTER — CLINICAL DOCUMENTATION (OUTPATIENT)
Age: 38
End: 2023-12-11

## 2023-12-11 ENCOUNTER — CLINICAL DOCUMENTATION (OUTPATIENT)
Facility: HOSPITAL | Age: 38
End: 2023-12-11

## 2023-12-11 VITALS
OXYGEN SATURATION: 97 % | TEMPERATURE: 97.8 F | RESPIRATION RATE: 18 BRPM | SYSTOLIC BLOOD PRESSURE: 137 MMHG | BODY MASS INDEX: 35.14 KG/M2 | HEART RATE: 96 BPM | WEIGHT: 186 LBS | DIASTOLIC BLOOD PRESSURE: 79 MMHG

## 2023-12-11 VITALS
HEIGHT: 61 IN | HEART RATE: 96 BPM | BODY MASS INDEX: 35.12 KG/M2 | TEMPERATURE: 97.8 F | SYSTOLIC BLOOD PRESSURE: 142 MMHG | DIASTOLIC BLOOD PRESSURE: 83 MMHG | OXYGEN SATURATION: 97 % | WEIGHT: 186 LBS

## 2023-12-11 DIAGNOSIS — Z09 CHEMOTHERAPY FOLLOW-UP EXAMINATION: ICD-10-CM

## 2023-12-11 DIAGNOSIS — G89.3 CANCER RELATED PAIN: ICD-10-CM

## 2023-12-11 DIAGNOSIS — Z51.81 ENCOUNTER FOR MONITORING CARDIOTOXIC DRUG THERAPY: ICD-10-CM

## 2023-12-11 DIAGNOSIS — E83.39 HYPOPHOSPHATEMIA: ICD-10-CM

## 2023-12-11 DIAGNOSIS — C78.7 MALIGNANT NEOPLASM OF BREAST METASTATIC TO LIVER (HCC): ICD-10-CM

## 2023-12-11 DIAGNOSIS — C78.01 MALIGNANT NEOPLASM METASTATIC TO BOTH LUNGS (HCC): ICD-10-CM

## 2023-12-11 DIAGNOSIS — Z87.311 HISTORY OF PATHOLOGIC FRACTURE: ICD-10-CM

## 2023-12-11 DIAGNOSIS — Z79.899 ENCOUNTER FOR MONITORING CARDIOTOXIC DRUG THERAPY: ICD-10-CM

## 2023-12-11 DIAGNOSIS — C50.919 MALIGNANT NEOPLASM OF BREAST METASTATIC TO LIVER (HCC): Primary | ICD-10-CM

## 2023-12-11 DIAGNOSIS — M25.551 RIGHT HIP PAIN: ICD-10-CM

## 2023-12-11 DIAGNOSIS — C78.7 MALIGNANT NEOPLASM OF BREAST METASTATIC TO LIVER (HCC): Primary | ICD-10-CM

## 2023-12-11 DIAGNOSIS — C50.919 MALIGNANT NEOPLASM OF BREAST METASTATIC TO LIVER (HCC): ICD-10-CM

## 2023-12-11 DIAGNOSIS — T45.1X5A CHEMOTHERAPY-INDUCED NEUROPATHY (HCC): ICD-10-CM

## 2023-12-11 DIAGNOSIS — C78.02 MALIGNANT NEOPLASM METASTATIC TO BOTH LUNGS (HCC): ICD-10-CM

## 2023-12-11 DIAGNOSIS — R53.83 CHEMOTHERAPY-INDUCED FATIGUE: ICD-10-CM

## 2023-12-11 DIAGNOSIS — C79.51 MALIGNANT NEOPLASM METASTATIC TO BONE (HCC): Primary | ICD-10-CM

## 2023-12-11 DIAGNOSIS — T45.1X5A CHEMOTHERAPY-INDUCED FATIGUE: ICD-10-CM

## 2023-12-11 DIAGNOSIS — G62.0 CHEMOTHERAPY-INDUCED NEUROPATHY (HCC): ICD-10-CM

## 2023-12-11 DIAGNOSIS — Z95.828 PORT-A-CATH IN PLACE: ICD-10-CM

## 2023-12-11 LAB
ALBUMIN SERPL-MCNC: 3.6 G/DL (ref 3.5–5)
ALBUMIN/GLOB SERPL: 1.1 (ref 1.1–2.2)
ALP SERPL-CCNC: 102 U/L (ref 45–117)
ALT SERPL-CCNC: 25 U/L (ref 12–78)
ANION GAP BLD CALC-SCNC: 11.5 MMOL/L (ref 10–20)
ANION GAP SERPL CALC-SCNC: 6 MMOL/L (ref 5–15)
AST SERPL-CCNC: 10 U/L (ref 15–37)
BASOPHILS # BLD: 0 K/UL (ref 0–0.1)
BASOPHILS NFR BLD: 0 % (ref 0–1)
BILIRUB SERPL-MCNC: 0.2 MG/DL (ref 0.2–1)
BUN SERPL-MCNC: 13 MG/DL (ref 6–20)
BUN/CREAT SERPL: 15 (ref 12–20)
CA-I BLD-MCNC: 1.2 MMOL/L (ref 1.12–1.32)
CALCIUM SERPL-MCNC: 9 MG/DL (ref 8.5–10.1)
CHLORIDE BLD-SCNC: 108 MMOL/L (ref 98–107)
CHLORIDE SERPL-SCNC: 109 MMOL/L (ref 97–108)
CO2 BLD-SCNC: 23.5 MMOL/L (ref 21–32)
CO2 SERPL-SCNC: 24 MMOL/L (ref 21–32)
CREAT BLD-MCNC: 0.63 MG/DL (ref 0.6–1.3)
CREAT SERPL-MCNC: 0.88 MG/DL (ref 0.55–1.02)
DIFFERENTIAL METHOD BLD: ABNORMAL
EOSINOPHIL # BLD: 0 K/UL (ref 0–0.4)
EOSINOPHIL NFR BLD: 0 % (ref 0–7)
ERYTHROCYTE [DISTWIDTH] IN BLOOD BY AUTOMATED COUNT: 15.8 % (ref 11.5–14.5)
EST. AVERAGE GLUCOSE BLD GHB EST-MCNC: 105 MG/DL
GLOBULIN SER CALC-MCNC: 3.3 G/DL (ref 2–4)
GLUCOSE BLD-MCNC: 277 MG/DL (ref 65–100)
GLUCOSE SERPL-MCNC: 257 MG/DL (ref 65–100)
HBA1C MFR BLD: 5.3 % (ref 4–5.6)
HCT VFR BLD AUTO: 34.6 % (ref 35–47)
HGB BLD-MCNC: 11.2 G/DL (ref 11.5–16)
IMM GRANULOCYTES # BLD AUTO: 0.1 K/UL (ref 0–0.04)
IMM GRANULOCYTES NFR BLD AUTO: 1 % (ref 0–0.5)
LYMPHOCYTES # BLD: 0.9 K/UL (ref 0.8–3.5)
LYMPHOCYTES NFR BLD: 9 % (ref 12–49)
MAGNESIUM SERPL-MCNC: 2.2 MG/DL (ref 1.6–2.4)
MCH RBC QN AUTO: 28.9 PG (ref 26–34)
MCHC RBC AUTO-ENTMCNC: 32.4 G/DL (ref 30–36.5)
MCV RBC AUTO: 89.2 FL (ref 80–99)
MONOCYTES # BLD: 0.2 K/UL (ref 0–1)
MONOCYTES NFR BLD: 3 % (ref 5–13)
NEUTS SEG # BLD: 7.8 K/UL (ref 1.8–8)
NEUTS SEG NFR BLD: 87 % (ref 32–75)
NRBC # BLD: 0 K/UL (ref 0–0.01)
NRBC BLD-RTO: 0 PER 100 WBC
PHOSPHATE SERPL-MCNC: 2.2 MG/DL (ref 2.6–4.7)
PLATELET # BLD AUTO: 338 K/UL (ref 150–400)
PMV BLD AUTO: 9.5 FL (ref 8.9–12.9)
POTASSIUM BLD-SCNC: 3.9 MMOL/L (ref 3.5–5.1)
POTASSIUM SERPL-SCNC: 4 MMOL/L (ref 3.5–5.1)
PROT SERPL-MCNC: 6.9 G/DL (ref 6.4–8.2)
RBC # BLD AUTO: 3.88 M/UL (ref 3.8–5.2)
SERVICE CMNT-IMP: ABNORMAL
SODIUM BLD-SCNC: 143 MMOL/L (ref 136–145)
SODIUM SERPL-SCNC: 139 MMOL/L (ref 136–145)
WBC # BLD AUTO: 9 K/UL (ref 3.6–11)

## 2023-12-11 PROCEDURE — A4216 STERILE WATER/SALINE, 10 ML: HCPCS | Performed by: INTERNAL MEDICINE

## 2023-12-11 PROCEDURE — 6360000002 HC RX W HCPCS: Performed by: NURSE PRACTITIONER

## 2023-12-11 PROCEDURE — 36415 COLL VENOUS BLD VENIPUNCTURE: CPT

## 2023-12-11 PROCEDURE — 2580000003 HC RX 258: Performed by: INTERNAL MEDICINE

## 2023-12-11 PROCEDURE — 83036 HEMOGLOBIN GLYCOSYLATED A1C: CPT

## 2023-12-11 PROCEDURE — 84100 ASSAY OF PHOSPHORUS: CPT

## 2023-12-11 PROCEDURE — 96401 CHEMO ANTI-NEOPL SQ/IM: CPT

## 2023-12-11 PROCEDURE — 96372 THER/PROPH/DIAG INJ SC/IM: CPT

## 2023-12-11 PROCEDURE — 80047 BASIC METABLC PNL IONIZED CA: CPT

## 2023-12-11 PROCEDURE — 96375 TX/PRO/DX INJ NEW DRUG ADDON: CPT

## 2023-12-11 PROCEDURE — 96413 CHEMO IV INFUSION 1 HR: CPT

## 2023-12-11 PROCEDURE — 6360000002 HC RX W HCPCS: Performed by: INTERNAL MEDICINE

## 2023-12-11 PROCEDURE — 80053 COMPREHEN METABOLIC PANEL: CPT

## 2023-12-11 PROCEDURE — 2500000003 HC RX 250 WO HCPCS: Performed by: INTERNAL MEDICINE

## 2023-12-11 PROCEDURE — 83735 ASSAY OF MAGNESIUM: CPT

## 2023-12-11 PROCEDURE — 99214 OFFICE O/P EST MOD 30 MIN: CPT | Performed by: INTERNAL MEDICINE

## 2023-12-11 PROCEDURE — 6370000000 HC RX 637 (ALT 250 FOR IP)

## 2023-12-11 PROCEDURE — 85025 COMPLETE CBC W/AUTO DIFF WBC: CPT

## 2023-12-11 RX ORDER — DIPHENHYDRAMINE HYDROCHLORIDE 50 MG/ML
50 INJECTION INTRAMUSCULAR; INTRAVENOUS
OUTPATIENT
Start: 2024-01-01

## 2023-12-11 RX ORDER — ACETAMINOPHEN 325 MG/1
650 TABLET ORAL
OUTPATIENT
Start: 2024-01-01

## 2023-12-11 RX ORDER — OXYCODONE HYDROCHLORIDE 20 MG/1
20 TABLET ORAL EVERY 4 HOURS PRN
Qty: 90 TABLET | Refills: 0 | Status: SHIPPED | OUTPATIENT
Start: 2023-12-11 | End: 2023-12-12 | Stop reason: RX

## 2023-12-11 RX ORDER — SODIUM CHLORIDE 9 MG/ML
5-250 INJECTION, SOLUTION INTRAVENOUS PRN
Status: DISCONTINUED | OUTPATIENT
Start: 2023-12-11 | End: 2023-12-12 | Stop reason: HOSPADM

## 2023-12-11 RX ORDER — DIPHENHYDRAMINE HYDROCHLORIDE 50 MG/ML
25 INJECTION INTRAMUSCULAR; INTRAVENOUS ONCE
Status: COMPLETED | OUTPATIENT
Start: 2023-12-11 | End: 2023-12-11

## 2023-12-11 RX ORDER — SODIUM CHLORIDE 9 MG/ML
INJECTION, SOLUTION INTRAVENOUS CONTINUOUS
OUTPATIENT
Start: 2024-01-01

## 2023-12-11 RX ORDER — OXYCODONE HYDROCHLORIDE 15 MG/1
15 TABLET ORAL EVERY 4 HOURS PRN
Qty: 90 TABLET | Refills: 0 | Status: SHIPPED | OUTPATIENT
Start: 2023-12-12 | End: 2023-12-11 | Stop reason: DRUGHIGH

## 2023-12-11 RX ORDER — ONDANSETRON 2 MG/ML
8 INJECTION INTRAMUSCULAR; INTRAVENOUS
OUTPATIENT
Start: 2024-01-01

## 2023-12-11 RX ORDER — DEXAMETHASONE SODIUM PHOSPHATE 10 MG/ML
10 INJECTION, SOLUTION INTRAMUSCULAR; INTRAVENOUS ONCE
Status: COMPLETED | OUTPATIENT
Start: 2023-12-11 | End: 2023-12-11

## 2023-12-11 RX ORDER — SODIUM CHLORIDE 0.9 % (FLUSH) 0.9 %
5-40 SYRINGE (ML) INJECTION PRN
Status: DISCONTINUED | OUTPATIENT
Start: 2023-12-11 | End: 2023-12-12 | Stop reason: HOSPADM

## 2023-12-11 RX ORDER — DIBASIC SODIUM PHOSPHATE, MONOBASIC POTASSIUM PHOSPHATE AND MONOBASIC SODIUM PHOSPHATE 852; 155; 130 MG/1; MG/1; MG/1
1 TABLET ORAL 2 TIMES DAILY
Qty: 14 TABLET | Refills: 0 | Status: SHIPPED | OUTPATIENT
Start: 2023-12-11 | End: 2023-12-12 | Stop reason: SDUPTHER

## 2023-12-11 RX ORDER — ALBUTEROL SULFATE 90 UG/1
4 AEROSOL, METERED RESPIRATORY (INHALATION) PRN
OUTPATIENT
Start: 2024-01-01

## 2023-12-11 RX ORDER — EPINEPHRINE 1 MG/ML
0.3 INJECTION, SOLUTION INTRAMUSCULAR; SUBCUTANEOUS PRN
OUTPATIENT
Start: 2024-01-01

## 2023-12-11 RX ADMIN — SODIUM CHLORIDE 25 ML/HR: 9 INJECTION, SOLUTION INTRAVENOUS at 10:07

## 2023-12-11 RX ADMIN — SODIUM CHLORIDE, PRESERVATIVE FREE 10 ML: 5 INJECTION INTRAVENOUS at 08:30

## 2023-12-11 RX ADMIN — FAMOTIDINE 20 MG: 10 INJECTION, SOLUTION INTRAVENOUS at 10:36

## 2023-12-11 RX ADMIN — DOCETAXEL ANHYDROUS 106 MG: 10 INJECTION, SOLUTION INTRAVENOUS at 10:55

## 2023-12-11 RX ADMIN — DENOSUMAB 120 MG: 120 INJECTION SUBCUTANEOUS at 11:59

## 2023-12-11 RX ADMIN — DEXAMETHASONE SODIUM PHOSPHATE 10 MG: 10 INJECTION, SOLUTION INTRAMUSCULAR; INTRAVENOUS at 10:37

## 2023-12-11 RX ADMIN — POTASSIUM & SODIUM PHOSPHATES POWDER PACK 280-160-250 MG 500 MG: 280-160-250 PACK at 09:50

## 2023-12-11 RX ADMIN — PERTUZUMAB, TRASTUZUMAB, AND HYALURONIDASE-ZZXF 15 ML: 1200; 600; 2000 INJECTION, SOLUTION SUBCUTANEOUS at 10:20

## 2023-12-11 RX ADMIN — DIPHENHYDRAMINE HYDROCHLORIDE 25 MG: 50 INJECTION, SOLUTION INTRAMUSCULAR; INTRAVENOUS at 10:34

## 2023-12-11 NOTE — TELEPHONE ENCOUNTER
Patient is asking to speak with Amarilis Lozano, the pharmacy has a hold on the Oxycodone prescription until a certain amount of time, and she needs to pick it up today # [phone]

## 2023-12-11 NOTE — TELEPHONE ENCOUNTER
Called patient- went straight to voicemail. Left message. Will try her again in 5. Pt returned call. We discussed issues with the switch to MS Contin. She reports to me that she increased the oxy IR frequency from every 4 hrs to every 3 hrs as needed considering the inefficacy of the MS Contin. This explains running out 48 hrs early. She notes that doing this increased her fatigue, not great. Plan:  - Continue MS Contin 30 mg every 12 hours for now  - Increase oxycodone IR to 20 mg every 4 hours as needed, #6 per day. New script sent for today, cancelled the 15 mg.    - I let her know the IR will need a prior auth and may not be processed today by her insurer.   - Increase Lyrica to 100 mg TID--- when out, can increase capsule to 150 mg and go back to BID. I also let her know about my discussion with BLAZE Cadet [facility] orthopaedist, who reviewed her pelvic MRI and overall saw inflammatory changes but as she has no fevers/chills/skin changes c/w infection, septic joint less likely.      Dr. Lucas Mota

## 2023-12-11 NOTE — TELEPHONE ENCOUNTER
Palliative Medicine Clinic   [place]: [phone]    Patient Name: Marisol Delgadillo  YOB: 1985    Medication Refill Request    Incoming call from Ms. Leslie Parekh who stated that she needs a refill on Oxy IR 15 mg. She also tried the Morphine ER 30 mg this weekend and did not find that it works as well as the Time Mccurdy,  She was able to compare the difference; with Oxycontin and Oxy IR her pain would reduce to 3/10, but with the Morphine it would only reduce to 5-6/10. She is wondering if Dr. Mason Garcia will consider some sort of a change with the Morphine or with another approved medication as her insurance will not allow her to stay on the Oxycontin starting in January.      Patient is scheduled for follow up per PSR note:  [x]  YES  []   NO    PDMP reviewed:  [x] YES   []  System down / Cindy Brenner  []  NO- Patient fills out of state    Medication: Oxycodone 15 mg  Dose and directions: 1 every 4 prn  Number dispensed: 90 for 15 day supply  Date filled ([address] [place], [place] [address] [place] or Pharmacy): 11/28/23  # left: 3 tabs      Appropriate for refill:  [x]  Early Request - Due on 12/13/23    Other pertinent information for prescriber:

## 2023-12-11 NOTE — TELEPHONE ENCOUNTER
Palliative Medicine  Nursing Note  [phone])  Fax [phone]        Patient Name: Dung Gallego  YOB: 1985    12/11/2023        Primary Decision Maker: Leilani Double - Spouse - [phone]       12/11/2023     6:46 PM   Demographics   Marital Status      Prior Auth initiated via Covermymeds for Oxy IR 20 mg 1 every 4 prn #180 for 30 day supply. Result - Insurance has approved increased dose of Oxy IR, and PA was not needed.      Stoney Parish RN  Palliative Medicine

## 2023-12-11 NOTE — PROGRESS NOTES
Dung Gallego is a 45 y.o. female    Chief Complaint   Patient presents with    Follow-up     Metastatic Breast Cancer     1. Have you been to the ER, urgent care clinic since your last visit? Hospitalized since your last visit? No    2. Have you seen or consulted any other health care providers outside of the [address] [place] [address] Avenue since your last visit? Include any pap smears or colon screening.  No

## 2023-12-11 NOTE — TELEPHONE ENCOUNTER
Attempting to contact [facility] [place] - Dr. Jemma Mckeon, to review her pelvic MRI. Message left with his medical assistant.

## 2023-12-11 NOTE — PROGRESS NOTES
Onncology Pharmacist Note    Vaughn Hassan is a  45 y. o.female  diagnosed with breast cancer . Ms. Sheri Vick is being treated with HP. Phosphorus level 2.2 mg/dL.  Replacement ordered in [facility] with Phospha Neutral 500 mg (2 packets) oral in the [facility] and prescription sent for Phospha 250 mg Neutral twice a day for 7 days           Beth Soriano, PHARMD, BCOP, [facility] [place] [facility]    Program: Medical Group  CPA in place:  Yes  Recommendation Provided To: Patient/Caregiver: 1 via In person  Intervention Detail: New Rx: 1, reason: Needs Additional Therapy  Intervention Accepted By: Patient/Caregiver: 1    Time Spent (min): 10

## 2023-12-11 NOTE — TELEPHONE ENCOUNTER
Oxycodone IR 15 mg every 4 hours as needed approved for fill date of 12/12/23. For long acting- her insurance is approving   morphine sulphate Er, oxymorphone er, hydromorphone er, and the fentanyl patch as first line options. Before we switch off the MS Contin quickly- I would recommend another increase in the Lyrica. At our visit we increased from 75 mg BID to 100 mg BID. Let's increase this to 100 mg TID for now --- if this dose works well, I can change the capsule to 150 mg for the next refill and go back to twice a day dosing. Please bump up her follow up if possible so we can make a decision together about the long acting. This can be a virtual.     Let her know I have a message out to Dr. Michel Sensor office about the MRI.      Dr. Kane Dyer

## 2023-12-11 NOTE — PROGRESS NOTES
Patient Assistance    Met with:     Navigator Type:  Infusion  Documentation Type: New Patient  Contact Type: Telephone  Navigation Status: Copay Enrollment  Status of Patient Insurance Coverage: Patient has active coverage          Additional notes:     Drug Name: Silver Perry  Form of PAP Assistance: Copay

## 2023-12-12 ENCOUNTER — TELEPHONE (OUTPATIENT)
Age: 38
End: 2023-12-12

## 2023-12-12 DIAGNOSIS — C78.7 MALIGNANT NEOPLASM OF BREAST METASTATIC TO LIVER (HCC): ICD-10-CM

## 2023-12-12 DIAGNOSIS — E83.39 HYPOPHOSPHATEMIA: ICD-10-CM

## 2023-12-12 DIAGNOSIS — C50.919 MALIGNANT NEOPLASM OF BREAST METASTATIC TO LIVER (HCC): Primary | ICD-10-CM

## 2023-12-12 DIAGNOSIS — C50.919 MALIGNANT NEOPLASM OF BREAST METASTATIC TO LIVER (HCC): ICD-10-CM

## 2023-12-12 DIAGNOSIS — C78.7 MALIGNANT NEOPLASM OF BREAST METASTATIC TO LIVER (HCC): Primary | ICD-10-CM

## 2023-12-12 DIAGNOSIS — G89.3 CANCER RELATED PAIN: ICD-10-CM

## 2023-12-12 RX ORDER — OXYCODONE HYDROCHLORIDE 20 MG/1
20 TABLET ORAL EVERY 4 HOURS PRN
Qty: 90 TABLET | Refills: 0 | Status: SHIPPED | OUTPATIENT
Start: 2023-12-12 | End: 2023-12-27

## 2023-12-12 RX ORDER — DIBASIC SODIUM PHOSPHATE, MONOBASIC POTASSIUM PHOSPHATE AND MONOBASIC SODIUM PHOSPHATE 852; 155; 130 MG/1; MG/1; MG/1
1 TABLET ORAL 2 TIMES DAILY
Qty: 14 TABLET | Refills: 0 | Status: SHIPPED | OUTPATIENT
Start: 2023-12-12

## 2023-12-12 NOTE — TELEPHONE ENCOUNTER
Palliative Medicine  Nursing Note  [phone]  Fax [phone]      Telephone Call  Patient Name: Nelda Silva  YOB: 1985    12/12/2023        Primary Decision Maker: Rigoberto Jaimes - Spouse - [phone]       12/12/2023    11:19 AM   Demographics   Marital Status      Call returned to Ms. Zak Truong who stated that her local [facility] and [facility] pharmacies are out of stock for Oxy IR 20 mg due to a national backorder. Call placed to [facility] and they have been receiving normal shipments of Oxy IR 20 mg - they do not fall under the same guidelines as the chain pharmacies. They currently have #90 in stock. Call placed to Ms. Zak Truong who agreed to use [facility] for her prescriptions. New script pended for Dr. Yovana Woodson for Oxy IR 20 mg #90 for 15 day supply.         Annel Mcdaniels, RN  Palliative Medicine

## 2023-12-22 RX ORDER — ONDANSETRON 2 MG/ML
8 INJECTION INTRAMUSCULAR; INTRAVENOUS
OUTPATIENT
Start: 2024-01-02

## 2023-12-22 RX ORDER — ACETAMINOPHEN 325 MG/1
650 TABLET ORAL
OUTPATIENT
Start: 2024-01-02

## 2023-12-22 RX ORDER — SODIUM CHLORIDE 9 MG/ML
5-250 INJECTION, SOLUTION INTRAVENOUS PRN
OUTPATIENT
Start: 2024-01-02

## 2023-12-22 RX ORDER — EPINEPHRINE 1 MG/ML
0.3 INJECTION, SOLUTION INTRAMUSCULAR; SUBCUTANEOUS PRN
OUTPATIENT
Start: 2024-01-02

## 2023-12-22 RX ORDER — DIPHENHYDRAMINE HYDROCHLORIDE 50 MG/ML
50 INJECTION INTRAMUSCULAR; INTRAVENOUS
OUTPATIENT
Start: 2024-01-02

## 2023-12-22 RX ORDER — HEPARIN 100 UNIT/ML
500 SYRINGE INTRAVENOUS PRN
OUTPATIENT
Start: 2024-01-02

## 2023-12-22 RX ORDER — ALBUTEROL SULFATE 90 UG/1
4 AEROSOL, METERED RESPIRATORY (INHALATION) PRN
OUTPATIENT
Start: 2024-01-02

## 2023-12-22 RX ORDER — SODIUM CHLORIDE 9 MG/ML
INJECTION, SOLUTION INTRAVENOUS CONTINUOUS
OUTPATIENT
Start: 2024-01-02

## 2023-12-22 RX ORDER — MEPERIDINE HYDROCHLORIDE 25 MG/ML
12.5 INJECTION INTRAMUSCULAR; INTRAVENOUS; SUBCUTANEOUS PRN
OUTPATIENT
Start: 2024-01-02

## 2023-12-26 DIAGNOSIS — G62.0 CHEMOTHERAPY-INDUCED NEUROPATHY (HCC): Primary | ICD-10-CM

## 2023-12-26 DIAGNOSIS — G89.3 CANCER RELATED PAIN: ICD-10-CM

## 2023-12-26 DIAGNOSIS — C78.7 MALIGNANT NEOPLASM OF BREAST METASTATIC TO LIVER (HCC): ICD-10-CM

## 2023-12-26 DIAGNOSIS — C50.919 MALIGNANT NEOPLASM OF BREAST METASTATIC TO LIVER (HCC): ICD-10-CM

## 2023-12-26 DIAGNOSIS — C50.919 MALIGNANT NEOPLASM OF BREAST METASTATIC TO LIVER (HCC): Primary | ICD-10-CM

## 2023-12-26 DIAGNOSIS — E83.39 HYPOPHOSPHATEMIA: ICD-10-CM

## 2023-12-26 DIAGNOSIS — T45.1X5A CHEMOTHERAPY-INDUCED NEUROPATHY (HCC): Primary | ICD-10-CM

## 2023-12-26 DIAGNOSIS — C78.7 MALIGNANT NEOPLASM OF BREAST METASTATIC TO LIVER (HCC): Primary | ICD-10-CM

## 2023-12-26 RX ORDER — MORPHINE SULFATE 30 MG/1
30 TABLET, FILM COATED, EXTENDED RELEASE ORAL 2 TIMES DAILY
Qty: 30 TABLET | Refills: 0 | Status: SHIPPED | OUTPATIENT
Start: 2023-12-26 | End: 2024-01-10

## 2023-12-26 RX ORDER — PREGABALIN 150 MG/1
150 CAPSULE ORAL 2 TIMES DAILY
Qty: 60 CAPSULE | Refills: 0 | Status: SHIPPED | OUTPATIENT
Start: 2023-12-26 | End: 2024-01-25

## 2023-12-26 NOTE — PROGRESS NOTES
Palliative Medicine Clinic   [place]: [phone] ([phone]    Patient Name: Iveth Ely  YOB: 1985    Medication Refill Request    Per Dr. Hannah WILLETTS on 12/11/23 - Wanted patient to increase Lyrica 100 mg to 3 x daily. Once out, would change to Lyrica 150 mg twice daily. Patient has 2 days left. Prescription with new dose and directions pended for Dr. Nehal Lloyd.      Patient is scheduled for follow up per PSR note:  [x]  YES  []   NO    PDMP reviewed:  [x] YES   []  System down / Herb Ascencio  []  NO- Patient fills out of state    Medication: Lyrica 100 mg  Dose and directions: 1 twice daily  Number dispensed: 60 for 30 days  Date filled (PDMP or Pharmacy): 11/28/23  # left: 2 days      Appropriate for refill:  [x]  YES  []  Early Request - Requires MD/NP Review

## 2024-01-01 DIAGNOSIS — C78.7 MALIGNANT NEOPLASM OF BREAST METASTATIC TO LIVER (HCC): ICD-10-CM

## 2024-01-01 DIAGNOSIS — C50.919 MALIGNANT NEOPLASM OF BREAST METASTATIC TO LIVER (HCC): ICD-10-CM

## 2024-01-01 DIAGNOSIS — G89.3 CANCER RELATED PAIN: ICD-10-CM

## 2024-01-01 DIAGNOSIS — F43.22 ADJUSTMENT DISORDER WITH ANXIOUS MOOD: ICD-10-CM

## 2024-01-02 ENCOUNTER — HOSPITAL ENCOUNTER (OUTPATIENT)
Facility: HOSPITAL | Age: 39
Setting detail: INFUSION SERIES
Discharge: HOME | End: 2024-01-02
Payer: COMMERCIAL

## 2024-01-02 ENCOUNTER — OFFICE VISIT (OUTPATIENT)
Age: 39
End: 2024-01-02
Payer: COMMERCIAL

## 2024-01-02 VITALS
HEART RATE: 88 BPM | WEIGHT: 188 LBS | HEIGHT: 62 IN | TEMPERATURE: 97.7 F | BODY MASS INDEX: 34.6 KG/M2 | OXYGEN SATURATION: 95 % | DIASTOLIC BLOOD PRESSURE: 79 MMHG | RESPIRATION RATE: 18 BRPM | SYSTOLIC BLOOD PRESSURE: 133 MMHG

## 2024-01-02 VITALS
TEMPERATURE: 97.7 F | RESPIRATION RATE: 18 BRPM | HEART RATE: 88 BPM | OXYGEN SATURATION: 95 % | BODY MASS INDEX: 34.39 KG/M2 | WEIGHT: 188 LBS | DIASTOLIC BLOOD PRESSURE: 79 MMHG | SYSTOLIC BLOOD PRESSURE: 133 MMHG

## 2024-01-02 DIAGNOSIS — C78.7 MALIGNANT NEOPLASM METASTATIC TO LIVER (HCC): Primary | ICD-10-CM

## 2024-01-02 DIAGNOSIS — C50.919 MALIGNANT NEOPLASM OF BREAST METASTATIC TO LIVER (HCC): ICD-10-CM

## 2024-01-02 DIAGNOSIS — C78.7 MALIGNANT NEOPLASM OF BREAST METASTATIC TO LIVER (HCC): ICD-10-CM

## 2024-01-02 DIAGNOSIS — G89.3 CANCER RELATED PAIN: ICD-10-CM

## 2024-01-02 DIAGNOSIS — C79.51 MALIGNANT NEOPLASM METASTATIC TO BONE (HCC): Primary | ICD-10-CM

## 2024-01-02 DIAGNOSIS — C79.51 MALIGNANT NEOPLASM METASTATIC TO BONE (HCC): ICD-10-CM

## 2024-01-02 DIAGNOSIS — C78.01 MALIGNANT NEOPLASM METASTATIC TO BOTH LUNGS (HCC): ICD-10-CM

## 2024-01-02 DIAGNOSIS — C78.02 MALIGNANT NEOPLASM METASTATIC TO BOTH LUNGS (HCC): ICD-10-CM

## 2024-01-02 LAB
ALBUMIN SERPL-MCNC: 3.6 G/DL (ref 3.5–5)
ALBUMIN/GLOB SERPL: 1 (ref 1.1–2.2)
ALP SERPL-CCNC: 108 U/L (ref 45–117)
ALT SERPL-CCNC: 27 U/L (ref 12–78)
ANION GAP SERPL CALC-SCNC: 7 MMOL/L (ref 5–15)
AST SERPL-CCNC: 10 U/L (ref 15–37)
BASO+EOS+MONOS # BLD AUTO: 0.3 K/UL (ref 0.2–1.2)
BASO+EOS+MONOS NFR BLD AUTO: 3 % (ref 3.2–16.9)
BILIRUB SERPL-MCNC: 0.3 MG/DL (ref 0.2–1)
BUN SERPL-MCNC: 12 MG/DL (ref 6–20)
BUN/CREAT SERPL: 15 (ref 12–20)
CALCIUM SERPL-MCNC: 8.8 MG/DL (ref 8.5–10.1)
CHLORIDE SERPL-SCNC: 108 MMOL/L (ref 97–108)
CO2 SERPL-SCNC: 24 MMOL/L (ref 21–32)
CREAT SERPL-MCNC: 0.82 MG/DL (ref 0.55–1.02)
DIFFERENTIAL METHOD BLD: ABNORMAL
ERYTHROCYTE [DISTWIDTH] IN BLOOD BY AUTOMATED COUNT: 15.5 % (ref 11.8–15.8)
GLOBULIN SER CALC-MCNC: 3.5 G/DL (ref 2–4)
GLUCOSE SERPL-MCNC: 188 MG/DL (ref 65–100)
HCT VFR BLD AUTO: 36 % (ref 35–47)
HGB BLD-MCNC: 11.6 G/DL (ref 11.5–16)
LYMPHOCYTES # BLD: 1.1 K/UL (ref 0.8–3.5)
LYMPHOCYTES NFR BLD: 10 % (ref 12–49)
MCH RBC QN AUTO: 28.7 PG (ref 26–34)
MCHC RBC AUTO-ENTMCNC: 32.2 G/DL (ref 30–36.5)
MCV RBC AUTO: 89.1 FL (ref 80–99)
NEUTS SEG # BLD: 9.6 K/UL (ref 1.8–8)
NEUTS SEG NFR BLD: 87 % (ref 32–75)
PLATELET # BLD AUTO: 383 K/UL (ref 150–400)
POTASSIUM SERPL-SCNC: 4.2 MMOL/L (ref 3.5–5.1)
PROT SERPL-MCNC: 7.1 G/DL (ref 6.4–8.2)
RBC # BLD AUTO: 4.04 M/UL (ref 3.8–5.2)
SODIUM SERPL-SCNC: 139 MMOL/L (ref 136–145)
WBC # BLD AUTO: 11 K/UL (ref 3.6–11)

## 2024-01-02 PROCEDURE — 36415 COLL VENOUS BLD VENIPUNCTURE: CPT

## 2024-01-02 PROCEDURE — 2580000003 HC RX 258: Performed by: INTERNAL MEDICINE

## 2024-01-02 PROCEDURE — A4216 STERILE WATER/SALINE, 10 ML: HCPCS | Performed by: INTERNAL MEDICINE

## 2024-01-02 PROCEDURE — 80053 COMPREHEN METABOLIC PANEL: CPT

## 2024-01-02 PROCEDURE — 2500000003 HC RX 250 WO HCPCS: Performed by: INTERNAL MEDICINE

## 2024-01-02 PROCEDURE — 6360000002 HC RX W HCPCS: Performed by: INTERNAL MEDICINE

## 2024-01-02 PROCEDURE — 96413 CHEMO IV INFUSION 1 HR: CPT

## 2024-01-02 PROCEDURE — 85025 COMPLETE CBC W/AUTO DIFF WBC: CPT

## 2024-01-02 PROCEDURE — 96375 TX/PRO/DX INJ NEW DRUG ADDON: CPT

## 2024-01-02 PROCEDURE — 99214 OFFICE O/P EST MOD 30 MIN: CPT | Performed by: INTERNAL MEDICINE

## 2024-01-02 PROCEDURE — 96401 CHEMO ANTI-NEOPL SQ/IM: CPT

## 2024-01-02 RX ORDER — BUPROPION HYDROCHLORIDE 300 MG/1
300 TABLET ORAL DAILY
Qty: 30 TABLET | Refills: 0 | Status: SHIPPED | OUTPATIENT
Start: 2024-01-02

## 2024-01-02 RX ORDER — DIPHENHYDRAMINE HYDROCHLORIDE 50 MG/ML
25 INJECTION INTRAMUSCULAR; INTRAVENOUS ONCE
Status: COMPLETED | OUTPATIENT
Start: 2024-01-02 | End: 2024-01-02

## 2024-01-02 RX ORDER — SODIUM CHLORIDE 9 MG/ML
5-250 INJECTION, SOLUTION INTRAVENOUS PRN
Status: DISCONTINUED | OUTPATIENT
Start: 2024-01-02 | End: 2024-01-03 | Stop reason: HOSPADM

## 2024-01-02 RX ORDER — MORPHINE SULFATE 30 MG/1
30 TABLET, FILM COATED, EXTENDED RELEASE ORAL 2 TIMES DAILY
Qty: 30 TABLET | Refills: 0 | Status: SHIPPED | OUTPATIENT
Start: 2024-01-10 | End: 2024-01-25

## 2024-01-02 RX ORDER — OXYCODONE HYDROCHLORIDE 20 MG/1
20 TABLET ORAL EVERY 4 HOURS PRN
Qty: 90 TABLET | OUTPATIENT
Start: 2024-01-02 | End: 2024-01-17

## 2024-01-02 RX ORDER — OXYCODONE HYDROCHLORIDE 20 MG/1
20 TABLET ORAL EVERY 4 HOURS PRN
Qty: 90 TABLET | Refills: 0 | Status: SHIPPED | OUTPATIENT
Start: 2024-01-02 | End: 2024-01-17

## 2024-01-02 RX ORDER — DEXAMETHASONE SODIUM PHOSPHATE 10 MG/ML
10 INJECTION, SOLUTION INTRAMUSCULAR; INTRAVENOUS ONCE
Status: COMPLETED | OUTPATIENT
Start: 2024-01-02 | End: 2024-01-02

## 2024-01-02 RX ORDER — SODIUM CHLORIDE 0.9 % (FLUSH) 0.9 %
5-40 SYRINGE (ML) INJECTION PRN
Status: DISCONTINUED | OUTPATIENT
Start: 2024-01-02 | End: 2024-01-03 | Stop reason: HOSPADM

## 2024-01-02 RX ORDER — OXYCODONE HYDROCHLORIDE 20 MG/1
20 TABLET ORAL EVERY 4 HOURS PRN
Qty: 90 TABLET | Refills: 0 | OUTPATIENT
Start: 2024-01-02 | End: 2024-01-17

## 2024-01-02 RX ADMIN — SODIUM CHLORIDE, PRESERVATIVE FREE 10 ML: 5 INJECTION INTRAVENOUS at 12:05

## 2024-01-02 RX ADMIN — PERTUZUMAB, TRASTUZUMAB, AND HYALURONIDASE-ZZXF 10 ML: 600; 600; 2000 INJECTION, SOLUTION SUBCUTANEOUS at 10:27

## 2024-01-02 RX ADMIN — DOCETAXEL ANHYDROUS 106 MG: 10 INJECTION, SOLUTION INTRAVENOUS at 11:00

## 2024-01-02 RX ADMIN — SODIUM CHLORIDE 50 ML/HR: 9 INJECTION, SOLUTION INTRAVENOUS at 10:22

## 2024-01-02 RX ADMIN — DEXAMETHASONE SODIUM PHOSPHATE 10 MG: 10 INJECTION, SOLUTION INTRAMUSCULAR; INTRAVENOUS at 10:36

## 2024-01-02 RX ADMIN — DIPHENHYDRAMINE HYDROCHLORIDE 25 MG: 50 INJECTION INTRAMUSCULAR; INTRAVENOUS at 10:38

## 2024-01-02 RX ADMIN — FAMOTIDINE 20 MG: 10 INJECTION, SOLUTION INTRAVENOUS at 10:22

## 2024-01-02 NOTE — PROGRESS NOTES
[facility] Short Note                       Date: 2024    Name: Millie King    MRN: [id number]         : 1985    0800 Pt admit to [facility] for Phesgo and Docetaxel ambulatory in stable condition. Assessment completed. No new concerns voiced.  Please review pending lab results in CC.      Ms. King's vitals were reviewed prior to treatment.   Patient Vitals for the past 12 hrs:   Temp Pulse Resp BP SpO2   24 0815 97.7 °F (36.5 °C) 88 18 133/79 95 %       Port with positive blood return. Lab drawn, flushed, heparinized and de-accessed per protocol.       Medications given:  Phesgo given left thigh  Medications Administered         0.9 % sodium chloride infusion Admin Date  2024 Action  New Bag Dose  50 mL/hr Rate  50 mL/hr Route  IntraVENous Administered By  Kanchan Luna RN        dexAMETHasone (PF) (DECADRON) injection 10 mg Admin Date  2024 Action  Given Dose  10 mg Rate   Route  IntraVENous Administered By  Kanchan Luna RN        diphenhydrAMINE (BENADRYL) injection 25 mg Admin Date  2024 Action  Given Dose  25 mg Rate   Route  IntraVENous Administered By  Kanchan Luna RN        DOCEtaxel (TAXOTERE) 106 mg in sodium chloride 0.9 % 250 mL chemo IVPB Admin Date  2024 Action  New Bag Dose  106 mg Rate  285.6 mL/hr Route  IntraVENous Administered By  Kanchan Luna RN        famotidine (PEPCID) 20 mg in sodium chloride (PF) 0.9 % 10 mL injection Admin Date  2024 Action  Given Dose  20 mg Rate   Route  IntraVENous Administered By  Kanchan Luna RN        pertuzumab 600 mg-trastuzumab 600 mg-hyaluronidase-zzxf 20,000 units/10 mL (PHESGO) chemo syringe Admin Date  2024 Action  Given Dose  10 mL Rate   Route  SubCUTAneous Administered By  Kanchan Luna RN        sodium chloride flush 0.9 % injection 5-40 mL Admin Date  2024 Action  Given Dose  10 mL Rate   Route  IntraVENous Administered By  Kanchan Luna, BLADE                Pt

## 2024-01-02 NOTE — TELEPHONE ENCOUNTER
Palliative Medicine Clinic   [place]: [phone] ([phone])    Patient Name: Millie King  YOB: 1985    Medication Refill Request    Patient is scheduled for follow up per PSR note:  [x]  YES  []   NO    PDMP reviewed:  [x] YES   []  System down / Unable  []  NO- Patient fills out of state    Medication: Oxycodone 20 mg  Dose and directions: 1 every 4 prn  Number dispensed: 90 for 15 days  Date filled (PDMP or Pharmacy): 12/12/23  # left:    Medication: Morphine ER 30 mg  Dose and directions: 1 every 12 hours   Number dispensed: 30 for 15 days  Date filled (PDMP or Pharmacy): 12/26/23  #left:    Appropriate for refill:  [x]  YES  []  Early Request - Requires MD/NP Review      Other pertinent information for prescriber:  Pended Morphine ER as it's die on 1/10/23

## 2024-01-09 ENCOUNTER — TELEPHONE (OUTPATIENT)
Age: 39
End: 2024-01-09

## 2024-01-09 NOTE — TELEPHONE ENCOUNTER
Called patient to advise/confirm upcoming appt with Dr. Nieves on 1/11/2024 at 11:00  for a vv. Spoke with patient and confirmed appointment.

## 2024-01-10 ASSESSMENT — SLEEP AND FATIGUE QUESTIONNAIRES
HOW LIKELY ARE YOU TO NOD OFF OR FALL ASLEEP WHILE LYING DOWN TO REST IN THE AFTERNOON WHEN CIRCUMSTANCES PERMIT: 3
HOW LIKELY ARE YOU TO NOD OFF OR FALL ASLEEP WHILE WATCHING TV: 0
HOW LIKELY ARE YOU TO NOD OFF OR FALL ASLEEP WHILE SITTING AND TALKING TO SOMEONE: 0
HOW LIKELY ARE YOU TO NOD OFF OR FALL ASLEEP WHEN YOU ARE A PASSENGER IN A CAR FOR AN HOUR WITHOUT A BREAK: 0
HOW LIKELY ARE YOU TO NOD OFF OR FALL ASLEEP WHILE SITTING QUIETLY AFTER LUNCH WITHOUT ALCOHOL: 0
HOW LIKELY ARE YOU TO NOD OFF OR FALL ASLEEP WHILE SITTING INACTIVE IN A PUBLIC PLACE: 0
HOW LIKELY ARE YOU TO NOD OFF OR FALL ASLEEP WHILE SITTING AND READING: 3
HOW LIKELY ARE YOU TO NOD OFF OR FALL ASLEEP IN A CAR, WHILE STOPPED FOR A FEW MINUTES IN TRAFFIC: 0
ESS TOTAL SCORE: 6

## 2024-01-11 ENCOUNTER — HOSPITAL ENCOUNTER (OUTPATIENT)
Facility: HOSPITAL | Age: 39
End: 2024-01-11
Attending: INTERNAL MEDICINE
Payer: COMMERCIAL

## 2024-01-11 ENCOUNTER — CLINICAL DOCUMENTATION (OUTPATIENT)
Age: 39
End: 2024-01-11

## 2024-01-11 ENCOUNTER — HOSPITAL ENCOUNTER (OUTPATIENT)
Facility: HOSPITAL | Age: 39
Discharge: HOME | End: 2024-01-13
Payer: COMMERCIAL

## 2024-01-11 ENCOUNTER — TELEMEDICINE (OUTPATIENT)
Age: 39
End: 2024-01-11
Payer: COMMERCIAL

## 2024-01-11 ENCOUNTER — TELEPHONE (OUTPATIENT)
Age: 39
End: 2024-01-11

## 2024-01-11 VITALS
SYSTOLIC BLOOD PRESSURE: 147 MMHG | HEIGHT: 62 IN | DIASTOLIC BLOOD PRESSURE: 92 MMHG | WEIGHT: 188 LBS | BODY MASS INDEX: 34.6 KG/M2

## 2024-01-11 DIAGNOSIS — Z79.899 ENCOUNTER FOR MONITORING CARDIOTOXIC DRUG THERAPY: ICD-10-CM

## 2024-01-11 DIAGNOSIS — C78.7 MALIGNANT NEOPLASM METASTATIC TO LIVER (HCC): ICD-10-CM

## 2024-01-11 DIAGNOSIS — G89.3 CANCER RELATED PAIN: ICD-10-CM

## 2024-01-11 DIAGNOSIS — G62.0 CHEMOTHERAPY-INDUCED NEUROPATHY (HCC): ICD-10-CM

## 2024-01-11 DIAGNOSIS — F43.22 ADJUSTMENT DISORDER WITH ANXIOUS MOOD: ICD-10-CM

## 2024-01-11 DIAGNOSIS — G89.3 CANCER RELATED PAIN: Primary | ICD-10-CM

## 2024-01-11 DIAGNOSIS — C78.7 MALIGNANT NEOPLASM OF BREAST METASTATIC TO LIVER (HCC): ICD-10-CM

## 2024-01-11 DIAGNOSIS — C50.919 MALIGNANT NEOPLASM OF BREAST METASTATIC TO LIVER (HCC): ICD-10-CM

## 2024-01-11 DIAGNOSIS — G47.33 OBSTRUCTIVE SLEEP APNEA (ADULT) (PEDIATRIC): Primary | ICD-10-CM

## 2024-01-11 DIAGNOSIS — Z51.81 ENCOUNTER FOR MONITORING CARDIOTOXIC DRUG THERAPY: ICD-10-CM

## 2024-01-11 DIAGNOSIS — T45.1X5A CHEMOTHERAPY-INDUCED NEUROPATHY (HCC): ICD-10-CM

## 2024-01-11 LAB
ECHO AO ASC DIAM: 3 CM
ECHO AO ASCENDING AORTA INDEX: 1.61 CM/M2
ECHO BSA: 1.93 M2
ECHO LA DIAMETER INDEX: 1.77 CM/M2
ECHO LA DIAMETER: 3.3 CM
ECHO LV EDV A2C: 62 ML
ECHO LV EDV A4C: 72 ML
ECHO LV EDV BP: 67 ML (ref 56–104)
ECHO LV EDV INDEX A4C: 39 ML/M2
ECHO LV EDV INDEX BP: 36 ML/M2
ECHO LV EDV NDEX A2C: 33 ML/M2
ECHO LV EJECTION FRACTION A2C: 67 %
ECHO LV EJECTION FRACTION A4C: 60 %
ECHO LV EJECTION FRACTION A4C: 61 %
ECHO LV EJECTION FRACTION BIPLANE: 63 % (ref 55–100)
ECHO LV ESV A2C: 21 ML
ECHO LV ESV A4C: 29 ML
ECHO LV ESV BP: 25 ML (ref 19–49)
ECHO LV ESV INDEX A2C: 11 ML/M2
ECHO LV ESV INDEX A4C: 16 ML/M2
ECHO LV ESV INDEX BP: 13 ML/M2
ECHO LV FRACTIONAL SHORTENING: 45 % (ref 28–44)
ECHO LV GLOBAL LONGITUDINAL STRAIN (GLS): -16.5 %
ECHO LV GLOBAL LONGITUDINAL STRAIN (GLS): -17 %
ECHO LV GLOBAL LONGITUDINAL STRAIN (GLS): -18.3 %
ECHO LV GLOBAL LONGITUDINAL STRAIN (GLS): -21.3 %
ECHO LV INTERNAL DIMENSION DIASTOLE INDEX: 2.85 CM/M2
ECHO LV INTERNAL DIMENSION DIASTOLIC: 5.3 CM (ref 3.9–5.3)
ECHO LV INTERNAL DIMENSION SYSTOLIC INDEX: 1.56 CM/M2
ECHO LV INTERNAL DIMENSION SYSTOLIC: 2.9 CM
ECHO LV IVSD: 0.8 CM (ref 0.6–0.9)
ECHO LV MASS 2D: 150.1 G (ref 67–162)
ECHO LV MASS INDEX 2D: 80.7 G/M2 (ref 43–95)
ECHO LV POSTERIOR WALL DIASTOLIC: 0.8 CM (ref 0.6–0.9)
ECHO LV RELATIVE WALL THICKNESS RATIO: 0.3
ECHO LVOT AREA: 3.5 CM2
ECHO LVOT DIAM: 2.1 CM

## 2024-01-11 PROCEDURE — 93308 TTE F-UP OR LMTD: CPT

## 2024-01-11 PROCEDURE — 6360000004 HC RX CONTRAST MEDICATION: Performed by: INTERNAL MEDICINE

## 2024-01-11 PROCEDURE — 99215 OFFICE O/P EST HI 40 MIN: CPT | Performed by: INTERNAL MEDICINE

## 2024-01-11 PROCEDURE — 74177 CT ABD & PELVIS W/CONTRAST: CPT

## 2024-01-11 PROCEDURE — 70553 MRI BRAIN STEM W/O & W/DYE: CPT

## 2024-01-11 PROCEDURE — 99213 OFFICE O/P EST LOW 20 MIN: CPT | Performed by: INTERNAL MEDICINE

## 2024-01-11 PROCEDURE — 93306 TTE W/DOPPLER COMPLETE: CPT

## 2024-01-11 PROCEDURE — A9579 GAD-BASE MR CONTRAST NOS,1ML: HCPCS | Performed by: RADIOLOGY

## 2024-01-11 PROCEDURE — 6360000004 HC RX CONTRAST MEDICATION: Performed by: RADIOLOGY

## 2024-01-11 RX ORDER — OXYMORPHONE HYDROCHLORIDE 10 MG/1
10 TABLET, FILM COATED, EXTENDED RELEASE ORAL EVERY 12 HOURS
Qty: 60 TABLET | Refills: 0 | Status: SHIPPED | OUTPATIENT
Start: 2024-01-11 | End: 2024-02-10

## 2024-01-11 RX ORDER — OXYMORPHONE HYDROCHLORIDE 30 MG/1
30 TABLET, FILM COATED, EXTENDED RELEASE ORAL EVERY 12 HOURS
Qty: 60 TABLET | Refills: 0 | Status: SHIPPED | OUTPATIENT
Start: 2024-01-11 | End: 2024-01-11 | Stop reason: DRUGHIGH

## 2024-01-11 RX ORDER — HEPARIN 100 UNIT/ML
300 SYRINGE INTRAVENOUS PRN
Status: DISCONTINUED | OUTPATIENT
Start: 2024-01-11 | End: 2024-01-15 | Stop reason: HOSPADM

## 2024-01-11 RX ORDER — PREGABALIN 150 MG/1
150 CAPSULE ORAL 2 TIMES DAILY
Qty: 60 CAPSULE | Refills: 2 | Status: SHIPPED | OUTPATIENT
Start: 2024-01-22 | End: 2024-04-21

## 2024-01-11 RX ADMIN — IOPAMIDOL 100 ML: 755 INJECTION, SOLUTION INTRAVENOUS at 15:19

## 2024-01-11 RX ADMIN — GADOTERIDOL 17 ML: 279.3 INJECTION, SOLUTION INTRAVENOUS at 14:57

## 2024-01-11 NOTE — PROGRESS NOTES
Palliative Medicine Office Visit  Palliative Medicine Nurse Check In  [phone]    Patient Name: Millie King  YOB: 1985      Date of Office Visit: 1/11/24    Patient states: \" F/U \"    From Check In Sheet (scanned in Media):  Has a medical provider talked with you about cardiopulmonary resuscitation (CPR)?   [] Yes   [x] No   [] Unable to obtain    Nurse reminder to complete or update ACP FlowSheet:    Is ACP on the Problem List?    [x] Yes    [] No  IF ACP Document is ON FILE; Nurse to place ACP on Problem List     Is there an ACP Note in Chart Review/Note?    [x] Yes    [] No   If NO: ALERT PROVIDER         1/11/2024     2:15 PM   Demographics   Marital Status        Is there anything that we should know about you as a person in order to provide you the best care possible?     Have you been to the ER, urgent care clinic since your last visit?   [] Yes   [x] No   [] Unable to obtain    Have you been hospitalized since your last visit?   [] Yes   [x] No   [] Unable to obtain    Have you seen or consulted any other health care providers outside of the [facility] since your last visit?   [] Yes   [x] No   [] Unable to obtain    Functional status (describe):   Independent    Last BM: Regular - yesterday     accessed (date):     Bottle review (for opioid pain medication):  Medication 1:  Oxycodone 20 mg  Date filled:   Directions: 1 every 4 prn  # filled:   # left:   # pills taking per day:  Last dose taken:    Medication 2: Morphine ER  Date filled:   Directions: every 12  # filled:   # left:   # pills taking per day: 2   Last dose taken: Today    Patient states that Morphine doesn't work nearly as well as the Oxycontin.  She is sleeping well at night because she takes an IR with ER at bedtime, but is 8/10 pain when waking in AM.  Pain is 8/10 before bed as well.  ER doesn't last 12 hours, maybe 8, and pain overall not managed as well.

## 2024-01-11 NOTE — PROGRESS NOTES
Palliative Medicine Outpatient Services  [place]: [phone] ([phone])    Patient Name: Millie King  YOB: 1985    Date of Current Visit: 01/11/24  Location of Current Visit:    [x] [facility] Office  [] [facility] Office  [] [facility] Office  [] Home  [] Synchronous real-time A/V virtual visit    Date of Initial Palliative Medicine Visit: 6/8/23   Referral from: Dr. Manrique    REASON FOR VISIT:   38 year old woman with MBC seen for follow up pain and symptom management.     FOLLOW UP:   Return in about 4 weeks (around 2/8/2024).     ASSESSMENT AND PLAN:     Cancer related pain- uncontrolled  - primarily right hip pain related to pathologic partially healed fracture s/p fixation.  There are no additional surgical options.   There is a significant neuropathic component to the hip pain; addition of Lyrica has been helpful.    - she also has diffuse joint aches related to chemotherapy.    - previously on Oxycontin 20 mg BID in 2023, insurance stopped covering for 2024.    - Rotated to MS Contin 30 mg BID end of December, this has been ineffective.   - In the interim increased oxycodone IR from 15 mg to 30 mg every 4 hrs as needed.  Typical use is 4 tablets per day.   - Discussed alternative options today for long acting agents preferred by her insurer- Opana, hydromorphone ER, Fentanyl Td.  Fentanyl Td contraindicated right now due to excessive sweating.   - Decision made to start Opana- initiate at dose of 10 mg BID based on 1:3 conversion factor.  No reduction for cross tolerance as alternatively instructed her to hold the oxycodone IR if pain controlled or any fatigue (the oxy accounts for 120 of her morphine equivalents).   - Continue Lyrica 150 mg q12h for neuropathic component to hip pain  - obtaining periodic Toxassure urine drug screening consistent with practice safe prescribing practices.   - Continues to have regular bowel movements without need for routine bowel regimen    Chemotherapy induced peripheral

## 2024-01-11 NOTE — PATIENT INSTRUCTIONS
[address]  [place] [address]  Tel.  [phone]  Fax. [phone] [address]  [place] [address]  Tel.  [phone]  Fax. [phone] [address] [place] [address]  Tel.  [phone]  Fax. [phone]     PROPER SLEEP HYGIENE    What to avoid  Do not have drinks with caffeine, such as coffee or black tea, for 8 hours before bed.  Do not smoke or use other types of tobacco near bedtime. Nicotine is a stimulant and can keep you awake.  Avoid drinking alcohol late in the evening, because it can cause you to wake in the middle of the night.  Do not eat a big meal close to bedtime. If you are hungry, eat a light snack.  Do not drink a lot of water close to bedtime, because the need to urinate may wake you up during the night.  Do not read or watch TV in bed. Use the bed only for sleeping and sexual activity.  What to try  Go to bed at the same time every night, and wake up at the same time every morning. Do not take naps during the day.  Keep your bedroom quiet, dark, and cool.  Get regular exercise, but not within 3 to 4 hours of your bedtime..  Sleep on a comfortable pillow and mattress.  If watching the clock makes you anxious, turn it facing away from you so you cannot see the time.  If you worry when you lie down, start a worry book. Well before bedtime, write down your worries, and then set the book and your concerns aside.  Try meditation or other relaxation techniques before you go to bed.  If you cannot fall asleep, get up and go to another room until you feel sleepy. Do something relaxing. Repeat your bedtime routine before you go to bed again.  Make your house quiet and calm about an hour before bedtime. Turn down the lights, turn off the TV, log off the computer, and turn down the volume on music. This can help you relax after a busy day.    Drowsy Driving  The [place] National Highway Traffic Safety Administration cites drowsiness as a

## 2024-01-11 NOTE — PROGRESS NOTES
Patient pressure was adjusted remotely to 7-10 cm, humidity setting adjusted from 4 - 5 and ramp was turned off.

## 2024-01-11 NOTE — PROGRESS NOTES
1430- Pt requested that her port be accessed for her CT scan. RT upper chest wall port was accessed using sterile fashion with 20 G X 0.75 in ríos needle. Port with positive blood return and flushed with saline. Pt tolerated well. Orders placed for hep flush to de access the port by CT staff after scan.

## 2024-01-11 NOTE — PROGRESS NOTES
facial skin         [] Abnormal -            Psychiatric:       [x] Normal Affect [] Abnormal -        Other pertinent observable physical exam findings:-            A>   Diagnosis Orders   1. Obstructive sleep apnea (adult) (pediatric)          AHI = 39 (10-23).  On CPAP, Resmed, AS10  :  5-10 cmH2O.    Compliant:      yes    Therapeutic Response:  Positive    P>    she is compliant with PAP therapy and PAP continues to benefit patient and remains necessary for control of her sleep apnea.   PAP setting - change setting to 7-10 cm, turn off ramp and increase humidity setting, check download in 2 weeks. If no increase in estimated AHI flow at that time, will consider changing to 7-12 cmH20       * She was asked to contact our office for any problems regarding PAP therapy.    * Counseling was provided regarding the importance of regular PAP use and on proper sleep hygiene and safe driving.    * Re-enforced proper and regular cleaning for the device.  she was advised to follow 's recommendations regarding cleaning of PAP device and PAP supplies.  All of her questions were addressed.           Services were provided through a video synchronous discussion virtually to substitute for in-person clinic visit.    Christy Enriquez MD    Electronically signed by    Christy Enriquez MD  Diplomate in Sleep Medicine  [facility]

## 2024-01-12 DIAGNOSIS — C79.31 METASTASIS TO BRAIN (HCC): ICD-10-CM

## 2024-01-12 DIAGNOSIS — C78.7 MALIGNANT NEOPLASM METASTATIC TO LIVER (HCC): Primary | ICD-10-CM

## 2024-01-15 LAB
ECHO AO ASC DIAM: 3 CM
ECHO AO ASCENDING AORTA INDEX: 1.61 CM/M2
ECHO BSA: 1.93 M2
ECHO LA DIAMETER INDEX: 1.77 CM/M2
ECHO LA DIAMETER: 3.3 CM
ECHO LV EDV A2C: 62 ML
ECHO LV EDV A4C: 72 ML
ECHO LV EDV BP: 67 ML (ref 56–104)
ECHO LV EDV INDEX A4C: 39 ML/M2
ECHO LV EDV INDEX BP: 36 ML/M2
ECHO LV EDV NDEX A2C: 33 ML/M2
ECHO LV EJECTION FRACTION A2C: 67 %
ECHO LV EJECTION FRACTION A4C: 60 %
ECHO LV EJECTION FRACTION A4C: 61 %
ECHO LV EJECTION FRACTION BIPLANE: 63 % (ref 55–100)
ECHO LV ESV A2C: 21 ML
ECHO LV ESV A4C: 29 ML
ECHO LV ESV BP: 25 ML (ref 19–49)
ECHO LV ESV INDEX A2C: 11 ML/M2
ECHO LV ESV INDEX A4C: 16 ML/M2
ECHO LV ESV INDEX BP: 13 ML/M2
ECHO LV FRACTIONAL SHORTENING: 45 % (ref 28–44)
ECHO LV GLOBAL LONGITUDINAL STRAIN (GLS): -18.3 %
ECHO LV INTERNAL DIMENSION DIASTOLE INDEX: 2.85 CM/M2
ECHO LV INTERNAL DIMENSION DIASTOLIC: 5.3 CM (ref 3.9–5.3)
ECHO LV INTERNAL DIMENSION SYSTOLIC INDEX: 1.56 CM/M2
ECHO LV INTERNAL DIMENSION SYSTOLIC: 2.9 CM
ECHO LV IVSD: 0.8 CM (ref 0.6–0.9)
ECHO LV MASS 2D: 150.1 G (ref 67–162)
ECHO LV MASS INDEX 2D: 80.7 G/M2 (ref 43–95)
ECHO LV POSTERIOR WALL DIASTOLIC: 0.8 CM (ref 0.6–0.9)
ECHO LV RELATIVE WALL THICKNESS RATIO: 0.3
ECHO LVOT AREA: 3.5 CM2
ECHO LVOT DIAM: 2.1 CM

## 2024-01-15 RX ORDER — ACETAMINOPHEN 325 MG/1
650 TABLET ORAL
Status: CANCELLED | OUTPATIENT
Start: 2024-01-22

## 2024-01-15 RX ORDER — DIPHENHYDRAMINE HYDROCHLORIDE 50 MG/ML
50 INJECTION INTRAMUSCULAR; INTRAVENOUS
Status: CANCELLED | OUTPATIENT
Start: 2024-01-22

## 2024-01-15 RX ORDER — SODIUM CHLORIDE 9 MG/ML
INJECTION, SOLUTION INTRAVENOUS CONTINUOUS
Status: CANCELLED | OUTPATIENT
Start: 2024-01-22

## 2024-01-15 RX ORDER — MEPERIDINE HYDROCHLORIDE 25 MG/ML
12.5 INJECTION INTRAMUSCULAR; INTRAVENOUS; SUBCUTANEOUS PRN
Status: CANCELLED | OUTPATIENT
Start: 2024-01-22

## 2024-01-15 RX ORDER — ALBUTEROL SULFATE 90 UG/1
4 AEROSOL, METERED RESPIRATORY (INHALATION) PRN
Status: CANCELLED | OUTPATIENT
Start: 2024-01-22

## 2024-01-15 RX ORDER — ONDANSETRON 2 MG/ML
8 INJECTION INTRAMUSCULAR; INTRAVENOUS
Status: CANCELLED | OUTPATIENT
Start: 2024-01-22

## 2024-01-15 RX ORDER — EPINEPHRINE 1 MG/ML
0.3 INJECTION, SOLUTION INTRAMUSCULAR; SUBCUTANEOUS PRN
Status: CANCELLED | OUTPATIENT
Start: 2024-01-22

## 2024-01-17 ENCOUNTER — HOSPITAL ENCOUNTER (OUTPATIENT)
Facility: HOSPITAL | Age: 39
Discharge: HOME | End: 2024-01-20

## 2024-01-17 VITALS — HEART RATE: 95 BPM | RESPIRATION RATE: 18 BRPM | SYSTOLIC BLOOD PRESSURE: 133 MMHG | DIASTOLIC BLOOD PRESSURE: 90 MMHG

## 2024-01-17 DIAGNOSIS — C50.919 PRIMARY MALIGNANT NEOPLASM OF BREAST WITH METASTASIS (HCC): Primary | ICD-10-CM

## 2024-01-17 DIAGNOSIS — C79.31 SECONDARY MALIGNANT NEOPLASM OF BRAIN (HCC): ICD-10-CM

## 2024-01-17 DIAGNOSIS — C79.51 MALIGNANT NEOPLASM METASTATIC TO BONE (HCC): ICD-10-CM

## 2024-01-17 ASSESSMENT — PAIN DESCRIPTION - DESCRIPTORS: DESCRIPTORS: ACHING

## 2024-01-17 ASSESSMENT — PAIN SCALES - GENERAL: PAINLEVEL_OUTOF10: 3

## 2024-01-17 NOTE — PROGRESS NOTES
[facility] [place] [facility]  [facility]    Radiation Oncology Follow Up  Encounter Date: 01/17/24    Millie King  [id number]  1985     Diagnosis   C50.912, C79.31: stage IV [id number] -/-/+ left breast cancer with hepatic/pulmonary/osseous metastases on systemic therapy, new melissa metastasis    AJCC Staging has been reviewed.  Oncologic History   10/03/2022: Initially presented after abdominal US for abdominal pain showed multiple liver lesions. CT AP showed a spiculated left breast mass suspicious for malignancy as well as multiple lung and liver metastases. Multiple to normal size retroperitoneal lymph nodes are nonspecific with metastatic disease not excluded  10/04/2022: CT chest redemonstrated the breast mass in addition to diffuse bilateral pulmonary nodules most compatible with metastatic disease. Partially imaged hepatic hypodensities concerning for metastatic disease. Indeterminate 6 mm T10 lytic lesion. Liver biopsy showed ER 0%, MS 0%, her2/maxwell 3+ ki67 60% metastatic adenocarcinoma consistent with breast primary  10/17/2022 to 10/31/2022: Began palliative HP-taxol  11/07/2022: Switched to palliative THP  12/27/2022: CT CAP showed significant interval response to therapy including diminished pulmonary metastatic disease burden with numerous resolved or nearly resolved pulmonary nodules, diminished size of hepatic masses. The left breast lesion was not demonstrated.  01/02/2023: She experienced a ground-level fall with hip pain with hip XR and CT pelvis showing an acute right femoral neck pathologic fracture   01/03/2023: She underwent IMN placement in right hip  01/05/2023: Bone scan showed intense activity at the right hip, left sixth rib, and right sacrum  02/06/2023: Began xgeva  02/10/2023: She completed palliative RT to the right femur and sacrum (20Gy in 5fx)    Interval History   Ms. King arrives today for follow up for new MRI brain findings. She

## 2024-01-21 DIAGNOSIS — C78.7 MALIGNANT NEOPLASM OF BREAST METASTATIC TO LIVER (HCC): ICD-10-CM

## 2024-01-21 DIAGNOSIS — C50.919 MALIGNANT NEOPLASM OF BREAST METASTATIC TO LIVER (HCC): ICD-10-CM

## 2024-01-21 DIAGNOSIS — G89.3 CANCER RELATED PAIN: ICD-10-CM

## 2024-01-22 ENCOUNTER — OFFICE VISIT (OUTPATIENT)
Age: 39
End: 2024-01-22
Payer: COMMERCIAL

## 2024-01-22 ENCOUNTER — HOSPITAL ENCOUNTER (OUTPATIENT)
Facility: HOSPITAL | Age: 39
Setting detail: INFUSION SERIES
Discharge: HOME | End: 2024-01-22
Payer: COMMERCIAL

## 2024-01-22 VITALS
WEIGHT: 189 LBS | OXYGEN SATURATION: 98 % | HEART RATE: 81 BPM | DIASTOLIC BLOOD PRESSURE: 87 MMHG | RESPIRATION RATE: 18 BRPM | SYSTOLIC BLOOD PRESSURE: 134 MMHG | TEMPERATURE: 98 F | BODY MASS INDEX: 34.56 KG/M2

## 2024-01-22 VITALS
SYSTOLIC BLOOD PRESSURE: 163 MMHG | RESPIRATION RATE: 18 BRPM | TEMPERATURE: 98 F | WEIGHT: 189.2 LBS | HEART RATE: 67 BPM | BODY MASS INDEX: 34.82 KG/M2 | HEIGHT: 62 IN | DIASTOLIC BLOOD PRESSURE: 80 MMHG

## 2024-01-22 DIAGNOSIS — C79.51 MALIGNANT NEOPLASM METASTATIC TO BONE (HCC): Primary | ICD-10-CM

## 2024-01-22 DIAGNOSIS — Z79.899 ENCOUNTER FOR MONITORING CARDIOTOXIC DRUG THERAPY: ICD-10-CM

## 2024-01-22 DIAGNOSIS — C78.01 MALIGNANT NEOPLASM METASTATIC TO BOTH LUNGS (HCC): ICD-10-CM

## 2024-01-22 DIAGNOSIS — C78.7 MALIGNANT NEOPLASM METASTATIC TO LIVER (HCC): Primary | ICD-10-CM

## 2024-01-22 DIAGNOSIS — Z95.828 PORT-A-CATH IN PLACE: ICD-10-CM

## 2024-01-22 DIAGNOSIS — C79.31 METASTASIS TO BRAIN (HCC): ICD-10-CM

## 2024-01-22 DIAGNOSIS — C78.02 MALIGNANT NEOPLASM METASTATIC TO BOTH LUNGS (HCC): ICD-10-CM

## 2024-01-22 DIAGNOSIS — T45.1X5A CHEMOTHERAPY-INDUCED NEUROPATHY (HCC): ICD-10-CM

## 2024-01-22 DIAGNOSIS — Z09 CHEMOTHERAPY FOLLOW-UP EXAMINATION: ICD-10-CM

## 2024-01-22 DIAGNOSIS — Z87.311 HISTORY OF PATHOLOGIC FRACTURE: ICD-10-CM

## 2024-01-22 DIAGNOSIS — Z51.81 ENCOUNTER FOR MONITORING CARDIOTOXIC DRUG THERAPY: ICD-10-CM

## 2024-01-22 DIAGNOSIS — G62.0 CHEMOTHERAPY-INDUCED NEUROPATHY (HCC): ICD-10-CM

## 2024-01-22 DIAGNOSIS — G89.3 CANCER RELATED PAIN: ICD-10-CM

## 2024-01-22 LAB
ALBUMIN SERPL-MCNC: 4.2 G/DL (ref 3.5–5)
ALBUMIN/GLOB SERPL: 1.5 (ref 1.1–2.2)
ALP SERPL-CCNC: 97 U/L (ref 45–117)
ALT SERPL-CCNC: 24 U/L (ref 12–78)
ANION GAP SERPL CALC-SCNC: 5 MMOL/L (ref 5–15)
AST SERPL-CCNC: 10 U/L (ref 15–37)
BASOPHILS # BLD: 0 K/UL (ref 0–0.1)
BASOPHILS NFR BLD: 0 % (ref 0–1)
BILIRUB SERPL-MCNC: 0.2 MG/DL (ref 0.2–1)
BUN SERPL-MCNC: 16 MG/DL (ref 6–20)
BUN/CREAT SERPL: 20 (ref 12–20)
CALCIUM SERPL-MCNC: 9.2 MG/DL (ref 8.5–10.1)
CHLORIDE SERPL-SCNC: 105 MMOL/L (ref 97–108)
CO2 SERPL-SCNC: 27 MMOL/L (ref 21–32)
CREAT SERPL-MCNC: 0.81 MG/DL (ref 0.55–1.02)
DIFFERENTIAL METHOD BLD: ABNORMAL
EOSINOPHIL # BLD: 0 K/UL (ref 0–0.4)
EOSINOPHIL NFR BLD: 0 % (ref 0–7)
ERYTHROCYTE [DISTWIDTH] IN BLOOD BY AUTOMATED COUNT: 15.5 % (ref 11.5–14.5)
GLOBULIN SER CALC-MCNC: 2.8 G/DL (ref 2–4)
GLUCOSE SERPL-MCNC: 149 MG/DL (ref 65–100)
HCT VFR BLD AUTO: 36.4 % (ref 35–47)
HGB BLD-MCNC: 11.9 G/DL (ref 11.5–16)
IMM GRANULOCYTES # BLD AUTO: 0 K/UL
IMM GRANULOCYTES NFR BLD AUTO: 0 %
LYMPHOCYTES # BLD: 2.4 K/UL (ref 0.8–3.5)
LYMPHOCYTES NFR BLD: 13 % (ref 12–49)
MAGNESIUM SERPL-MCNC: 2.4 MG/DL (ref 1.6–2.4)
MCH RBC QN AUTO: 28.7 PG (ref 26–34)
MCHC RBC AUTO-ENTMCNC: 32.7 G/DL (ref 30–36.5)
MCV RBC AUTO: 87.9 FL (ref 80–99)
MONOCYTES # BLD: 1.1 K/UL (ref 0–1)
MONOCYTES NFR BLD: 6 % (ref 5–13)
MYELOCYTES NFR BLD MANUAL: 2 %
NEUTS SEG # BLD: 14.9 K/UL (ref 1.8–8)
NEUTS SEG NFR BLD: 79 % (ref 32–75)
NRBC # BLD: 0 K/UL (ref 0–0.01)
NRBC BLD-RTO: 0 PER 100 WBC
PHOSPHATE SERPL-MCNC: 3.1 MG/DL (ref 2.6–4.7)
PLATELET # BLD AUTO: 476 K/UL (ref 150–400)
PMV BLD AUTO: 9.2 FL (ref 8.9–12.9)
POTASSIUM SERPL-SCNC: 3.8 MMOL/L (ref 3.5–5.1)
PROT SERPL-MCNC: 7 G/DL (ref 6.4–8.2)
RBC # BLD AUTO: 4.14 M/UL (ref 3.8–5.2)
RBC MORPH BLD: ABNORMAL
SODIUM SERPL-SCNC: 137 MMOL/L (ref 136–145)
WBC # BLD AUTO: 18.8 K/UL (ref 3.6–11)

## 2024-01-22 PROCEDURE — 2500000003 HC RX 250 WO HCPCS: Performed by: INTERNAL MEDICINE

## 2024-01-22 PROCEDURE — 83735 ASSAY OF MAGNESIUM: CPT

## 2024-01-22 PROCEDURE — 96413 CHEMO IV INFUSION 1 HR: CPT

## 2024-01-22 PROCEDURE — A4216 STERILE WATER/SALINE, 10 ML: HCPCS | Performed by: INTERNAL MEDICINE

## 2024-01-22 PROCEDURE — 96375 TX/PRO/DX INJ NEW DRUG ADDON: CPT

## 2024-01-22 PROCEDURE — 96401 CHEMO ANTI-NEOPL SQ/IM: CPT

## 2024-01-22 PROCEDURE — 80053 COMPREHEN METABOLIC PANEL: CPT

## 2024-01-22 PROCEDURE — 96372 THER/PROPH/DIAG INJ SC/IM: CPT

## 2024-01-22 PROCEDURE — 84100 ASSAY OF PHOSPHORUS: CPT

## 2024-01-22 PROCEDURE — 2580000003 HC RX 258: Performed by: INTERNAL MEDICINE

## 2024-01-22 PROCEDURE — 36415 COLL VENOUS BLD VENIPUNCTURE: CPT

## 2024-01-22 PROCEDURE — 85025 COMPLETE CBC W/AUTO DIFF WBC: CPT

## 2024-01-22 PROCEDURE — 99215 OFFICE O/P EST HI 40 MIN: CPT | Performed by: INTERNAL MEDICINE

## 2024-01-22 PROCEDURE — 6360000002 HC RX W HCPCS: Performed by: INTERNAL MEDICINE

## 2024-01-22 RX ORDER — SODIUM CHLORIDE 9 MG/ML
5-250 INJECTION, SOLUTION INTRAVENOUS PRN
Status: DISCONTINUED | OUTPATIENT
Start: 2024-01-22 | End: 2024-01-23 | Stop reason: HOSPADM

## 2024-01-22 RX ORDER — DIPHENHYDRAMINE HYDROCHLORIDE 50 MG/ML
50 INJECTION INTRAMUSCULAR; INTRAVENOUS
OUTPATIENT
Start: 2024-03-03

## 2024-01-22 RX ORDER — SODIUM CHLORIDE 9 MG/ML
INJECTION, SOLUTION INTRAVENOUS CONTINUOUS
OUTPATIENT
Start: 2024-03-03

## 2024-01-22 RX ORDER — ONDANSETRON 2 MG/ML
8 INJECTION INTRAMUSCULAR; INTRAVENOUS
OUTPATIENT
Start: 2024-03-03

## 2024-01-22 RX ORDER — DIPHENHYDRAMINE HYDROCHLORIDE 50 MG/ML
25 INJECTION INTRAMUSCULAR; INTRAVENOUS ONCE
Status: COMPLETED | OUTPATIENT
Start: 2024-01-22 | End: 2024-01-22

## 2024-01-22 RX ORDER — ACETAMINOPHEN 325 MG/1
650 TABLET ORAL
Status: DISCONTINUED | OUTPATIENT
Start: 2024-01-22 | End: 2024-01-23 | Stop reason: HOSPADM

## 2024-01-22 RX ORDER — DEXAMETHASONE SODIUM PHOSPHATE 10 MG/ML
10 INJECTION, SOLUTION INTRAMUSCULAR; INTRAVENOUS ONCE
Status: COMPLETED | OUTPATIENT
Start: 2024-01-22 | End: 2024-01-22

## 2024-01-22 RX ORDER — EPINEPHRINE 1 MG/ML
0.3 INJECTION, SOLUTION INTRAMUSCULAR; SUBCUTANEOUS PRN
OUTPATIENT
Start: 2024-03-03

## 2024-01-22 RX ORDER — OXYCODONE HYDROCHLORIDE 20 MG/1
20 TABLET ORAL EVERY 4 HOURS PRN
Qty: 90 TABLET | Refills: 0 | Status: SHIPPED | OUTPATIENT
Start: 2024-02-06 | End: 2024-02-21

## 2024-01-22 RX ORDER — ACETAMINOPHEN 325 MG/1
650 TABLET ORAL
OUTPATIENT
Start: 2024-03-03

## 2024-01-22 RX ORDER — ALBUTEROL SULFATE 90 UG/1
4 AEROSOL, METERED RESPIRATORY (INHALATION) PRN
OUTPATIENT
Start: 2024-03-03

## 2024-01-22 RX ORDER — HEPARIN 100 UNIT/ML
500 SYRINGE INTRAVENOUS PRN
Status: DISCONTINUED | OUTPATIENT
Start: 2024-01-22 | End: 2024-01-23 | Stop reason: HOSPADM

## 2024-01-22 RX ORDER — OXYCODONE HYDROCHLORIDE 20 MG/1
20 TABLET ORAL EVERY 4 HOURS PRN
Qty: 90 TABLET | Refills: 0 | Status: SHIPPED | OUTPATIENT
Start: 2024-01-22 | End: 2024-02-06

## 2024-01-22 RX ORDER — SODIUM CHLORIDE 0.9 % (FLUSH) 0.9 %
5-40 SYRINGE (ML) INJECTION PRN
Status: DISCONTINUED | OUTPATIENT
Start: 2024-01-22 | End: 2024-01-23 | Stop reason: HOSPADM

## 2024-01-22 RX ADMIN — SODIUM CHLORIDE 25 ML/HR: 9 INJECTION, SOLUTION INTRAVENOUS at 11:57

## 2024-01-22 RX ADMIN — DEXAMETHASONE SODIUM PHOSPHATE 10 MG: 10 INJECTION, SOLUTION INTRAMUSCULAR; INTRAVENOUS at 11:58

## 2024-01-22 RX ADMIN — DENOSUMAB 120 MG: 120 INJECTION SUBCUTANEOUS at 11:01

## 2024-01-22 RX ADMIN — FAMOTIDINE 20 MG: 10 INJECTION, SOLUTION INTRAVENOUS at 12:01

## 2024-01-22 RX ADMIN — PERTUZUMAB, TRASTUZUMAB, AND HYALURONIDASE-ZZXF 10 ML: 600; 600; 2000 INJECTION, SOLUTION SUBCUTANEOUS at 11:49

## 2024-01-22 RX ADMIN — DIPHENHYDRAMINE HYDROCHLORIDE 25 MG: 50 INJECTION INTRAMUSCULAR; INTRAVENOUS at 12:02

## 2024-01-22 RX ADMIN — DOCETAXEL ANHYDROUS 106 MG: 10 INJECTION, SOLUTION INTRAVENOUS at 12:35

## 2024-01-22 NOTE — PROGRESS NOTES
Millie King is a 38 y.o. female      Chief Complaint   Patient presents with    Follow-up     Metastatic Breast Cancer       1. Have you been to the ER, urgent care clinic since your last visit?  Hospitalized since your last visit?No    2. Have you seen or consulted any other health care providers outside of the [facility] since your last visit?  Include any pap smears or colon screening. No      
Satisfactory for evaluation       ER/MS negative, Her2 3+       0/11/22  ECHO ADULT COMPLETE 10/12/2022 10/12/2022  Interpretation Summary  Left Ventricle: Normal left ventricular systolic function. EF by visual approximation is 55%. Left ventricle size is normal. Normal wall thickness. Normal wall  motion. Normal diastolic function.  Aortic Valve: Valve structure is normal. Mild sclerosis of the aortic valve cusp.  GLS is inaccurate therefore not reported.      Signed by: Kenan Gomez MD on 10/12/2022 12:30 PM      CT C/A/P 12/27/22   RECIST        TARGET LESIONS:                                  1. Right hepatic lesion posteriorly 13 x 12 mm       2. Right hepatic lesion subcapsular 15 x 20 mm       NONTARGET LESIONS:   Scattered small pulmonary nodules       IMPRESSION:   Imaging findings consistent with significant interval response to therapy.   Significantly diminished pulmonary metastatic disease burden with numerous   resolved or nearly resolved pulmonary nodules.       Significantly diminished size of hepatic masses.       Left breast lesion is not demonstrated on the current exam.      Right Hip XRAY 1/2/23   IMPRESSION   Right basicervical femoral neck fracture with medial angulation.      CT Pelv 1/2/23   IMPRESSION:   Redemonstrated acute right basicervical femoral neck fracture, with findings   concerning for pathologic etiology.       XR Femur 1/2/23   IMPRESSION:   Mildly displaced right femoral neck fracture      Bone Scan 1/5/23   FINDINGS:    There is expected intense activity at the right hip fracture site.   There is focal intense activity in the right sacrum, with correlating subtle   osseous lesion on CT.   There is small focal mild activity in the posterior approximate left sixth rib   without CT correlate.   There is mild activity of the distal right femoral diametaphysis with no   radiographic correlate.       IMPRESSION   1. Expected intense activity at the right hip fracture site.   2.

## 2024-01-22 NOTE — PROGRESS NOTES
[facility] Progress Note    08:00 Ms. King Arrived ambulatory and in no distress for PHESGO/DOCETAXEL/XGEVA (C21D1) regimen.  Assessment was completed, no acute issues at this time, no new complaints voiced.  Port accessed without difficulty, labs drawn and processed.    Ms. King's vitals were reviewed.  Patient Vitals for the past 12 hrs:   Temp Pulse Resp BP   01/22/24 1339 -- 67 -- (!) 163/80   01/22/24 0830 98 °F (36.7 °C) 81 18 (!) 152/87         Lab results were obtained and reviewed.  Recent Results (from the past 12 hour(s))   Phosphorus    Collection Time: 01/22/24  8:21 AM   Result Value Ref Range    Phosphorus 3.1 2.6 - 4.7 MG/DL   Magnesium    Collection Time: 01/22/24  8:21 AM   Result Value Ref Range    Magnesium 2.4 1.6 - 2.4 mg/dL   Comprehensive metabolic panel    Collection Time: 01/22/24  8:21 AM   Result Value Ref Range    Sodium 137 136 - 145 mmol/L    Potassium 3.8 3.5 - 5.1 mmol/L    Chloride 105 97 - 108 mmol/L    CO2 27 21 - 32 mmol/L    Anion Gap 5 5 - 15 mmol/L    Glucose 149 (H) 65 - 100 mg/dL    BUN 16 6 - 20 MG/DL    Creatinine 0.81 0.55 - 1.02 MG/DL    Bun/Cre Ratio 20 12 - 20      Est, Glom Filt Rate >60 >60 ml/min/1.73m2    Calcium 9.2 8.5 - 10.1 MG/DL    Total Bilirubin 0.2 0.2 - 1.0 MG/DL    ALT 24 12 - 78 U/L    AST 10 (L) 15 - 37 U/L    Alk Phosphatase 97 45 - 117 U/L    Total Protein 7.0 6.4 - 8.2 g/dL    Albumin 4.2 3.5 - 5.0 g/dL    Globulin 2.8 2.0 - 4.0 g/dL    Albumin/Globulin Ratio 1.5 1.1 - 2.2     CBC with Auto Differential    Collection Time: 01/22/24  8:21 AM   Result Value Ref Range    WBC 18.8 (H) 3.6 - 11.0 K/uL    RBC 4.14 3.80 - 5.20 M/uL    Hemoglobin 11.9 11.5 - 16.0 g/dL    Hematocrit 36.4 35.0 - 47.0 %    MCV 87.9 80.0 - 99.0 FL    MCH 28.7 26.0 - 34.0 PG    MCHC 32.7 30.0 - 36.5 g/dL    RDW 15.5 (H) 11.5 - 14.5 %    Platelets 476 (H) 150 - 400 K/uL    MPV 9.2 8.9 - 12.9 FL    Nucleated RBCs 0.0 0  WBC    nRBC 0.00 0.00 - 0.01 K/uL    Neutrophils % 79  (H) 32 - 75 %    Lymphocytes % 13 12 - 49 %    Monocytes % 6 5 - 13 %    Eosinophils % 0 0 - 7 %    Basophils % 0 0 - 1 %    Myelocytes 2 (H) 0 %    Immature Granulocytes 0 %    Neutrophils Absolute 14.9 (H) 1.8 - 8.0 K/UL    Lymphocytes Absolute 2.4 0.8 - 3.5 K/UL    Monocytes Absolute 1.1 (H) 0.0 - 1.0 K/UL    Eosinophils Absolute 0.0 0.0 - 0.4 K/UL    Basophils Absolute 0.0 0.0 - 0.1 K/UL    Absolute Immature Granulocyte 0.0 K/UL    Differential Type MANUAL      RBC Comment ANISOCYTOSIS  1+        RBC Comment TEARDROP CELLS  PRESENT        RBC Comment POIKILOCYTOSIS  PRESENT           Pre-medications  were administered as ordered and chemotherapy was initiated.  Medications Administered         0.9 % sodium chloride infusion Admin Date  01/22/2024 Action  New Bag Dose  25 mL/hr Rate  25 mL/hr Route  IntraVENous Administered By  Alexandra King RN        denosumab (XGEVA) SC injection 120 mg Admin Date  01/22/2024 Action  Given Dose  120 mg Rate   Route  SubCUTAneous Administered By  Alexandra King RN        dexAMETHasone (PF) (DECADRON) injection 10 mg Admin Date  01/22/2024 Action  Given Dose  10 mg Rate   Route  IntraVENous Administered By  Alexandra King RN        diphenhydrAMINE (BENADRYL) injection 25 mg Admin Date  01/22/2024 Action  Given Dose  25 mg Rate   Route  IntraVENous Administered By  Alexandra King RN        DOCEtaxel (TAXOTERE) 106 mg in sodium chloride 0.9 % 250 mL chemo IVPB Admin Date  01/22/2024 Action  New Bag Dose  106 mg Rate  285.6 mL/hr Route  IntraVENous Administered By  Alexandra King RN        famotidine (PEPCID) 20 mg in sodium chloride (PF) 0.9 % 10 mL injection Admin Date  01/22/2024 Action  Given Dose  20 mg Rate   Route  IntraVENous Administered By  Alexandra King RN        pertuzumab 600 mg-trastuzumab 600 mg-hyaluronidase-zzxf 20,000 units/10 mL (PHESGO) chemo syringe Admin Date  01/22/2024 Action  Given Dose  10 mL Rate   Route  SubCUTAneous Administered By  Fernando

## 2024-01-22 NOTE — TELEPHONE ENCOUNTER
Palliative Medicine Clinic   [place]: [phone])    Patient Name: Millie King  YOB: 1985    Medication Refill Request    Patient is scheduled for follow up per PSR note:  []  YES  []   NO    PDMP reviewed:  [x] YES   []  System down / Unable  []  NO- Patient fills out of state    Medication:oxyCODONE (ROXICODONE) 20 MG    Dose and directions:Take 1 tablet by mouth every 4 hours   Number dispensed:90  Date filled (PDMP or Pharmacy):1/2/2024  # left:     filled (PDMP or Pharmacy):  #left:    Appropriate for refill:  [x]  YES  []  Early Request - Requires MD/NP Review      Other pertinent information for prescriber:

## 2024-01-24 ENCOUNTER — CLINICAL DOCUMENTATION (OUTPATIENT)
Facility: HOSPITAL | Age: 39
End: 2024-01-24

## 2024-01-24 NOTE — PROGRESS NOTES
Patient Assistance    Met with:     Navigator Type: Infusion  Documentation Type: Assistance Review  Contact Type: Telephone  Navigation Status: Copay Enrollment  Status of Patient Insurance Coverage: Patient has active coverage          Additional notes:     Drug Name: OTHER  Other Drug Name: Enhertu  Form of PAP Assistance: Copay

## 2024-01-26 DIAGNOSIS — C50.919 MALIGNANT NEOPLASM OF BREAST METASTATIC TO LIVER (HCC): ICD-10-CM

## 2024-01-26 DIAGNOSIS — C78.02 MALIGNANT NEOPLASM METASTATIC TO BOTH LUNGS (HCC): ICD-10-CM

## 2024-01-26 DIAGNOSIS — C79.51 MALIGNANT NEOPLASM METASTATIC TO BONE (HCC): ICD-10-CM

## 2024-01-26 DIAGNOSIS — C79.31 METASTASIS TO BRAIN (HCC): Primary | ICD-10-CM

## 2024-01-26 DIAGNOSIS — C78.7 MALIGNANT NEOPLASM OF BREAST METASTATIC TO LIVER (HCC): ICD-10-CM

## 2024-01-26 DIAGNOSIS — C78.01 MALIGNANT NEOPLASM METASTATIC TO BOTH LUNGS (HCC): ICD-10-CM

## 2024-01-26 RX ORDER — ALBUTEROL SULFATE 90 UG/1
4 AEROSOL, METERED RESPIRATORY (INHALATION) PRN
OUTPATIENT
Start: 2024-02-12

## 2024-01-26 RX ORDER — DIPHENHYDRAMINE HYDROCHLORIDE 50 MG/ML
50 INJECTION INTRAMUSCULAR; INTRAVENOUS
OUTPATIENT
Start: 2024-02-12

## 2024-01-26 RX ORDER — ONDANSETRON 2 MG/ML
8 INJECTION INTRAMUSCULAR; INTRAVENOUS
OUTPATIENT
Start: 2024-02-12

## 2024-01-26 RX ORDER — PALONOSETRON 0.05 MG/ML
0.25 INJECTION, SOLUTION INTRAVENOUS ONCE
OUTPATIENT
Start: 2024-02-12 | End: 2024-02-12

## 2024-01-26 RX ORDER — DEXTROSE MONOHYDRATE 50 MG/ML
5-250 INJECTION, SOLUTION INTRAVENOUS PRN
OUTPATIENT
Start: 2024-02-12

## 2024-01-26 RX ORDER — HEPARIN 100 UNIT/ML
500 SYRINGE INTRAVENOUS PRN
OUTPATIENT
Start: 2024-02-12

## 2024-01-26 RX ORDER — SODIUM CHLORIDE 9 MG/ML
INJECTION, SOLUTION INTRAVENOUS CONTINUOUS
OUTPATIENT
Start: 2024-02-12

## 2024-01-26 RX ORDER — EPINEPHRINE 1 MG/ML
0.3 INJECTION, SOLUTION, CONCENTRATE INTRAVENOUS PRN
OUTPATIENT
Start: 2024-02-12

## 2024-01-26 RX ORDER — SODIUM CHLORIDE 0.9 % (FLUSH) 0.9 %
5-40 SYRINGE (ML) INJECTION PRN
OUTPATIENT
Start: 2024-02-12

## 2024-01-26 RX ORDER — SODIUM CHLORIDE 9 MG/ML
5-250 INJECTION, SOLUTION INTRAVENOUS PRN
OUTPATIENT
Start: 2024-02-12

## 2024-01-26 RX ORDER — MEPERIDINE HYDROCHLORIDE 25 MG/ML
12.5 INJECTION INTRAMUSCULAR; INTRAVENOUS; SUBCUTANEOUS PRN
OUTPATIENT
Start: 2024-02-12

## 2024-01-26 RX ORDER — ACETAMINOPHEN 325 MG/1
650 TABLET ORAL
OUTPATIENT
Start: 2024-02-12

## 2024-01-28 DIAGNOSIS — F43.22 ADJUSTMENT DISORDER WITH ANXIOUS MOOD: ICD-10-CM

## 2024-01-28 DIAGNOSIS — G62.0 CHEMOTHERAPY-INDUCED NEUROPATHY (HCC): ICD-10-CM

## 2024-01-28 DIAGNOSIS — T45.1X5A CHEMOTHERAPY-INDUCED NEUROPATHY (HCC): ICD-10-CM

## 2024-01-28 RX ORDER — PREGABALIN 150 MG/1
150 CAPSULE ORAL 2 TIMES DAILY
Qty: 60 CAPSULE | Refills: 2 | Status: CANCELLED | OUTPATIENT
Start: 2024-01-28 | End: 2024-04-27

## 2024-01-29 RX ORDER — ESCITALOPRAM OXALATE 10 MG/1
10 TABLET ORAL DAILY
Qty: 30 TABLET | Refills: 2 | Status: SHIPPED | OUTPATIENT
Start: 2024-01-29

## 2024-01-29 RX ORDER — BUPROPION HYDROCHLORIDE 300 MG/1
300 TABLET ORAL DAILY
Qty: 30 TABLET | Refills: 2 | Status: SHIPPED | OUTPATIENT
Start: 2024-01-29

## 2024-01-29 NOTE — TELEPHONE ENCOUNTER
Palliative Medicine Clinic   [place]: [phone])    Patient Name: Millie King  YOB: 1985    Medication Refill Request    Patient is scheduled for follow up:  [x]  YES  []   NO  Next [facility] Med Clinic Visit: 2/15/2024    PDMP reviewed:  [x] YES   []  System down / Unable  []  NO- Patient fills out of state    Medication:escitalopram (LEXAPRO) 10 MG     Dose and directions:TAKE 1 TABLET BY MOUTH DAILY  Number dispensed:30  Date filled (PDMP or Pharmacy):  # left:        Appropriate for refill:  [x]  YES  []  Early Request - Requires MD/NP Review      Other pertinent information for prescriber:

## 2024-01-29 NOTE — TELEPHONE ENCOUNTER
Palliative Medicine Clinic   [place]: [phone] ([phone])    Patient Name: Millie King  YOB: 1985    Medication Refill Request    Patient is scheduled for follow up:  [x]  YES  []   NO  Next [facility] Med Clinic Visit: 2/15/2024    PDMP reviewed:  [x] YES   []  System down / Unable  []  NO- Patient fills out of state        Medication:buPROPion (WELLBUTRIN XL) 300 MG    Dose and directions:Take 1 tablet by mouth daily  Number dispensed:30  Date filled (PDMP or Pharmacy):  #left:    Appropriate for refill:  [x]  YES  []  Early Request - Requires MD/NP Review      Other pertinent information for prescriber:

## 2024-02-01 DIAGNOSIS — C79.31 MALIGNANT NEOPLASM OF BREAST METASTATIC TO BRAIN, UNSPECIFIED LATERALITY (HCC): Primary | ICD-10-CM

## 2024-02-01 DIAGNOSIS — C50.919 MALIGNANT NEOPLASM OF BREAST METASTATIC TO BRAIN, UNSPECIFIED LATERALITY (HCC): Primary | ICD-10-CM

## 2024-02-01 RX ORDER — CAPECITABINE 500 MG/1
2000 TABLET, FILM COATED ORAL 2 TIMES DAILY
Qty: 112 TABLET | Refills: 5 | Status: SHIPPED | OUTPATIENT
Start: 2024-02-01

## 2024-02-05 ENCOUNTER — CLINICAL DOCUMENTATION (OUTPATIENT)
Facility: HOSPITAL | Age: 39
End: 2024-02-05

## 2024-02-05 NOTE — PROGRESS NOTES
Patient Assistance    Met with: Spoke to patient               Additional notes: Obtained Copay Card through [employer] for Herceptin Hylecta

## 2024-02-06 DIAGNOSIS — C79.31 MALIGNANT NEOPLASM OF BREAST METASTATIC TO BRAIN, UNSPECIFIED LATERALITY (HCC): ICD-10-CM

## 2024-02-06 DIAGNOSIS — C50.919 MALIGNANT NEOPLASM OF BREAST METASTATIC TO BRAIN, UNSPECIFIED LATERALITY (HCC): ICD-10-CM

## 2024-02-07 ENCOUNTER — TELEPHONE (OUTPATIENT)
Age: 39
End: 2024-02-07

## 2024-02-07 DIAGNOSIS — C79.31 METASTASIS TO BRAIN (HCC): ICD-10-CM

## 2024-02-07 DIAGNOSIS — G89.3 CANCER RELATED PAIN: ICD-10-CM

## 2024-02-07 DIAGNOSIS — C50.919 MALIGNANT NEOPLASM OF BREAST METASTATIC TO LIVER (HCC): ICD-10-CM

## 2024-02-07 DIAGNOSIS — C78.7 MALIGNANT NEOPLASM OF BREAST METASTATIC TO LIVER (HCC): ICD-10-CM

## 2024-02-07 DIAGNOSIS — C79.51 MALIGNANT NEOPLASM METASTATIC TO BONE (HCC): ICD-10-CM

## 2024-02-07 DIAGNOSIS — C78.7 MALIGNANT NEOPLASM METASTATIC TO LIVER (HCC): Primary | ICD-10-CM

## 2024-02-07 RX ORDER — OXYCODONE HYDROCHLORIDE 20 MG/1
20 TABLET ORAL EVERY 4 HOURS PRN
Qty: 90 TABLET | Refills: 0 | Status: SHIPPED | OUTPATIENT
Start: 2024-02-07 | End: 2024-02-22

## 2024-02-07 RX ORDER — ONDANSETRON 4 MG/1
4-8 TABLET, ORALLY DISINTEGRATING ORAL EVERY 8 HOURS PRN
Qty: 60 TABLET | Refills: 1 | Status: SHIPPED | OUTPATIENT
Start: 2024-02-07

## 2024-02-07 NOTE — TELEPHONE ENCOUNTER
Oral Chemotherapy     Millie King is a  38 y.o.female  diagnosed with breast cancer . Ms. King is being treated with tucatinib, capecitabine, trastuzumab.     Medication name: capecitabine    Dose:  2000 mg   Frequency: twice a day  Administration schedule: for 14 day on, 7 days off every 21 days      Medication name: tucatinib    Dose:  300 mg   Frequency: twice a day      Ordering provider: Fang Cespedes DO  Start date: pending       Provided education on tucatinib, capecitabine, trastuzumab.    Refill for ondansetron sent to pharmacy.     Side effects of chemotherapy reviewed included s/s infection, anemia, appetite changes, thrombocytopenia, fatigue, hair loss/alopecia, bone pain, skin and nail changes, diarrhea/constipation and cardiac toxicity.    Patient given ways to manage these side effects and when to contact office.        Expected follow up date: Labs/office visit each cycle. Next cycle start on pending     Patient will be provided with an Oral Chemotherapy Journal.     Ms. King verbalized understanding of the information presented and all of the patient's questions were answered.       Beryl Sanchez, BETID, BCOP, BCPS    For Pharmacy Admin Tracking Only    Program: Medical Group  CPA in place:  Yes  Recommendation Provided To: Patient/Caregiver: 2 via Telephone  Intervention Detail: Refill(s) Provided  Intervention Accepted By: Patient/Caregiver: 2    Time Spent (min): 20

## 2024-02-07 NOTE — TELEPHONE ENCOUNTER
Palliative Medicine Clinic   [place]: [phone] ([phone])    Patient Name: Millie King  YOB: 1985    Medication Refill Request    Patient is scheduled for follow up:  [x]  YES  []   NO  Next [facility] Med Clinic Visit: 2/15/2024    PDMP reviewed:  [x] YES   []  System down / Unable  []  NO- Patient fills out of state    Medication:oxyCODONE (ROXICODONE) 20 MG    Dose and directions:Take 1 tablet by mouth every 4 hours  Number dispensed:90  Date filled (PDMP or Pharmacy):1/22/2024  # left:      Appropriate for refill:  [x]  YES  []  Early Request - Requires MD/NP Review      Other pertinent information for prescriber:

## 2024-02-12 ENCOUNTER — APPOINTMENT (OUTPATIENT)
Facility: HOSPITAL | Age: 39
End: 2024-02-12
Payer: COMMERCIAL

## 2024-02-13 ENCOUNTER — TELEPHONE (OUTPATIENT)
Age: 39
End: 2024-02-13

## 2024-02-13 ENCOUNTER — HOSPITAL ENCOUNTER (OUTPATIENT)
Facility: HOSPITAL | Age: 39
Setting detail: INFUSION SERIES
Discharge: HOME | End: 2024-02-13
Payer: COMMERCIAL

## 2024-02-13 ENCOUNTER — OFFICE VISIT (OUTPATIENT)
Age: 39
End: 2024-02-13
Payer: COMMERCIAL

## 2024-02-13 ENCOUNTER — CLINICAL DOCUMENTATION (OUTPATIENT)
Age: 39
End: 2024-02-13

## 2024-02-13 VITALS
DIASTOLIC BLOOD PRESSURE: 84 MMHG | WEIGHT: 199.2 LBS | TEMPERATURE: 97.4 F | SYSTOLIC BLOOD PRESSURE: 109 MMHG | BODY MASS INDEX: 36.66 KG/M2 | RESPIRATION RATE: 18 BRPM | HEIGHT: 62 IN | HEART RATE: 114 BPM

## 2024-02-13 VITALS
DIASTOLIC BLOOD PRESSURE: 84 MMHG | RESPIRATION RATE: 18 BRPM | HEART RATE: 117 BPM | SYSTOLIC BLOOD PRESSURE: 109 MMHG | TEMPERATURE: 97.4 F | WEIGHT: 199 LBS | OXYGEN SATURATION: 95 % | BODY MASS INDEX: 36.39 KG/M2

## 2024-02-13 DIAGNOSIS — C50.919 MALIGNANT NEOPLASM OF BREAST METASTATIC TO LIVER (HCC): ICD-10-CM

## 2024-02-13 DIAGNOSIS — C79.51 MALIGNANT NEOPLASM METASTATIC TO BONE (HCC): Primary | ICD-10-CM

## 2024-02-13 DIAGNOSIS — C50.919 MALIGNANT NEOPLASM OF BREAST METASTATIC TO BRAIN, UNSPECIFIED LATERALITY (HCC): ICD-10-CM

## 2024-02-13 DIAGNOSIS — Z51.81 ENCOUNTER FOR MONITORING CARDIOTOXIC DRUG THERAPY: ICD-10-CM

## 2024-02-13 DIAGNOSIS — C78.7 MALIGNANT NEOPLASM METASTATIC TO LIVER (HCC): Primary | ICD-10-CM

## 2024-02-13 DIAGNOSIS — C78.7 MALIGNANT NEOPLASM OF BREAST METASTATIC TO LIVER (HCC): ICD-10-CM

## 2024-02-13 DIAGNOSIS — C78.01 MALIGNANT NEOPLASM METASTATIC TO BOTH LUNGS (HCC): ICD-10-CM

## 2024-02-13 DIAGNOSIS — C79.31 MALIGNANT NEOPLASM OF BREAST METASTATIC TO BRAIN, UNSPECIFIED LATERALITY (HCC): ICD-10-CM

## 2024-02-13 DIAGNOSIS — C79.31 METASTASIS TO BRAIN (HCC): ICD-10-CM

## 2024-02-13 DIAGNOSIS — Z95.828 PORT-A-CATH IN PLACE: ICD-10-CM

## 2024-02-13 DIAGNOSIS — Z79.899 ENCOUNTER FOR MONITORING CARDIOTOXIC DRUG THERAPY: ICD-10-CM

## 2024-02-13 DIAGNOSIS — C78.02 MALIGNANT NEOPLASM METASTATIC TO BOTH LUNGS (HCC): ICD-10-CM

## 2024-02-13 LAB
ALBUMIN SERPL-MCNC: 3.3 G/DL (ref 3.5–5)
ALBUMIN/GLOB SERPL: 1.2 (ref 1.1–2.2)
ALP SERPL-CCNC: 104 U/L (ref 45–117)
ALT SERPL-CCNC: 68 U/L (ref 12–78)
ANION GAP SERPL CALC-SCNC: 5 MMOL/L (ref 5–15)
AST SERPL-CCNC: 33 U/L (ref 15–37)
BASOPHILS # BLD: 0 K/UL (ref 0–0.1)
BASOPHILS NFR BLD: 0 % (ref 0–1)
BILIRUB SERPL-MCNC: 0.3 MG/DL (ref 0.2–1)
BUN SERPL-MCNC: 12 MG/DL (ref 6–20)
BUN/CREAT SERPL: 17 (ref 12–20)
CALCIUM SERPL-MCNC: 8.7 MG/DL (ref 8.5–10.1)
CHLORIDE SERPL-SCNC: 107 MMOL/L (ref 97–108)
CO2 SERPL-SCNC: 29 MMOL/L (ref 21–32)
CREAT SERPL-MCNC: 0.71 MG/DL (ref 0.55–1.02)
DIFFERENTIAL METHOD BLD: ABNORMAL
EOSINOPHIL # BLD: 0.1 K/UL (ref 0–0.4)
EOSINOPHIL NFR BLD: 1 % (ref 0–7)
ERYTHROCYTE [DISTWIDTH] IN BLOOD BY AUTOMATED COUNT: 16.5 % (ref 11.5–14.5)
GLOBULIN SER CALC-MCNC: 2.7 G/DL (ref 2–4)
GLUCOSE SERPL-MCNC: 111 MG/DL (ref 65–100)
HCT VFR BLD AUTO: 36.9 % (ref 35–47)
HGB BLD-MCNC: 11.8 G/DL (ref 11.5–16)
IMM GRANULOCYTES # BLD AUTO: 0.1 K/UL (ref 0–0.04)
IMM GRANULOCYTES NFR BLD AUTO: 1 % (ref 0–0.5)
LYMPHOCYTES # BLD: 1.6 K/UL (ref 0.8–3.5)
LYMPHOCYTES NFR BLD: 24 % (ref 12–49)
MCH RBC QN AUTO: 28.9 PG (ref 26–34)
MCHC RBC AUTO-ENTMCNC: 32 G/DL (ref 30–36.5)
MCV RBC AUTO: 90.2 FL (ref 80–99)
MONOCYTES # BLD: 0.8 K/UL (ref 0–1)
MONOCYTES NFR BLD: 11 % (ref 5–13)
NEUTS SEG # BLD: 4.2 K/UL (ref 1.8–8)
NEUTS SEG NFR BLD: 63 % (ref 32–75)
NRBC # BLD: 0 K/UL (ref 0–0.01)
NRBC BLD-RTO: 0 PER 100 WBC
PLATELET # BLD AUTO: 282 K/UL (ref 150–400)
PMV BLD AUTO: 9.3 FL (ref 8.9–12.9)
POTASSIUM SERPL-SCNC: 3.9 MMOL/L (ref 3.5–5.1)
PROT SERPL-MCNC: 6 G/DL (ref 6.4–8.2)
RBC # BLD AUTO: 4.09 M/UL (ref 3.8–5.2)
SODIUM SERPL-SCNC: 141 MMOL/L (ref 136–145)
WBC # BLD AUTO: 6.7 K/UL (ref 3.6–11)

## 2024-02-13 PROCEDURE — 36415 COLL VENOUS BLD VENIPUNCTURE: CPT

## 2024-02-13 PROCEDURE — 6360000002 HC RX W HCPCS: Performed by: INTERNAL MEDICINE

## 2024-02-13 PROCEDURE — 96401 CHEMO ANTI-NEOPL SQ/IM: CPT

## 2024-02-13 PROCEDURE — 80053 COMPREHEN METABOLIC PANEL: CPT

## 2024-02-13 PROCEDURE — 99215 OFFICE O/P EST HI 40 MIN: CPT | Performed by: INTERNAL MEDICINE

## 2024-02-13 PROCEDURE — 85025 COMPLETE CBC W/AUTO DIFF WBC: CPT

## 2024-02-13 RX ORDER — ONDANSETRON 2 MG/ML
8 INJECTION INTRAMUSCULAR; INTRAVENOUS
Status: CANCELLED | OUTPATIENT
Start: 2024-02-13

## 2024-02-13 RX ORDER — DIPHENHYDRAMINE HYDROCHLORIDE 50 MG/ML
50 INJECTION INTRAMUSCULAR; INTRAVENOUS
Status: CANCELLED | OUTPATIENT
Start: 2024-02-13

## 2024-02-13 RX ORDER — SODIUM CHLORIDE 9 MG/ML
5-250 INJECTION, SOLUTION INTRAVENOUS PRN
Status: CANCELLED | OUTPATIENT
Start: 2024-02-13

## 2024-02-13 RX ORDER — MEPERIDINE HYDROCHLORIDE 25 MG/ML
12.5 INJECTION INTRAMUSCULAR; INTRAVENOUS; SUBCUTANEOUS PRN
Status: CANCELLED | OUTPATIENT
Start: 2024-02-13

## 2024-02-13 RX ORDER — ACETAMINOPHEN 325 MG/1
650 TABLET ORAL
Status: CANCELLED | OUTPATIENT
Start: 2024-02-13

## 2024-02-13 RX ORDER — EPINEPHRINE 1 MG/ML
0.3 INJECTION, SOLUTION, CONCENTRATE INTRAVENOUS PRN
Status: CANCELLED | OUTPATIENT
Start: 2024-02-13

## 2024-02-13 RX ORDER — SODIUM CHLORIDE 0.9 % (FLUSH) 0.9 %
5-40 SYRINGE (ML) INJECTION PRN
Status: CANCELLED | OUTPATIENT
Start: 2024-02-13

## 2024-02-13 RX ORDER — HEPARIN 100 UNIT/ML
500 SYRINGE INTRAVENOUS PRN
Status: CANCELLED | OUTPATIENT
Start: 2024-02-13

## 2024-02-13 RX ORDER — ALBUTEROL SULFATE 90 UG/1
4 AEROSOL, METERED RESPIRATORY (INHALATION) PRN
Status: CANCELLED | OUTPATIENT
Start: 2024-02-13

## 2024-02-13 RX ORDER — SODIUM CHLORIDE 9 MG/ML
INJECTION, SOLUTION INTRAVENOUS CONTINUOUS
Status: CANCELLED | OUTPATIENT
Start: 2024-02-13

## 2024-02-13 RX ADMIN — TRASTUZUMAB AND HYALURONIDASE-OYSK 600 MG: 600; 10000 INJECTION, SOLUTION SUBCUTANEOUS at 11:06

## 2024-02-13 NOTE — TELEPHONE ENCOUNTER
Called Radiology at #[phone]. Requested that Neuro-Radiologist re-read Brain MRI from 1/11/24.

## 2024-02-13 NOTE — PROGRESS NOTES
Oral Chemotherapy      Millie King is a  38 y.o.female  diagnosed with breast cancer . Ms. King is being treated with tucatinib, capecitabine, trastuzumab.      Medication name: capecitabine     Dose:  2000 mg   Frequency: twice a day  Administration schedule: for 14 day on, 7 days off every 21 days        Medication name: tucatinib     Dose:  300 mg   Frequency: twice a day        Ordering provider: Fang Cespedes DO  Start date: 2/13/24        Provided education on tucatinib, capecitabine, trastuzumab.          Reviewed side effects of chemotherapy to include s/s infection, anemia, appetite changes, thrombocytopenia, fatigue, hair loss/alopecia, bone pain, skin and nail changes, diarrhea/constipation and cardiac toxicity.     Patient given ways to manage these side effects and when to contact office.         Expected follow up date: Labs/office visit each cycle. Next cycle start on 3/4/24     Patient  provided with an Oral Chemotherapy Journal.      Ms. King verbalized understanding of the information presented and all of the patient's questions were answered.        Beryl Sanchez, BETID, BCOP, BCPS    For Pharmacy Admin Tracking Only    Program: Medical Group  CPA in place:  Yes  Recommendation Provided To: Patient/Caregiver: 1 via In person    Intervention Accepted By: Patient/Caregiver: 1    Time Spent (min): 15

## 2024-02-13 NOTE — TELEPHONE ENCOUNTER
Called patient to advise/confirm upcoming appt with Dr. Nieves on 2/15/2024 at 11:00  for a vv. Left a message.   [FreeTextEntry1] : HPI: ORIGINAL PRESENTATION: This is a 53 year old man who has been seen in the practice for many years, he has chronic left lumbar pain into the left leg. The patient has had this pain for 5 years. The pain has worsened in the last 3 years. The pain started after no events. Patient describes pain as severe.. During the last month the pain has been in no typical pattern. Pain described as burning, sharp, shooting, cutting, pressure-like, cramping, dull/aching, throbbing. Pain is associated with numbness and pins and needles into the lower extremities. Patient has weakness in the upper and lower extremities. Pain is not changed with lying down, standing, sitting, walking, exercise, relaxation, coughing sneezing or bowel movements. The patient experiences frequent constipation   ACTIVITIES: Patient could walk less than 1 blocks before the pain starts. Patient can sit 5 minutes before pain starts. Patient can stand 5 minutes before pain starts. The patient constantly lays down because of pain. Patient uses a cane at this time. Patient has difficulty performing household chores, going to work, doing yardwork or shopping, socializing with friends, participating in recreational activities and exercising at this time.  TODAY: The reason for the visit is for a continuing active encounter. He continues with back pain and radiculopathy. He also has numbness in his left thigh. He is medically managed at our office. He has been taking Xtampza ER 18 mg BID and Percocet 10/325 mg 3 times daily for breakthrough pain. He also continues with Gabapentin 600 mg TID. He is no longer taking Mobic PRN or baclofen. His medication regimen is giving him good relief without side effects and he wishes to continue as is.  SOAPP (a Screener and Opioid Assessment for Patients with Pain) Low risk: Low risk.   UDS, 6/6/23 - Consistent.

## 2024-02-13 NOTE — PROGRESS NOTES
[facility] Progress Note    Ms. King Arrived ambulatory and in no distress for Herceptin Hylecta (C1D1) regimen.  Assessment was completed, no acute issues at this time, no new complaints voiced.  Port accessed without difficulty, labs drawn and processed.    Ms. King's vitals were reviewed.  Patient Vitals for the past 12 hrs:   Temp Pulse Resp BP   02/13/24 0855 97.4 °F (36.3 °C) (!) 114 18 109/84       Lab results were obtained and reviewed.  Recent Results (from the past 12 hour(s))   Comprehensive metabolic panel    Collection Time: 02/13/24  9:08 AM   Result Value Ref Range    Sodium 141 136 - 145 mmol/L    Potassium 3.9 3.5 - 5.1 mmol/L    Chloride 107 97 - 108 mmol/L    CO2 29 21 - 32 mmol/L    Anion Gap 5 5 - 15 mmol/L    Glucose 111 (H) 65 - 100 mg/dL    BUN 12 6 - 20 MG/DL    Creatinine 0.71 0.55 - 1.02 MG/DL    Bun/Cre Ratio 17 12 - 20      Est, Glom Filt Rate >60 >60 ml/min/1.73m2    Calcium 8.7 8.5 - 10.1 MG/DL    Total Bilirubin 0.3 0.2 - 1.0 MG/DL    ALT 68 12 - 78 U/L    AST 33 15 - 37 U/L    Alk Phosphatase 104 45 - 117 U/L    Total Protein 6.0 (L) 6.4 - 8.2 g/dL    Albumin 3.3 (L) 3.5 - 5.0 g/dL    Globulin 2.7 2.0 - 4.0 g/dL    Albumin/Globulin Ratio 1.2 1.1 - 2.2     CBC With Auto Differential    Collection Time: 02/13/24  9:08 AM   Result Value Ref Range    WBC 6.7 3.6 - 11.0 K/uL    RBC 4.09 3.80 - 5.20 M/uL    Hemoglobin 11.8 11.5 - 16.0 g/dL    Hematocrit 36.9 35.0 - 47.0 %    MCV 90.2 80.0 - 99.0 FL    MCH 28.9 26.0 - 34.0 PG    MCHC 32.0 30.0 - 36.5 g/dL    RDW 16.5 (H) 11.5 - 14.5 %    Platelets 282 150 - 400 K/uL    MPV 9.3 8.9 - 12.9 FL    Nucleated RBCs 0.0 0  WBC    nRBC 0.00 0.00 - 0.01 K/uL    Neutrophils % 63 32 - 75 %    Lymphocytes % 24 12 - 49 %    Monocytes % 11 5 - 13 %    Eosinophils % 1 0 - 7 %    Basophils % 0 0 - 1 %    Immature Granulocytes 1 (H) 0.0 - 0.5 %    Neutrophils Absolute 4.2 1.8 - 8.0 K/UL    Lymphocytes Absolute 1.6 0.8 - 3.5 K/UL    Monocytes Absolute

## 2024-02-13 NOTE — PROGRESS NOTES
Millie King is a 38 y.o. female    Chief Complaint   Patient presents with    Follow-up     Metastatic Breast Cancer       1. Have you been to the ER, urgent care clinic since your last visit?  Hospitalized since your last visit?No    2. Have you seen or consulted any other health care providers outside of the [facility] since your last visit?  Include any pap smears or colon screening. Yes, Michelle      
enhancement within the right breast to indicate breast carcinoma.There are no suspicious internal mammary or axillary chain lymph nodes. LEFT BREAST: Background parenchymal enhancement: Mild. There is no suspicious masslike or nonmasslike enhancement within the left breast to indicate breast carcinoma. There are no suspicious internal mammary or axillary chain lymph nodes  Brain MRI 6/13/23: No significant abnormality or acute process  CT C/A/P 7/21/23: Imaging findings consistent with interval response to therapy. Slightly diminished hepatic mass lesions. Small right-sided pleural effusion is new. No new suspicious pulmonary mass or nodule  CT C/A/P 10/18/23: Minimal right pleural fluid, decreased in quantity. Continued interval decrease in size, number and conspicuity of hepatic lesions  Brain MRI 1/11/24: Imaging findings concerning for interval metastatic disease. There is a small 2.9 mm enhancing focus in the periventricular white matter of the right frontal lobe. No associated midline shift or mass effect. No intracranial hemorrhage or evidence of acute infarction  CT C/A/P 1/11/24: Stable small hepatic lesions and overall hepatic morphology with no new hepatic or other metastatic disease in the chest, abdomen or pelvis. Stable tiny bilateral pleural effusions, significance uncertain    2/13/24 tucatinib to capecitabine/trastuzumab     STAGE: 4 ER/NC negative Her2 3+    History of Present Illness:   Millie Knig is a 38 y.o. female seen today in office for follow up of MBC for new regimen    tucatinib to capecitabine/trastuzumab   Post GK to brain 2 weeks  Doing ok overall  Off steroids now.   Feels tired.   Vision better.   No fevers/ chills/ chest pain/ SOB/ nausea/ vomiting/diarrhea/ pain/fatigue          Last note:  on palliative chemo with Taxotere+HP since 11/7/22. She was having lumbar spine and sacrum worse with movement and had an MRI on 11/12/23 that showed ortho issues/no cancer. She had follow up

## 2024-02-15 ENCOUNTER — TELEMEDICINE (OUTPATIENT)
Age: 39
End: 2024-02-15
Payer: COMMERCIAL

## 2024-02-15 DIAGNOSIS — G62.0 CHEMOTHERAPY-INDUCED NEUROPATHY (HCC): Primary | ICD-10-CM

## 2024-02-15 DIAGNOSIS — T45.1X5A CHEMOTHERAPY-INDUCED NEUROPATHY (HCC): Primary | ICD-10-CM

## 2024-02-15 DIAGNOSIS — G89.3 CANCER RELATED PAIN: ICD-10-CM

## 2024-02-15 DIAGNOSIS — C50.919 MALIGNANT NEOPLASM OF BREAST METASTATIC TO LIVER (HCC): ICD-10-CM

## 2024-02-15 DIAGNOSIS — F43.22 ADJUSTMENT DISORDER WITH ANXIOUS MOOD: ICD-10-CM

## 2024-02-15 DIAGNOSIS — C78.7 MALIGNANT NEOPLASM OF BREAST METASTATIC TO LIVER (HCC): ICD-10-CM

## 2024-02-15 DIAGNOSIS — C79.31 METASTASIS TO BRAIN (HCC): ICD-10-CM

## 2024-02-15 PROCEDURE — 99215 OFFICE O/P EST HI 40 MIN: CPT | Performed by: INTERNAL MEDICINE

## 2024-02-15 RX ORDER — ESCITALOPRAM OXALATE 20 MG/1
20 TABLET ORAL DAILY
Qty: 90 TABLET | Refills: 1 | Status: SHIPPED | OUTPATIENT
Start: 2024-02-15

## 2024-02-15 RX ORDER — LEVETIRACETAM 500 MG/1
500 TABLET ORAL 2 TIMES DAILY
COMMUNITY

## 2024-02-15 RX ORDER — OXYMORPHONE HYDROCHLORIDE 20 MG/1
20 TABLET, FILM COATED, EXTENDED RELEASE ORAL EVERY 12 HOURS
Qty: 30 TABLET | Refills: 0 | Status: SHIPPED | OUTPATIENT
Start: 2024-02-15 | End: 2024-03-01

## 2024-02-15 RX ORDER — OXYCODONE HYDROCHLORIDE 20 MG/1
20 TABLET ORAL EVERY 4 HOURS PRN
Qty: 90 TABLET | Refills: 0 | Status: SHIPPED | OUTPATIENT
Start: 2024-02-21 | End: 2024-03-07

## 2024-02-15 RX ORDER — OXYMORPHONE HYDROCHLORIDE 30 MG/1
30 TABLET, FILM COATED, EXTENDED RELEASE ORAL EVERY 12 HOURS
Qty: 60 TABLET | Refills: 0 | Status: SHIPPED | OUTPATIENT
Start: 2024-02-15 | End: 2024-02-15

## 2024-02-15 NOTE — PROGRESS NOTES
Palliative Medicine Outpatient Services  [place]: [phone] ([phone])    Patient Name: Millie King  YOB: 1985    Date of Current Visit: 02/15/24  Location of Current Visit:    [] [facility] Office  [] [facility] Office  [] [facility] Office  [] Home  [x] Synchronous real-time A/V virtual visit    Date of Initial Palliative Medicine Visit: 6/8/23   Referral from: Dr. Manrique    REASON FOR VISIT:   38 year old woman with MBC seen for follow up pain and symptom management.     FOLLOW UP:   Return in about 2 weeks (around 2/29/2024) for 3 week in person follow up.     ASSESSMENT AND PLAN:     Cancer related pain- uncontrolled  - primarily right hip pain related to pathologic partially healed fracture s/p fixation.  There are no additional surgical options.   There is a significant neuropathic component to the hip pain; addition of Lyrica has been helpful.    - she also has diffuse joint aches related to chemotherapy.    - previously on Oxycontin 20 mg BID in 2023, insurance stopped covering for 2024.    - Rotated to MS Contin 30 mg BID end of December, this was been ineffective.   - long acting agents preferred by her insurer- Opana, hydromorphone ER, Fentanyl Td.  Fentanyl Td contraindicated right now due to excessive sweating.   - Rotated to Opana ER starting at 10 mg BID in January. 2024  - Current oxycodone IR use- 20 mg x 6 times per day.   - Total current MEDD:  240  - Plan:  - Increase Opana ER to 20  mg BID  - Continue oxycodone IR 20 mg every 4 hrs prn  - Continue Lyrica 150 mg q12h for neuropathic component to hip pain  - obtaining periodic Toxassure urine drug screening consistent with practice safe prescribing practices.   - Continues to have regular bowel movements without need for routine bowel regimen    Chemotherapy induced peripheral neuropathy  - continue Lyrica 150 mg BID (also taken for neuropathic component of hip pain)    Abnormal pelvic MRI  - performed on 11/16/23, reviewed today and while there

## 2024-02-21 DIAGNOSIS — C78.7 MALIGNANT NEOPLASM OF BREAST METASTATIC TO LIVER (HCC): Primary | ICD-10-CM

## 2024-02-21 DIAGNOSIS — G89.3 CANCER RELATED PAIN: ICD-10-CM

## 2024-02-21 DIAGNOSIS — C50.919 MALIGNANT NEOPLASM OF BREAST METASTATIC TO LIVER (HCC): Primary | ICD-10-CM

## 2024-02-21 PROBLEM — Z09 CHEMOTHERAPY FOLLOW-UP EXAMINATION: Status: RESOLVED | Noted: 2024-01-22 | Resolved: 2024-02-21

## 2024-02-21 RX ORDER — OXYCODONE HYDROCHLORIDE 20 MG/1
20 TABLET ORAL EVERY 4 HOURS PRN
Qty: 90 TABLET | Refills: 0 | Status: SHIPPED | OUTPATIENT
Start: 2024-02-22 | End: 2024-03-08

## 2024-02-21 NOTE — PROGRESS NOTES
Palliative Medicine Clinic   [place]: [phone] ([phone])    Patient Name: Millie King  YOB: 1985    Medication Refill Request    Patient is scheduled for follow up:  [x]  YES  []   NO  Next [facility] Med Clinic Visit: 3/5/24    PDMP reviewed:  [x] YES       Medication: Oxy IR 20 mg  Dose and directions: 1 every 4 prn  Number dispensed: 90for 15 day supply  Date filled (PDMP or Pharmacy): 2/7/25  # left:    Appropriate for refill:  [x]  YES     Oxy IR 20 mg prescription pended for Dr. Nieves.

## 2024-02-26 RX ORDER — DIPHENHYDRAMINE HYDROCHLORIDE 50 MG/ML
50 INJECTION INTRAMUSCULAR; INTRAVENOUS
Status: CANCELLED | OUTPATIENT
Start: 2024-03-04

## 2024-02-26 RX ORDER — SODIUM CHLORIDE 9 MG/ML
INJECTION, SOLUTION INTRAVENOUS CONTINUOUS
Status: CANCELLED | OUTPATIENT
Start: 2024-03-04

## 2024-02-26 RX ORDER — ACETAMINOPHEN 325 MG/1
650 TABLET ORAL
Status: CANCELLED | OUTPATIENT
Start: 2024-03-04

## 2024-02-26 RX ORDER — EPINEPHRINE 1 MG/ML
0.3 INJECTION, SOLUTION INTRAMUSCULAR; SUBCUTANEOUS PRN
Status: CANCELLED | OUTPATIENT
Start: 2024-03-04

## 2024-02-26 RX ORDER — ONDANSETRON 2 MG/ML
8 INJECTION INTRAMUSCULAR; INTRAVENOUS
Status: CANCELLED | OUTPATIENT
Start: 2024-03-04

## 2024-02-26 RX ORDER — ALBUTEROL SULFATE 90 UG/1
4 AEROSOL, METERED RESPIRATORY (INHALATION) PRN
Status: CANCELLED | OUTPATIENT
Start: 2024-03-04

## 2024-02-26 RX ORDER — MEPERIDINE HYDROCHLORIDE 25 MG/ML
12.5 INJECTION INTRAMUSCULAR; INTRAVENOUS; SUBCUTANEOUS PRN
Status: CANCELLED | OUTPATIENT
Start: 2024-03-04

## 2024-02-27 ENCOUNTER — HOSPITAL ENCOUNTER (EMERGENCY)
Facility: HOSPITAL | Age: 39
Discharge: HOME | End: 2024-02-27
Attending: STUDENT IN AN ORGANIZED HEALTH CARE EDUCATION/TRAINING PROGRAM
Payer: COMMERCIAL

## 2024-02-27 VITALS
OXYGEN SATURATION: 94 % | HEART RATE: 96 BPM | DIASTOLIC BLOOD PRESSURE: 71 MMHG | TEMPERATURE: 98.1 F | RESPIRATION RATE: 16 BRPM | SYSTOLIC BLOOD PRESSURE: 135 MMHG

## 2024-02-27 DIAGNOSIS — S99.922A INJURY OF TOE ON LEFT FOOT, INITIAL ENCOUNTER: Primary | ICD-10-CM

## 2024-02-27 DIAGNOSIS — T14.8XXA SKIN AVULSION: ICD-10-CM

## 2024-02-27 PROCEDURE — 99282 EMERGENCY DEPT VISIT SF MDM: CPT

## 2024-02-27 ASSESSMENT — PAIN - FUNCTIONAL ASSESSMENT: PAIN_FUNCTIONAL_ASSESSMENT: 0-10

## 2024-02-27 ASSESSMENT — ENCOUNTER SYMPTOMS
EYE DISCHARGE: 0
EYE REDNESS: 0

## 2024-02-27 ASSESSMENT — PAIN DESCRIPTION - LOCATION: LOCATION: FOOT

## 2024-02-27 ASSESSMENT — PAIN SCALES - GENERAL: PAINLEVEL_OUTOF10: 5

## 2024-02-27 ASSESSMENT — PAIN DESCRIPTION - ORIENTATION: ORIENTATION: LEFT

## 2024-02-27 ASSESSMENT — PAIN DESCRIPTION - PAIN TYPE: TYPE: ACUTE PAIN

## 2024-02-27 NOTE — DISCHARGE INSTRUCTIONS
Wash the wound twice a day with warm soapy water.  Keep the wound closed if you are going to be outside or walking barefoot.  Return to the emergency department if you develop signs of infection which include redness, warmth, drainage or pain to the toe.

## 2024-02-27 NOTE — ED PROVIDER NOTES
Triage Vitals [02/27/24 1029]   BP Temp Temp Source Pulse Respirations SpO2 Height Weight   135/71 98.1 °F (36.7 °C) Oral 96 16 94 % -- --       There is no height or weight on file to calculate BMI.    Physical Exam  Vitals and nursing note reviewed.   Constitutional:       General: She is not in acute distress.     Appearance: Normal appearance. She is not ill-appearing or toxic-appearing.   HENT:      Head: Normocephalic and atraumatic.   Eyes:      General: No scleral icterus.        Right eye: No discharge.         Left eye: No discharge.      Conjunctiva/sclera: Conjunctivae normal.   Cardiovascular:      Rate and Rhythm: Normal rate.      Pulses: Normal pulses.   Pulmonary:      Effort: Pulmonary effort is normal. No respiratory distress.   Musculoskeletal:         General: Normal range of motion.   Skin:     General: Skin is warm and dry.      Capillary Refill: Capillary refill takes less than 2 seconds.      Comments: +skin avulsion to the left 1st toe over the plantar surface    Neurological:      General: No focal deficit present.      Mental Status: She is alert.   Psychiatric:         Mood and Affect: Mood normal.         Behavior: Behavior normal.         DIAGNOSTIC RESULTS     EKG: All EKG's are interpreted by the Emergency Department Physician who either signs or Co-signs this chart in the absence of a cardiologist.         RADIOLOGY:   Non-plain film images such as CT, Ultrasound and MRI are read by the radiologist. Plain radiographic images are visualized and preliminarily interpreted by the emergency physician with the below findings:        Interpretation per the Radiologist below, if available at the time of this note:    No orders to display        LABS:  Labs Reviewed - No data to display    All other labs were within normal range or not returned as of this dictation.    EMERGENCY DEPARTMENT COURSE and DIFFERENTIAL DIAGNOSIS/MDM:   Vitals:    Vitals:    02/27/24 1029   BP: 135/71   Pulse: 96

## 2024-02-27 NOTE — ED TRIAGE NOTES
Ambulatory to treatment area with steady gait reports stubbing great toe on steps open wound bleeding controlled took oxycodone 1 hour prior to arrival

## 2024-02-28 ENCOUNTER — TELEPHONE (OUTPATIENT)
Age: 39
End: 2024-02-28

## 2024-02-28 DIAGNOSIS — T45.1X5A CHEMOTHERAPY-INDUCED NEUROPATHY (HCC): ICD-10-CM

## 2024-02-28 DIAGNOSIS — G62.0 CHEMOTHERAPY-INDUCED NEUROPATHY (HCC): ICD-10-CM

## 2024-02-28 RX ORDER — PREGABALIN 150 MG/1
150 CAPSULE ORAL 2 TIMES DAILY
Qty: 60 CAPSULE | Refills: 2 | OUTPATIENT
Start: 2024-02-28 | End: 2024-05-28

## 2024-02-28 NOTE — TELEPHONE ENCOUNTER
Oral Chemotherapy      Millie King is a  38 y.o.female  diagnosed with breast cancer . Ms. King is being treated with tucatinib, capecitabine, trastuzumab.      Medication name: capecitabine     Dose:  2000 mg   Frequency: twice a day  Administration schedule: for 14 day on, 7 days off every 21 days        Medication name: tucatinib     Dose:  300 mg   Frequency: twice a day        Ordering provider: Fang Cespedes DO  Start date: 2/13/24        Call to Ms King to check in on completing capecitabine - she thought she was supposed to finish today and didn't have enough tablets. Assured her that her last dose was the AM dose on 2/27. Will make separate calendars for next cycle.     Overall Ms. King did well with Cycle 1. Mentioned has some peeling on her hands and feet, not bothersome and no pain or tenderness.     Ms. King verbalized understanding of the information presented and all of the patient's questions were answered.      Beryl Sanchez, BETID, BCOP, BCPS    For Pharmacy Admin Tracking Only    Program: Medical Group  CPA in place:  Yes  Recommendation Provided To: Patient/Caregiver: 1 via Telephone    Intervention Accepted By: Patient/Caregiver: 1    Time Spent (min): 10

## 2024-03-01 ENCOUNTER — TELEPHONE (OUTPATIENT)
Age: 39
End: 2024-03-01

## 2024-03-01 NOTE — TELEPHONE ENCOUNTER
Called patient to advise/confirm upcoming appt with Dr. Nieves on 03/05/2024 at 2:00  at [facility].   Spoke with Millie King and confirmed appointment.

## 2024-03-04 ENCOUNTER — HOSPITAL ENCOUNTER (OUTPATIENT)
Facility: HOSPITAL | Age: 39
Setting detail: INFUSION SERIES
Discharge: HOME | End: 2024-03-04
Payer: COMMERCIAL

## 2024-03-04 ENCOUNTER — CLINICAL DOCUMENTATION (OUTPATIENT)
Age: 39
End: 2024-03-04

## 2024-03-04 ENCOUNTER — APPOINTMENT (OUTPATIENT)
Facility: HOSPITAL | Age: 39
End: 2024-03-04
Payer: COMMERCIAL

## 2024-03-04 ENCOUNTER — OFFICE VISIT (OUTPATIENT)
Age: 39
End: 2024-03-04
Payer: COMMERCIAL

## 2024-03-04 VITALS
SYSTOLIC BLOOD PRESSURE: 130 MMHG | BODY MASS INDEX: 37.3 KG/M2 | WEIGHT: 204 LBS | DIASTOLIC BLOOD PRESSURE: 79 MMHG | TEMPERATURE: 98.1 F | OXYGEN SATURATION: 96 % | HEART RATE: 90 BPM | RESPIRATION RATE: 18 BRPM

## 2024-03-04 VITALS
WEIGHT: 204 LBS | DIASTOLIC BLOOD PRESSURE: 79 MMHG | SYSTOLIC BLOOD PRESSURE: 130 MMHG | BODY MASS INDEX: 37.3 KG/M2 | TEMPERATURE: 98.1 F | HEART RATE: 90 BPM

## 2024-03-04 DIAGNOSIS — Z95.828 PORT-A-CATH IN PLACE: ICD-10-CM

## 2024-03-04 DIAGNOSIS — C50.919 MALIGNANT NEOPLASM OF BREAST METASTATIC TO BRAIN, UNSPECIFIED LATERALITY (HCC): Primary | ICD-10-CM

## 2024-03-04 DIAGNOSIS — C78.7 MALIGNANT NEOPLASM OF BREAST METASTATIC TO LIVER (HCC): ICD-10-CM

## 2024-03-04 DIAGNOSIS — C79.31 MALIGNANT NEOPLASM OF BREAST METASTATIC TO BRAIN, UNSPECIFIED LATERALITY (HCC): ICD-10-CM

## 2024-03-04 DIAGNOSIS — C79.31 MALIGNANT NEOPLASM OF BREAST METASTATIC TO BRAIN, UNSPECIFIED LATERALITY (HCC): Primary | ICD-10-CM

## 2024-03-04 DIAGNOSIS — Z09 CHEMOTHERAPY FOLLOW-UP EXAMINATION: ICD-10-CM

## 2024-03-04 DIAGNOSIS — C50.919 MALIGNANT NEOPLASM OF BREAST METASTATIC TO LIVER (HCC): ICD-10-CM

## 2024-03-04 DIAGNOSIS — C79.51 MALIGNANT NEOPLASM METASTATIC TO BONE (HCC): ICD-10-CM

## 2024-03-04 DIAGNOSIS — C50.919 MALIGNANT NEOPLASM OF BREAST METASTATIC TO BRAIN, UNSPECIFIED LATERALITY (HCC): ICD-10-CM

## 2024-03-04 DIAGNOSIS — C78.01 MALIGNANT NEOPLASM METASTATIC TO BOTH LUNGS (HCC): Primary | ICD-10-CM

## 2024-03-04 DIAGNOSIS — C78.02 MALIGNANT NEOPLASM METASTATIC TO BOTH LUNGS (HCC): Primary | ICD-10-CM

## 2024-03-04 LAB
ANION GAP BLD CALC-SCNC: 10.4 MMOL/L (ref 10–20)
BASOPHILS # BLD: 0 K/UL (ref 0–0.1)
BASOPHILS NFR BLD: 1 % (ref 0–1)
CA-I BLD-MCNC: 1.21 MMOL/L (ref 1.12–1.32)
CHLORIDE BLD-SCNC: 108 MMOL/L (ref 98–107)
CO2 BLD-SCNC: 24.6 MMOL/L (ref 21–32)
CREAT BLD-MCNC: 0.88 MG/DL (ref 0.6–1.3)
DIFFERENTIAL METHOD BLD: ABNORMAL
EOSINOPHIL # BLD: 0.2 K/UL (ref 0–0.4)
EOSINOPHIL NFR BLD: 4 % (ref 0–7)
ERYTHROCYTE [DISTWIDTH] IN BLOOD BY AUTOMATED COUNT: 18.9 % (ref 11.5–14.5)
GLUCOSE BLD-MCNC: 83 MG/DL (ref 65–100)
HCT VFR BLD AUTO: 34.7 % (ref 35–47)
HGB BLD-MCNC: 11.4 G/DL (ref 11.5–16)
IMM GRANULOCYTES # BLD AUTO: 0 K/UL (ref 0–0.04)
IMM GRANULOCYTES NFR BLD AUTO: 1 % (ref 0–0.5)
LYMPHOCYTES # BLD: 1.7 K/UL (ref 0.8–3.5)
LYMPHOCYTES NFR BLD: 26 % (ref 12–49)
MAGNESIUM SERPL-MCNC: 2 MG/DL (ref 1.6–2.4)
MCH RBC QN AUTO: 29.8 PG (ref 26–34)
MCHC RBC AUTO-ENTMCNC: 32.9 G/DL (ref 30–36.5)
MCV RBC AUTO: 90.8 FL (ref 80–99)
MONOCYTES # BLD: 0.8 K/UL (ref 0–1)
MONOCYTES NFR BLD: 13 % (ref 5–13)
NEUTS SEG # BLD: 3.5 K/UL (ref 1.8–8)
NEUTS SEG NFR BLD: 55 % (ref 32–75)
NRBC # BLD: 0 K/UL (ref 0–0.01)
NRBC BLD-RTO: 0 PER 100 WBC
PHOSPHATE SERPL-MCNC: 4.2 MG/DL (ref 2.6–4.7)
PLATELET # BLD AUTO: 358 K/UL (ref 150–400)
PMV BLD AUTO: 9.6 FL (ref 8.9–12.9)
POTASSIUM BLD-SCNC: 4 MMOL/L (ref 3.5–5.1)
RBC # BLD AUTO: 3.82 M/UL (ref 3.8–5.2)
SERVICE CMNT-IMP: ABNORMAL
SODIUM BLD-SCNC: 143 MMOL/L (ref 136–145)
WBC # BLD AUTO: 6.3 K/UL (ref 3.6–11)

## 2024-03-04 PROCEDURE — 6360000002 HC RX W HCPCS: Performed by: NURSE PRACTITIONER

## 2024-03-04 PROCEDURE — 96401 CHEMO ANTI-NEOPL SQ/IM: CPT

## 2024-03-04 PROCEDURE — 85025 COMPLETE CBC W/AUTO DIFF WBC: CPT

## 2024-03-04 PROCEDURE — 6360000002 HC RX W HCPCS: Performed by: INTERNAL MEDICINE

## 2024-03-04 PROCEDURE — 96372 THER/PROPH/DIAG INJ SC/IM: CPT

## 2024-03-04 PROCEDURE — 80047 BASIC METABLC PNL IONIZED CA: CPT

## 2024-03-04 PROCEDURE — 84100 ASSAY OF PHOSPHORUS: CPT

## 2024-03-04 PROCEDURE — 83735 ASSAY OF MAGNESIUM: CPT

## 2024-03-04 PROCEDURE — 36415 COLL VENOUS BLD VENIPUNCTURE: CPT

## 2024-03-04 PROCEDURE — 99215 OFFICE O/P EST HI 40 MIN: CPT | Performed by: INTERNAL MEDICINE

## 2024-03-04 RX ORDER — DIPHENHYDRAMINE HYDROCHLORIDE 50 MG/ML
50 INJECTION INTRAMUSCULAR; INTRAVENOUS
OUTPATIENT
Start: 2024-04-15

## 2024-03-04 RX ORDER — ONDANSETRON 2 MG/ML
8 INJECTION INTRAMUSCULAR; INTRAVENOUS
OUTPATIENT
Start: 2024-04-15

## 2024-03-04 RX ORDER — HEPARIN 100 UNIT/ML
500 SYRINGE INTRAVENOUS PRN
Status: DISCONTINUED | OUTPATIENT
Start: 2024-03-04 | End: 2024-03-05 | Stop reason: HOSPADM

## 2024-03-04 RX ORDER — SODIUM CHLORIDE 0.9 % (FLUSH) 0.9 %
5-40 SYRINGE (ML) INJECTION PRN
Status: DISCONTINUED | OUTPATIENT
Start: 2024-03-04 | End: 2024-03-05 | Stop reason: HOSPADM

## 2024-03-04 RX ORDER — ONDANSETRON 2 MG/ML
8 INJECTION INTRAMUSCULAR; INTRAVENOUS
Status: DISCONTINUED | OUTPATIENT
Start: 2024-03-04 | End: 2024-03-05 | Stop reason: HOSPADM

## 2024-03-04 RX ORDER — DIPHENHYDRAMINE HYDROCHLORIDE 50 MG/ML
50 INJECTION INTRAMUSCULAR; INTRAVENOUS
Status: DISCONTINUED | OUTPATIENT
Start: 2024-03-04 | End: 2024-03-05 | Stop reason: HOSPADM

## 2024-03-04 RX ORDER — ACETAMINOPHEN 325 MG/1
650 TABLET ORAL
Status: DISCONTINUED | OUTPATIENT
Start: 2024-03-04 | End: 2024-03-05 | Stop reason: HOSPADM

## 2024-03-04 RX ORDER — SODIUM CHLORIDE 9 MG/ML
INJECTION, SOLUTION INTRAVENOUS CONTINUOUS
OUTPATIENT
Start: 2024-04-15

## 2024-03-04 RX ORDER — SODIUM CHLORIDE 9 MG/ML
5-250 INJECTION, SOLUTION INTRAVENOUS PRN
Status: DISCONTINUED | OUTPATIENT
Start: 2024-03-04 | End: 2024-03-05 | Stop reason: HOSPADM

## 2024-03-04 RX ORDER — EPINEPHRINE 1 MG/ML
0.3 INJECTION, SOLUTION INTRAMUSCULAR; SUBCUTANEOUS PRN
OUTPATIENT
Start: 2024-04-15

## 2024-03-04 RX ORDER — ALBUTEROL SULFATE 90 UG/1
4 AEROSOL, METERED RESPIRATORY (INHALATION) PRN
OUTPATIENT
Start: 2024-04-15

## 2024-03-04 RX ORDER — ACETAMINOPHEN 325 MG/1
650 TABLET ORAL
OUTPATIENT
Start: 2024-04-15

## 2024-03-04 RX ADMIN — TRASTUZUMAB AND HYALURONIDASE-OYSK 600 MG: 600; 10000 INJECTION, SOLUTION SUBCUTANEOUS at 10:29

## 2024-03-04 RX ADMIN — DENOSUMAB 120 MG: 120 INJECTION SUBCUTANEOUS at 10:09

## 2024-03-04 ASSESSMENT — PAIN DESCRIPTION - LOCATION: LOCATION: RIB CAGE

## 2024-03-04 ASSESSMENT — PAIN DESCRIPTION - ORIENTATION: ORIENTATION: RIGHT

## 2024-03-04 ASSESSMENT — PAIN DESCRIPTION - PAIN TYPE: TYPE: ACUTE PAIN

## 2024-03-04 NOTE — PROGRESS NOTES
Outpatient Infusion [facility] - Chemotherapy Progress Note    0805 Pt admit to OPI for Herceptin Hylecta/C2/Xgeva ambulatory in stable condition. Assessment completed by access RN.  Port accessed by access RN with positive blood return. Labs drawn per order and sent.  Line flushed, clamped, Curos Cap applied to end clave.    1000 Port flushed w/ + blood return; CBC drawn and sent for processing; line flushed and de-accessed per protocol    /79   Pulse 90   Temp 98.1 °F (36.7 °C) (Temporal)   Wt 92.5 kg (204 lb)   BMI 37.30 kg/m²     Medications Administered         denosumab (XGEVA) SC injection 120 mg Admin Date  03/04/2024 Action  Given Dose  120 mg Route  SubCUTAneous Administered By  Abbey Blanco RN        trastuzumab-hyaluronidase-oysk (HERCEPTIN HYLECTA) injection 600 mg Admin Date  03/04/2024 Action  Given Dose  600 mg Route  SubCUTAneous Administered By  Abbey Blanco RN          (Xgeva, SC L arm; Herceptin Hylecta, SC R thigh)       Two nurses verified prior to administering:, Drug name, Drug dose, Infusion volume or drug volume when prepared in a syringe, Rate of administration, Route of administration, Expiration dates and/or times, Appearance and physical integrity of the drugs, Rate set on infusion pump, when used, Sequencing of drug administration         1040 Pt tolerated treatment well. D/c home ambulatory in no distress. Pt aware of next [facility] appointment scheduled for 03/25/2024.    Recent Results (from the past 12 hour(s))   Phosphorus    Collection Time: 03/04/24  8:19 AM   Result Value Ref Range    Phosphorus 4.2 2.6 - 4.7 MG/DL   Magnesium    Collection Time: 03/04/24  8:19 AM   Result Value Ref Range    Magnesium 2.0 1.6 - 2.4 mg/dL   POC CHEM 8    Collection Time: 03/04/24  8:19 AM   Result Value Ref Range    POC Ionized Calcium 1.21 1.12 - 1.32 mmol/L    POC Sodium 143 136 - 145 mmol/L    POC Potassium 4.0 3.5 - 5.1 mmol/L    POC Chloride 108 (H) 98 - 107 mmol/L    POC TCO2  24.6 21 - 32 mmol/L    Anion Gap, POC 10.4 10 - 20 mmol/L    POC Glucose 83 65 - 100 mg/dL    POC Creatinine 0.88 0.6 - 1.3 mg/dL    eGFR, POC >60 >60 ml/min/1.73m2    UA Comment Comment Not Indicated.

## 2024-03-04 NOTE — PROGRESS NOTES
Spiritual Care Partner Volunteer visited patient at [facility] in [facility] on 3/4/2024   Documented by:  Stephen Dempsey MDIV, BCC

## 2024-03-04 NOTE — PROGRESS NOTES
Millie King is a 38 y.o. female    Chief Complaint   Patient presents with    Follow-up      Metastatic Breast Cancer.     1. Have you been to the ER, urgent care clinic since your last visit?  Hospitalized since your last visit Yes,  [facility] [place] last Tues     2. Have you seen or consulted any other health care providers outside of the [facility] since your last visit?  Include any pap smears or colon screening. No      
above.    MRI Result (most recent):  MRI BRAIN W WO CONTRAST 01/11/2024    Narrative  Clinical history: Secondary malignant neoplasm of liver and intrahepatic bile  duct; Neoplasm related pain (acute) (chronic)  INDICATION:   Secondary malignant neoplasm of liver and intrahepatic bile duct;  Neoplasm related pain (acute) (chronic)    COMPARISON: 6/13/2023  TECHNIQUE: MR examination of the brain includes axial and sagittal T1 , axial  T2, axial FLAIR, axial gradient echo, axial DWI, coronal T1 . Pre and post  contrast axial T1-weighted imaging. Postcontrast T1-weighted imaging coronal  plane.    CONTRAST: ProHance  FINDINGS:  There is a small focus of enhancement in the periventricular white matter of the  right frontal lobe.  There are confluent periventricular and scattered foci of increased T2 signal  intensity demonstrated in the corona radiata and the centrum semiovale.    Otherwise;  There is no abnormal parenchymal enhancement. There is no abnormal meningeal  enhancement demonstrated. The brain architecture is normal. There is no evidence  of midline shift or mass-effect. The ventricles are normal in size, position and  configuration.  There are no extra-axial fluid collections. Major intracranial  vascular flow-voids are unremarkable. The orbits are grossly symmetric. Dural  venous sinuses are grossly unremarkable. There is no Chiari or syrinx. Pituitary  and infundibulum grossly unremarkable. Cerebellopontine angles are unremarkable.  No skull base mass is identified. Cavernous sinuses are symmetric. The mastoid  air cells and are well pneumatized and clear. Mucous retention cysts in the  axillary sinuses.    Impression  Imaging findings concerning for interval metastatic disease. There is a small  2.9 mm enhancing focus in the periventricular white matter of the right frontal  lobe. No associated midline shift or mass effect.  No intracranial hemorrhage or evidence of acute infarction.    Records reviewed

## 2024-03-04 NOTE — PROGRESS NOTES
Oral Chemotherapy      Millie King is a  38 y.o.female  diagnosed with breast cancer . Ms. King is being treated with tucatinib, capecitabine, trastuzumab.      Medication name: capecitabine     Dose:  2000 mg   Frequency: twice a day  Administration schedule: for 14 day on, 7 days off every 21 days        Medication name: tucatinib     Dose:  300 mg   Frequency: twice a day        Ordering provider: Fang Cespedes DO  Start date: 3/4/24     Lab Results   Component Value Date    WBC 6.3 03/04/2024    HGB 11.4 (L) 03/04/2024    HCT 34.7 (L) 03/04/2024    MCV 90.8 03/04/2024     03/04/2024    LYMPHOPCT 26 03/04/2024    RBC 3.82 03/04/2024    MCH 29.8 03/04/2024    MCHC 32.9 03/04/2024    RDW 18.9 (H) 03/04/2024     Lab Results   Component Value Date    NEUTROABS 3.5 03/04/2024              Expected follow up date: Labs/office visit each cycle. Next cycle start on 3/25/24     Patient  provided with an Oral Chemotherapy Journal.      Ms. King verbalized understanding of the information presented and all of the patient's questions were answered.        Beryl Sanchez, BETID, BCOP, BCPS    For Pharmacy Admin Tracking Only    Program: Medical Group  CPA in place:  Yes  Recommendation Provided To: Patient/Caregiver: 1 via In person    Intervention Accepted By: Patient/Caregiver: 1    Time Spent (min): 20

## 2024-03-04 NOTE — PLAN OF CARE
Problem: Chronic Conditions and Co-morbidities  Goal: Patient's chronic conditions and co-morbidity symptoms are monitored and maintained or improved  Outcome: Progressing     Problem: Pain  Goal: Verbalizes/displays adequate comfort level or baseline comfort level  Outcome: Progressing     Problem: Safety - Adult  Goal: Free from fall injury  Outcome: Progressing

## 2024-03-05 ENCOUNTER — TELEPHONE (OUTPATIENT)
Age: 39
End: 2024-03-05

## 2024-03-05 NOTE — TELEPHONE ENCOUNTER
Patient cancelled 2:00 today with Dr. Nieves. She just started chemo again yesterday and she is not feeling well. Did not want to do vv. Cancelled as late cancellation and rescheduled to 3/19.

## 2024-03-08 ENCOUNTER — TELEPHONE (OUTPATIENT)
Age: 39
End: 2024-03-08

## 2024-03-08 NOTE — TELEPHONE ENCOUNTER
Called patient to advise/confirm upcoming vv appt with Dr. Nieves on 03/12/2024 at 11:00  at virtual. Got voicemail. Left message.

## 2024-03-12 ENCOUNTER — TELEMEDICINE (OUTPATIENT)
Age: 39
End: 2024-03-12
Payer: COMMERCIAL

## 2024-03-12 DIAGNOSIS — C78.7 MALIGNANT NEOPLASM OF BREAST METASTATIC TO LIVER (HCC): ICD-10-CM

## 2024-03-12 DIAGNOSIS — C50.919 MALIGNANT NEOPLASM OF BREAST METASTATIC TO LIVER (HCC): ICD-10-CM

## 2024-03-12 DIAGNOSIS — G62.0 CHEMOTHERAPY-INDUCED NEUROPATHY (HCC): ICD-10-CM

## 2024-03-12 DIAGNOSIS — G89.3 CANCER RELATED PAIN: ICD-10-CM

## 2024-03-12 DIAGNOSIS — T45.1X5A CHEMOTHERAPY-INDUCED NEUROPATHY (HCC): ICD-10-CM

## 2024-03-12 DIAGNOSIS — R53.83 CHEMOTHERAPY-INDUCED FATIGUE: Primary | ICD-10-CM

## 2024-03-12 DIAGNOSIS — T45.1X5A CHEMOTHERAPY-INDUCED FATIGUE: Primary | ICD-10-CM

## 2024-03-12 PROBLEM — Z71.89 ACP (ADVANCE CARE PLANNING): Status: ACTIVE | Noted: 2024-03-12

## 2024-03-12 PROCEDURE — 99214 OFFICE O/P EST MOD 30 MIN: CPT | Performed by: INTERNAL MEDICINE

## 2024-03-12 RX ORDER — OXYMORPHONE HYDROCHLORIDE 30 MG/1
30 TABLET, FILM COATED, EXTENDED RELEASE ORAL EVERY 12 HOURS
Qty: 60 TABLET | Refills: 0 | Status: SHIPPED | OUTPATIENT
Start: 2024-03-20 | End: 2024-04-19

## 2024-03-12 RX ORDER — OXYCODONE HYDROCHLORIDE 20 MG/1
20 TABLET ORAL EVERY 4 HOURS PRN
Qty: 90 TABLET | Refills: 0 | Status: SHIPPED | OUTPATIENT
Start: 2024-03-12 | End: 2024-03-27

## 2024-03-12 RX ORDER — PREGABALIN 150 MG/1
150 CAPSULE ORAL 2 TIMES DAILY
Qty: 60 CAPSULE | Refills: 2 | Status: SHIPPED | OUTPATIENT
Start: 2024-03-30 | End: 2024-06-28

## 2024-03-12 NOTE — PROGRESS NOTES
Palliative Medicine Office Visit  Palliative Medicine Nurse Check In  [phone]    Patient Name: Millie King  YOB: 1985      Date of Office Visit: 3/12/24    Patient states: \"F/U\"    From Check In Sheet (scanned in Media):  Has a medical provider talked with you about cardiopulmonary resuscitation (CPR)?   [] Yes   [x] No   [] Unable to obtain    Nurse reminder to complete or update ACP FlowSheet:    Is ACP on the Problem List?    [] Yes    [x] No  Problem placed on list    Is there an ACP Note in Chart Review/Note?    [] Yes    [x] No   No NOTE IN CHART        3/8/2024     9:32 AM   Demographics   Marital Status        Is there anything that we should know about you as a person in order to provide you the best care possible?     Have you been to the ER, urgent care clinic since your last visit?   [x] Yes   [] No   [] Unable to obtain    Have you been hospitalized since your last visit?   [x] Yes   [] No   [] Unable to obtain    Have you seen or consulted any other health care providers outside of the [facility] since your last visit?   [x] Yes   [] No       Functional status (describe):Independent       Bottle review (for opioid pain medication):  Medication 1:   Date filled:   Directions:   # filled:   # left:   # pills taking per day:  Last dose taken:    Medication 2:   Date filled:   Directions:   # filled:   # left:   # pills taking per day:  Last dose taken:                
fecal  material throughout the colon.  APPENDIX: Not seen.  PERITONEUM: No ascites or pneumoperitoneum. No marsha hepatis or other regional  lymphadenopathy.  RETROPERITONEUM: No lymphadenopathy or aortic aneurysm.  REPRODUCTIVE ORGANS: The uterus is surgically absent.  URINARY BLADDER: No mass or calculus.  BONES: No destructive bone lesion. Stable compression screw device in the right  proximal femur.  ADDITIONAL COMMENTS: N/A    Impression  1. Stable small hepatic lesions and overall hepatic morphology with no new  hepatic or other metastatic disease in the chest, abdomen or pelvis.  2. Stable tiny bilateral pleural effusions, significance uncertain..  3. Other incidental findings described above.      PET Results (most recent):  NM BONE SCAN WHOLE BODY 04/05/2023    Narrative  EXAM: NM BONE SCAN WH BODY    INDICATION: Restaging breast cancer    COMPARISON: 1/5/2023    IMAGING CORRELATION: Chest abdomen pelvis CT 4/5/2023    TRACER: 1.5 mCi of Tc-99m MDP.    TECHNIQUE: Imaging of the whole body was performed from the anterior and  posterior projections following intravenous administration of  Tc-99m MDP and  appropriate delay.    FINDINGS: Postoperative changes are again noted in the right femur. Stable  activity right sacrum. Improved left posterior rib activity. No new abnormal  uptake is identified..  Incidental renal imaging shows no abnormality.    Impression  Stable activity right sacrum. Improved left posterior rib activity.  No new scintigraphic evidence to suggest metastatic disease.        CONTROLLED SUBSTANCES SAFETY ASSESSMENT (IF ON CONTROLLED SUBSTANCES):     Reviewed patient record in [place] Prescription Drug Monitoring Program (PDMP).    Reviewed opioid safety handout:  [x] Yes   [] No  24 hour opioid dose >150mg morphine equivalent/day:  [x] Yes   [] No  Benzodiazepines:  [] Yes   [x] No  Sleep apnea:  [x] Yes   [] No   Urine Toxicology Testing within last 6 months:  [] Yes   [x] No  History of

## 2024-03-18 DIAGNOSIS — T45.1X5A CHEMOTHERAPY INDUCED DIARRHEA: Primary | ICD-10-CM

## 2024-03-18 DIAGNOSIS — K52.1 CHEMOTHERAPY INDUCED DIARRHEA: Primary | ICD-10-CM

## 2024-03-18 RX ORDER — ONDANSETRON 2 MG/ML
8 INJECTION INTRAMUSCULAR; INTRAVENOUS
Status: CANCELLED | OUTPATIENT
Start: 2024-03-25

## 2024-03-18 RX ORDER — ACETAMINOPHEN 325 MG/1
650 TABLET ORAL
Status: CANCELLED | OUTPATIENT
Start: 2024-03-25

## 2024-03-18 RX ORDER — MEPERIDINE HYDROCHLORIDE 25 MG/ML
12.5 INJECTION INTRAMUSCULAR; INTRAVENOUS; SUBCUTANEOUS PRN
Status: CANCELLED | OUTPATIENT
Start: 2024-03-25

## 2024-03-18 RX ORDER — HEPARIN 100 UNIT/ML
500 SYRINGE INTRAVENOUS PRN
Status: CANCELLED | OUTPATIENT
Start: 2024-03-25

## 2024-03-18 RX ORDER — DIPHENOXYLATE HYDROCHLORIDE AND ATROPINE SULFATE 2.5; .025 MG/1; MG/1
2 TABLET ORAL 4 TIMES DAILY PRN
Qty: 60 TABLET | Refills: 0 | Status: SHIPPED | OUTPATIENT
Start: 2024-03-18 | End: 2024-03-28

## 2024-03-18 RX ORDER — SODIUM CHLORIDE 9 MG/ML
5-250 INJECTION, SOLUTION INTRAVENOUS PRN
Status: CANCELLED | OUTPATIENT
Start: 2024-03-25

## 2024-03-18 RX ORDER — EPINEPHRINE 1 MG/ML
0.3 INJECTION, SOLUTION INTRAMUSCULAR; SUBCUTANEOUS PRN
Status: CANCELLED | OUTPATIENT
Start: 2024-03-25

## 2024-03-18 RX ORDER — ALBUTEROL SULFATE 90 UG/1
4 AEROSOL, METERED RESPIRATORY (INHALATION) PRN
Status: CANCELLED | OUTPATIENT
Start: 2024-03-25

## 2024-03-18 RX ORDER — DIPHENHYDRAMINE HYDROCHLORIDE 50 MG/ML
50 INJECTION INTRAMUSCULAR; INTRAVENOUS
Status: CANCELLED | OUTPATIENT
Start: 2024-03-25

## 2024-03-18 RX ORDER — SODIUM CHLORIDE 0.9 % (FLUSH) 0.9 %
5-40 SYRINGE (ML) INJECTION PRN
Status: CANCELLED | OUTPATIENT
Start: 2024-03-25

## 2024-03-18 RX ORDER — SODIUM CHLORIDE 9 MG/ML
INJECTION, SOLUTION INTRAVENOUS CONTINUOUS
Status: CANCELLED | OUTPATIENT
Start: 2024-03-25

## 2024-03-21 RX ORDER — 0.9 % SODIUM CHLORIDE 0.9 %
1000 INTRAVENOUS SOLUTION INTRAVENOUS ONCE
Start: 2024-03-25

## 2024-03-22 DIAGNOSIS — G89.3 CANCER RELATED PAIN: ICD-10-CM

## 2024-03-22 PROBLEM — K52.1 CHEMOTHERAPY INDUCED DIARRHEA: Status: ACTIVE | Noted: 2024-03-22

## 2024-03-22 PROBLEM — I63.9 ACUTE ISCHEMIC STROKE (HCC): Status: RESOLVED | Noted: 2023-04-25 | Resolved: 2024-03-22

## 2024-03-22 PROBLEM — T45.1X5A CHEMOTHERAPY INDUCED DIARRHEA: Status: ACTIVE | Noted: 2024-03-22

## 2024-03-22 PROBLEM — Z09 CHEMOTHERAPY FOLLOW-UP EXAMINATION: Status: ACTIVE | Noted: 2024-03-22

## 2024-03-22 RX ORDER — OXYMORPHONE HYDROCHLORIDE 30 MG/1
30 TABLET, FILM COATED, EXTENDED RELEASE ORAL EVERY 12 HOURS
Qty: 60 TABLET | Refills: 0 | OUTPATIENT
Start: 2024-03-22 | End: 2024-04-21

## 2024-03-22 NOTE — PROGRESS NOTES
[facility] [place] at [facility]  [address], [address] [place] [address]  W: [phone]  F: [phone]    Reason for Visit:   Millie King is a 39 y.o. female seen today in office for follow up of Metastatic Breast Cancer.    Treatment History:   Abd US 10/3/22: There are multiple liver masses with 2 representative masses in  the right liver measuring 2.4 x 2.6 cm and 1.6 x 2.1 cm  CT A/P 10/3/22: Spiculated left breast mass suspicious for malignancy. Multiple lung and liver metastases. Multiple top normal size retroperitoneal lymph nodes are nonspecific with metastatic disease  not excluded  CT Chest 10/4/22: Indeterminate 1.4 x 1.9 cm left breast nodule. Primary malignancy not excluded. Mammographic correlation advised. Diffuse bilateral pulmonary nodules most compatible with metastatic disease.Partially imaged hepatic hypodensities concerning  for metastatic disease. Indeterminate 6 mm T10 lytic lesion, possibly benign. Attention on follow-up  Liver Biopsy 10/4/22: PATH - Metastatic adenocarcinoma, consistent with breast primary  ER/MT negative, Her2 3+  Port placed on 10/13/2   Palliative Taxol+Herceptin+Perjeta 10/17/22 - 10/31/22  Switched to Palliative Taxotere+Herceptin+Perjeta on 11/7/22 - 1/22/24   DR Taxotere to 60 mg/m2 with Cycle 8  CT C/A/P 12/27/22: Imaging findings consistent with significant interval response to therapy. Significantly diminished pulmonary metastatic disease  burden with numerous resolved or nearly resolved pulmonary nodules. Significantly diminished size of hepatic masses. Left breast lesion is not demonstrated on the current exam  Right Hip XRAY 1/2/23: Right basicervical femoral neck fracture with medial angulation  CT Pelv 1/2/23: Re-demonstrated acute right basicervical femoral neck fracture, with findings concerning for pathologic etiology  XR Femur 1/2/23: Mildly displaced right femoral neck fracture  Right Hip IMN with Ortho Dr Bermudez   Bone

## 2024-03-25 ENCOUNTER — OFFICE VISIT (OUTPATIENT)
Age: 39
End: 2024-03-25
Payer: COMMERCIAL

## 2024-03-25 ENCOUNTER — CLINICAL DOCUMENTATION (OUTPATIENT)
Age: 39
End: 2024-03-25

## 2024-03-25 ENCOUNTER — APPOINTMENT (OUTPATIENT)
Facility: HOSPITAL | Age: 39
End: 2024-03-25
Payer: COMMERCIAL

## 2024-03-25 ENCOUNTER — HOSPITAL ENCOUNTER (OUTPATIENT)
Facility: HOSPITAL | Age: 39
Setting detail: INFUSION SERIES
Discharge: HOME | End: 2024-03-25
Payer: COMMERCIAL

## 2024-03-25 VITALS
BODY MASS INDEX: 36.02 KG/M2 | SYSTOLIC BLOOD PRESSURE: 116 MMHG | RESPIRATION RATE: 18 BRPM | OXYGEN SATURATION: 96 % | WEIGHT: 197 LBS | HEART RATE: 83 BPM | TEMPERATURE: 98.1 F | DIASTOLIC BLOOD PRESSURE: 84 MMHG

## 2024-03-25 VITALS
SYSTOLIC BLOOD PRESSURE: 116 MMHG | TEMPERATURE: 98.1 F | HEART RATE: 83 BPM | RESPIRATION RATE: 18 BRPM | DIASTOLIC BLOOD PRESSURE: 84 MMHG

## 2024-03-25 DIAGNOSIS — C50.919 MALIGNANT NEOPLASM OF BREAST METASTATIC TO LIVER (HCC): ICD-10-CM

## 2024-03-25 DIAGNOSIS — C79.51 MALIGNANT NEOPLASM METASTATIC TO BONE (HCC): ICD-10-CM

## 2024-03-25 DIAGNOSIS — Z95.828 PORT-A-CATH IN PLACE: ICD-10-CM

## 2024-03-25 DIAGNOSIS — G62.0 CHEMOTHERAPY-INDUCED NEUROPATHY (HCC): ICD-10-CM

## 2024-03-25 DIAGNOSIS — C79.31 MALIGNANT NEOPLASM OF BREAST METASTATIC TO BRAIN, UNSPECIFIED LATERALITY (HCC): ICD-10-CM

## 2024-03-25 DIAGNOSIS — Z79.899 ENCOUNTER FOR MONITORING CARDIOTOXIC DRUG THERAPY: ICD-10-CM

## 2024-03-25 DIAGNOSIS — C78.02 MALIGNANT NEOPLASM METASTATIC TO BOTH LUNGS (HCC): ICD-10-CM

## 2024-03-25 DIAGNOSIS — R53.83 CHEMOTHERAPY-INDUCED FATIGUE: ICD-10-CM

## 2024-03-25 DIAGNOSIS — C79.51 MALIGNANT NEOPLASM METASTATIC TO BONE (HCC): Primary | ICD-10-CM

## 2024-03-25 DIAGNOSIS — C78.7 MALIGNANT NEOPLASM METASTATIC TO LIVER (HCC): ICD-10-CM

## 2024-03-25 DIAGNOSIS — C50.919 MALIGNANT NEOPLASM OF BREAST METASTATIC TO BRAIN, UNSPECIFIED LATERALITY (HCC): Primary | ICD-10-CM

## 2024-03-25 DIAGNOSIS — C50.919 MALIGNANT NEOPLASM OF BREAST METASTATIC TO BRAIN, UNSPECIFIED LATERALITY (HCC): ICD-10-CM

## 2024-03-25 DIAGNOSIS — K52.1 CHEMOTHERAPY INDUCED DIARRHEA: ICD-10-CM

## 2024-03-25 DIAGNOSIS — G89.3 CANCER RELATED PAIN: ICD-10-CM

## 2024-03-25 DIAGNOSIS — T45.1X5A CHEMOTHERAPY-INDUCED FATIGUE: ICD-10-CM

## 2024-03-25 DIAGNOSIS — Z51.81 ENCOUNTER FOR MONITORING CARDIOTOXIC DRUG THERAPY: ICD-10-CM

## 2024-03-25 DIAGNOSIS — C78.01 MALIGNANT NEOPLASM METASTATIC TO BOTH LUNGS (HCC): ICD-10-CM

## 2024-03-25 DIAGNOSIS — C79.31 MALIGNANT NEOPLASM OF BREAST METASTATIC TO BRAIN, UNSPECIFIED LATERALITY (HCC): Primary | ICD-10-CM

## 2024-03-25 DIAGNOSIS — T45.1X5A CHEMOTHERAPY INDUCED DIARRHEA: ICD-10-CM

## 2024-03-25 DIAGNOSIS — T45.1X5A CHEMOTHERAPY-INDUCED NEUROPATHY (HCC): ICD-10-CM

## 2024-03-25 DIAGNOSIS — C78.7 MALIGNANT NEOPLASM OF BREAST METASTATIC TO LIVER (HCC): ICD-10-CM

## 2024-03-25 DIAGNOSIS — Z87.311 HISTORY OF PATHOLOGIC FRACTURE: ICD-10-CM

## 2024-03-25 DIAGNOSIS — Z09 CHEMOTHERAPY FOLLOW-UP EXAMINATION: ICD-10-CM

## 2024-03-25 DIAGNOSIS — R19.7 DIARRHEA, UNSPECIFIED TYPE: ICD-10-CM

## 2024-03-25 LAB
ALBUMIN SERPL-MCNC: 3.6 G/DL (ref 3.5–5)
ALBUMIN/GLOB SERPL: 1.6 (ref 1.1–2.2)
ALP SERPL-CCNC: 94 U/L (ref 45–117)
ALT SERPL-CCNC: 61 U/L (ref 12–78)
ANION GAP SERPL CALC-SCNC: 6 MMOL/L (ref 5–15)
AST SERPL-CCNC: 66 U/L (ref 15–37)
BASOPHILS # BLD: 0 K/UL (ref 0–0.1)
BASOPHILS NFR BLD: 1 % (ref 0–1)
BILIRUB SERPL-MCNC: 0.7 MG/DL (ref 0.2–1)
BUN SERPL-MCNC: 5 MG/DL (ref 6–20)
BUN/CREAT SERPL: 6 (ref 12–20)
CALCIUM SERPL-MCNC: 8.6 MG/DL (ref 8.5–10.1)
CHLORIDE SERPL-SCNC: 110 MMOL/L (ref 97–108)
CO2 SERPL-SCNC: 28 MMOL/L (ref 21–32)
CREAT SERPL-MCNC: 0.79 MG/DL (ref 0.55–1.02)
DIFFERENTIAL METHOD BLD: ABNORMAL
EOSINOPHIL # BLD: 0.3 K/UL (ref 0–0.4)
EOSINOPHIL NFR BLD: 5 % (ref 0–7)
ERYTHROCYTE [DISTWIDTH] IN BLOOD BY AUTOMATED COUNT: 19.9 % (ref 11.5–14.5)
GLOBULIN SER CALC-MCNC: 2.3 G/DL (ref 2–4)
GLUCOSE SERPL-MCNC: 102 MG/DL (ref 65–100)
HCT VFR BLD AUTO: 35.2 % (ref 35–47)
HGB BLD-MCNC: 11.3 G/DL (ref 11.5–16)
IMM GRANULOCYTES # BLD AUTO: 0 K/UL (ref 0–0.04)
IMM GRANULOCYTES NFR BLD AUTO: 0 % (ref 0–0.5)
LYMPHOCYTES # BLD: 1.4 K/UL (ref 0.8–3.5)
LYMPHOCYTES NFR BLD: 29 % (ref 12–49)
MCH RBC QN AUTO: 30.2 PG (ref 26–34)
MCHC RBC AUTO-ENTMCNC: 32.1 G/DL (ref 30–36.5)
MCV RBC AUTO: 94.1 FL (ref 80–99)
MONOCYTES # BLD: 0.6 K/UL (ref 0–1)
MONOCYTES NFR BLD: 13 % (ref 5–13)
NEUTS SEG # BLD: 2.5 K/UL (ref 1.8–8)
NEUTS SEG NFR BLD: 52 % (ref 32–75)
NRBC # BLD: 0 K/UL (ref 0–0.01)
NRBC BLD-RTO: 0 PER 100 WBC
PLATELET # BLD AUTO: 276 K/UL (ref 150–400)
PMV BLD AUTO: 9.5 FL (ref 8.9–12.9)
POTASSIUM SERPL-SCNC: 3.3 MMOL/L (ref 3.5–5.1)
PROT SERPL-MCNC: 5.9 G/DL (ref 6.4–8.2)
RBC # BLD AUTO: 3.74 M/UL (ref 3.8–5.2)
SODIUM SERPL-SCNC: 144 MMOL/L (ref 136–145)
WBC # BLD AUTO: 4.7 K/UL (ref 3.6–11)

## 2024-03-25 PROCEDURE — 2580000003 HC RX 258: Performed by: NURSE PRACTITIONER

## 2024-03-25 PROCEDURE — 6370000000 HC RX 637 (ALT 250 FOR IP): Performed by: NURSE PRACTITIONER

## 2024-03-25 PROCEDURE — 96401 CHEMO ANTI-NEOPL SQ/IM: CPT

## 2024-03-25 PROCEDURE — 99215 OFFICE O/P EST HI 40 MIN: CPT | Performed by: NURSE PRACTITIONER

## 2024-03-25 PROCEDURE — 80053 COMPREHEN METABOLIC PANEL: CPT

## 2024-03-25 PROCEDURE — 85025 COMPLETE CBC W/AUTO DIFF WBC: CPT

## 2024-03-25 PROCEDURE — 96361 HYDRATE IV INFUSION ADD-ON: CPT

## 2024-03-25 PROCEDURE — 6360000002 HC RX W HCPCS: Performed by: INTERNAL MEDICINE

## 2024-03-25 PROCEDURE — 36415 COLL VENOUS BLD VENIPUNCTURE: CPT

## 2024-03-25 PROCEDURE — 96360 HYDRATION IV INFUSION INIT: CPT

## 2024-03-25 RX ORDER — POTASSIUM CHLORIDE 750 MG/1
40 TABLET, FILM COATED, EXTENDED RELEASE ORAL ONCE
Status: COMPLETED | OUTPATIENT
Start: 2024-03-25 | End: 2024-03-25

## 2024-03-25 RX ORDER — SODIUM CHLORIDE 9 MG/ML
5-250 INJECTION, SOLUTION INTRAVENOUS PRN
Status: DISCONTINUED | OUTPATIENT
Start: 2024-03-25 | End: 2024-03-26 | Stop reason: HOSPADM

## 2024-03-25 RX ORDER — 0.9 % SODIUM CHLORIDE 0.9 %
1000 INTRAVENOUS SOLUTION INTRAVENOUS ONCE
Status: COMPLETED | OUTPATIENT
Start: 2024-03-25 | End: 2024-03-25

## 2024-03-25 RX ADMIN — SODIUM CHLORIDE 1000 ML: 9 INJECTION, SOLUTION INTRAVENOUS at 09:36

## 2024-03-25 RX ADMIN — POTASSIUM CHLORIDE 40 MEQ: 750 TABLET, EXTENDED RELEASE ORAL at 10:28

## 2024-03-25 RX ADMIN — TRASTUZUMAB AND HYALURONIDASE-OYSK 600 MG: 600; 10000 INJECTION, SOLUTION SUBCUTANEOUS at 10:18

## 2024-03-25 NOTE — PROGRESS NOTES
Millie King is a 39 y.o. female      Chief Complaint   Patient presents with    Follow-up     Metastatic Breast Cancer       1. Have you been to the ER, urgent care clinic since your last visit?  Hospitalized since your last visit?No    2. Have you seen or consulted any other health care providers outside of the [facility] since your last visit?  Include any pap smears or colon screening. No

## 2024-03-25 NOTE — PROGRESS NOTES
[facility] Chemotherapy Progress Note    Date: 2024    Name: Millie King    MRN: [id number]         : 1985      0800 Pt admit to [facility] for C3D1 Herceptin Hylecta/Hydration ambulatory in stable condition. Assessment completed. No new concerns voiced. Port with positive blood return. Labs sent for processing.         Ms. King's vitals were reviewed.  Patient Vitals for the past 12 hrs:   Temp Pulse Resp BP   24 0815 98.1 °F (36.7 °C) 83 18 116/84         Lab results were obtained and reviewed.  Recent Results (from the past 12 hour(s))   Comprehensive Metabolic Panel    Collection Time: 24  8:31 AM   Result Value Ref Range    Sodium 144 136 - 145 mmol/L    Potassium 3.3 (L) 3.5 - 5.1 mmol/L    Chloride 110 (H) 97 - 108 mmol/L    CO2 28 21 - 32 mmol/L    Anion Gap 6 5 - 15 mmol/L    Glucose 102 (H) 65 - 100 mg/dL    BUN 5 (L) 6 - 20 MG/DL    Creatinine 0.79 0.55 - 1.02 MG/DL    Bun/Cre Ratio 6 (L) 12 - 20      Est, Glom Filt Rate >90 >60 ml/min/1.73m2    Calcium 8.6 8.5 - 10.1 MG/DL    Total Bilirubin 0.7 0.2 - 1.0 MG/DL    ALT 61 12 - 78 U/L    AST 66 (H) 15 - 37 U/L    Alk Phosphatase 94 45 - 117 U/L    Total Protein 5.9 (L) 6.4 - 8.2 g/dL    Albumin 3.6 3.5 - 5.0 g/dL    Globulin 2.3 2.0 - 4.0 g/dL    Albumin/Globulin Ratio 1.6 1.1 - 2.2     CBC with Auto Differential    Collection Time: 24  8:31 AM   Result Value Ref Range    WBC 4.7 3.6 - 11.0 K/uL    RBC 3.74 (L) 3.80 - 5.20 M/uL    Hemoglobin 11.3 (L) 11.5 - 16.0 g/dL    Hematocrit 35.2 35.0 - 47.0 %    MCV 94.1 80.0 - 99.0 FL    MCH 30.2 26.0 - 34.0 PG    MCHC 32.1 30.0 - 36.5 g/dL    RDW 19.9 (H) 11.5 - 14.5 %    Platelets 276 150 - 400 K/uL    MPV 9.5 8.9 - 12.9 FL    Nucleated RBCs 0.0 0  WBC    nRBC 0.00 0.00 - 0.01 K/uL    Neutrophils % 52 32 - 75 %    Lymphocytes % 29 12 - 49 %    Monocytes % 13 5 - 13 %    Eosinophils % 5 0 - 7 %    Basophils % 1 0 - 1 %    Immature Granulocytes 0 0.0 - 0.5 %    Neutrophils

## 2024-03-26 DIAGNOSIS — F43.22 ADJUSTMENT DISORDER WITH ANXIOUS MOOD: ICD-10-CM

## 2024-03-26 RX ORDER — BUPROPION HYDROCHLORIDE 300 MG/1
300 TABLET ORAL DAILY
Qty: 30 TABLET | Refills: 1 | Status: SHIPPED | OUTPATIENT
Start: 2024-03-26

## 2024-03-29 NOTE — TELEPHONE ENCOUNTER
See [employer] communication.   This week I increased her Opana (oxymorphone ER) to 30 mg every 8 hrs.   Continue oxycodone IR 20 mg every 4 hrs prn; but DID give permission to take 1 1/2 tabs when necessary.    Cautioned to be mindful of how she feels before the IR increase considering the increase in Opana.   No driving for right now given higher opioid doses and adjustments.     Dr. Nieves

## 2024-04-01 ENCOUNTER — PATIENT MESSAGE (OUTPATIENT)
Age: 39
End: 2024-04-01

## 2024-04-01 DIAGNOSIS — G89.3 CANCER RELATED PAIN: ICD-10-CM

## 2024-04-01 DIAGNOSIS — C78.7 MALIGNANT NEOPLASM OF BREAST METASTATIC TO LIVER (HCC): Primary | ICD-10-CM

## 2024-04-01 DIAGNOSIS — C50.919 MALIGNANT NEOPLASM OF BREAST METASTATIC TO LIVER (HCC): Primary | ICD-10-CM

## 2024-04-01 RX ORDER — OXYCODONE HYDROCHLORIDE 20 MG/1
20 TABLET ORAL EVERY 4 HOURS PRN
Qty: 90 TABLET | Refills: 0 | Status: SHIPPED | OUTPATIENT
Start: 2024-04-01 | End: 2024-04-16

## 2024-04-01 NOTE — TELEPHONE ENCOUNTER
From: Millie King  To: Dr. Sonia Nieves  Sent: 4/1/2024 5:40 AM EDT  Subject: Refill needed    Good morning!  I need a refill on my immediate release 20mg oxycodone please. It didn’t give me the option to request the refill on the request refills page. If you could just let me know when you’ve sent the refill in that would be great. Thank you!

## 2024-04-01 NOTE — TELEPHONE ENCOUNTER
Palliative Medicine Clinic   [place]: [phone])    Patient Name: Millie King  YOB: 1985    Medication Refill Request    Patient is scheduled for follow up:  []  YES  []   NO  Next [facility] Med Clinic Visit: 4/23/24    PDMP reviewed:  [x] YES   []  System down / Unable  []  NO- Patient fills out of state    Medication:Oxycodone IR 20mg  Dose and directions:1 every 4 PRN  Number dispensed:9; 15 days  Date filled (PDMP or Pharmacy):3/12/24  # left:    Appropriate for refill:  [x]  YES  []  Early Request - Requires MD/NP Review      Other pertinent information for prescriber:  Pended for review and approval to Dominic Rollins NP

## 2024-04-04 ENCOUNTER — HOSPITAL ENCOUNTER (OUTPATIENT)
Facility: HOSPITAL | Age: 39
Discharge: HOME | End: 2024-04-06
Attending: INTERNAL MEDICINE
Payer: COMMERCIAL

## 2024-04-04 VITALS
WEIGHT: 197 LBS | DIASTOLIC BLOOD PRESSURE: 78 MMHG | BODY MASS INDEX: 36.25 KG/M2 | HEART RATE: 88 BPM | SYSTOLIC BLOOD PRESSURE: 112 MMHG | HEIGHT: 62 IN

## 2024-04-04 DIAGNOSIS — C79.31 MALIGNANT NEOPLASM OF BREAST METASTATIC TO BRAIN, UNSPECIFIED LATERALITY (HCC): ICD-10-CM

## 2024-04-04 DIAGNOSIS — C50.919 MALIGNANT NEOPLASM OF BREAST METASTATIC TO BRAIN, UNSPECIFIED LATERALITY (HCC): ICD-10-CM

## 2024-04-04 DIAGNOSIS — Z09 CHEMOTHERAPY FOLLOW-UP EXAMINATION: ICD-10-CM

## 2024-04-04 DIAGNOSIS — Z95.828 PORT-A-CATH IN PLACE: ICD-10-CM

## 2024-04-04 PROCEDURE — 93308 TTE F-UP OR LMTD: CPT

## 2024-04-05 LAB
ECHO BSA: 1.98 M2
ECHO LV EDV A2C: 44 ML
ECHO LV EDV A4C: 51 ML
ECHO LV EDV BP: 47 ML (ref 56–104)
ECHO LV EDV INDEX A4C: 27 ML/M2
ECHO LV EDV INDEX BP: 25 ML/M2
ECHO LV EDV NDEX A2C: 23 ML/M2
ECHO LV EJECTION FRACTION A2C: 59 %
ECHO LV EJECTION FRACTION A4C: 61 %
ECHO LV EJECTION FRACTION BIPLANE: 59 % (ref 55–100)
ECHO LV ESV A2C: 18 ML
ECHO LV ESV A4C: 20 ML
ECHO LV ESV BP: 19 ML (ref 19–49)
ECHO LV ESV INDEX A2C: 9 ML/M2
ECHO LV ESV INDEX A4C: 11 ML/M2
ECHO LV ESV INDEX BP: 10 ML/M2
ECHO LV FRACTIONAL SHORTENING: 36 % (ref 28–44)
ECHO LV GLOBAL LONGITUDINAL STRAIN (GLS): -17.6 %
ECHO LV INTERNAL DIMENSION DIASTOLE INDEX: 2.21 CM/M2
ECHO LV INTERNAL DIMENSION DIASTOLIC: 4.2 CM (ref 3.9–5.3)
ECHO LV INTERNAL DIMENSION SYSTOLIC INDEX: 1.42 CM/M2
ECHO LV INTERNAL DIMENSION SYSTOLIC: 2.7 CM
ECHO LV IVSD: 1.1 CM (ref 0.6–0.9)
ECHO LV MASS 2D: 157.1 G (ref 67–162)
ECHO LV MASS INDEX 2D: 82.7 G/M2 (ref 43–95)
ECHO LV POSTERIOR WALL DIASTOLIC: 1.1 CM (ref 0.6–0.9)
ECHO LV RELATIVE WALL THICKNESS RATIO: 0.52

## 2024-04-08 DIAGNOSIS — C78.02 MALIGNANT NEOPLASM METASTATIC TO BOTH LUNGS (HCC): ICD-10-CM

## 2024-04-08 DIAGNOSIS — C79.51 MALIGNANT NEOPLASM METASTATIC TO BONE (HCC): Primary | ICD-10-CM

## 2024-04-08 DIAGNOSIS — C78.7 MALIGNANT NEOPLASM OF BREAST METASTATIC TO LIVER (HCC): ICD-10-CM

## 2024-04-08 DIAGNOSIS — C50.919 MALIGNANT NEOPLASM OF BREAST METASTATIC TO BRAIN, UNSPECIFIED LATERALITY (HCC): ICD-10-CM

## 2024-04-08 DIAGNOSIS — C78.01 MALIGNANT NEOPLASM METASTATIC TO BOTH LUNGS (HCC): ICD-10-CM

## 2024-04-08 DIAGNOSIS — C50.919 MALIGNANT NEOPLASM OF BREAST METASTATIC TO LIVER (HCC): ICD-10-CM

## 2024-04-08 DIAGNOSIS — C79.31 MALIGNANT NEOPLASM OF BREAST METASTATIC TO BRAIN, UNSPECIFIED LATERALITY (HCC): ICD-10-CM

## 2024-04-08 RX ORDER — DIPHENOXYLATE HYDROCHLORIDE AND ATROPINE SULFATE 2.5; .025 MG/1; MG/1
1 TABLET ORAL 4 TIMES DAILY PRN
Qty: 40 TABLET | Refills: 0 | Status: SHIPPED | OUTPATIENT
Start: 2024-04-08 | End: 2024-04-18

## 2024-04-08 RX ORDER — 0.9 % SODIUM CHLORIDE 0.9 %
1000 INTRAVENOUS SOLUTION INTRAVENOUS ONCE
Start: 2024-04-15

## 2024-04-08 RX ORDER — DIPHENHYDRAMINE HYDROCHLORIDE 50 MG/ML
50 INJECTION INTRAMUSCULAR; INTRAVENOUS
OUTPATIENT
Start: 2024-04-15

## 2024-04-08 RX ORDER — ONDANSETRON 2 MG/ML
8 INJECTION INTRAMUSCULAR; INTRAVENOUS
OUTPATIENT
Start: 2024-04-15

## 2024-04-08 RX ORDER — SODIUM CHLORIDE 9 MG/ML
5-250 INJECTION, SOLUTION INTRAVENOUS PRN
OUTPATIENT
Start: 2024-04-15

## 2024-04-08 RX ORDER — EPINEPHRINE 1 MG/ML
0.3 INJECTION, SOLUTION, CONCENTRATE INTRAVENOUS PRN
OUTPATIENT
Start: 2024-04-15

## 2024-04-08 RX ORDER — MEPERIDINE HYDROCHLORIDE 25 MG/ML
12.5 INJECTION INTRAMUSCULAR; INTRAVENOUS; SUBCUTANEOUS PRN
OUTPATIENT
Start: 2024-04-15

## 2024-04-08 RX ORDER — HEPARIN 100 UNIT/ML
500 SYRINGE INTRAVENOUS PRN
OUTPATIENT
Start: 2024-04-15

## 2024-04-08 RX ORDER — SODIUM CHLORIDE 9 MG/ML
INJECTION, SOLUTION INTRAVENOUS CONTINUOUS
OUTPATIENT
Start: 2024-04-15

## 2024-04-08 RX ORDER — SODIUM CHLORIDE 0.9 % (FLUSH) 0.9 %
5-40 SYRINGE (ML) INJECTION PRN
OUTPATIENT
Start: 2024-04-15

## 2024-04-08 RX ORDER — ACETAMINOPHEN 325 MG/1
650 TABLET ORAL
OUTPATIENT
Start: 2024-04-15

## 2024-04-08 RX ORDER — ALBUTEROL SULFATE 90 UG/1
4 AEROSOL, METERED RESPIRATORY (INHALATION) PRN
OUTPATIENT
Start: 2024-04-15

## 2024-04-15 ENCOUNTER — OFFICE VISIT (OUTPATIENT)
Age: 39
End: 2024-04-15
Payer: COMMERCIAL

## 2024-04-15 ENCOUNTER — CLINICAL DOCUMENTATION (OUTPATIENT)
Age: 39
End: 2024-04-15

## 2024-04-15 ENCOUNTER — HOSPITAL ENCOUNTER (OUTPATIENT)
Facility: HOSPITAL | Age: 39
Setting detail: INFUSION SERIES
Discharge: HOME | End: 2024-04-15
Payer: COMMERCIAL

## 2024-04-15 VITALS
DIASTOLIC BLOOD PRESSURE: 78 MMHG | SYSTOLIC BLOOD PRESSURE: 132 MMHG | BODY MASS INDEX: 35.66 KG/M2 | RESPIRATION RATE: 16 BRPM | HEIGHT: 62 IN | WEIGHT: 193.8 LBS | TEMPERATURE: 97.9 F | HEART RATE: 72 BPM

## 2024-04-15 VITALS
HEART RATE: 72 BPM | OXYGEN SATURATION: 95 % | SYSTOLIC BLOOD PRESSURE: 132 MMHG | DIASTOLIC BLOOD PRESSURE: 78 MMHG | RESPIRATION RATE: 16 BRPM | BODY MASS INDEX: 35.3 KG/M2 | WEIGHT: 193 LBS | TEMPERATURE: 97.9 F

## 2024-04-15 DIAGNOSIS — Z09 CHEMOTHERAPY FOLLOW-UP EXAMINATION: ICD-10-CM

## 2024-04-15 DIAGNOSIS — T45.1X5A CHEMOTHERAPY-INDUCED NAUSEA: ICD-10-CM

## 2024-04-15 DIAGNOSIS — C78.02 MALIGNANT NEOPLASM METASTATIC TO BOTH LUNGS (HCC): ICD-10-CM

## 2024-04-15 DIAGNOSIS — R53.83 CHEMOTHERAPY-INDUCED FATIGUE: ICD-10-CM

## 2024-04-15 DIAGNOSIS — T45.1X5A CHEMOTHERAPY-INDUCED NEUROPATHY (HCC): ICD-10-CM

## 2024-04-15 DIAGNOSIS — C79.31 METASTASIS TO BRAIN (HCC): Primary | ICD-10-CM

## 2024-04-15 DIAGNOSIS — C79.51 MALIGNANT NEOPLASM METASTATIC TO BONE (HCC): ICD-10-CM

## 2024-04-15 DIAGNOSIS — G62.0 CHEMOTHERAPY-INDUCED NEUROPATHY (HCC): ICD-10-CM

## 2024-04-15 DIAGNOSIS — C79.31 MALIGNANT NEOPLASM OF BREAST METASTATIC TO BRAIN, UNSPECIFIED LATERALITY (HCC): ICD-10-CM

## 2024-04-15 DIAGNOSIS — C78.7 MALIGNANT NEOPLASM METASTATIC TO LIVER (HCC): ICD-10-CM

## 2024-04-15 DIAGNOSIS — T45.1X5A CHEMOTHERAPY INDUCED DIARRHEA: ICD-10-CM

## 2024-04-15 DIAGNOSIS — R11.0 CHEMOTHERAPY-INDUCED NAUSEA: ICD-10-CM

## 2024-04-15 DIAGNOSIS — C50.919 MALIGNANT NEOPLASM OF BREAST METASTATIC TO LIVER (HCC): Primary | ICD-10-CM

## 2024-04-15 DIAGNOSIS — T45.1X5A CHEMOTHERAPY-INDUCED FATIGUE: ICD-10-CM

## 2024-04-15 DIAGNOSIS — C78.01 MALIGNANT NEOPLASM METASTATIC TO BOTH LUNGS (HCC): ICD-10-CM

## 2024-04-15 DIAGNOSIS — C78.7 MALIGNANT NEOPLASM OF BREAST METASTATIC TO LIVER (HCC): Primary | ICD-10-CM

## 2024-04-15 DIAGNOSIS — C50.919 MALIGNANT NEOPLASM OF BREAST METASTATIC TO BRAIN, UNSPECIFIED LATERALITY (HCC): ICD-10-CM

## 2024-04-15 DIAGNOSIS — Z51.81 ENCOUNTER FOR MONITORING CARDIOTOXIC DRUG THERAPY: ICD-10-CM

## 2024-04-15 DIAGNOSIS — Z79.899 ENCOUNTER FOR MONITORING CARDIOTOXIC DRUG THERAPY: ICD-10-CM

## 2024-04-15 DIAGNOSIS — K52.1 CHEMOTHERAPY INDUCED DIARRHEA: ICD-10-CM

## 2024-04-15 LAB
ALBUMIN SERPL-MCNC: 3.6 G/DL (ref 3.5–5)
ALBUMIN/GLOB SERPL: 1.6 (ref 1.1–2.2)
ALP SERPL-CCNC: 93 U/L (ref 45–117)
ALT SERPL-CCNC: 29 U/L (ref 12–78)
ANION GAP SERPL CALC-SCNC: 3 MMOL/L (ref 5–15)
AST SERPL-CCNC: 21 U/L (ref 15–37)
BASOPHILS # BLD: 0 K/UL (ref 0–0.1)
BASOPHILS NFR BLD: 0 % (ref 0–1)
BILIRUB SERPL-MCNC: 0.7 MG/DL (ref 0.2–1)
BUN SERPL-MCNC: 11 MG/DL (ref 6–20)
BUN/CREAT SERPL: 13 (ref 12–20)
CALCIUM SERPL-MCNC: 9.2 MG/DL (ref 8.5–10.1)
CHLORIDE SERPL-SCNC: 110 MMOL/L (ref 97–108)
CO2 SERPL-SCNC: 29 MMOL/L (ref 21–32)
CREAT SERPL-MCNC: 0.83 MG/DL (ref 0.55–1.02)
DIFFERENTIAL METHOD BLD: ABNORMAL
EOSINOPHIL # BLD: 0.1 K/UL (ref 0–0.4)
EOSINOPHIL NFR BLD: 3 % (ref 0–7)
ERYTHROCYTE [DISTWIDTH] IN BLOOD BY AUTOMATED COUNT: 20.2 % (ref 11.5–14.5)
GLOBULIN SER CALC-MCNC: 2.3 G/DL (ref 2–4)
GLUCOSE SERPL-MCNC: 108 MG/DL (ref 65–100)
HCT VFR BLD AUTO: 34 % (ref 35–47)
HGB BLD-MCNC: 11.6 G/DL (ref 11.5–16)
IMM GRANULOCYTES # BLD AUTO: 0 K/UL (ref 0–0.04)
IMM GRANULOCYTES NFR BLD AUTO: 0 % (ref 0–0.5)
LYMPHOCYTES # BLD: 1.4 K/UL (ref 0.8–3.5)
LYMPHOCYTES NFR BLD: 29 % (ref 12–49)
MAGNESIUM SERPL-MCNC: 2 MG/DL (ref 1.6–2.4)
MCH RBC QN AUTO: 32.5 PG (ref 26–34)
MCHC RBC AUTO-ENTMCNC: 34.1 G/DL (ref 30–36.5)
MCV RBC AUTO: 95.2 FL (ref 80–99)
MONOCYTES # BLD: 0.5 K/UL (ref 0–1)
MONOCYTES NFR BLD: 11 % (ref 5–13)
NEUTS SEG # BLD: 2.8 K/UL (ref 1.8–8)
NEUTS SEG NFR BLD: 57 % (ref 32–75)
NRBC # BLD: 0 K/UL (ref 0–0.01)
NRBC BLD-RTO: 0 PER 100 WBC
PHOSPHATE SERPL-MCNC: 4.2 MG/DL (ref 2.6–4.7)
PLATELET # BLD AUTO: 226 K/UL (ref 150–400)
PMV BLD AUTO: 9 FL (ref 8.9–12.9)
POTASSIUM SERPL-SCNC: 3.3 MMOL/L (ref 3.5–5.1)
PROT SERPL-MCNC: 5.9 G/DL (ref 6.4–8.2)
RBC # BLD AUTO: 3.57 M/UL (ref 3.8–5.2)
RBC MORPH BLD: ABNORMAL
SODIUM SERPL-SCNC: 142 MMOL/L (ref 136–145)
WBC # BLD AUTO: 4.8 K/UL (ref 3.6–11)

## 2024-04-15 PROCEDURE — 6370000000 HC RX 637 (ALT 250 FOR IP)

## 2024-04-15 PROCEDURE — 84100 ASSAY OF PHOSPHORUS: CPT

## 2024-04-15 PROCEDURE — 96401 CHEMO ANTI-NEOPL SQ/IM: CPT

## 2024-04-15 PROCEDURE — 2580000003 HC RX 258

## 2024-04-15 PROCEDURE — 6360000002 HC RX W HCPCS

## 2024-04-15 PROCEDURE — 6360000002 HC RX W HCPCS: Performed by: INTERNAL MEDICINE

## 2024-04-15 PROCEDURE — 99215 OFFICE O/P EST HI 40 MIN: CPT | Performed by: INTERNAL MEDICINE

## 2024-04-15 PROCEDURE — 83735 ASSAY OF MAGNESIUM: CPT

## 2024-04-15 PROCEDURE — 85025 COMPLETE CBC W/AUTO DIFF WBC: CPT

## 2024-04-15 PROCEDURE — 96360 HYDRATION IV INFUSION INIT: CPT

## 2024-04-15 PROCEDURE — 96372 THER/PROPH/DIAG INJ SC/IM: CPT

## 2024-04-15 PROCEDURE — 80053 COMPREHEN METABOLIC PANEL: CPT

## 2024-04-15 PROCEDURE — 36415 COLL VENOUS BLD VENIPUNCTURE: CPT

## 2024-04-15 RX ORDER — SODIUM CHLORIDE 9 MG/ML
INJECTION, SOLUTION INTRAVENOUS CONTINUOUS
OUTPATIENT
Start: 2024-05-27

## 2024-04-15 RX ORDER — CAPECITABINE 500 MG/1
1500 TABLET, FILM COATED ORAL 2 TIMES DAILY
Qty: 84 TABLET | Refills: 5 | Status: ACTIVE | OUTPATIENT
Start: 2024-04-15

## 2024-04-15 RX ORDER — ONDANSETRON 2 MG/ML
8 INJECTION INTRAMUSCULAR; INTRAVENOUS
OUTPATIENT
Start: 2024-05-27

## 2024-04-15 RX ORDER — ALBUTEROL SULFATE 90 UG/1
4 AEROSOL, METERED RESPIRATORY (INHALATION) PRN
OUTPATIENT
Start: 2024-05-27

## 2024-04-15 RX ORDER — EPINEPHRINE 1 MG/ML
0.3 INJECTION, SOLUTION INTRAMUSCULAR; SUBCUTANEOUS PRN
OUTPATIENT
Start: 2024-05-27

## 2024-04-15 RX ORDER — SODIUM CHLORIDE 9 MG/ML
5-250 INJECTION, SOLUTION INTRAVENOUS PRN
Status: DISCONTINUED | OUTPATIENT
Start: 2024-04-15 | End: 2024-04-16 | Stop reason: HOSPADM

## 2024-04-15 RX ORDER — ACETAMINOPHEN 325 MG/1
650 TABLET ORAL
OUTPATIENT
Start: 2024-05-27

## 2024-04-15 RX ORDER — DIPHENHYDRAMINE HYDROCHLORIDE 50 MG/ML
50 INJECTION INTRAMUSCULAR; INTRAVENOUS
OUTPATIENT
Start: 2024-05-27

## 2024-04-15 RX ORDER — 0.9 % SODIUM CHLORIDE 0.9 %
1000 INTRAVENOUS SOLUTION INTRAVENOUS ONCE
Status: COMPLETED | OUTPATIENT
Start: 2024-04-15 | End: 2024-04-15

## 2024-04-15 RX ORDER — POTASSIUM CHLORIDE 750 MG/1
40 TABLET, FILM COATED, EXTENDED RELEASE ORAL ONCE
Status: COMPLETED | OUTPATIENT
Start: 2024-04-15 | End: 2024-04-15

## 2024-04-15 RX ADMIN — POTASSIUM CHLORIDE 40 MEQ: 750 TABLET, FILM COATED, EXTENDED RELEASE ORAL at 10:35

## 2024-04-15 RX ADMIN — SODIUM CHLORIDE 1000 ML: 9 INJECTION, SOLUTION INTRAVENOUS at 09:27

## 2024-04-15 RX ADMIN — TRASTUZUMAB AND HYALURONIDASE-OYSK 600 MG: 600; 10000 INJECTION, SOLUTION SUBCUTANEOUS at 10:40

## 2024-04-15 RX ADMIN — DENOSUMAB 120 MG: 120 INJECTION SUBCUTANEOUS at 10:34

## 2024-04-15 ASSESSMENT — PAIN SCALES - GENERAL: PAINLEVEL_OUTOF10: 0

## 2024-04-15 NOTE — PROGRESS NOTES
Millie King is a 39 y.o. female    Chief Complaint   Patient presents with    Follow-up     Metastatic Breast Cancer       1. Have you been to the ER, urgent care clinic since your last visit?  Hospitalized since your last visit?No    2. Have you seen or consulted any other health care providers outside of the [facility] since your last visit?  Include any pap smears or colon screening. Yes, [facility]      
malignancy.    COMPARISON: 10/18/2023    TECHNIQUE:  Following the uneventful intravenous administration of 100 cc  Isovue-300, 5 mm axial images were obtained through the chest, abdomen, and  pelvis. Oral contrast was not administered.  Coronal and sagittal  reconstructions were generated. CT dose reduction was achieved through use of a  standardized protocol tailored for this examination and automatic exposure  control for dose modulation.    FINDINGS:    THYROID: No nodule.  MEDIASTINUM: No mass or lymphadenopathy.  ROMULO: No mass or lymphadenopathy.  THORACIC AORTA: No dissection or aneurysm.  MAIN PULMONARY ARTERY: Normal in caliber.  TRACHEA/BRONCHI: Patent.  ESOPHAGUS: No wall thickening or dilatation.  HEART: Normal in size.  Coronary artery calcium:  absent.  PLEURA: Tiny bilateral pleural effusions are stable.  LUNGS: No nodule, mass, or airspace disease.  LIVER: Stable overall capsular retraction and micronodular to of the liver as  previously described. The lesions described on the prior study were not  documented and saved. However, a 7.6 mm hypodensity in the right hepatic lobe is  stable image 36. A very vague 5.9 mm hypodensity in the right hepatic lobe tip  is also stable image 57. There are no other focal hepatic abnormalities which  can be confirmed.  GALLBLADDER: Unremarkable.  SPLEEN: No mass.  PANCREAS: No mass or ductal dilatation.  ADRENALS: Unremarkable.  KIDNEYS: Nonobstructing intrarenal calcifications on the left are stable.  STOMACH: Unremarkable.  SMALL BOWEL: No dilatation or wall thickening.  COLON: No dilatation or wall thickening. Moderately heavy retained fecal  material throughout the colon.  APPENDIX: Not seen.  PERITONEUM: No ascites or pneumoperitoneum. No marsha hepatis or other regional  lymphadenopathy.  RETROPERITONEUM: No lymphadenopathy or aortic aneurysm.  REPRODUCTIVE ORGANS: The uterus is surgically absent.  URINARY BLADDER: No mass or calculus.  BONES: No destructive

## 2024-04-15 NOTE — PROGRESS NOTES
[facility] Chemotherapy Progress Note    Date: April 15, 2024    Name: Millie King    MRN: [id number]         : 1985       Pt admit to [facility] for Herceptin Hylecta and xgeva and IVF ambulatory in stable condition. Assessment completed. No new concerns voiced. Port with positive blood return. Labs sent for processing.         Ms. King's vitals were reviewed.  Patient Vitals for the past 12 hrs:   Temp Pulse Resp BP   04/15/24 0800 97.9 °F (36.6 °C) 72 16 132/78         Lab results were obtained and reviewed.  Recent Results (from the past 12 hour(s))   Phosphorus    Collection Time: 04/15/24  8:13 AM   Result Value Ref Range    Phosphorus 4.2 2.6 - 4.7 MG/DL   Magnesium    Collection Time: 04/15/24  8:13 AM   Result Value Ref Range    Magnesium 2.0 1.6 - 2.4 mg/dL   Comprehensive Metabolic Panel    Collection Time: 04/15/24  8:13 AM   Result Value Ref Range    Sodium 142 136 - 145 mmol/L    Potassium 3.3 (L) 3.5 - 5.1 mmol/L    Chloride 110 (H) 97 - 108 mmol/L    CO2 29 21 - 32 mmol/L    Anion Gap 3 (L) 5 - 15 mmol/L    Glucose 108 (H) 65 - 100 mg/dL    BUN 11 6 - 20 MG/DL    Creatinine 0.83 0.55 - 1.02 MG/DL    Bun/Cre Ratio 13 12 - 20      Est, Glom Filt Rate >90 >60 ml/min/1.73m2    Calcium 9.2 8.5 - 10.1 MG/DL    Total Bilirubin 0.7 0.2 - 1.0 MG/DL    ALT 29 12 - 78 U/L    AST 21 15 - 37 U/L    Alk Phosphatase 93 45 - 117 U/L    Total Protein 5.9 (L) 6.4 - 8.2 g/dL    Albumin 3.6 3.5 - 5.0 g/dL    Globulin 2.3 2.0 - 4.0 g/dL    Albumin/Globulin Ratio 1.6 1.1 - 2.2     CBC with Auto Differential    Collection Time: 04/15/24  8:13 AM   Result Value Ref Range    WBC 4.8 3.6 - 11.0 K/uL    RBC 3.57 (L) 3.80 - 5.20 M/uL    Hemoglobin 11.6 11.5 - 16.0 g/dL    Hematocrit 34.0 (L) 35.0 - 47.0 %    MCV 95.2 80.0 - 99.0 FL    MCH 32.5 26.0 - 34.0 PG    MCHC 34.1 30.0 - 36.5 g/dL    RDW 20.2 (H) 11.5 - 14.5 %    Platelets 226 150 - 400 K/uL    MPV 9.0 8.9 - 12.9 FL    Nucleated RBCs 0.0 0  WBC    nRBC 0.00 0.00 -  0.01 K/uL    Neutrophils % 57 32 - 75 %    Lymphocytes % 29 12 - 49 %    Monocytes % 11 5 - 13 %    Eosinophils % 3 0 - 7 %    Basophils % 0 0 - 1 %    Immature Granulocytes % 0 0.0 - 0.5 %    Neutrophils Absolute 2.8 1.8 - 8.0 K/UL    Lymphocytes Absolute 1.4 0.8 - 3.5 K/UL    Monocytes Absolute 0.5 0.0 - 1.0 K/UL    Eosinophils Absolute 0.1 0.0 - 0.4 K/UL    Basophils Absolute 0.0 0.0 - 0.1 K/UL    Immature Granulocytes Absolute 0.0 0.00 - 0.04 K/UL    Differential Type SMEAR SCANNED      RBC Comment ANISOCYTOSIS  2+           Pre-medications  were administered as ordered and chemotherapy was initiated.  Medications Administered         denosumab (XGEVA) SC injection 120 mg Admin Date  04/15/2024 Action  Given Dose  120 mg Rate   Route  SubCUTAneous Administered By  Yenifer Seaman RN        potassium chloride (KLOR-CON) extended release tablet 40 mEq Admin Date  04/15/2024 Action  Given Dose  40 mEq Rate   Route  Oral Administered By  Yenifer Seaman RN        sodium chloride 0.9 % bolus 1,000 mL Admin Date  04/15/2024 Action  New Bag Dose  1,000 mL Rate  983.6 mL/hr Route  IntraVENous Administered By  Yenifer Seaman RN        trastuzumab-hyaluronidase-oysk (HERCEPTIN HYLECTA) injection 600 mg Admin Date  04/15/2024 Action  Given Dose  600 mg Rate   Route  SubCUTAneous Administered By  Yenifer Seaman, RN            Two nurses verified prior to administering:Drug name, Drug doseInfusion volume or drug volume when prepared in a syringe, Rate of administration, Route of administration, Expiration dates and/or times, Appearance and physical integrity of the drugs, Rate set on infusion pump, when used, Sequencing of drug administration.       Pt tolerated treatment well. Port maintained positive blood return throughout treatment. Flushed, heparinized and de-accessed per protocol. D/c home ambulatory in no distress.   Future Appointments   Date Time Provider Department Center   4/23/2024  2:00 PM Sonia Nieves MD

## 2024-04-15 NOTE — PROGRESS NOTES
Oral Chemotherapy      Millie King is a  38 y.o.female  diagnosed with breast cancer . Ms. King is being treated with tucatinib, capecitabine, trastuzumab.      Medication name: capecitabine     Dose:  1500 mg (dose reduced 4/15 with Cycle 4)  Frequency: twice a day  Administration schedule: for 14 day on, 7 days off every 21 days        Medication name: tucatinib     Dose:  250 mg  (dose reduced 4/15 with Cycle 4)  Frequency: twice a day        Ordering provider: Fang Cespedes DO  Start date: 4/15/24    Lab Results   Component Value Date    WBC 4.8 04/15/2024    HGB 11.6 04/15/2024    HCT 34.0 (L) 04/15/2024    MCV 95.2 04/15/2024     04/15/2024    LYMPHOPCT 29 04/15/2024    RBC 3.57 (L) 04/15/2024    MCH 32.5 04/15/2024    MCHC 34.1 04/15/2024    RDW 20.2 (H) 04/15/2024     Lab Results   Component Value Date    NEUTROABS 2.8 04/15/2024       Updated prescriptions sent for tucatinib and capecitabine to [facility]. For cycle 4, will start with capecitabine and will add in tucatinib when 50 mg tablets are received. Will also, take loperamide on a scheduled basis for a few days when she restarts the lower tucatinib dose.     Expected follow up date: Labs/office visit each cycle. Next cycle start on 5/6/24     Patient  provided with an Oral Chemotherapy Journal.      Ms. King verbalized understanding of the information presented and all of the patient's questions were answered.        Beryl Sanchez, BETID, BCOP, BCPS    For Pharmacy Admin Tracking Only    Program: Medical Group  CPA in place:  Yes  Recommendation Provided To: Provider: 1 via Verbally to provider and Patient/Caregiver: 2 via In person  Intervention Detail: Dose Adjustment: 2, reason: ARIC  Intervention Accepted By: Provider: 1 and Patient/Caregiver: 2    Time Spent (min): 20

## 2024-04-17 ENCOUNTER — TELEPHONE (OUTPATIENT)
Age: 39
End: 2024-04-17

## 2024-04-17 NOTE — TELEPHONE ENCOUNTER
Cancer [place] at [facility]   [address] [place] [address]   W: [phone]  F: [phone]    Medical Nutrition Therapy  Nutrition Encounter:    Referral received. Called patient. No answer.  Left a message, explaining that RD is available as a resource for any nutrition related concerns.        Signed By: TESHA MOSLEY RD

## 2024-04-18 ENCOUNTER — PATIENT MESSAGE (OUTPATIENT)
Age: 39
End: 2024-04-18

## 2024-04-18 DIAGNOSIS — C79.51 MALIGNANT NEOPLASM METASTATIC TO BONE (HCC): ICD-10-CM

## 2024-04-18 DIAGNOSIS — C78.7 MALIGNANT NEOPLASM OF BREAST METASTATIC TO LIVER (HCC): ICD-10-CM

## 2024-04-18 DIAGNOSIS — C78.01 MALIGNANT NEOPLASM METASTATIC TO BOTH LUNGS (HCC): ICD-10-CM

## 2024-04-18 DIAGNOSIS — C78.02 MALIGNANT NEOPLASM METASTATIC TO BOTH LUNGS (HCC): ICD-10-CM

## 2024-04-18 DIAGNOSIS — C50.919 MALIGNANT NEOPLASM OF BREAST METASTATIC TO LIVER (HCC): ICD-10-CM

## 2024-04-18 DIAGNOSIS — G89.3 CANCER RELATED PAIN: Primary | ICD-10-CM

## 2024-04-18 RX ORDER — OXYCODONE HYDROCHLORIDE 20 MG/1
20 TABLET ORAL EVERY 4 HOURS PRN
Qty: 90 TABLET | Refills: 0 | Status: SHIPPED | OUTPATIENT
Start: 2024-04-18 | End: 2024-05-03

## 2024-04-18 RX ORDER — DIPHENOXYLATE HYDROCHLORIDE AND ATROPINE SULFATE 2.5; .025 MG/1; MG/1
2 TABLET ORAL 4 TIMES DAILY PRN
Qty: 90 TABLET | Refills: 0 | Status: SHIPPED | OUTPATIENT
Start: 2024-04-18 | End: 2024-05-18

## 2024-04-18 NOTE — TELEPHONE ENCOUNTER
From: Millie King  To: Dr. Sonia Nieves  Sent: 4/18/2024 10:39 AM EDT  Subject: Oxycodone refill    Good morning! I need a refill on my oxycodone but it doesn’t give me the option to request that refill on my chart. Can you please submit a refill to the [facility] on [address]?

## 2024-04-19 ENCOUNTER — TELEPHONE (OUTPATIENT)
Age: 39
End: 2024-04-19

## 2024-04-19 NOTE — TELEPHONE ENCOUNTER
Called patient to advise/confirm upcoming vv appt with Dr. Nieves on 04/23/2024 at 2:00  at  virtual. Spoke with Millie King and confirmed appointment.

## 2024-04-21 PROBLEM — Z09 CHEMOTHERAPY FOLLOW-UP EXAMINATION: Status: RESOLVED | Noted: 2024-03-22 | Resolved: 2024-04-21

## 2024-04-23 ENCOUNTER — TELEMEDICINE (OUTPATIENT)
Age: 39
End: 2024-04-23
Payer: COMMERCIAL

## 2024-04-23 DIAGNOSIS — C50.919 MALIGNANT NEOPLASM OF BREAST METASTATIC TO LIVER (HCC): ICD-10-CM

## 2024-04-23 DIAGNOSIS — G89.3 CANCER RELATED PAIN: ICD-10-CM

## 2024-04-23 DIAGNOSIS — C78.7 MALIGNANT NEOPLASM OF BREAST METASTATIC TO LIVER (HCC): ICD-10-CM

## 2024-04-23 DIAGNOSIS — R53.83 CHEMOTHERAPY-INDUCED FATIGUE: ICD-10-CM

## 2024-04-23 DIAGNOSIS — T45.1X5A CHEMOTHERAPY-INDUCED NEUROPATHY (HCC): Primary | ICD-10-CM

## 2024-04-23 DIAGNOSIS — T45.1X5A CHEMOTHERAPY-INDUCED FATIGUE: ICD-10-CM

## 2024-04-23 DIAGNOSIS — G62.0 CHEMOTHERAPY-INDUCED NEUROPATHY (HCC): Primary | ICD-10-CM

## 2024-04-23 DIAGNOSIS — F43.22 ADJUSTMENT DISORDER WITH ANXIOUS MOOD: ICD-10-CM

## 2024-04-23 PROCEDURE — 99214 OFFICE O/P EST MOD 30 MIN: CPT | Performed by: INTERNAL MEDICINE

## 2024-04-23 RX ORDER — OXYCODONE HYDROCHLORIDE 20 MG/1
20 TABLET ORAL EVERY 4 HOURS PRN
Qty: 90 TABLET | Refills: 0 | Status: SHIPPED | OUTPATIENT
Start: 2024-04-23 | End: 2024-05-08

## 2024-04-23 RX ORDER — PREGABALIN 150 MG/1
150 CAPSULE ORAL 3 TIMES DAILY
Qty: 90 CAPSULE | Refills: 2 | Status: SHIPPED | OUTPATIENT
Start: 2024-04-23 | End: 2024-07-22

## 2024-04-23 RX ORDER — OXYMORPHONE HYDROCHLORIDE 30 MG/1
30 TABLET, FILM COATED, EXTENDED RELEASE ORAL EVERY 12 HOURS
Qty: 60 TABLET | Refills: 0 | Status: SHIPPED | OUTPATIENT
Start: 2024-04-23 | End: 2024-05-23

## 2024-04-23 NOTE — PROGRESS NOTES
Palliative Medicine Outpatient Clinic  Nurse Check in Note  [phone]    Patient Name: Millie King  YOB: 1985      Date of Visit: 04/23/2024  Visit Location:  [facility] Visit     Chief patient or family concern today: Headaches at times and blurred vision in right eye for a few weeks.     Patient's Last Palliative Medicine Clinic Visit Date:  3/12/2024    Have you been to an ER or urgent care center since your last visit?  No  Have you been hospitalized since your last visit? No  Have you seen or consulted any health care providers outside of the [facility] system since your last visit?  No  If Yes, alert PSR to request appropriate records from non-[facility] offices    Medications:  Med reconciliation was performed with:  Patient    Requested refills:  None    If prescribed an opioid, does patient have access to naloxone at home?:  YES  If No, pend naloxone nasal spray    Function and Symptoms:  Use of assist devices:  None    Palliative Performance Status (PPS):        ESAS:  Modified-[place] Symptom Assessment Scale (ESAS)  Tiredness Score: 7  Drowsiness Score: 5  Depression Score: 2  Pain Score: 4  Anxiety Score: 2  Nausea Score: Not nauseated  Appetite Score: Best appetite  Dyspnea Score: No shortness of breath  Constipation: No  Wellbeing Score: Best feeling of wellbeing  Other Problem Score: Best possible response    Constipation?  No  Last BM: 04/24/24  Advance Care Planning:  Currently listed healthcare agent:    Primary Decision Maker: Eric King - Spouse - [phone]    Is there an ACP Note within the past 12 months?  YES  If No, Alert Clinician and/or Social Work      Divya Simms LPN

## 2024-04-23 NOTE — PROGRESS NOTES
Palliative Medicine Outpatient Services  [place]: [phone])    Patient Name: Millie King  YOB: 1985    Date of Current Visit: 04/23/24  Location of Current Visit:    [] [facility] Office  [] [facility] Office  [] [facility] Office  [] Home  [x] Synchronous real-time A/V virtual visit    Millie King, was evaluated through a synchronous (real-time) audio-video encounter. The patient (or guardian if applicable) is aware that this is a billable service, which includes applicable co-pays. This Virtual Visit was conducted with patient's (and/or legal guardian's) consent. Patient identification was verified, and a caregiver was present when appropriate.   The patient was located at Home: [address] [place], [place] [id number]  Provider was located at Facility (Appt Dept): [address] [place] [address] [address]  [place] [address]  Confirm you are appropriately licensed, registered, or certified to deliver care in the [facility] where the patient is located as indicated above. If you are not or unsure, please re-schedule the visit: Yes, I confirm.      Total time spent for this encounter: Not billed by time    --Sonia Nieves MD on 4/23/2024 at 3:03 PM    An electronic signature was used to authenticate this note.    Date of Initial Palliative Medicine Visit: 6/8/23   Referral from: Dr. Manrique    REASON FOR VISIT:   38 year old woman with MBC seen for follow up pain and symptom management.     FOLLOW UP:   Return in about 8 weeks (around 6/18/2024) for In person follow up.     ASSESSMENT AND PLAN:     Cancer related pain- controlled  - right hip pain related to pathologic partially healed fracture s/p fixation.  There are no additional surgical options.   There is a significant neuropathic component to the hip pain; addition of Lyrica has been helpful.    - she also has diffuse joint aches related to chemotherapy.    - new RUQ pain following initiation of Xeloda  - previously on Oxycontin 20 mg BID in 2023, insurance

## 2024-04-29 ENCOUNTER — HOSPITAL ENCOUNTER (OUTPATIENT)
Facility: HOSPITAL | Age: 39
Discharge: HOME | End: 2024-05-02
Attending: NEUROLOGICAL SURGERY
Payer: COMMERCIAL

## 2024-04-29 ENCOUNTER — HOSPITAL ENCOUNTER (OUTPATIENT)
Facility: HOSPITAL | Age: 39
Discharge: HOME | End: 2024-05-02
Attending: INTERNAL MEDICINE
Payer: COMMERCIAL

## 2024-04-29 VITALS — BODY MASS INDEX: 35.3 KG/M2 | WEIGHT: 193 LBS

## 2024-04-29 DIAGNOSIS — C79.31 METASTASIS TO BRAIN (HCC): ICD-10-CM

## 2024-04-29 DIAGNOSIS — Z09 CHEMOTHERAPY FOLLOW-UP EXAMINATION: ICD-10-CM

## 2024-04-29 DIAGNOSIS — C78.7 MALIGNANT NEOPLASM METASTATIC TO LIVER (HCC): ICD-10-CM

## 2024-04-29 PROCEDURE — 74177 CT ABD & PELVIS W/CONTRAST: CPT

## 2024-04-29 PROCEDURE — 70553 MRI BRAIN STEM W/O & W/DYE: CPT

## 2024-04-29 PROCEDURE — 6360000004 HC RX CONTRAST MEDICATION: Performed by: INTERNAL MEDICINE

## 2024-04-29 PROCEDURE — A9575 INJ GADOTERATE MEGLUMI 0.1ML: HCPCS | Performed by: RADIOLOGY

## 2024-04-29 PROCEDURE — 6360000004 HC RX CONTRAST MEDICATION: Performed by: RADIOLOGY

## 2024-04-29 RX ORDER — GADOTERATE MEGLUMINE 376.9 MG/ML
17 INJECTION INTRAVENOUS
Status: COMPLETED | OUTPATIENT
Start: 2024-04-29 | End: 2024-04-29

## 2024-04-29 RX ADMIN — IOPAMIDOL 100 ML: 755 INJECTION, SOLUTION INTRAVENOUS at 13:04

## 2024-04-29 RX ADMIN — GADOTERATE MEGLUMINE 17 ML: 376.9 INJECTION, SOLUTION INTRAVENOUS at 12:29

## 2024-04-30 ENCOUNTER — TELEPHONE (OUTPATIENT)
Age: 39
End: 2024-04-30

## 2024-04-30 DIAGNOSIS — C78.01 MALIGNANT NEOPLASM METASTATIC TO BOTH LUNGS (HCC): ICD-10-CM

## 2024-04-30 DIAGNOSIS — C78.02 MALIGNANT NEOPLASM METASTATIC TO BOTH LUNGS (HCC): ICD-10-CM

## 2024-04-30 DIAGNOSIS — C50.919 MALIGNANT NEOPLASM OF BREAST METASTATIC TO BRAIN, UNSPECIFIED LATERALITY (HCC): ICD-10-CM

## 2024-04-30 DIAGNOSIS — R19.7 DIARRHEA, UNSPECIFIED TYPE: ICD-10-CM

## 2024-04-30 DIAGNOSIS — C78.7 MALIGNANT NEOPLASM OF BREAST METASTATIC TO LIVER (HCC): ICD-10-CM

## 2024-04-30 DIAGNOSIS — C79.51 MALIGNANT NEOPLASM METASTATIC TO BONE (HCC): Primary | ICD-10-CM

## 2024-04-30 DIAGNOSIS — C79.31 MALIGNANT NEOPLASM OF BREAST METASTATIC TO BRAIN, UNSPECIFIED LATERALITY (HCC): ICD-10-CM

## 2024-04-30 DIAGNOSIS — C79.51 MALIGNANT NEOPLASM METASTATIC TO BONE (HCC): ICD-10-CM

## 2024-04-30 DIAGNOSIS — C50.919 MALIGNANT NEOPLASM OF BREAST METASTATIC TO LIVER (HCC): ICD-10-CM

## 2024-04-30 RX ORDER — SODIUM CHLORIDE 9 MG/ML
INJECTION, SOLUTION INTRAVENOUS CONTINUOUS
Status: CANCELLED | OUTPATIENT
Start: 2024-05-01

## 2024-04-30 RX ORDER — SODIUM CHLORIDE 0.9 % (FLUSH) 0.9 %
5-40 SYRINGE (ML) INJECTION PRN
Status: CANCELLED | OUTPATIENT
Start: 2024-05-01

## 2024-04-30 RX ORDER — EPINEPHRINE 1 MG/ML
0.3 INJECTION, SOLUTION, CONCENTRATE INTRAVENOUS PRN
Status: CANCELLED | OUTPATIENT
Start: 2024-05-01

## 2024-04-30 RX ORDER — 0.9 % SODIUM CHLORIDE 0.9 %
1000 INTRAVENOUS SOLUTION INTRAVENOUS ONCE
Status: CANCELLED | OUTPATIENT
Start: 2024-05-01 | End: 2024-05-01

## 2024-04-30 RX ORDER — ONDANSETRON 2 MG/ML
8 INJECTION INTRAMUSCULAR; INTRAVENOUS
Status: CANCELLED | OUTPATIENT
Start: 2024-05-01

## 2024-04-30 RX ORDER — SODIUM CHLORIDE 9 MG/ML
5-250 INJECTION, SOLUTION INTRAVENOUS PRN
Status: CANCELLED | OUTPATIENT
Start: 2024-05-01

## 2024-04-30 RX ORDER — ALBUTEROL SULFATE 90 UG/1
4 AEROSOL, METERED RESPIRATORY (INHALATION) PRN
Status: CANCELLED | OUTPATIENT
Start: 2024-05-01

## 2024-04-30 RX ORDER — 0.9 % SODIUM CHLORIDE 0.9 %
1000 INTRAVENOUS SOLUTION INTRAVENOUS ONCE
Start: 2024-05-06

## 2024-04-30 RX ORDER — ACETAMINOPHEN 325 MG/1
650 TABLET ORAL
Status: CANCELLED | OUTPATIENT
Start: 2024-05-01

## 2024-04-30 RX ORDER — HEPARIN 100 UNIT/ML
500 SYRINGE INTRAVENOUS PRN
Status: CANCELLED | OUTPATIENT
Start: 2024-05-01

## 2024-04-30 RX ORDER — DIPHENHYDRAMINE HYDROCHLORIDE 50 MG/ML
50 INJECTION INTRAMUSCULAR; INTRAVENOUS
Status: CANCELLED | OUTPATIENT
Start: 2024-05-01

## 2024-04-30 NOTE — TELEPHONE ENCOUNTER
FU call to patient, ID verified X 2. [phone].    Patient has been having near constant diarrhea in spite of alternating Lomotil/Imodium.  Understands that Provider has added IVF for each [facility] appt.  Inquired if would like fluids today in [facility] at 11, patient does not feel can get there in time.  Per Sarina in Peds, patient can come at 10 on 5/1/24 for fluids. Patient accepts.  Inquired if scans had resulted, updated that they are both still in process but will call with CT scan results when available. Patient states that she almost hopes that they are worse, so that she can be taken off this treatment regimen.  Appreciative of patient sharing this thought, encouraged her to have honest discussion at her 5/6/24 appt with Provider to discuss how the treatment is affecting her and her QOL.  Understanding verbalized.   Update to Provider/[facility] scheduling.      ----- Message -----  From: Millie King  Sent: 4/29/2024   8:28 PM EDT  To: [facility] Clinical Staff  Subject: Fluids infusion                                  Hi there! Can I please request fluids when I come in on Monday? I would like to get fluids each time I come into the infusion center if possible. I’m still having GI issues, though they are not as bad as before and the fluids make me feel better. Thank you!!

## 2024-05-01 ENCOUNTER — HOSPITAL ENCOUNTER (OUTPATIENT)
Facility: HOSPITAL | Age: 39
Setting detail: INFUSION SERIES
Discharge: HOME | End: 2024-05-01
Payer: COMMERCIAL

## 2024-05-01 VITALS
HEART RATE: 107 BPM | RESPIRATION RATE: 18 BRPM | SYSTOLIC BLOOD PRESSURE: 99 MMHG | TEMPERATURE: 97.8 F | DIASTOLIC BLOOD PRESSURE: 67 MMHG

## 2024-05-01 DIAGNOSIS — C79.31 MALIGNANT NEOPLASM OF BREAST METASTATIC TO BRAIN, UNSPECIFIED LATERALITY (HCC): Primary | ICD-10-CM

## 2024-05-01 DIAGNOSIS — C50.919 MALIGNANT NEOPLASM OF BREAST METASTATIC TO BRAIN, UNSPECIFIED LATERALITY (HCC): Primary | ICD-10-CM

## 2024-05-01 LAB
COMMENT:: NORMAL
SPECIMEN HOLD: NORMAL

## 2024-05-01 PROCEDURE — 36415 COLL VENOUS BLD VENIPUNCTURE: CPT

## 2024-05-01 PROCEDURE — 81232 DPYD GENE COMMON VARIANTS: CPT

## 2024-05-01 PROCEDURE — 96360 HYDRATION IV INFUSION INIT: CPT

## 2024-05-01 PROCEDURE — 2580000003 HC RX 258: Performed by: NURSE PRACTITIONER

## 2024-05-01 RX ORDER — ONDANSETRON 2 MG/ML
8 INJECTION INTRAMUSCULAR; INTRAVENOUS
OUTPATIENT
Start: 2024-05-01

## 2024-05-01 RX ORDER — HEPARIN 100 UNIT/ML
500 SYRINGE INTRAVENOUS PRN
Status: DISCONTINUED | OUTPATIENT
Start: 2024-05-01 | End: 2024-05-02 | Stop reason: HOSPADM

## 2024-05-01 RX ORDER — 0.9 % SODIUM CHLORIDE 0.9 %
1000 INTRAVENOUS SOLUTION INTRAVENOUS ONCE
OUTPATIENT
Start: 2024-05-01 | End: 2024-05-01

## 2024-05-01 RX ORDER — DIPHENHYDRAMINE HYDROCHLORIDE 50 MG/ML
50 INJECTION INTRAMUSCULAR; INTRAVENOUS
Status: DISCONTINUED | OUTPATIENT
Start: 2024-05-01 | End: 2024-05-02 | Stop reason: HOSPADM

## 2024-05-01 RX ORDER — DIPHENHYDRAMINE HYDROCHLORIDE 50 MG/ML
50 INJECTION INTRAMUSCULAR; INTRAVENOUS
OUTPATIENT
Start: 2024-05-01

## 2024-05-01 RX ORDER — ALBUTEROL SULFATE 90 UG/1
4 AEROSOL, METERED RESPIRATORY (INHALATION) PRN
OUTPATIENT
Start: 2024-05-01

## 2024-05-01 RX ORDER — 0.9 % SODIUM CHLORIDE 0.9 %
1000 INTRAVENOUS SOLUTION INTRAVENOUS ONCE
Status: COMPLETED | OUTPATIENT
Start: 2024-05-01 | End: 2024-05-01

## 2024-05-01 RX ORDER — SODIUM CHLORIDE 9 MG/ML
INJECTION, SOLUTION INTRAVENOUS CONTINUOUS
Status: DISCONTINUED | OUTPATIENT
Start: 2024-05-01 | End: 2024-05-02 | Stop reason: HOSPADM

## 2024-05-01 RX ORDER — HEPARIN 100 UNIT/ML
500 SYRINGE INTRAVENOUS PRN
OUTPATIENT
Start: 2024-05-01

## 2024-05-01 RX ORDER — ACETAMINOPHEN 325 MG/1
650 TABLET ORAL
Status: DISCONTINUED | OUTPATIENT
Start: 2024-05-01 | End: 2024-05-02 | Stop reason: HOSPADM

## 2024-05-01 RX ORDER — SODIUM CHLORIDE 0.9 % (FLUSH) 0.9 %
5-40 SYRINGE (ML) INJECTION PRN
OUTPATIENT
Start: 2024-05-01

## 2024-05-01 RX ORDER — SODIUM CHLORIDE 9 MG/ML
INJECTION, SOLUTION INTRAVENOUS CONTINUOUS
OUTPATIENT
Start: 2024-05-01

## 2024-05-01 RX ORDER — EPINEPHRINE 1 MG/ML
0.3 INJECTION, SOLUTION INTRAMUSCULAR; SUBCUTANEOUS PRN
Status: DISCONTINUED | OUTPATIENT
Start: 2024-05-01 | End: 2024-05-02 | Stop reason: HOSPADM

## 2024-05-01 RX ORDER — ONDANSETRON 2 MG/ML
8 INJECTION INTRAMUSCULAR; INTRAVENOUS
Status: DISCONTINUED | OUTPATIENT
Start: 2024-05-01 | End: 2024-05-02 | Stop reason: HOSPADM

## 2024-05-01 RX ORDER — EPINEPHRINE 1 MG/ML
0.3 INJECTION, SOLUTION INTRAMUSCULAR; SUBCUTANEOUS PRN
OUTPATIENT
Start: 2024-05-01

## 2024-05-01 RX ORDER — SODIUM CHLORIDE 0.9 % (FLUSH) 0.9 %
5-40 SYRINGE (ML) INJECTION PRN
Status: DISCONTINUED | OUTPATIENT
Start: 2024-05-01 | End: 2024-05-02 | Stop reason: HOSPADM

## 2024-05-01 RX ORDER — ALBUTEROL SULFATE 90 UG/1
4 AEROSOL, METERED RESPIRATORY (INHALATION) PRN
Status: DISCONTINUED | OUTPATIENT
Start: 2024-05-01 | End: 2024-05-02 | Stop reason: HOSPADM

## 2024-05-01 RX ORDER — SODIUM CHLORIDE 9 MG/ML
5-250 INJECTION, SOLUTION INTRAVENOUS PRN
Status: DISCONTINUED | OUTPATIENT
Start: 2024-05-01 | End: 2024-05-02 | Stop reason: HOSPADM

## 2024-05-01 RX ORDER — ACETAMINOPHEN 325 MG/1
650 TABLET ORAL
OUTPATIENT
Start: 2024-05-01

## 2024-05-01 RX ORDER — SODIUM CHLORIDE 9 MG/ML
5-250 INJECTION, SOLUTION INTRAVENOUS PRN
OUTPATIENT
Start: 2024-05-01

## 2024-05-01 RX ADMIN — SODIUM CHLORIDE 1000 ML: 900 INJECTION, SOLUTION INTRAVENOUS at 10:38

## 2024-05-01 ASSESSMENT — PAIN SCALES - GENERAL: PAINLEVEL_OUTOF10: 0

## 2024-05-01 NOTE — PROGRESS NOTES
[facility] Peds/Adult Note                       Date: May 1, 2024    Name: Millie King    MRN: [id number]         : 1985    1000 Patient arrives for Hydration without acute problems. Please see Epic for complete assessment and education provided.    Vital signs stable throughout and prior to discharge. Patient tolerated procedure well and was discharged without incident. Patient is aware of next [facility] appointment on 2024. Appointment card give to the Patient.      Ms. King's vitals were reviewed prior to and after treatment.   Patient Vitals for the past 12 hrs:   Temp Pulse Resp BP   24 1015 97.8 °F (36.6 °C) (!) 107 18 99/67         Medications given:   Medications Administered         sodium chloride 0.9 % bolus 1,000 mL Admin Date  2024 Action  New Bag Dose  1,000 mL Rate  983.6 mL/hr Route  IntraVENous Administered By  Ghada Patino RN            Ms. King tolerated the infusion, and had no complaints.    [facility] condition. Discharge Instructions provided to patient, patient verbalized understanding but denied the request for a copy of after visit summary.     Future Appointments   Date Time Provider Department Center   2024  8:00 AM [facility] CHEMO CHAIR 4 [facility]   2024  8:45 AM Fang Manrique DO [facility] BS AMB   2024  8:00 AM [facility] CHEMO CHAIR 4 [facility]   2024  8:30 AM Fang Manrique DO [facility] BS AMB   6/10/2024  2:00 PM Sonia Nieves MD [facility] BS AMB   2024  8:00 AM [facility] CHEMO CHAIR 3 [facility]   2024  8:30 AM Fang Manrique DO [facility] BS AMB   2024  8:30 AM [facility] CHEMO CHAIR 3 [facility]   2024  9:00 AM Fang Manrique DO [facility] BS AMB   2024  8:30 AM [facility] CHEMO CHAIR 2 [facility]   2024  9:00 AM Fang Manrique DO [facility] BS AMB   2024  8:30 AM [facility] CHEMO CHAIR 4 [facility]   2024  9:00 AM Fang Manrique DO [facility] BS AMB   2024  8:30 AM [facility]

## 2024-05-06 ENCOUNTER — HOSPITAL ENCOUNTER (OUTPATIENT)
Facility: HOSPITAL | Age: 39
Setting detail: INFUSION SERIES
Discharge: HOME | End: 2024-05-06
Payer: COMMERCIAL

## 2024-05-06 ENCOUNTER — OFFICE VISIT (OUTPATIENT)
Age: 39
End: 2024-05-06
Payer: COMMERCIAL

## 2024-05-06 VITALS
DIASTOLIC BLOOD PRESSURE: 79 MMHG | HEART RATE: 110 BPM | BODY MASS INDEX: 35.67 KG/M2 | OXYGEN SATURATION: 97 % | RESPIRATION RATE: 16 BRPM | WEIGHT: 195 LBS | SYSTOLIC BLOOD PRESSURE: 130 MMHG | TEMPERATURE: 97.9 F

## 2024-05-06 VITALS
DIASTOLIC BLOOD PRESSURE: 79 MMHG | WEIGHT: 195.2 LBS | RESPIRATION RATE: 16 BRPM | BODY MASS INDEX: 35.7 KG/M2 | SYSTOLIC BLOOD PRESSURE: 130 MMHG | TEMPERATURE: 97.9 F | HEART RATE: 103 BPM

## 2024-05-06 DIAGNOSIS — Z87.311 HISTORY OF PATHOLOGIC FRACTURE: ICD-10-CM

## 2024-05-06 DIAGNOSIS — C78.7 MALIGNANT NEOPLASM OF BREAST METASTATIC TO LIVER (HCC): ICD-10-CM

## 2024-05-06 DIAGNOSIS — C79.31 MALIGNANT NEOPLASM OF BREAST METASTATIC TO BRAIN, UNSPECIFIED LATERALITY (HCC): ICD-10-CM

## 2024-05-06 DIAGNOSIS — R11.0 CHEMOTHERAPY-INDUCED NAUSEA: ICD-10-CM

## 2024-05-06 DIAGNOSIS — T45.1X5A CHEMOTHERAPY INDUCED DIARRHEA: ICD-10-CM

## 2024-05-06 DIAGNOSIS — T45.1X5A CHEMOTHERAPY-INDUCED FATIGUE: ICD-10-CM

## 2024-05-06 DIAGNOSIS — Z79.899 ENCOUNTER FOR MONITORING CARDIOTOXIC DRUG THERAPY: ICD-10-CM

## 2024-05-06 DIAGNOSIS — Z51.81 ENCOUNTER FOR MONITORING CARDIOTOXIC DRUG THERAPY: ICD-10-CM

## 2024-05-06 DIAGNOSIS — Z95.828 PORT-A-CATH IN PLACE: ICD-10-CM

## 2024-05-06 DIAGNOSIS — C79.51 MALIGNANT NEOPLASM METASTATIC TO BONE (HCC): Primary | ICD-10-CM

## 2024-05-06 DIAGNOSIS — C79.31 METASTASIS TO BRAIN (HCC): ICD-10-CM

## 2024-05-06 DIAGNOSIS — C78.02 MALIGNANT NEOPLASM METASTATIC TO BOTH LUNGS (HCC): ICD-10-CM

## 2024-05-06 DIAGNOSIS — G89.3 CANCER RELATED PAIN: ICD-10-CM

## 2024-05-06 DIAGNOSIS — G62.0 CHEMOTHERAPY-INDUCED NEUROPATHY (HCC): ICD-10-CM

## 2024-05-06 DIAGNOSIS — K52.1 CHEMOTHERAPY INDUCED DIARRHEA: ICD-10-CM

## 2024-05-06 DIAGNOSIS — C78.01 MALIGNANT NEOPLASM METASTATIC TO BOTH LUNGS (HCC): ICD-10-CM

## 2024-05-06 DIAGNOSIS — T45.1X5A CHEMOTHERAPY-INDUCED NEUROPATHY (HCC): ICD-10-CM

## 2024-05-06 DIAGNOSIS — C78.7 MALIGNANT NEOPLASM METASTATIC TO LIVER (HCC): ICD-10-CM

## 2024-05-06 DIAGNOSIS — C50.919 MALIGNANT NEOPLASM OF BREAST METASTATIC TO BRAIN, UNSPECIFIED LATERALITY (HCC): ICD-10-CM

## 2024-05-06 DIAGNOSIS — C50.919 MALIGNANT NEOPLASM OF BREAST METASTATIC TO LIVER (HCC): ICD-10-CM

## 2024-05-06 DIAGNOSIS — R53.83 CHEMOTHERAPY-INDUCED FATIGUE: ICD-10-CM

## 2024-05-06 DIAGNOSIS — R19.7 DIARRHEA, UNSPECIFIED TYPE: ICD-10-CM

## 2024-05-06 DIAGNOSIS — T45.1X5A CHEMOTHERAPY-INDUCED NAUSEA: ICD-10-CM

## 2024-05-06 DIAGNOSIS — Z09 CHEMOTHERAPY FOLLOW-UP EXAMINATION: ICD-10-CM

## 2024-05-06 LAB
ALBUMIN SERPL-MCNC: 3.3 G/DL (ref 3.5–5)
ALBUMIN/GLOB SERPL: 1.1 (ref 1.1–2.2)
ALP SERPL-CCNC: 110 U/L (ref 45–117)
ALT SERPL-CCNC: 78 U/L (ref 12–78)
ANION GAP SERPL CALC-SCNC: 6 MMOL/L (ref 5–15)
AST SERPL-CCNC: 48 U/L (ref 15–37)
BASOPHILS # BLD: 0 K/UL (ref 0–0.1)
BASOPHILS NFR BLD: 0 % (ref 0–1)
BILIRUB SERPL-MCNC: 0.6 MG/DL (ref 0.2–1)
BUN SERPL-MCNC: 10 MG/DL (ref 6–20)
BUN/CREAT SERPL: 11 (ref 12–20)
CALCIUM SERPL-MCNC: 9.1 MG/DL (ref 8.5–10.1)
CHLORIDE SERPL-SCNC: 109 MMOL/L (ref 97–108)
CO2 SERPL-SCNC: 25 MMOL/L (ref 21–32)
CREAT SERPL-MCNC: 0.88 MG/DL (ref 0.55–1.02)
DIFFERENTIAL METHOD BLD: ABNORMAL
EOSINOPHIL # BLD: 0 K/UL (ref 0–0.4)
EOSINOPHIL NFR BLD: 0 % (ref 0–7)
ERYTHROCYTE [DISTWIDTH] IN BLOOD BY AUTOMATED COUNT: 18.9 % (ref 11.5–14.5)
GLOBULIN SER CALC-MCNC: 3.1 G/DL (ref 2–4)
GLUCOSE SERPL-MCNC: 208 MG/DL (ref 65–100)
HCT VFR BLD AUTO: 34.8 % (ref 35–47)
HGB BLD-MCNC: 11.6 G/DL (ref 11.5–16)
IMM GRANULOCYTES # BLD AUTO: 0.1 K/UL (ref 0–0.04)
IMM GRANULOCYTES NFR BLD AUTO: 1 % (ref 0–0.5)
LYMPHOCYTES # BLD: 1.5 K/UL (ref 0.8–3.5)
LYMPHOCYTES NFR BLD: 13 % (ref 12–49)
MCH RBC QN AUTO: 33.2 PG (ref 26–34)
MCHC RBC AUTO-ENTMCNC: 33.3 G/DL (ref 30–36.5)
MCV RBC AUTO: 99.7 FL (ref 80–99)
MONOCYTES # BLD: 0.6 K/UL (ref 0–1)
MONOCYTES NFR BLD: 6 % (ref 5–13)
NEUTS SEG # BLD: 8.9 K/UL (ref 1.8–8)
NEUTS SEG NFR BLD: 80 % (ref 32–75)
NRBC # BLD: 0 K/UL (ref 0–0.01)
NRBC BLD-RTO: 0 PER 100 WBC
PLATELET # BLD AUTO: 183 K/UL (ref 150–400)
PMV BLD AUTO: 9.8 FL (ref 8.9–12.9)
POTASSIUM SERPL-SCNC: 3.7 MMOL/L (ref 3.5–5.1)
PROT SERPL-MCNC: 6.4 G/DL (ref 6.4–8.2)
RBC # BLD AUTO: 3.49 M/UL (ref 3.8–5.2)
SODIUM SERPL-SCNC: 140 MMOL/L (ref 136–145)
WBC # BLD AUTO: 11.1 K/UL (ref 3.6–11)

## 2024-05-06 PROCEDURE — 96401 CHEMO ANTI-NEOPL SQ/IM: CPT

## 2024-05-06 PROCEDURE — 6360000002 HC RX W HCPCS: Performed by: INTERNAL MEDICINE

## 2024-05-06 PROCEDURE — 36415 COLL VENOUS BLD VENIPUNCTURE: CPT

## 2024-05-06 PROCEDURE — 85025 COMPLETE CBC W/AUTO DIFF WBC: CPT

## 2024-05-06 PROCEDURE — 2580000003 HC RX 258: Performed by: NURSE PRACTITIONER

## 2024-05-06 PROCEDURE — 96360 HYDRATION IV INFUSION INIT: CPT

## 2024-05-06 PROCEDURE — 99215 OFFICE O/P EST HI 40 MIN: CPT | Performed by: NURSE PRACTITIONER

## 2024-05-06 PROCEDURE — 80053 COMPREHEN METABOLIC PANEL: CPT

## 2024-05-06 RX ORDER — SODIUM CHLORIDE 9 MG/ML
INJECTION, SOLUTION INTRAVENOUS CONTINUOUS
Status: DISCONTINUED | OUTPATIENT
Start: 2024-05-06 | End: 2024-05-07 | Stop reason: HOSPADM

## 2024-05-06 RX ORDER — ONDANSETRON 2 MG/ML
8 INJECTION INTRAMUSCULAR; INTRAVENOUS
Status: DISCONTINUED | OUTPATIENT
Start: 2024-05-06 | End: 2024-05-07 | Stop reason: HOSPADM

## 2024-05-06 RX ORDER — ACETAMINOPHEN 325 MG/1
650 TABLET ORAL
Status: DISCONTINUED | OUTPATIENT
Start: 2024-05-06 | End: 2024-05-07 | Stop reason: HOSPADM

## 2024-05-06 RX ORDER — 0.9 % SODIUM CHLORIDE 0.9 %
1000 INTRAVENOUS SOLUTION INTRAVENOUS ONCE
Status: COMPLETED | OUTPATIENT
Start: 2024-05-06 | End: 2024-05-06

## 2024-05-06 RX ORDER — SODIUM CHLORIDE 9 MG/ML
5-250 INJECTION, SOLUTION INTRAVENOUS PRN
Status: DISCONTINUED | OUTPATIENT
Start: 2024-05-06 | End: 2024-05-07 | Stop reason: HOSPADM

## 2024-05-06 RX ORDER — SODIUM CHLORIDE 0.9 % (FLUSH) 0.9 %
5-40 SYRINGE (ML) INJECTION PRN
Status: DISCONTINUED | OUTPATIENT
Start: 2024-05-06 | End: 2024-05-07 | Stop reason: HOSPADM

## 2024-05-06 RX ORDER — EPINEPHRINE 1 MG/ML
0.3 INJECTION, SOLUTION INTRAMUSCULAR; SUBCUTANEOUS PRN
Status: DISCONTINUED | OUTPATIENT
Start: 2024-05-06 | End: 2024-05-07 | Stop reason: HOSPADM

## 2024-05-06 RX ORDER — DIPHENHYDRAMINE HYDROCHLORIDE 50 MG/ML
50 INJECTION INTRAMUSCULAR; INTRAVENOUS
Status: DISCONTINUED | OUTPATIENT
Start: 2024-05-06 | End: 2024-05-07 | Stop reason: HOSPADM

## 2024-05-06 RX ORDER — ALBUTEROL SULFATE 90 UG/1
4 AEROSOL, METERED RESPIRATORY (INHALATION) PRN
Status: DISCONTINUED | OUTPATIENT
Start: 2024-05-06 | End: 2024-05-07 | Stop reason: HOSPADM

## 2024-05-06 RX ORDER — HEPARIN 100 UNIT/ML
500 SYRINGE INTRAVENOUS PRN
Status: DISCONTINUED | OUTPATIENT
Start: 2024-05-06 | End: 2024-05-07 | Stop reason: HOSPADM

## 2024-05-06 RX ADMIN — TRASTUZUMAB AND HYALURONIDASE-OYSK 600 MG: 600; 10000 INJECTION, SOLUTION SUBCUTANEOUS at 10:14

## 2024-05-06 RX ADMIN — SODIUM CHLORIDE 1000 ML: 9 INJECTION, SOLUTION INTRAVENOUS at 09:11

## 2024-05-06 ASSESSMENT — PAIN SCALES - GENERAL: PAINLEVEL_OUTOF10: 0

## 2024-05-06 NOTE — PROGRESS NOTES
Millie King is a 39 y.o. female    Chief Complaint   Patient presents with    Follow-up     Metastatic Breast Cancer       1. Have you been to the ER, urgent care clinic since your last visit?  Hospitalized since your last visit?No    2. Have you seen or consulted any other health care providers outside of the [facility] since your last visit?  Include any pap smears or colon screening. No      
MRI 4/11/24 showed resolved mets.  Next Brain MRI is on 8/8/24 per patient.     4) Hx of Right Hip Pain  S/p right femur fracture.  S/p IMN with Ortho Dr Bermudez on 1/3/23.  XRT 2/6/23 - 2/10/23.   Ultrasound negative for DVT.  Pain management per Palliative Medicine.      5) Pulmonary Nodules on CT Chest  Likely mets. Significantly improved   She has been referred to Pulmonary - she is holding off on this for now.     6) ADD    Management per PCP.    7)  Management of High Risk Medications - Chemotherapy   On Drug Therapy Requiring Intensive Monitoring for Toxicity   Toxicities include Grade 1 Fatigue and Grade 1 Neuropathy and Grade 2-3 Diarrhea.  Tucatinib and Xeloda DR with Cycle 4 due to side effects and only slightly improved.  Continue Imodium+Lomoril PRN for diarrhea; ordered stool studies but patient does not want to do; DPD testing pending.   HOLD Xeloda and Tucatinib this cycle; proceed with Herceptin only.   Continue ECHOs every 3 months - next due in 7/24.  Labs (CBC and CMP) pending today.  Will continue to monitor for side effects.     8) Psychosocial  Mood good, coping well.   She has good family support.    She recently got , has a daughter.   She moved to [place]; has a puppy.   SW/NN support as needed.    Call if questions.  Follow up in 3 weeks with next cycle.  Case discussed with and patient seen by Dr Manrique today.  I appreciate the opportunity to participate in Ms. Millie King's care.    Signed By: AGUSTINA Perez - NP

## 2024-05-06 NOTE — PROGRESS NOTES
[facility] Short Note                       Date: May 6, 2024    Name: Millie King    MRN: [id number]         : 1985      Pt admit to [facility] for Herceptin/Hylecta and Hydration ambulatory in stable condition. Assessment completed and documented in flowsheets by assessment RN. No acute concerns at this time.  Please review pending lab results in CC.      Ms. King's vitals were reviewed prior to and after treatment.   Patient Vitals for the past 12 hrs:   Temp Pulse Resp BP   24 0800 97.9 °F (36.6 °C) (!) 103 16 130/79         Lab results were obtained and reviewed. Labs within parameter for treatment.  Recent Results (from the past 12 hour(s))   CBC with Auto Differential    Collection Time: 24  8:14 AM   Result Value Ref Range    WBC 11.1 (H) 3.6 - 11.0 K/uL    RBC 3.49 (L) 3.80 - 5.20 M/uL    Hemoglobin 11.6 11.5 - 16.0 g/dL    Hematocrit 34.8 (L) 35.0 - 47.0 %    MCV 99.7 (H) 80.0 - 99.0 FL    MCH 33.2 26.0 - 34.0 PG    MCHC 33.3 30.0 - 36.5 g/dL    RDW 18.9 (H) 11.5 - 14.5 %    Platelets 183 150 - 400 K/uL    MPV 9.8 8.9 - 12.9 FL    Nucleated RBCs 0.0 0  WBC    nRBC 0.00 0.00 - 0.01 K/uL    Neutrophils % 80 (H) 32 - 75 %    Lymphocytes % 13 12 - 49 %    Monocytes % 6 5 - 13 %    Eosinophils % 0 0 - 7 %    Basophils % 0 0 - 1 %    Immature Granulocytes % 1 (H) 0.0 - 0.5 %    Neutrophils Absolute 8.9 (H) 1.8 - 8.0 K/UL    Lymphocytes Absolute 1.5 0.8 - 3.5 K/UL    Monocytes Absolute 0.6 0.0 - 1.0 K/UL    Eosinophils Absolute 0.0 0.0 - 0.4 K/UL    Basophils Absolute 0.0 0.0 - 0.1 K/UL    Immature Granulocytes Absolute 0.1 (H) 0.00 - 0.04 K/UL    Differential Type AUTOMATED     Comprehensive Metabolic Panel    Collection Time: 24  8:14 AM   Result Value Ref Range    Sodium 140 136 - 145 mmol/L    Potassium 3.7 3.5 - 5.1 mmol/L    Chloride 109 (H) 97 - 108 mmol/L    CO2 25 21 - 32 mmol/L    Anion Gap 6 5 - 15 mmol/L    Glucose 208 (H) 65 - 100 mg/dL    BUN 10 6 - 20 MG/DL    Creatinine  0.88 0.55 - 1.02 MG/DL    Bun/Cre Ratio 11 (L) 12 - 20      Est, Glom Filt Rate 86 >60 ml/min/1.73m2    Calcium 9.1 8.5 - 10.1 MG/DL    Total Bilirubin 0.6 0.2 - 1.0 MG/DL    ALT 78 12 - 78 U/L    AST 48 (H) 15 - 37 U/L    Alk Phosphatase 110 45 - 117 U/L    Total Protein 6.4 6.4 - 8.2 g/dL    Albumin 3.3 (L) 3.5 - 5.0 g/dL    Globulin 3.1 2.0 - 4.0 g/dL    Albumin/Globulin Ratio 1.1 1.1 - 2.2         Medications given: R thigh  Medications Administered         sodium chloride 0.9 % bolus 1,000 mL Admin Date  05/06/2024 Action  New Bag Dose  1,000 mL Rate  983.6 mL/hr Route  IntraVENous Administered By  Cayla Briones RN        trastuzumab-hyaluronidase-oysk (HERCEPTIN HYLECTA) injection 600 mg Admin Date  05/06/2024 Action  Given Dose  600 mg Rate   Route  SubCUTAneous Administered By  Cayla Briones RN            Port accessed with positive blood return. Port flushed and de-accessed per protocol.     Two nurses verified prior to administering: Drug name, Drug dose, Infusion volume or drug volume when prepared in a syringe, Rate of administration, Route of administration, Expiration dates and/or times, Appearance and physical integrity of the drugs, Rate set on infusion pump, when used, and Sequencing of drug administration.    Ms. King tolerated the infusion, and had no complaints.    [facility]. [facility] condition.     Future Appointments   Date Time Provider Department Center   5/28/2024  8:00 AM [facility]   5/28/2024  8:30 AM Fang Manrique DO [facility] AMB   6/10/2024  2:00 PM Sonia Nieves MD [facility] BS [facility]   6/17/2024  8:00 AM [facility]AIR 3 [facility]   6/17/2024  8:30 AM Fang Manrique DO [facility] BS AMB   7/8/2024  8:30 AM [facility] CHEMO CHAIR 3 [facility]   7/8/2024  9:00 AM Fang Manrique DO [facility] BS [facility]   7/29/2024  8:30 AM [facility] CHAIR 2 [facility]   7/29/2024  9:00 AM Fang Manrique DO [facility] BS [facility]   8/19/2024

## 2024-05-10 LAB
DIRECTOR REVIEW: NORMAL
DPYD GENOTYPE: NORMAL
DPYD INFORMATION: NORMAL
DPYD METABOLIC ACTIVITY: NORMAL
INTERPRETATION: NORMAL

## 2024-05-20 DIAGNOSIS — M25.552 LEFT HIP PAIN: ICD-10-CM

## 2024-05-20 DIAGNOSIS — C79.51 MALIGNANT NEOPLASM METASTATIC TO BONE (HCC): Primary | ICD-10-CM

## 2024-05-20 RX ORDER — SODIUM CHLORIDE 0.9 % (FLUSH) 0.9 %
5-40 SYRINGE (ML) INJECTION PRN
OUTPATIENT
Start: 2024-05-28

## 2024-05-20 RX ORDER — ONDANSETRON 2 MG/ML
8 INJECTION INTRAMUSCULAR; INTRAVENOUS
OUTPATIENT
Start: 2024-05-28

## 2024-05-20 RX ORDER — SODIUM CHLORIDE 9 MG/ML
5-250 INJECTION, SOLUTION INTRAVENOUS PRN
OUTPATIENT
Start: 2024-05-28

## 2024-05-20 RX ORDER — ACETAMINOPHEN 325 MG/1
650 TABLET ORAL
OUTPATIENT
Start: 2024-05-28

## 2024-05-20 RX ORDER — EPINEPHRINE 1 MG/ML
0.3 INJECTION, SOLUTION INTRAMUSCULAR; SUBCUTANEOUS PRN
OUTPATIENT
Start: 2024-05-28

## 2024-05-20 RX ORDER — SODIUM CHLORIDE 9 MG/ML
INJECTION, SOLUTION INTRAVENOUS CONTINUOUS
OUTPATIENT
Start: 2024-05-28

## 2024-05-20 RX ORDER — DIPHENHYDRAMINE HYDROCHLORIDE 50 MG/ML
50 INJECTION INTRAMUSCULAR; INTRAVENOUS
OUTPATIENT
Start: 2024-05-28

## 2024-05-20 RX ORDER — HEPARIN 100 UNIT/ML
500 SYRINGE INTRAVENOUS PRN
OUTPATIENT
Start: 2024-05-28

## 2024-05-20 RX ORDER — ALBUTEROL SULFATE 90 UG/1
4 AEROSOL, METERED RESPIRATORY (INHALATION) PRN
OUTPATIENT
Start: 2024-05-28

## 2024-05-22 RX ORDER — ONDANSETRON 2 MG/ML
8 INJECTION INTRAMUSCULAR; INTRAVENOUS
OUTPATIENT
Start: 2024-05-26

## 2024-05-22 RX ORDER — DIPHENHYDRAMINE HYDROCHLORIDE 50 MG/ML
50 INJECTION INTRAMUSCULAR; INTRAVENOUS
OUTPATIENT
Start: 2024-05-26

## 2024-05-22 RX ORDER — ACETAMINOPHEN 325 MG/1
650 TABLET ORAL
OUTPATIENT
Start: 2024-05-26

## 2024-05-22 RX ORDER — ALBUTEROL SULFATE 90 UG/1
4 AEROSOL, METERED RESPIRATORY (INHALATION) PRN
OUTPATIENT
Start: 2024-05-26

## 2024-05-22 RX ORDER — SODIUM CHLORIDE 9 MG/ML
INJECTION, SOLUTION INTRAVENOUS CONTINUOUS
OUTPATIENT
Start: 2024-05-26

## 2024-05-22 RX ORDER — EPINEPHRINE 1 MG/ML
0.3 INJECTION, SOLUTION, CONCENTRATE INTRAVENOUS PRN
OUTPATIENT
Start: 2024-05-26

## 2024-05-24 ENCOUNTER — PATIENT MESSAGE (OUTPATIENT)
Age: 39
End: 2024-05-24

## 2024-05-24 DIAGNOSIS — C78.7 MALIGNANT NEOPLASM OF BREAST METASTATIC TO LIVER (HCC): ICD-10-CM

## 2024-05-24 DIAGNOSIS — G89.3 CANCER RELATED PAIN: Primary | ICD-10-CM

## 2024-05-24 DIAGNOSIS — C50.919 MALIGNANT NEOPLASM OF BREAST METASTATIC TO LIVER (HCC): ICD-10-CM

## 2024-05-24 RX ORDER — OXYCODONE HYDROCHLORIDE 20 MG/1
20 TABLET ORAL EVERY 4 HOURS PRN
Qty: 90 TABLET | Refills: 0 | Status: SHIPPED | OUTPATIENT
Start: 2024-05-24 | End: 2024-06-08

## 2024-05-24 NOTE — TELEPHONE ENCOUNTER
Palliative Medicine Clinic   [place]: [phone])    Patient Name: Millie King  YOB: 1985    Medication Refill Request    Patient is scheduled for follow up:  [x]  YES  []   NO  Next [facility] Med Clinic Visit: 06/10/24    PDMP reviewed:  [x] YES   []  System down / Unable  []  NO- Patient fills out of state    Medication:oxyCODONE (ROXICODONE) 20 MG immediate release tablet   Dose and directions:Take 1 tablet by mouth every 4 hours as needed for Pain for up to 15 days   Number dispensed:90  Date filled (PDMP or Pharmacy):05/03/24  # left: unknown        Appropriate for refill:  [x]  YES  []  Early Request - Requires MD/NP Review      Other pertinent information for prescriber:

## 2024-05-24 NOTE — TELEPHONE ENCOUNTER
From: Millie King  To: Dr. Sonia Nieves  Sent: 5/24/2024 1:05 PM EDT  Subject: Prescription refill    Can I please get a refill on my oxycodone? I don’t think I have enough to last through the long weekend. Thank you!  Millie King    MAR

## 2024-05-28 ENCOUNTER — TELEPHONE (OUTPATIENT)
Age: 39
End: 2024-05-28

## 2024-05-28 ENCOUNTER — HOSPITAL ENCOUNTER (OUTPATIENT)
Facility: HOSPITAL | Age: 39
Setting detail: INFUSION SERIES
Discharge: HOME | End: 2024-05-28
Payer: COMMERCIAL

## 2024-05-28 ENCOUNTER — PATIENT MESSAGE (OUTPATIENT)
Age: 39
End: 2024-05-28

## 2024-05-28 ENCOUNTER — OFFICE VISIT (OUTPATIENT)
Age: 39
End: 2024-05-28
Payer: COMMERCIAL

## 2024-05-28 ENCOUNTER — CLINICAL DOCUMENTATION (OUTPATIENT)
Age: 39
End: 2024-05-28

## 2024-05-28 VITALS
RESPIRATION RATE: 16 BRPM | BODY MASS INDEX: 36.44 KG/M2 | HEART RATE: 85 BPM | WEIGHT: 198 LBS | TEMPERATURE: 97.6 F | DIASTOLIC BLOOD PRESSURE: 73 MMHG | HEIGHT: 62 IN | SYSTOLIC BLOOD PRESSURE: 127 MMHG

## 2024-05-28 VITALS
RESPIRATION RATE: 16 BRPM | BODY MASS INDEX: 36.21 KG/M2 | OXYGEN SATURATION: 95 % | TEMPERATURE: 97.6 F | SYSTOLIC BLOOD PRESSURE: 127 MMHG | HEART RATE: 85 BPM | WEIGHT: 198 LBS | DIASTOLIC BLOOD PRESSURE: 73 MMHG

## 2024-05-28 DIAGNOSIS — C78.7 MALIGNANT NEOPLASM METASTATIC TO LIVER (HCC): Primary | ICD-10-CM

## 2024-05-28 DIAGNOSIS — R19.7 DIARRHEA, UNSPECIFIED TYPE: ICD-10-CM

## 2024-05-28 DIAGNOSIS — C50.919 MALIGNANT NEOPLASM OF BREAST METASTATIC TO LIVER (HCC): ICD-10-CM

## 2024-05-28 DIAGNOSIS — C79.31 MALIGNANT NEOPLASM OF BREAST METASTATIC TO BRAIN, UNSPECIFIED LATERALITY (HCC): ICD-10-CM

## 2024-05-28 DIAGNOSIS — C79.31 METASTASIS TO BRAIN (HCC): ICD-10-CM

## 2024-05-28 DIAGNOSIS — Z95.828 PORT-A-CATH IN PLACE: ICD-10-CM

## 2024-05-28 DIAGNOSIS — C79.51 MALIGNANT NEOPLASM METASTATIC TO BONE (HCC): Primary | ICD-10-CM

## 2024-05-28 DIAGNOSIS — C78.01 MALIGNANT NEOPLASM METASTATIC TO BOTH LUNGS (HCC): ICD-10-CM

## 2024-05-28 DIAGNOSIS — C50.919 MALIGNANT NEOPLASM OF BREAST METASTATIC TO BRAIN, UNSPECIFIED LATERALITY (HCC): ICD-10-CM

## 2024-05-28 DIAGNOSIS — C78.7 MALIGNANT NEOPLASM OF BREAST METASTATIC TO LIVER (HCC): ICD-10-CM

## 2024-05-28 DIAGNOSIS — C78.02 MALIGNANT NEOPLASM METASTATIC TO BOTH LUNGS (HCC): ICD-10-CM

## 2024-05-28 DIAGNOSIS — G89.3 CANCER RELATED PAIN: Primary | ICD-10-CM

## 2024-05-28 LAB
ALBUMIN SERPL-MCNC: 3.2 G/DL (ref 3.5–5)
ALBUMIN/GLOB SERPL: 1.1 (ref 1.1–2.2)
ALP SERPL-CCNC: 112 U/L (ref 45–117)
ALT SERPL-CCNC: 20 U/L (ref 12–78)
ANION GAP BLD CALC-SCNC: 9.2 MMOL/L (ref 10–20)
ANION GAP SERPL CALC-SCNC: 5 MMOL/L (ref 5–15)
AST SERPL-CCNC: 19 U/L (ref 15–37)
BASO+EOS+MONOS # BLD AUTO: 0.9 K/UL (ref 0.2–1.2)
BASO+EOS+MONOS NFR BLD AUTO: 10 % (ref 3.2–16.9)
BILIRUB SERPL-MCNC: 0.3 MG/DL (ref 0.2–1)
BUN SERPL-MCNC: 8 MG/DL (ref 6–20)
BUN/CREAT SERPL: 11 (ref 12–20)
CA-I BLD-MCNC: 1.18 MMOL/L (ref 1.12–1.32)
CALCIUM SERPL-MCNC: 8.8 MG/DL (ref 8.5–10.1)
CHLORIDE BLD-SCNC: 106 MMOL/L (ref 98–107)
CHLORIDE SERPL-SCNC: 108 MMOL/L (ref 97–108)
CO2 BLD-SCNC: 24.8 MMOL/L (ref 21–32)
CO2 SERPL-SCNC: 27 MMOL/L (ref 21–32)
CREAT BLD-MCNC: 0.6 MG/DL (ref 0.6–1.3)
CREAT SERPL-MCNC: 0.72 MG/DL (ref 0.55–1.02)
DIFFERENTIAL METHOD BLD: ABNORMAL
ERYTHROCYTE [DISTWIDTH] IN BLOOD BY AUTOMATED COUNT: 15.4 % (ref 11.8–15.8)
GLOBULIN SER CALC-MCNC: 2.8 G/DL (ref 2–4)
GLUCOSE BLD-MCNC: 139 MG/DL (ref 70–110)
GLUCOSE SERPL-MCNC: 135 MG/DL (ref 65–100)
HCT VFR BLD AUTO: 34.2 % (ref 35–47)
HGB BLD-MCNC: 11.3 G/DL (ref 11.5–16)
LYMPHOCYTES # BLD: 1.7 K/UL (ref 0.8–3.5)
LYMPHOCYTES NFR BLD: 19 % (ref 12–49)
MAGNESIUM SERPL-MCNC: 2.1 MG/DL (ref 1.6–2.4)
MCH RBC QN AUTO: 32.7 PG (ref 26–34)
MCHC RBC AUTO-ENTMCNC: 33 G/DL (ref 30–36.5)
MCV RBC AUTO: 98.8 FL (ref 80–99)
NEUTS SEG # BLD: 6.3 K/UL (ref 1.8–8)
NEUTS SEG NFR BLD: 71 % (ref 32–75)
PHOSPHATE SERPL-MCNC: 3.7 MG/DL (ref 2.6–4.7)
PLATELET # BLD AUTO: 329 K/UL (ref 150–400)
POTASSIUM BLD-SCNC: 3.7 MMOL/L (ref 3.5–5.1)
POTASSIUM SERPL-SCNC: 3.9 MMOL/L (ref 3.5–5.1)
PROT SERPL-MCNC: 6 G/DL (ref 6.4–8.2)
RBC # BLD AUTO: 3.46 M/UL (ref 3.8–5.2)
SERVICE CMNT-IMP: ABNORMAL
SODIUM BLD-SCNC: 140 MMOL/L (ref 136–145)
SODIUM SERPL-SCNC: 140 MMOL/L (ref 136–145)
WBC # BLD AUTO: 8.9 K/UL (ref 3.6–11)

## 2024-05-28 PROCEDURE — 96401 CHEMO ANTI-NEOPL SQ/IM: CPT

## 2024-05-28 PROCEDURE — 2580000003 HC RX 258: Performed by: INTERNAL MEDICINE

## 2024-05-28 PROCEDURE — 36415 COLL VENOUS BLD VENIPUNCTURE: CPT

## 2024-05-28 PROCEDURE — 96360 HYDRATION IV INFUSION INIT: CPT

## 2024-05-28 PROCEDURE — 80047 BASIC METABLC PNL IONIZED CA: CPT

## 2024-05-28 PROCEDURE — 83735 ASSAY OF MAGNESIUM: CPT

## 2024-05-28 PROCEDURE — 85025 COMPLETE CBC W/AUTO DIFF WBC: CPT

## 2024-05-28 PROCEDURE — 6360000002 HC RX W HCPCS: Performed by: INTERNAL MEDICINE

## 2024-05-28 PROCEDURE — 96372 THER/PROPH/DIAG INJ SC/IM: CPT

## 2024-05-28 PROCEDURE — 80053 COMPREHEN METABOLIC PANEL: CPT

## 2024-05-28 PROCEDURE — 84100 ASSAY OF PHOSPHORUS: CPT

## 2024-05-28 PROCEDURE — 99215 OFFICE O/P EST HI 40 MIN: CPT | Performed by: INTERNAL MEDICINE

## 2024-05-28 RX ORDER — EPINEPHRINE 1 MG/ML
0.3 INJECTION, SOLUTION INTRAMUSCULAR; SUBCUTANEOUS PRN
OUTPATIENT
Start: 2024-07-09

## 2024-05-28 RX ORDER — ACETAMINOPHEN 325 MG/1
650 TABLET ORAL
OUTPATIENT
Start: 2024-07-09

## 2024-05-28 RX ORDER — DIPHENHYDRAMINE HYDROCHLORIDE 50 MG/ML
50 INJECTION INTRAMUSCULAR; INTRAVENOUS
OUTPATIENT
Start: 2024-07-09

## 2024-05-28 RX ORDER — AZITHROMYCIN 250 MG/1
TABLET, FILM COATED ORAL
Qty: 6 TABLET | Refills: 0 | Status: SHIPPED | OUTPATIENT
Start: 2024-05-28 | End: 2024-06-07

## 2024-05-28 RX ORDER — SODIUM CHLORIDE 9 MG/ML
5-250 INJECTION, SOLUTION INTRAVENOUS PRN
Status: DISCONTINUED | OUTPATIENT
Start: 2024-05-28 | End: 2024-05-29 | Stop reason: HOSPADM

## 2024-05-28 RX ORDER — ONDANSETRON 2 MG/ML
8 INJECTION INTRAMUSCULAR; INTRAVENOUS
OUTPATIENT
Start: 2024-07-09

## 2024-05-28 RX ORDER — 0.9 % SODIUM CHLORIDE 0.9 %
1000 INTRAVENOUS SOLUTION INTRAVENOUS ONCE
Status: COMPLETED | OUTPATIENT
Start: 2024-05-28 | End: 2024-05-28

## 2024-05-28 RX ORDER — OXYMORPHONE HYDROCHLORIDE 30 MG/1
30 TABLET, FILM COATED, EXTENDED RELEASE ORAL EVERY 12 HOURS
Qty: 60 TABLET | Refills: 0 | Status: SHIPPED | OUTPATIENT
Start: 2024-05-28 | End: 2024-06-27

## 2024-05-28 RX ORDER — SODIUM CHLORIDE 9 MG/ML
INJECTION, SOLUTION INTRAVENOUS CONTINUOUS
OUTPATIENT
Start: 2024-07-09

## 2024-05-28 RX ORDER — ALBUTEROL SULFATE 90 UG/1
4 AEROSOL, METERED RESPIRATORY (INHALATION) PRN
OUTPATIENT
Start: 2024-07-09

## 2024-05-28 RX ADMIN — SODIUM CHLORIDE 1000 ML: 9 INJECTION, SOLUTION INTRAVENOUS at 08:59

## 2024-05-28 RX ADMIN — DENOSUMAB 120 MG: 120 INJECTION SUBCUTANEOUS at 09:04

## 2024-05-28 RX ADMIN — TRASTUZUMAB AND HYALURONIDASE-OYSK 600 MG: 600; 10000 INJECTION, SOLUTION SUBCUTANEOUS at 09:36

## 2024-05-28 ASSESSMENT — PAIN SCALES - GENERAL: PAINLEVEL_OUTOF10: 0

## 2024-05-28 NOTE — PROGRESS NOTES
Oral Chemotherapy      Millie King is a  38 y.o.female  diagnosed with breast cancer . Ms. King is being treated with tucatinib, capecitabine, trastuzumab.      Medication name: capecitabine     Dose:  1500 mg (dose reduced 4/15 with Cycle 4)  Frequency: twice a day  Administration schedule: for 14 day on, 7 days off every 21 days        Medication name: tucatinib     Dose:  250 mg  (dose reduced 4/15 with Cycle 4)  Frequency: twice a day        Ordering provider: Fang Cespedes DO  Start date: 5/28/24 (Cycle 6)      Delayed cycle 5 capecitabine start to 5/13/24, patient took for 2 weeks instead of 1 as instructed' therefore, will only take 1 week of capecitabine for cycle 6 to allow for off week from Cycle 5 dosing and have off week before starting cycle 7.     Lab Results   Component Value Date    WBC 8.9 05/28/2024    HGB 11.3 (L) 05/28/2024    HCT 34.2 (L) 05/28/2024    MCV 98.8 05/28/2024     05/28/2024    LYMPHOPCT 19 05/28/2024    RBC 3.46 (L) 05/28/2024    MCH 32.7 05/28/2024    MCHC 33.0 05/28/2024    RDW 15.4 05/28/2024     Lab Results   Component Value Date    NEUTROABS 6.3 05/28/2024         Expected follow up date: Labs/office visit each cycle. Next cycle start on 6/17/24     Patient  provided with an Oral Chemotherapy Journal.      Ms. King verbalized understanding of the information presented and all of the patient's questions were answered.        Beryl Sanchez, BETID, BCOP, BCPS    For Pharmacy Admin Tracking Only    Program: Medical Group  CPA in place:  Yes  Recommendation Provided To: Patient/Caregiver: 1 via In person    Intervention Accepted By: Patient/Caregiver: 1    Time Spent (min): 20

## 2024-05-28 NOTE — PROGRESS NOTES
[facility] Short Note                       Date: May 28, 2024    Name: Millie King    MRN: [id number]         : 1985      Pt admit to [facility] for Herceptin/Hylecta and Hydration ambulatory in stable condition. Assessment completed and documented in flowsheets by assessment RN. No acute concerns at this time.  Please review pending lab results in CC.      Ms. King's vitals were reviewed prior to and after treatment.   Patient Vitals for the past 12 hrs:   Temp Pulse Resp BP   24 0800 97.6 °F (36.4 °C) 85 16 127/73           Lab results were obtained and reviewed. Labs within parameter for treatment.  Recent Results (from the past 12 hour(s))   Magnesium    Collection Time: 24  8:12 AM   Result Value Ref Range    Magnesium 2.1 1.6 - 2.4 mg/dL   Phosphorus    Collection Time: 24  8:12 AM   Result Value Ref Range    Phosphorus 3.7 2.6 - 4.7 MG/DL   Comprehensive Metabolic Panel    Collection Time: 24  8:12 AM   Result Value Ref Range    Sodium 140 136 - 145 mmol/L    Potassium 3.9 3.5 - 5.1 mmol/L    Chloride 108 97 - 108 mmol/L    CO2 27 21 - 32 mmol/L    Anion Gap 5 5 - 15 mmol/L    Glucose 135 (H) 65 - 100 mg/dL    BUN 8 6 - 20 MG/DL    Creatinine 0.72 0.55 - 1.02 MG/DL    BUN/Creatinine Ratio 11 (L) 12 - 20      Est, Glom Filt Rate >90 >60 ml/min/1.73m2    Calcium 8.8 8.5 - 10.1 MG/DL    Total Bilirubin 0.3 0.2 - 1.0 MG/DL    ALT 20 12 - 78 U/L    AST 19 15 - 37 U/L    Alk Phosphatase 112 45 - 117 U/L    Total Protein 6.0 (L) 6.4 - 8.2 g/dL    Albumin 3.2 (L) 3.5 - 5.0 g/dL    Globulin 2.8 2.0 - 4.0 g/dL    Albumin/Globulin Ratio 1.1 1.1 - 2.2     CBC with Partial Differential    Collection Time: 24  8:19 AM   Result Value Ref Range    WBC 8.9 3.6 - 11.0 K/uL    RBC 3.46 (L) 3.80 - 5.20 M/uL    Hemoglobin 11.3 (L) 11.5 - 16.0 g/dL    Hematocrit 34.2 (L) 35.0 - 47.0 %    MCV 98.8 80.0 - 99.0 FL    MCH 32.7 26.0 - 34.0 PG    MCHC 33.0 30.0 - 36.5 g/dL    RDW 15.4 11.8 - 15.8 %

## 2024-05-28 NOTE — TELEPHONE ENCOUNTER
The patient states she took double dose of all her meds. Took it before chemo and forgot and took it again after. Please give her a call back [phone].

## 2024-05-28 NOTE — TELEPHONE ENCOUNTER
From: Millie King  To: Dr. Sonia Nieves  Sent: 5/28/2024 6:22 AM EDT  Subject: Refill request    Good morning!   I need a refill on my oxymorphone extended release. If you need anything from me please let me know!  Millie Gonzales

## 2024-05-28 NOTE — TELEPHONE ENCOUNTER
Palliative Medicine  Nursing Note  [phone]  Fax [phone]      Telephone Call  Patient Name: Millie King  YOB: 1985    5/28/2024    Incoming call from patient transferred to nurse.    Patient reports she may or may not have taken her medications twice today. Medications include: Keppra 500mg , Lexapro , Wellbutrin,   Lyrica 150mg, Lisinopril 10mg.     Currently she says she feel fine.    Patient's  is at home currently mowing. I told Millie to get him to come into the home and to keep a watchful eye on her this afternoon. Things for them to watch out for include: dizziness, lightheadedness, unsteady on her feet, change in mentation, change in level of consciousness. If any of these occur they need to seek medical assistance immediately.     I have forwarded information to provider, Dr. Nieves.    Cristal Caraballo RN  Palliative Medicine

## 2024-05-28 NOTE — TELEPHONE ENCOUNTER
Palliative Medicine Clinic   [place]: [phone])    Patient Name: Millie King  YOB: 1985    Medication Refill Request    Patient is scheduled for follow up:  [x]  YES  []   NO  Next [facility] Med Clinic Visit: 6/10/24    PDMP reviewed:  [x] YES   []  System down / Unable  []  NO- Patient fills out of state    Medication:Oxymorphone ER 30mg  Dose and directions: 1 every 12 hours  Number dispensed:60: 30 days  Date filled (PDMP or Pharmacy):4/23/24  # left:    Appropriate for refill:  [x]  YES  []  Early Request - Requires MD/NP Review      Other pertinent information for prescriber: Pend to Dr. Nieves for approval

## 2024-05-28 NOTE — PROGRESS NOTES
Millie King is a 39 y.o. female    Chief Complaint   Patient presents with    Follow-up     Metastatic Breast Cancer     1. Have you been to the ER, urgent care clinic since your last visit?  Hospitalized since your last visit?No    2. Have you seen or consulted any other health care providers outside of the [facility] since your last visit?  Include any pap smears or colon screening. No        
Dependent basilar   atelectatic changes may represent combination of scarring and atelectasis.   INCIDENTALLY IMAGED UPPER ABDOMEN: Diffuse confluent hepatic hypodensities as   noted on recent CT abdomen pelvis.   BONES: Indeterminate 6 mm T10 lytic focus, 602-69.       IMPRESSION:   1.  Indeterminate 1.4 x 1.9 cm left breast nodule. Primary malignancy not   excluded. Mammographic correlation advised.   2.  Diffuse bilateral pulmonary nodules most compatible with metastatic disease.   3.  Partially imaged hepatic hypodensities concerning for metastatic disease.   4.  Indeterminate 6 mm T10 lytic lesion, possibly benign. Attention on   follow-up.      10/4/22   Specimen Source    1: Liver, Core biopsy with touch intepretation:    CYTOLOGIC INTERPRETATION:    Liver, mass, touch preps and core biopsy:    Metastatic adenocarcinoma, consistent with breast primary       General Categorization    Positive for malignancy.       Specimen Adequacy    Satisfactory for evaluation       ER/TX negative, Her2 3+       0/11/22  ECHO ADULT COMPLETE 10/12/2022 10/12/2022  Interpretation Summary  Left Ventricle: Normal left ventricular systolic function. EF by visual approximation is 55%. Left ventricle size is normal. Normal wall thickness. Normal wall  motion. Normal diastolic function.  Aortic Valve: Valve structure is normal. Mild sclerosis of the aortic valve cusp.  GLS is inaccurate therefore not reported.      Signed by: Kenan Gomez MD on 10/12/2022 12:30 PM      CT C/A/P 12/27/22   RECIST        TARGET LESIONS:                                  1. Right hepatic lesion posteriorly 13 x 12 mm       2. Right hepatic lesion subcapsular 15 x 20 mm       NONTARGET LESIONS:   Scattered small pulmonary nodules       IMPRESSION:   Imaging findings consistent with significant interval response to therapy.   Significantly diminished pulmonary metastatic disease burden with numerous   resolved or nearly resolved pulmonary nodules.

## 2024-05-30 ENCOUNTER — TELEPHONE (OUTPATIENT)
Age: 39
End: 2024-05-30

## 2024-06-04 ENCOUNTER — PATIENT MESSAGE (OUTPATIENT)
Age: 39
End: 2024-06-04

## 2024-06-04 DIAGNOSIS — T45.1X5A CHEMOTHERAPY-INDUCED NEUROPATHY (HCC): ICD-10-CM

## 2024-06-04 DIAGNOSIS — C79.31 METASTASIS TO BRAIN (HCC): Primary | ICD-10-CM

## 2024-06-04 DIAGNOSIS — G62.0 CHEMOTHERAPY-INDUCED NEUROPATHY (HCC): ICD-10-CM

## 2024-06-04 RX ORDER — PREGABALIN 150 MG/1
150 CAPSULE ORAL 3 TIMES DAILY
Qty: 90 CAPSULE | Refills: 2 | Status: SHIPPED | OUTPATIENT
Start: 2024-06-04 | End: 2024-09-02

## 2024-06-04 RX ORDER — LEVETIRACETAM 500 MG/1
500 TABLET ORAL 2 TIMES DAILY
Qty: 60 TABLET | Refills: 1 | Status: SHIPPED | OUTPATIENT
Start: 2024-06-04

## 2024-06-04 NOTE — TELEPHONE ENCOUNTER
Palliative Medicine Clinic   [place]: [phone])    Patient Name: Millie King  YOB: 1985    Medication Refill Request    Patient is scheduled for follow up:  [x]  YES  []   NO  Next [facility] Med Clinic Visit: 06/10/24    PDMP reviewed:  [] YES   []  System down / Unable  []  NO- Patient fills out of state    Medication:levETIRAcetam (KEPPRA) 500 MG tablet   Dose and directions:Take 1 tablet by mouth 2 times daily   Number dispensed:60  Date filled (PDMP or Pharmacy):02/15/24  # left:unknown      Appropriate for refill:  [x]  YES  []  Early Request - Requires MD/NP Review      Other pertinent information for prescriber:

## 2024-06-04 NOTE — TELEPHONE ENCOUNTER
From: Millie King  To: Dr. Sonia Nieves  Sent: 6/4/2024 10:27 AM EDT  Subject: Prescription refill    Good morning!  I need a refill on my LEVETIRACETAM. Are you the one who submits the refills for that one? Thanks!

## 2024-06-04 NOTE — TELEPHONE ENCOUNTER
Palliative Medicine Clinic   [place]: [phone])    Patient Name: Millie King  YOB: 1985    Medication Refill Request    Patient is scheduled for follow up:  [x]  YES  []   NO  Next [facility] Med Clinic Visit: 06/10/24    PDMP reviewed:  [x] YES   []  System down / Unable  []  NO- Patient fills out of state    Medication:pregabalin (LYRICA) 150 MG capsule   Dose and directions:Take 1 capsule by mouth in the morning, at noon, and at bedtime for 90 days.   Number dispensed:90  Date filled (PDMP or Pharmacy):04/24/24  # left:unknown        Appropriate for refill:  [x]  YES  []  Early Request - Requires MD/NP Review      Other pertinent information for prescriber:

## 2024-06-10 ENCOUNTER — TELEPHONE (OUTPATIENT)
Age: 39
End: 2024-06-10

## 2024-06-10 ENCOUNTER — OFFICE VISIT (OUTPATIENT)
Age: 39
End: 2024-06-10
Payer: COMMERCIAL

## 2024-06-10 ENCOUNTER — CLINICAL DOCUMENTATION (OUTPATIENT)
Age: 39
End: 2024-06-10

## 2024-06-10 VITALS
OXYGEN SATURATION: 96 % | RESPIRATION RATE: 18 BRPM | WEIGHT: 198 LBS | HEART RATE: 105 BPM | HEIGHT: 62 IN | SYSTOLIC BLOOD PRESSURE: 112 MMHG | DIASTOLIC BLOOD PRESSURE: 76 MMHG | BODY MASS INDEX: 36.44 KG/M2

## 2024-06-10 DIAGNOSIS — R53.0 NEOPLASTIC (MALIGNANT) RELATED FATIGUE: Primary | ICD-10-CM

## 2024-06-10 DIAGNOSIS — G62.0 CHEMOTHERAPY-INDUCED NEUROPATHY (HCC): ICD-10-CM

## 2024-06-10 DIAGNOSIS — T45.1X5A CHEMOTHERAPY-INDUCED FATIGUE: ICD-10-CM

## 2024-06-10 DIAGNOSIS — R53.83 CHEMOTHERAPY-INDUCED FATIGUE: ICD-10-CM

## 2024-06-10 DIAGNOSIS — T45.1X5A CHEMOTHERAPY-INDUCED NEUROPATHY (HCC): ICD-10-CM

## 2024-06-10 DIAGNOSIS — M79.2 NEUROPATHIC PAIN: ICD-10-CM

## 2024-06-10 DIAGNOSIS — C79.31 METASTASIS TO BRAIN (HCC): ICD-10-CM

## 2024-06-10 DIAGNOSIS — C50.919 MALIGNANT NEOPLASM OF BREAST METASTATIC TO LIVER (HCC): ICD-10-CM

## 2024-06-10 DIAGNOSIS — C78.7 MALIGNANT NEOPLASM OF BREAST METASTATIC TO LIVER (HCC): ICD-10-CM

## 2024-06-10 DIAGNOSIS — G89.3 CANCER RELATED PAIN: ICD-10-CM

## 2024-06-10 PROCEDURE — 99215 OFFICE O/P EST HI 40 MIN: CPT | Performed by: INTERNAL MEDICINE

## 2024-06-10 RX ORDER — ACETAMINOPHEN 325 MG/1
650 TABLET ORAL
Status: CANCELLED | OUTPATIENT
Start: 2024-06-17

## 2024-06-10 RX ORDER — DIPHENHYDRAMINE HYDROCHLORIDE 50 MG/ML
50 INJECTION INTRAMUSCULAR; INTRAVENOUS
Status: CANCELLED | OUTPATIENT
Start: 2024-06-17

## 2024-06-10 RX ORDER — HEPARIN 100 UNIT/ML
500 SYRINGE INTRAVENOUS PRN
Status: CANCELLED | OUTPATIENT
Start: 2024-06-17

## 2024-06-10 RX ORDER — ALBUTEROL SULFATE 90 UG/1
4 AEROSOL, METERED RESPIRATORY (INHALATION) PRN
Status: CANCELLED | OUTPATIENT
Start: 2024-06-17

## 2024-06-10 RX ORDER — SODIUM CHLORIDE 9 MG/ML
INJECTION, SOLUTION INTRAVENOUS CONTINUOUS
Status: CANCELLED | OUTPATIENT
Start: 2024-06-17

## 2024-06-10 RX ORDER — ONDANSETRON 2 MG/ML
8 INJECTION INTRAMUSCULAR; INTRAVENOUS
Status: CANCELLED | OUTPATIENT
Start: 2024-06-17

## 2024-06-10 RX ORDER — METHYLPHENIDATE HYDROCHLORIDE 5 MG/1
5 TABLET ORAL 2 TIMES DAILY
Qty: 30 TABLET | Refills: 0 | Status: SHIPPED | OUTPATIENT
Start: 2024-06-10 | End: 2024-06-25

## 2024-06-10 RX ORDER — SODIUM CHLORIDE 9 MG/ML
5-250 INJECTION, SOLUTION INTRAVENOUS PRN
Status: CANCELLED | OUTPATIENT
Start: 2024-06-17

## 2024-06-10 RX ORDER — PREGABALIN 150 MG/1
150 CAPSULE ORAL 3 TIMES DAILY
Qty: 90 CAPSULE | Refills: 2 | Status: SHIPPED | OUTPATIENT
Start: 2024-06-10 | End: 2024-09-08

## 2024-06-10 RX ORDER — EPINEPHRINE 1 MG/ML
0.3 INJECTION, SOLUTION INTRAMUSCULAR; SUBCUTANEOUS PRN
Status: CANCELLED | OUTPATIENT
Start: 2024-06-17

## 2024-06-10 RX ORDER — HYDROMORPHONE HYDROCHLORIDE 4 MG/1
4 TABLET ORAL EVERY 4 HOURS PRN
Qty: 90 TABLET | Refills: 0 | Status: SHIPPED | OUTPATIENT
Start: 2024-06-10 | End: 2024-06-25

## 2024-06-10 NOTE — TELEPHONE ENCOUNTER
Spoke with pt and advised her I had spoken with [facility] and they have Hydromorphone 4mg in stock.  However her ins. Will not cover the medication at all \"based on the \".  It will cost $36.43 for 90 tabs for a 15 day supply.  Pt notified and will pay OOP

## 2024-06-10 NOTE — PROGRESS NOTES
Palliative Medicine Outpatient Clinic  Nurse Check in Note  [phone]    Patient Name: Millie King  YOB: 1985      Date of Visit: 06/10/2024  Visit Location:  [facility]    Chief patient or family concern today: follow up.  Discuss fatigue and pain    Patient's Last Palliative Medicine Clinic Visit Date:  4/23/2024    Have you been to an ER or urgent care center since your last visit?  No  Have you been hospitalized since your last visit? No  Have you seen or consulted any health care providers outside of the [facility] system since your last visit?  No  If Yes, alert PSR to request appropriate records from non-[facility] offices    Medications:  Med reconciliation was performed with:  Patient    Requested refills:  yes pend for clinician    If prescribed an opioid, does patient have access to naloxone at home?:  YES  If No, pend naloxone nasal spray    Function and Symptoms:  Use of assist devices:  None    Palliative Performance Status (PPS):   Palliative Performance Scale (PPS)  PPS: 70    ESAS:  Modified-Frost Symptom Assessment Scale (ESAS)  Tiredness Score: 8  Drowsiness Score: 7  Depression Score: 3  Pain Score: 7  Anxiety Score: 6  Nausea Score: 2  Appetite Score: 1  Dyspnea Score: 1  Constipation: No  Wellbeing Score: 6    Constipation?  No  Last BM: 06/09/24    Advance Care Planning:  Currently listed healthcare agent:    Primary Decision Maker: Eric King - Spouse - [phone]    Is there an ACP Note within the past 12 months?  YES  If No, Alert Clinician and/or Social Work      Divya Simms LPN        
free  intraperitoneal air. There is no evidence of incarceration or obstruction. No  mesenteric adenopathy.  PERITONEUM: Unremarkable.  APPENDIX: Unremarkable.  BLADDER/REPRODUCTIVE ORGANS: Hysterectomy. Bladder is nondistended.  RETROPERITONEUM: Unremarkable. The abdominal aorta is normal in caliber. No  aneurysm. No retroperitoneal adenopathy.  OSSEOUS STRUCTURES: Right hip intramedullary shanon with chronic proximal right  femur deformity.    Impression  No evidence of recurrent/metastatic disease in the chest, abdomen or pelvis.  No acute intraperitoneal/intrathoracic process is identified.  Please see above for additional nonemergent incidental findings.      PET Results (most recent):  NM BONE SCAN WHOLE BODY 04/05/2023    Narrative  EXAM: NM BONE SCAN WH BODY    INDICATION: Restaging breast cancer    COMPARISON: 1/5/2023    IMAGING CORRELATION: Chest abdomen pelvis CT 4/5/2023    TRACER: 1.5 mCi of Tc-99m MDP.    TECHNIQUE: Imaging of the whole body was performed from the anterior and  posterior projections following intravenous administration of  Tc-99m MDP and  appropriate delay.    FINDINGS: Postoperative changes are again noted in the right femur. Stable  activity right sacrum. Improved left posterior rib activity. No new abnormal  uptake is identified..  Incidental renal imaging shows no abnormality.    Impression  Stable activity right sacrum. Improved left posterior rib activity.  No new scintigraphic evidence to suggest metastatic disease.        CONTROLLED SUBSTANCES SAFETY ASSESSMENT (IF ON CONTROLLED SUBSTANCES):     Reviewed patient record in [place] Prescription Drug Monitoring Program (PDMP).    Reviewed opioid safety handout:  [x] Yes   [] No  24 hour opioid dose >150mg morphine equivalent/day:  [x] Yes   [] No  Benzodiazepines:  [] Yes   [x] No  Sleep apnea:  [x] Yes   [] No   Urine Toxicology Testing within last 6 months:  [] Yes   [x] No  History of or new aberrant medication taking behaviors:

## 2024-06-10 NOTE — TELEPHONE ENCOUNTER
Patient is calling to speak with Divya about a Rx, states there is an issue and [facility] will not fill it # [phone]

## 2024-06-10 NOTE — PROGRESS NOTES
PA started on CMM for pt's Ritalin 5mg BID rx.  Per CMM rx is available without PA.  Will fax this info to [facility]

## 2024-06-12 ENCOUNTER — HOSPITAL ENCOUNTER (OUTPATIENT)
Facility: HOSPITAL | Age: 39
Discharge: HOME | End: 2024-06-15
Payer: COMMERCIAL

## 2024-06-12 DIAGNOSIS — C79.51 MALIGNANT NEOPLASM METASTATIC TO BONE (HCC): ICD-10-CM

## 2024-06-12 DIAGNOSIS — M25.552 LEFT HIP PAIN: ICD-10-CM

## 2024-06-12 LAB — DRUGS UR: NORMAL

## 2024-06-12 PROCEDURE — A9503 TC99M MEDRONATE: HCPCS | Performed by: NURSE PRACTITIONER

## 2024-06-12 PROCEDURE — 3430000000 HC RX DIAGNOSTIC RADIOPHARMACEUTICAL: Performed by: NURSE PRACTITIONER

## 2024-06-12 PROCEDURE — 78306 BONE IMAGING WHOLE BODY: CPT | Performed by: NURSE PRACTITIONER

## 2024-06-12 RX ORDER — TC 99M MEDRONATE 20 MG/10ML
24.7 INJECTION, POWDER, LYOPHILIZED, FOR SOLUTION INTRAVENOUS
Status: COMPLETED | OUTPATIENT
Start: 2024-06-12 | End: 2024-06-12

## 2024-06-12 RX ADMIN — TC 99M MEDRONATE 24.7 MILLICURIE: 20 INJECTION, POWDER, LYOPHILIZED, FOR SOLUTION INTRAVENOUS at 07:46

## 2024-06-17 ENCOUNTER — HOSPITAL ENCOUNTER (OUTPATIENT)
Facility: HOSPITAL | Age: 39
Setting detail: INFUSION SERIES
Discharge: HOME | End: 2024-06-17
Payer: COMMERCIAL

## 2024-06-17 ENCOUNTER — HOSPITAL ENCOUNTER (OUTPATIENT)
Facility: HOSPITAL | Age: 39
Setting detail: INFUSION SERIES
End: 2024-06-17
Payer: COMMERCIAL

## 2024-06-17 ENCOUNTER — TELEPHONE (OUTPATIENT)
Age: 39
End: 2024-06-17

## 2024-06-17 VITALS
WEIGHT: 200.8 LBS | HEART RATE: 81 BPM | SYSTOLIC BLOOD PRESSURE: 110 MMHG | TEMPERATURE: 97.5 F | RESPIRATION RATE: 18 BRPM | BODY MASS INDEX: 36.95 KG/M2 | OXYGEN SATURATION: 97 % | DIASTOLIC BLOOD PRESSURE: 65 MMHG | HEIGHT: 62 IN

## 2024-06-17 DIAGNOSIS — C79.51 MALIGNANT NEOPLASM METASTATIC TO BONE (HCC): Primary | ICD-10-CM

## 2024-06-17 DIAGNOSIS — C78.01 MALIGNANT NEOPLASM METASTATIC TO BOTH LUNGS (HCC): ICD-10-CM

## 2024-06-17 DIAGNOSIS — C78.7 MALIGNANT NEOPLASM OF BREAST METASTATIC TO LIVER (HCC): ICD-10-CM

## 2024-06-17 DIAGNOSIS — F43.22 ADJUSTMENT DISORDER WITH ANXIOUS MOOD: ICD-10-CM

## 2024-06-17 DIAGNOSIS — C78.02 MALIGNANT NEOPLASM METASTATIC TO BOTH LUNGS (HCC): ICD-10-CM

## 2024-06-17 DIAGNOSIS — C50.919 MALIGNANT NEOPLASM OF BREAST METASTATIC TO BRAIN, UNSPECIFIED LATERALITY (HCC): ICD-10-CM

## 2024-06-17 DIAGNOSIS — C50.919 MALIGNANT NEOPLASM OF BREAST METASTATIC TO LIVER (HCC): ICD-10-CM

## 2024-06-17 DIAGNOSIS — C79.31 MALIGNANT NEOPLASM OF BREAST METASTATIC TO BRAIN, UNSPECIFIED LATERALITY (HCC): ICD-10-CM

## 2024-06-17 DIAGNOSIS — R19.7 DIARRHEA, UNSPECIFIED TYPE: ICD-10-CM

## 2024-06-17 LAB
ALBUMIN SERPL-MCNC: 3.6 G/DL (ref 3.5–5)
ALBUMIN/GLOB SERPL: 1.5 (ref 1.1–2.2)
ALP SERPL-CCNC: 111 U/L (ref 45–117)
ALT SERPL-CCNC: 21 U/L (ref 12–78)
ANION GAP SERPL CALC-SCNC: 6 MMOL/L (ref 5–15)
AST SERPL-CCNC: 20 U/L (ref 15–37)
BASOPHILS # BLD: 0 K/UL (ref 0–0.1)
BASOPHILS NFR BLD: 1 % (ref 0–1)
BILIRUB SERPL-MCNC: 0.3 MG/DL (ref 0.2–1)
BUN SERPL-MCNC: 19 MG/DL (ref 6–20)
BUN/CREAT SERPL: 28 (ref 12–20)
CALCIUM SERPL-MCNC: 8.8 MG/DL (ref 8.5–10.1)
CHLORIDE SERPL-SCNC: 106 MMOL/L (ref 97–108)
CO2 SERPL-SCNC: 27 MMOL/L (ref 21–32)
CREAT SERPL-MCNC: 0.68 MG/DL (ref 0.55–1.02)
DIFFERENTIAL METHOD BLD: ABNORMAL
EOSINOPHIL # BLD: 0.3 K/UL (ref 0–0.4)
EOSINOPHIL NFR BLD: 7 % (ref 0–7)
ERYTHROCYTE [DISTWIDTH] IN BLOOD BY AUTOMATED COUNT: 14.6 % (ref 11.5–14.5)
GLOBULIN SER CALC-MCNC: 2.4 G/DL (ref 2–4)
GLUCOSE SERPL-MCNC: 100 MG/DL (ref 65–100)
HCT VFR BLD AUTO: 35.7 % (ref 35–47)
HGB BLD-MCNC: 12 G/DL (ref 11.5–16)
IMM GRANULOCYTES # BLD AUTO: 0 K/UL (ref 0–0.04)
IMM GRANULOCYTES NFR BLD AUTO: 0 % (ref 0–0.5)
LYMPHOCYTES # BLD: 2 K/UL (ref 0.8–3.5)
LYMPHOCYTES NFR BLD: 48 % (ref 12–49)
MCH RBC QN AUTO: 32.7 PG (ref 26–34)
MCHC RBC AUTO-ENTMCNC: 33.6 G/DL (ref 30–36.5)
MCV RBC AUTO: 97.3 FL (ref 80–99)
MONOCYTES # BLD: 0.5 K/UL (ref 0–1)
MONOCYTES NFR BLD: 13 % (ref 5–13)
NEUTS SEG # BLD: 1.3 K/UL (ref 1.8–8)
NEUTS SEG NFR BLD: 31 % (ref 32–75)
NRBC # BLD: 0 K/UL (ref 0–0.01)
NRBC BLD-RTO: 0 PER 100 WBC
PLATELET # BLD AUTO: 234 K/UL (ref 150–400)
PMV BLD AUTO: 9.9 FL (ref 8.9–12.9)
POTASSIUM SERPL-SCNC: 4.4 MMOL/L (ref 3.5–5.1)
PROT SERPL-MCNC: 6 G/DL (ref 6.4–8.2)
RBC # BLD AUTO: 3.67 M/UL (ref 3.8–5.2)
RBC MORPH BLD: ABNORMAL
SODIUM SERPL-SCNC: 139 MMOL/L (ref 136–145)
WBC # BLD AUTO: 4.1 K/UL (ref 3.6–11)

## 2024-06-17 PROCEDURE — 36415 COLL VENOUS BLD VENIPUNCTURE: CPT

## 2024-06-17 PROCEDURE — 6360000002 HC RX W HCPCS: Performed by: INTERNAL MEDICINE

## 2024-06-17 PROCEDURE — 85025 COMPLETE CBC W/AUTO DIFF WBC: CPT

## 2024-06-17 PROCEDURE — 96360 HYDRATION IV INFUSION INIT: CPT

## 2024-06-17 PROCEDURE — 80053 COMPREHEN METABOLIC PANEL: CPT

## 2024-06-17 PROCEDURE — 96401 CHEMO ANTI-NEOPL SQ/IM: CPT

## 2024-06-17 PROCEDURE — 2580000003 HC RX 258: Performed by: INTERNAL MEDICINE

## 2024-06-17 RX ORDER — SODIUM CHLORIDE 9 MG/ML
5-250 INJECTION, SOLUTION INTRAVENOUS PRN
Status: ACTIVE | OUTPATIENT
Start: 2024-06-17 | End: 2024-06-18

## 2024-06-17 RX ORDER — 0.9 % SODIUM CHLORIDE 0.9 %
1000 INTRAVENOUS SOLUTION INTRAVENOUS ONCE
Status: COMPLETED | OUTPATIENT
Start: 2024-06-17 | End: 2024-06-17

## 2024-06-17 RX ORDER — SODIUM CHLORIDE 0.9 % (FLUSH) 0.9 %
5-40 SYRINGE (ML) INJECTION PRN
Status: ACTIVE | OUTPATIENT
Start: 2024-06-17 | End: 2024-06-18

## 2024-06-17 RX ORDER — BUPROPION HYDROCHLORIDE 300 MG/1
300 TABLET ORAL DAILY
Qty: 90 TABLET | Refills: 1 | Status: SHIPPED | OUTPATIENT
Start: 2024-06-17

## 2024-06-17 RX ADMIN — SODIUM CHLORIDE 1000 ML: 9 INJECTION, SOLUTION INTRAVENOUS at 08:57

## 2024-06-17 RX ADMIN — TRASTUZUMAB AND HYALURONIDASE-OYSK 600 MG: 600; 10000 INJECTION, SOLUTION SUBCUTANEOUS at 09:55

## 2024-06-17 RX ADMIN — SODIUM CHLORIDE, PRESERVATIVE FREE 20 ML: 5 INJECTION INTRAVENOUS at 10:05

## 2024-06-17 RX ADMIN — SODIUM CHLORIDE, PRESERVATIVE FREE 10 ML: 5 INJECTION INTRAVENOUS at 08:51

## 2024-06-17 NOTE — PROGRESS NOTES
Cancer [place] at [facility]  [address], [address] [place] [address]  W: [phone]  F: [phone]    Reason for Visit:   Millie King is a 39 y.o. female seen today in office for follow up of Metastatic Breast Cancer.    Treatment History:   Abd US 10/3/22: There are multiple liver masses with 2 representative masses in  the right liver measuring 2.4 x 2.6 cm and 1.6 x 2.1 cm  CT A/P 10/3/22: Spiculated left breast mass suspicious for malignancy. Multiple lung and liver metastases. Multiple top normal size retroperitoneal lymph nodes are nonspecific with metastatic disease  not excluded  CT Chest 10/4/22: Indeterminate 1.4 x 1.9 cm left breast nodule. Primary malignancy not excluded. Mammographic correlation advised. Diffuse bilateral pulmonary nodules most compatible with metastatic disease.Partially imaged hepatic hypodensities concerning  for metastatic disease. Indeterminate 6 mm T10 lytic lesion, possibly benign. Attention on follow-up  Liver Biopsy 10/4/22: PATH - Metastatic adenocarcinoma, consistent with breast primary  ER/MN negative, Her2 3+  Port placed on 10/13/2   Palliative Taxol+Herceptin+Perjeta 10/17/22 - 10/31/22  Switched to Palliative Taxotere+Herceptin+Perjeta on 11/7/22 - 1/22/24   DR Taxotere to 60 mg/m2 with Cycle 8  CT C/A/P 12/27/22: Imaging findings consistent with significant interval response to therapy. Significantly diminished pulmonary metastatic disease  burden with numerous resolved or nearly resolved pulmonary nodules. Significantly diminished size of hepatic masses. Left breast lesion is not demonstrated on the current exam  Right Hip XRAY 1/2/23: Right basicervical femoral neck fracture with medial angulation  CT Pelv 1/2/23: Re-demonstrated acute right basicervical femoral neck fracture, with findings concerning for pathologic etiology  XR Femur 1/2/23: Mildly displaced right femoral neck fracture  Right Hip IMN with Ortho Dr Bermudez   Bone

## 2024-06-17 NOTE — PROGRESS NOTES
[facility] Visit Notes                 Date: 2024  Name: Millie King  MRN: [id number]       : 1985    Pt arrived ambulatory and in no acute distress to [facility] for Herceptin Hylecta  Denies fever, cough, and N/V- denies covid-like symptoms.     Chest port accessed with positive blood return.   Labs ordered/ drawn. See [facility] Results Review for details.     Herceptin given in Right thigh  Tolerated procedure well without issue. Band-aid and Gauze Applied to site.   Patient aware of upcoming appointment. Patient declined post- monitoring time. Education provided.     Future Appointments   Date Time Provider Department Center   2024  8:30 AM [facility] CHEMO CHAIR 3 [facility]   2024  9:00 AM [facility]   2024 11:00 AM Sonia Nieves MD [facility] BS [facility]   2024  8:30 AM [facility] CHEMO CHAIR 2 [facility]   2024  9:00 AM Fang Manrique DO [facility] BS AMB   2024  8:30 AM [facility] CHEMO CHAIR 4 [facility]   2024  9:00 AM [facility] - [facility]   2024  8:30 AM [facility] CHEMO CHAIR 4 [facility]   2024  9:00 AM Fang Manrique DO [facility] BS AMB   2025 10:40 AM Christy Enriquez MD [facility] BS AMB     Magui Amaro RN  2024

## 2024-06-17 NOTE — TELEPHONE ENCOUNTER
Oral Chemotherapy      Millie King is a  38 y.o.female  diagnosed with breast cancer . Ms. King is being treated with tucatinib, capecitabine, trastuzumab.      Medication name: capecitabine     Dose:  1500 mg (dose reduced 4/15 with Cycle 4)  Frequency: twice a day  Administration schedule: for 14 day on, 7 days off every 21 days        Medication name: tucatinib     Dose:  250 mg  (dose reduced 4/15 with Cycle 4)  Frequency: twice a day        Ordering provider: Fang Cespedes DO  Start date: 6/17/24 (Cycle 7)      Contacted Ms. King and informed her that she is good to start capecitabine this evening. ANC is 1.3.     Lab Results   Component Value Date    WBC 4.1 06/17/2024    HGB 12.0 06/17/2024    HCT 35.7 06/17/2024    MCV 97.3 06/17/2024     06/17/2024    LYMPHOPCT 48 06/17/2024    RBC 3.67 (L) 06/17/2024    MCH 32.7 06/17/2024    MCHC 33.6 06/17/2024    RDW 14.6 (H) 06/17/2024     Lab Results   Component Value Date    NEUTROABS 1.3 (L) 06/17/2024         Expected follow up date: Labs/office visit each cycle. Next cycle start on 7/8/24     Patient  provided with an Oral Chemotherapy Journal.      Ms. King verbalized understanding of the information presented and all of the patient's questions were answered.        Beryl Sanchez, BETID, BCOP, BCPS    For Pharmacy Admin Tracking Only    Program: Medical Group  CPA in place:  Yes  Recommendation Provided To: Patient/Caregiver: 1 via Telephone and In person    Intervention Accepted By: Patient/Caregiver: 1    Time Spent (min): 20

## 2024-06-17 NOTE — TELEPHONE ENCOUNTER
Palliative Medicine Clinic   [place]: [phone] ([phone])    Patient Name: Millie King  YOB: 1985    Medication Refill Request    Patient is scheduled for follow up:  [x]  YES  []   NO  Next [facility] Visit: 07/08/24    PDMP reviewed:  [] YES   []  System down / Unable  []  NO- Patient fills out of state    Medication:buPROPion (WELLBUTRIN XL) 300 MG extended release tablet   Dose and directions:Take 1 tablet by mouth daily   Number dispensed:30  Date filled (PDMP or Pharmacy):03/26/24  # left:unknown      Appropriate for refill:  [x]  YES  []  Early Request - Requires MD/NP Review      Other pertinent information for prescriber:  Ins req 90 day supply

## 2024-06-17 NOTE — TELEPHONE ENCOUNTER
FU call to patient in [facility], [phone].  Patient ID verified.  Per patient she feels fine and has no issues, was running late this morning.  Would like to skip OV this week, but is having labs/Herceptin in [facility].  Understands will call with update after labs reviewed.    Patient appreciative and plans to attend next OV.   Update to Provider.

## 2024-06-17 NOTE — TELEPHONE ENCOUNTER
Patient called stated that she is running behind and would like to just cancel her appt this morning. She is okay with coming on 7/8 for opic/ov.

## 2024-06-20 ENCOUNTER — TELEPHONE (OUTPATIENT)
Age: 39
End: 2024-06-20

## 2024-06-20 DIAGNOSIS — G89.3 CANCER RELATED PAIN: Primary | ICD-10-CM

## 2024-06-20 RX ORDER — HYDROMORPHONE HYDROCHLORIDE 4 MG/1
8 TABLET ORAL EVERY 4 HOURS PRN
Qty: 120 TABLET | Refills: 0 | Status: SHIPPED | OUTPATIENT
Start: 2024-06-20 | End: 2024-06-21 | Stop reason: RX

## 2024-06-20 RX ORDER — METHADONE HYDROCHLORIDE 10 MG/1
10 TABLET ORAL EVERY 4 HOURS PRN
Qty: 20 TABLET | Refills: 0 | Status: SHIPPED | OUTPATIENT
Start: 2024-06-20 | End: 2024-06-30

## 2024-06-20 RX ORDER — OXYMORPHONE HYDROCHLORIDE 15 MG/1
15 TABLET, FILM COATED, EXTENDED RELEASE ORAL EVERY 12 HOURS
Qty: 10 TABLET | Refills: 0 | Status: SHIPPED | OUTPATIENT
Start: 2024-06-20 | End: 2024-06-21 | Stop reason: RX

## 2024-06-20 NOTE — TELEPHONE ENCOUNTER
Marlahart message-     Plan to rotate long acting opioid from Opana ER to methadone.  Dilaudid also changed to 8 mg every 4 hrs prn.      - on day she starts methadone instructed to reduce Opana ER from 30 mg q12h to 15 mg q12h (I sent in a new script for this bridge).   - after three days (5 doses of the methadone), stop the Opana altogether  - Continue Dilaudid 8 mg every 12 hrs prn, but please be mindful and reduce to 4 mg or hold altogether as soon as you feel any drowsiness or fatigue.

## 2024-06-21 RX ORDER — HYDROMORPHONE HYDROCHLORIDE 4 MG/1
8 TABLET ORAL EVERY 4 HOURS PRN
Qty: 120 TABLET | Refills: 0 | Status: SHIPPED | OUTPATIENT
Start: 2024-06-21 | End: 2024-07-01

## 2024-06-21 RX ORDER — OXYMORPHONE HYDROCHLORIDE 10 MG/1
10 TABLET, FILM COATED, EXTENDED RELEASE ORAL EVERY 12 HOURS
Qty: 8 TABLET | Refills: 0 | Status: SHIPPED | OUTPATIENT
Start: 2024-06-21 | End: 2024-06-25

## 2024-06-21 RX ORDER — HYDROMORPHONE HYDROCHLORIDE 4 MG/1
8 TABLET ORAL EVERY 4 HOURS PRN
Qty: 120 TABLET | Refills: 0 | Status: CANCELLED | OUTPATIENT
Start: 2024-06-21 | End: 2024-07-01

## 2024-06-21 NOTE — TELEPHONE ENCOUNTER
Palliative Medicine  Nursing Note  [phone]  Fax [phone]      Telephone Call  Patient Name: Millie King  YOB: 1985    6/21/2024    Incoming call received from Millie this morning as a return call to Palliative Medicine nurseDivya.    Spoke with Millie and [facility]ad[facility]mg til next week, Dilaudid available at [facility] in 2mg dose but patient pays out of pocket. (Ins doesn't cover rx from that drug  per the pharmacist at [facility].) Opana 15mg not available at [facility] nor [facility] per Divya yang's conversation with both pharmacists.    I called and spoke to the following Pharmacy Pharmacists:  -[facility]  -[facility]  -Diann at [facility]  -[facility]    All except [facility] told me they did not have Oxymorphone ER 15 mg in stock nor had Oxymorphone ER. [facility] said they had Oxymorphone ER 10mg in stock.    NurseDivya called [facility] and they told her Oxymorphone ER 15 is no longer manufactured nor did they have Oxymorphone in stock.     Called and spoke with patient, Millie and she is okay to  prescriptions for Oxymorphone ER 10mg and Dilaudid 4mg at [facility].     Per conversation with provider, Dr. Nieves, ordering Oxymorphone ER 10mg tablets and Dilaudid 4mg tablets to be picked up by patient at [facility].    NurseDivya is completing prior authorizations for both medications now.    Cristal Caraballo RN  Palliative Medicine

## 2024-06-21 NOTE — TELEPHONE ENCOUNTER
Per pt she picked up all meds except for Dilaudid which is on back order at [facility]. Tried to call pt to see if she wanted rx sent to [facility] as they have 2 mg tabs in stock in stock currently or to [facility] as it is in stock there.  We were disconnected.

## 2024-06-28 ENCOUNTER — TELEPHONE (OUTPATIENT)
Age: 39
End: 2024-06-28

## 2024-06-28 DIAGNOSIS — G89.3 CANCER RELATED PAIN: ICD-10-CM

## 2024-06-28 RX ORDER — METHADONE HYDROCHLORIDE 10 MG/1
10 TABLET ORAL EVERY 8 HOURS
Qty: 45 TABLET | Refills: 0 | Status: SHIPPED | OUTPATIENT
Start: 2024-06-28 | End: 2024-07-13

## 2024-06-28 RX ORDER — DIPHENHYDRAMINE HYDROCHLORIDE 50 MG/ML
50 INJECTION INTRAMUSCULAR; INTRAVENOUS
Status: CANCELLED | OUTPATIENT
Start: 2024-07-08

## 2024-06-28 RX ORDER — ACETAMINOPHEN 325 MG/1
650 TABLET ORAL
Status: CANCELLED | OUTPATIENT
Start: 2024-07-08

## 2024-06-28 RX ORDER — ONDANSETRON 2 MG/ML
8 INJECTION INTRAMUSCULAR; INTRAVENOUS
Status: CANCELLED | OUTPATIENT
Start: 2024-07-08

## 2024-06-28 RX ORDER — SODIUM CHLORIDE 9 MG/ML
INJECTION, SOLUTION INTRAVENOUS CONTINUOUS
Status: CANCELLED | OUTPATIENT
Start: 2024-07-08

## 2024-06-28 RX ORDER — ALBUTEROL SULFATE 90 UG/1
4 AEROSOL, METERED RESPIRATORY (INHALATION) PRN
Status: CANCELLED | OUTPATIENT
Start: 2024-07-08

## 2024-06-28 RX ORDER — EPINEPHRINE 1 MG/ML
0.3 INJECTION, SOLUTION INTRAMUSCULAR; SUBCUTANEOUS PRN
Status: CANCELLED | OUTPATIENT
Start: 2024-07-08

## 2024-06-28 RX ORDER — SODIUM CHLORIDE 9 MG/ML
5-250 INJECTION, SOLUTION INTRAVENOUS PRN
Status: CANCELLED | OUTPATIENT
Start: 2024-07-08

## 2024-06-28 RX ORDER — SODIUM CHLORIDE 0.9 % (FLUSH) 0.9 %
5-40 SYRINGE (ML) INJECTION PRN
Status: CANCELLED | OUTPATIENT
Start: 2024-07-08

## 2024-06-28 RX ORDER — HEPARIN 100 UNIT/ML
500 SYRINGE INTRAVENOUS PRN
Status: CANCELLED | OUTPATIENT
Start: 2024-07-08

## 2024-06-28 NOTE — TELEPHONE ENCOUNTER
See [employer] message- tolerating methadone 10 mg every 12 hrs but no change or reduction in her pain level, recommend to increase methadone to 10 mg every 8 hrs.  New #15 days script sent in.     Dr. Nieves

## 2024-07-03 ENCOUNTER — TELEPHONE (OUTPATIENT)
Age: 39
End: 2024-07-03

## 2024-07-03 DIAGNOSIS — C78.01 MALIGNANT NEOPLASM METASTATIC TO BOTH LUNGS (HCC): ICD-10-CM

## 2024-07-03 DIAGNOSIS — C78.02 MALIGNANT NEOPLASM METASTATIC TO BOTH LUNGS (HCC): ICD-10-CM

## 2024-07-03 DIAGNOSIS — C79.51 MALIGNANT NEOPLASM METASTATIC TO BONE (HCC): Primary | ICD-10-CM

## 2024-07-03 NOTE — TELEPHONE ENCOUNTER
Called patient to advise/confirm upcoming appt with Dr. Nieves on 7/8/2024 at 11:00  at [facility].  Left a message

## 2024-07-05 DIAGNOSIS — G89.3 CANCER RELATED PAIN: ICD-10-CM

## 2024-07-05 PROBLEM — F98.8 ATTENTION DEFICIT DISORDER: Status: ACTIVE | Noted: 2024-07-05

## 2024-07-05 RX ORDER — HYDROMORPHONE HYDROCHLORIDE 4 MG/1
8 TABLET ORAL EVERY 4 HOURS PRN
Qty: 120 TABLET | Refills: 0 | Status: SHIPPED | OUTPATIENT
Start: 2024-07-05 | End: 2024-07-15

## 2024-07-08 ENCOUNTER — OFFICE VISIT (OUTPATIENT)
Age: 39
End: 2024-07-08
Payer: COMMERCIAL

## 2024-07-08 ENCOUNTER — HOSPITAL ENCOUNTER (OUTPATIENT)
Facility: HOSPITAL | Age: 39
Setting detail: INFUSION SERIES
Discharge: HOME | End: 2024-07-08
Payer: COMMERCIAL

## 2024-07-08 ENCOUNTER — CLINICAL DOCUMENTATION (OUTPATIENT)
Age: 39
End: 2024-07-08

## 2024-07-08 VITALS
RESPIRATION RATE: 18 BRPM | HEART RATE: 87 BPM | DIASTOLIC BLOOD PRESSURE: 62 MMHG | SYSTOLIC BLOOD PRESSURE: 108 MMHG | OXYGEN SATURATION: 95 % | WEIGHT: 206 LBS | BODY MASS INDEX: 37.68 KG/M2 | TEMPERATURE: 97.5 F

## 2024-07-08 VITALS
RESPIRATION RATE: 18 BRPM | DIASTOLIC BLOOD PRESSURE: 62 MMHG | BODY MASS INDEX: 37.91 KG/M2 | WEIGHT: 206 LBS | OXYGEN SATURATION: 95 % | HEART RATE: 70 BPM | HEIGHT: 62 IN | SYSTOLIC BLOOD PRESSURE: 102 MMHG

## 2024-07-08 VITALS
DIASTOLIC BLOOD PRESSURE: 62 MMHG | HEART RATE: 70 BPM | SYSTOLIC BLOOD PRESSURE: 102 MMHG | RESPIRATION RATE: 18 BRPM | OXYGEN SATURATION: 95 % | TEMPERATURE: 97.5 F

## 2024-07-08 DIAGNOSIS — G89.3 CANCER RELATED PAIN: Primary | ICD-10-CM

## 2024-07-08 DIAGNOSIS — C78.01 MALIGNANT NEOPLASM METASTATIC TO BOTH LUNGS (HCC): ICD-10-CM

## 2024-07-08 DIAGNOSIS — C79.31 MALIGNANT NEOPLASM OF BREAST METASTATIC TO BRAIN, UNSPECIFIED LATERALITY (HCC): ICD-10-CM

## 2024-07-08 DIAGNOSIS — T45.1X5A CHEMOTHERAPY-INDUCED NEUROPATHY (HCC): ICD-10-CM

## 2024-07-08 DIAGNOSIS — C50.919 MALIGNANT NEOPLASM OF BREAST METASTATIC TO BRAIN, UNSPECIFIED LATERALITY (HCC): ICD-10-CM

## 2024-07-08 DIAGNOSIS — C50.919 MALIGNANT NEOPLASM OF BREAST METASTATIC TO LIVER (HCC): ICD-10-CM

## 2024-07-08 DIAGNOSIS — Z79.899 ENCOUNTER FOR MONITORING CARDIOTOXIC DRUG THERAPY: ICD-10-CM

## 2024-07-08 DIAGNOSIS — R19.7 DIARRHEA, UNSPECIFIED TYPE: ICD-10-CM

## 2024-07-08 DIAGNOSIS — C78.7 MALIGNANT NEOPLASM OF BREAST METASTATIC TO LIVER (HCC): ICD-10-CM

## 2024-07-08 DIAGNOSIS — C50.919 PRIMARY MALIGNANT NEOPLASM OF BREAST WITH METASTASIS (HCC): ICD-10-CM

## 2024-07-08 DIAGNOSIS — R06.09 DOE (DYSPNEA ON EXERTION): ICD-10-CM

## 2024-07-08 DIAGNOSIS — C78.7 MALIGNANT NEOPLASM METASTATIC TO LIVER (HCC): Primary | ICD-10-CM

## 2024-07-08 DIAGNOSIS — C79.51 MALIGNANT NEOPLASM METASTATIC TO BONE (HCC): Primary | ICD-10-CM

## 2024-07-08 DIAGNOSIS — M79.2 NEUROPATHIC PAIN: ICD-10-CM

## 2024-07-08 DIAGNOSIS — F98.8 ATTENTION DEFICIT DISORDER, UNSPECIFIED HYPERACTIVITY PRESENCE: ICD-10-CM

## 2024-07-08 DIAGNOSIS — C79.31 METASTASIS TO BRAIN (HCC): ICD-10-CM

## 2024-07-08 DIAGNOSIS — T45.1X5A CHEMOTHERAPY-INDUCED FATIGUE: ICD-10-CM

## 2024-07-08 DIAGNOSIS — G62.0 CHEMOTHERAPY-INDUCED NEUROPATHY (HCC): ICD-10-CM

## 2024-07-08 DIAGNOSIS — Z87.311 HISTORY OF PATHOLOGIC FRACTURE: ICD-10-CM

## 2024-07-08 DIAGNOSIS — R53.83 CHEMOTHERAPY-INDUCED FATIGUE: ICD-10-CM

## 2024-07-08 DIAGNOSIS — Z51.81 ENCOUNTER FOR MONITORING CARDIOTOXIC DRUG THERAPY: ICD-10-CM

## 2024-07-08 DIAGNOSIS — C78.02 MALIGNANT NEOPLASM METASTATIC TO BOTH LUNGS (HCC): ICD-10-CM

## 2024-07-08 DIAGNOSIS — F43.22 ADJUSTMENT DISORDER WITH ANXIOUS MOOD: ICD-10-CM

## 2024-07-08 DIAGNOSIS — Z09 CHEMOTHERAPY FOLLOW-UP EXAMINATION: ICD-10-CM

## 2024-07-08 DIAGNOSIS — T45.1X5A CHEMOTHERAPY INDUCED DIARRHEA: ICD-10-CM

## 2024-07-08 DIAGNOSIS — Z91.89 AT RISK FOR LONG QT SYNDROME: ICD-10-CM

## 2024-07-08 DIAGNOSIS — K52.1 CHEMOTHERAPY INDUCED DIARRHEA: ICD-10-CM

## 2024-07-08 DIAGNOSIS — G89.3 CANCER RELATED PAIN: ICD-10-CM

## 2024-07-08 DIAGNOSIS — Z95.828 PORT-A-CATH IN PLACE: ICD-10-CM

## 2024-07-08 LAB
ALBUMIN SERPL-MCNC: 3.6 G/DL (ref 3.5–5)
ALBUMIN/GLOB SERPL: 1.5 (ref 1.1–2.2)
ALP SERPL-CCNC: 95 U/L (ref 45–117)
ALT SERPL-CCNC: 25 U/L (ref 12–78)
ANION GAP BLD CALC-SCNC: 8.8 MMOL/L (ref 10–20)
ANION GAP SERPL CALC-SCNC: 5 MMOL/L (ref 5–15)
AST SERPL-CCNC: 17 U/L (ref 15–37)
BASOPHILS # BLD: 0 K/UL (ref 0–0.1)
BASOPHILS NFR BLD: 1 % (ref 0–1)
BILIRUB SERPL-MCNC: 0.4 MG/DL (ref 0.2–1)
BUN SERPL-MCNC: 12 MG/DL (ref 6–20)
BUN/CREAT SERPL: 15 (ref 12–20)
CA-I BLD-MCNC: 1.19 MMOL/L (ref 1.12–1.32)
CALCIUM SERPL-MCNC: 8.8 MG/DL (ref 8.5–10.1)
CHLORIDE BLD-SCNC: 106 MMOL/L (ref 98–107)
CHLORIDE SERPL-SCNC: 108 MMOL/L (ref 97–108)
CO2 BLD-SCNC: 26.2 MMOL/L (ref 21–32)
CO2 SERPL-SCNC: 27 MMOL/L (ref 21–32)
CREAT BLD-MCNC: 0.82 MG/DL (ref 0.6–1.3)
CREAT SERPL-MCNC: 0.81 MG/DL (ref 0.55–1.02)
DIFFERENTIAL METHOD BLD: ABNORMAL
EOSINOPHIL # BLD: 0.2 K/UL (ref 0–0.4)
EOSINOPHIL NFR BLD: 4 % (ref 0–7)
ERYTHROCYTE [DISTWIDTH] IN BLOOD BY AUTOMATED COUNT: 15.2 % (ref 11.5–14.5)
GLOBULIN SER CALC-MCNC: 2.4 G/DL (ref 2–4)
GLUCOSE BLD-MCNC: 117 MG/DL (ref 70–110)
GLUCOSE SERPL-MCNC: 118 MG/DL (ref 65–100)
HCT VFR BLD AUTO: 34.2 % (ref 35–47)
HGB BLD-MCNC: 11.2 G/DL (ref 11.5–16)
IMM GRANULOCYTES # BLD AUTO: 0 K/UL (ref 0–0.04)
IMM GRANULOCYTES NFR BLD AUTO: 1 % (ref 0–0.5)
LYMPHOCYTES # BLD: 1.7 K/UL (ref 0.8–3.5)
LYMPHOCYTES NFR BLD: 40 % (ref 12–49)
MCH RBC QN AUTO: 32.7 PG (ref 26–34)
MCHC RBC AUTO-ENTMCNC: 32.7 G/DL (ref 30–36.5)
MCV RBC AUTO: 99.7 FL (ref 80–99)
MONOCYTES # BLD: 0.4 K/UL (ref 0–1)
MONOCYTES NFR BLD: 9 % (ref 5–13)
NEUTS SEG # BLD: 2 K/UL (ref 1.8–8)
NEUTS SEG NFR BLD: 45 % (ref 32–75)
NRBC # BLD: 0 K/UL (ref 0–0.01)
NRBC BLD-RTO: 0 PER 100 WBC
PLATELET # BLD AUTO: 228 K/UL (ref 150–400)
PMV BLD AUTO: 9.9 FL (ref 8.9–12.9)
POTASSIUM BLD-SCNC: 3.9 MMOL/L (ref 3.5–5.1)
POTASSIUM SERPL-SCNC: 4 MMOL/L (ref 3.5–5.1)
PROT SERPL-MCNC: 6 G/DL (ref 6.4–8.2)
RBC # BLD AUTO: 3.43 M/UL (ref 3.8–5.2)
SERVICE CMNT-IMP: ABNORMAL
SODIUM BLD-SCNC: 141 MMOL/L (ref 136–145)
SODIUM SERPL-SCNC: 140 MMOL/L (ref 136–145)
WBC # BLD AUTO: 4.4 K/UL (ref 3.6–11)

## 2024-07-08 PROCEDURE — 99215 OFFICE O/P EST HI 40 MIN: CPT | Performed by: INTERNAL MEDICINE

## 2024-07-08 PROCEDURE — 80053 COMPREHEN METABOLIC PANEL: CPT

## 2024-07-08 PROCEDURE — 36415 COLL VENOUS BLD VENIPUNCTURE: CPT

## 2024-07-08 PROCEDURE — 96372 THER/PROPH/DIAG INJ SC/IM: CPT

## 2024-07-08 PROCEDURE — 96360 HYDRATION IV INFUSION INIT: CPT

## 2024-07-08 PROCEDURE — 85025 COMPLETE CBC W/AUTO DIFF WBC: CPT

## 2024-07-08 PROCEDURE — 99215 OFFICE O/P EST HI 40 MIN: CPT | Performed by: NURSE PRACTITIONER

## 2024-07-08 PROCEDURE — 96401 CHEMO ANTI-NEOPL SQ/IM: CPT

## 2024-07-08 PROCEDURE — 6360000002 HC RX W HCPCS: Performed by: INTERNAL MEDICINE

## 2024-07-08 PROCEDURE — 80047 BASIC METABLC PNL IONIZED CA: CPT

## 2024-07-08 PROCEDURE — 2580000003 HC RX 258: Performed by: INTERNAL MEDICINE

## 2024-07-08 RX ORDER — ACETAMINOPHEN 325 MG/1
650 TABLET ORAL
OUTPATIENT
Start: 2024-08-19

## 2024-07-08 RX ORDER — 0.9 % SODIUM CHLORIDE 0.9 %
1000 INTRAVENOUS SOLUTION INTRAVENOUS ONCE
Status: COMPLETED | OUTPATIENT
Start: 2024-07-08 | End: 2024-07-08

## 2024-07-08 RX ORDER — EPINEPHRINE 1 MG/ML
0.3 INJECTION, SOLUTION INTRAMUSCULAR; SUBCUTANEOUS PRN
OUTPATIENT
Start: 2024-08-19

## 2024-07-08 RX ORDER — SODIUM CHLORIDE 9 MG/ML
INJECTION, SOLUTION INTRAVENOUS CONTINUOUS
OUTPATIENT
Start: 2024-08-19

## 2024-07-08 RX ORDER — DIPHENHYDRAMINE HYDROCHLORIDE 50 MG/ML
50 INJECTION INTRAMUSCULAR; INTRAVENOUS
OUTPATIENT
Start: 2024-08-19

## 2024-07-08 RX ORDER — ONDANSETRON 2 MG/ML
8 INJECTION INTRAMUSCULAR; INTRAVENOUS
OUTPATIENT
Start: 2024-08-19

## 2024-07-08 RX ORDER — ALBUTEROL SULFATE 90 UG/1
4 AEROSOL, METERED RESPIRATORY (INHALATION) PRN
OUTPATIENT
Start: 2024-08-19

## 2024-07-08 RX ORDER — GABAPENTIN 300 MG/1
300 CAPSULE ORAL NIGHTLY
Qty: 30 CAPSULE | Refills: 0 | Status: SHIPPED | OUTPATIENT
Start: 2024-07-08 | End: 2024-07-08 | Stop reason: CLARIF

## 2024-07-08 RX ORDER — SODIUM CHLORIDE 9 MG/ML
5-250 INJECTION, SOLUTION INTRAVENOUS PRN
Status: DISCONTINUED | OUTPATIENT
Start: 2024-07-08 | End: 2024-07-09 | Stop reason: HOSPADM

## 2024-07-08 RX ADMIN — TRASTUZUMAB AND HYALURONIDASE-OYSK 600 MG: 600; 10000 INJECTION, SOLUTION SUBCUTANEOUS at 09:28

## 2024-07-08 RX ADMIN — DENOSUMAB 120 MG: 120 INJECTION SUBCUTANEOUS at 09:07

## 2024-07-08 RX ADMIN — SODIUM CHLORIDE 1000 ML: 9 INJECTION, SOLUTION INTRAVENOUS at 08:59

## 2024-07-08 NOTE — PROGRESS NOTES
Oral Chemotherapy      Millie King is a  39 y.o.female  diagnosed with breast cancer . Ms. King is being treated with tucatinib, capecitabine, trastuzumab.      Medication name: capecitabine     Dose:  1500 mg (dose reduced 4/15 with Cycle 4)  Frequency: twice a day  Administration schedule: for 14 day on, 7 days off every 21 days        Medication name: tucatinib     Dose:  250 mg  (dose reduced 4/15 with Cycle 4)  Frequency: twice a day        Ordering provider: Fang Cespedes DO  Start date: 7/8/24 (Cycle 8)      Contacted Ms. King and informed her that she is good to start capecitabine this evening. ANC is 2.0     Lab Results   Component Value Date    WBC 4.4 07/08/2024    HGB 11.2 (L) 07/08/2024    HCT 34.2 (L) 07/08/2024    MCV 99.7 (H) 07/08/2024     07/08/2024    LYMPHOPCT 40 07/08/2024    RBC 3.43 (L) 07/08/2024    MCH 32.7 07/08/2024    MCHC 32.7 07/08/2024    RDW 15.2 (H) 07/08/2024     Lab Results   Component Value Date    NEUTROABS 2.0 07/08/2024         Expected follow up date: Labs/office visit each cycle. Next cycle start on 8/5/24     Patient  provided with an Oral Chemotherapy Journal.      Ms. King verbalized understanding of the information presented and all of the patient's questions were answered.        Beryl Sanchez, BETID, BCOP, BCPS    For Pharmacy Admin Tracking Only    Program: Medical Group  CPA in place:  Yes  Recommendation Provided To: Patient/Caregiver: 1 via Telephone    Intervention Accepted By: Patient/Caregiver: 1    Time Spent (min): 15

## 2024-07-08 NOTE — PROGRESS NOTES
Palliative Medicine Outpatient Services  [place]: [phone] ([phone])    Patient Name: Millie King  YOB: 1985    Date of Current Visit: 07/08/24  Location of Current Visit:    [x] [facility] Office  [] [facility] Office  [] [facility] Office  [] Home  [] Synchronous real-time A/V virtual visit    Date of Initial Palliative Medicine Visit: 6/8/23   Referral from: Dr. Manrique    REASON FOR VISIT:   38 year old woman with MBC seen for follow up pain and symptom management.     FOLLOW UP:   Return in about 5 weeks (around 8/12/2024).     ASSESSMENT AND PLAN:     Neoplasm related fatigue- improving  - initiated Ritalin 5 mg prn activity 6/2024-  has been very helpful.   - Uses sparingly prior to activity with family and friends and has improved her quality of life.   - Continue Ritalin 5 mg BID - take prior to 2 pm  - note she previously tolerated long acting stimulants for a diagnosis of ADHD, stopped when she moved from [facility] to [place]    Cancer related pain- controlled  - right hip pain related to pathologic partially healed fracture s/p fixation.  There are no additional surgical options.   There is a significant neuropathic component to the hip pain; addition of Lyrica has been helpful.    Another scan scheduled considering worsening pain again.    - she also has diffuse joint aches related to chemotherapy.    - previously on Oxycontin 20 mg BID in 2023, insurance stopped covering for 2024.    - Rotated to MS Contin 30 mg BID end of December, this was been ineffective.   - long acting agents preferred by her insurer- Opana, hydromorphone ER, Fentanyl Td.  Fentanyl Td contraindicated right now due to excessive sweating.   - Rotated to Opana ER January 2024- June 2024  - Rotated to methadone June 2024.      Plan:  - increase methadone from 10 mg every 12 hrs to every 8 hrs  - ECG before next visit, ordered today  - continue Dilaudid 8 mg (2 x 4  mg tabs) every 4 hrs for breakthrough pain but monitor need

## 2024-07-08 NOTE — PROGRESS NOTES
Millie King is a 39 y.o. female    Chief Complaint   Patient presents with    Follow-up     Metastatic Breast Cancer     1. Have you been to the ER, urgent care clinic since your last visit?  Hospitalized since your last visit?No    2. Have you seen or consulted any other health care providers outside of the [facility] since your last visit?  Include any pap smears or colon screening. No

## 2024-07-08 NOTE — PROGRESS NOTES
Palliative Medicine Outpatient Clinic  Nurse Check in Note  [phone]    Patient Name: Millie King  YOB: 1985      Date of Visit: 07/08/2024  Visit Location:  [facility]    Chief patient or family concern today: follow up    Patient's Last Palliative Medicine Clinic Visit Date:  6/10/2024    Have you been to an ER or urgent care center since your last visit?  No  Have you been hospitalized since your last visit? No  Have you seen or consulted any health care providers outside of the [facility] system since your last visit?  No  If Yes, alert PSR to request appropriate records from non-[facility] offices    Medications:  Med reconciliation was performed with:  Patient    Requested refills:  None    If prescribed an opioid, does patient have access to naloxone at home?:  YES  If No, pend naloxone nasal spray    Function and Symptoms:  Use of assist devices:  None    Palliative Performance Status (PPS):   Palliative Performance Scale (PPS)  PPS: 70    ESAS:  Modified-Brusett Symptom Assessment Scale (ESAS)  Tiredness Score: 7  Drowsiness Score: 3  Depression Score: 3  Pain Score: 7  Anxiety Score: 3  Nausea Score: 1  Appetite Score: 1  Dyspnea Score: 2  Constipation: No  Wellbeing Score: 2    Constipation?  No  Last BM: 07/07/24    Advance Care Planning:  Currently listed healthcare agent:    Primary Decision Maker: Eric King - Spouse - [phone]    Is there an ACP Note within the past 12 months?  YES  If No, Alert Clinician and/or Social Work      Divya Simms LPN

## 2024-07-08 NOTE — PROGRESS NOTES
[facility] Progress Note    Date: 2024    Name: Millie King    MRN: [id number]         : 1985    Ms. King Arrived ambulatory and in no distress for cycle 8 day 1 of Herceptin Hylecta/Xgeva regimen.      Follow Up: Proceed with treatment    Assessment was completed and documented in flowsheets. No acute concerns at this time. Port accessed without difficulty, labs drawn and processed. Patient went to Medical Oncology appointment upstairs. VICKI per JEROME [place] with last echo being 24.    Ms. King's vitals were reviewed.  Patient Vitals for the past 12 hrs:   Temp Pulse Resp BP SpO2   24 0815 97.5 °F (36.4 °C) 87 18 108/62 95 %     Lines:   Implantable Port Left Subclavian (Active)   Port-a-Cath Status Accessed 24   Criteria for Appropriate Use Limited/no vessel suitable for conventional peripheral access 24   Site Assessment Clean, dry & intact 24   Alcohol Cap Used Yes 24   Date of Last Dressing Change 24 08   Dressing Type Transparent 24   Dressing Status New dressing applied 24   Dressing Intervention New 24 08   Date Accessed  24 08   Access Attempts  1 24 08   Access Needle Gauge 20 G 24   Access Needle Length 1 inch 24 08   Accessed By: AH RN 24   Single Lumen Status Brisk blood return;Flushed;Alcohol cap applied 24 0827   De-Access Date 24 0851   De-Access Time 1005 24 0851   De-Accessed By OC 24 0825      Lab results were obtained and reviewed.  Labs within parameter for treatment.   Recent Results (from the past 12 hour(s))   Comprehensive Metabolic Panel    Collection Time: 24  8:14 AM   Result Value Ref Range    Sodium 140 136 - 145 mmol/L    Potassium 4.0 3.5 - 5.1 mmol/L    Chloride 108 97 - 108 mmol/L    CO2 27 21 - 32 mmol/L    Anion Gap 5 5 - 15 mmol/L    Glucose 118 (H) 65 - 100 mg/dL    BUN

## 2024-07-14 ENCOUNTER — PATIENT MESSAGE (OUTPATIENT)
Age: 39
End: 2024-07-14

## 2024-07-14 DIAGNOSIS — G89.3 CANCER RELATED PAIN: Primary | ICD-10-CM

## 2024-07-15 DIAGNOSIS — L65.8 ALOPECIA DUE TO CYTOTOXIC DRUG: Primary | ICD-10-CM

## 2024-07-15 DIAGNOSIS — T45.1X5A ALOPECIA DUE TO CYTOTOXIC DRUG: Primary | ICD-10-CM

## 2024-07-15 RX ORDER — METHADONE HYDROCHLORIDE 10 MG/1
10 TABLET ORAL EVERY 8 HOURS PRN
Qty: 45 TABLET | Refills: 0 | Status: SHIPPED | OUTPATIENT
Start: 2024-07-15 | End: 2024-08-14

## 2024-07-15 NOTE — TELEPHONE ENCOUNTER
Palliative Medicine Clinic   [place]: [phone])    Patient Name: Millie King  YOB: 1985    Medication Refill Request    Patient is scheduled for follow up:  [x]  YES  []   NO  Next [facility] Med Clinic Visit: 08/12/24    PDMP reviewed:  [x] YES   []  System down / Unable  []  NO- Patient fills out of state    Medication:methadone (DOLOPHINE) 10 MG tablet   Dose and directions:one tablet every 8 hours for 15 days  Number dispensed:45  Date filled (PDMP or Pharmacy):06/28/24  # left:unknown        Appropriate for refill:  [x]  YES  []  Early Request - Requires MD/NP Review      Other pertinent information for prescriber:

## 2024-07-15 NOTE — TELEPHONE ENCOUNTER
From: Millie King  Sent: 7/15/2024 8:49 AM EDT  To: [facility] Palliative Medicine-[facility] Clinical Staff  Subject: Med refill    [facility] [address] [facility] so that would be fine.

## 2024-07-17 ENCOUNTER — HOSPITAL ENCOUNTER (OUTPATIENT)
Facility: HOSPITAL | Age: 39
Discharge: HOME | End: 2024-07-20
Payer: COMMERCIAL

## 2024-07-17 DIAGNOSIS — R06.09 DOE (DYSPNEA ON EXERTION): ICD-10-CM

## 2024-07-17 DIAGNOSIS — C78.7 MALIGNANT NEOPLASM METASTATIC TO LIVER (HCC): ICD-10-CM

## 2024-07-17 PROCEDURE — 71260 CT THORAX DX C+: CPT

## 2024-07-17 PROCEDURE — 6360000004 HC RX CONTRAST MEDICATION: Performed by: NURSE PRACTITIONER

## 2024-07-17 RX ADMIN — IOPAMIDOL 100 ML: 755 INJECTION, SOLUTION INTRAVENOUS at 16:28

## 2024-07-21 ENCOUNTER — PATIENT MESSAGE (OUTPATIENT)
Age: 39
End: 2024-07-21

## 2024-07-21 DIAGNOSIS — G89.3 CANCER RELATED PAIN: Primary | ICD-10-CM

## 2024-07-22 ENCOUNTER — TELEPHONE (OUTPATIENT)
Age: 39
End: 2024-07-22

## 2024-07-22 RX ORDER — HYDROMORPHONE HYDROCHLORIDE 4 MG/1
8 TABLET ORAL EVERY 4 HOURS PRN
Qty: 120 TABLET | Refills: 0 | Status: SHIPPED | OUTPATIENT
Start: 2024-07-22 | End: 2024-08-01

## 2024-07-22 NOTE — TELEPHONE ENCOUNTER
From: Millie King  To: Dr. Sonia Nieves  Sent: 7/21/2024 6:59 PM EDT  Subject: Refill    Good morning!  I need a refill for my fast acting hydromorphone 4mg. I get this one filled at [facility]. Thank you so much!!

## 2024-07-22 NOTE — TELEPHONE ENCOUNTER
Rx has been pend for provider to send.  Dr. Nieves is aware pt needs med today.  Provider has been in clinic all day and will take care of today.

## 2024-07-22 NOTE — TELEPHONE ENCOUNTER
Palliative Medicine Clinic   [place]: [phone] ([phone])    Patient Name: Millie King  YOB: 1985    Medication Refill Request    Patient is scheduled for follow up:  [x]  YES  []   NO  Next [facility] Med Clinic Visit: 08/12/24    PDMP reviewed:  [x] YES   []  System down / Unable  []  NO- Patient fills out of state    Medication:HYDROmorphone (DILAUDID) 4 MG tablet   Dose and directions:Take 2 tablets by mouth every 4 hours as needed for Pain for up to 10 days   Number dispensed:120  Date filled (PDMP or Pharmacy):07/08/24  # left:unknown      Appropriate for refill:  [x]  YES  []  Early Request - Requires MD/NP Review      Other pertinent information for prescriber:

## 2024-07-22 NOTE — TELEPHONE ENCOUNTER
Pt is calling with an update on the Dilaudid, she called [facility] and they have not received a rx

## 2024-07-26 ENCOUNTER — HOSPITAL ENCOUNTER (OUTPATIENT)
Facility: HOSPITAL | Age: 39
Discharge: HOME | End: 2024-07-26
Payer: COMMERCIAL

## 2024-07-26 DIAGNOSIS — Z79.899 ENCOUNTER FOR MONITORING CARDIOTOXIC DRUG THERAPY: ICD-10-CM

## 2024-07-26 DIAGNOSIS — Z51.81 ENCOUNTER FOR MONITORING CARDIOTOXIC DRUG THERAPY: ICD-10-CM

## 2024-07-26 LAB
ECHO AO ROOT DIAM: 2.2 CM
ECHO AV AREA PEAK VELOCITY: 2.3 CM2
ECHO AV AREA PLAN: 1.8 CM2
ECHO AV MEAN GRADIENT: 3 MMHG
ECHO AV MEAN VELOCITY: 0.8 M/S
ECHO AV PEAK GRADIENT: 7 MMHG
ECHO AV PEAK VELOCITY: 1.3 M/S
ECHO AV VELOCITY RATIO: 0.85
ECHO AV VTI: 21.8 CM
ECHO EST RA PRESSURE: 5 MMHG
ECHO LA DIAMETER: 3.4 CM
ECHO LA TO AORTIC ROOT RATIO: 1.55
ECHO LA VOL A-L A4C: 37 ML (ref 22–52)
ECHO LA VOL MOD A4C: 35 ML (ref 22–52)
ECHO LV EDV A4C: 61 ML
ECHO LV EJECTION FRACTION A4C: 60 %
ECHO LV ESV A4C: 24 ML
ECHO LV FRACTIONAL SHORTENING: 38 % (ref 28–44)
ECHO LV INTERNAL DIMENSION DIASTOLIC: 4.8 CM (ref 3.9–5.3)
ECHO LV INTERNAL DIMENSION SYSTOLIC: 3 CM
ECHO LV IVSD: 1 CM (ref 0.6–0.9)
ECHO LV MASS 2D: 170.2 G (ref 67–162)
ECHO LV POSTERIOR WALL DIASTOLIC: 1 CM (ref 0.6–0.9)
ECHO LV RELATIVE WALL THICKNESS RATIO: 0.42
ECHO LVOT AREA: 2.8 CM2
ECHO LVOT DIAM: 1.9 CM
ECHO LVOT PEAK GRADIENT: 4 MMHG
ECHO LVOT PEAK VELOCITY: 1.1 M/S
ECHO MV A VELOCITY: 0.42 M/S
ECHO MV AREA PHT: 4.4 CM2
ECHO MV E DECELERATION TIME (DT): 104.7 MS
ECHO MV E VELOCITY: 0.51 M/S
ECHO MV E/A RATIO: 1.21
ECHO MV MAX VELOCITY: 0.8 M/S
ECHO MV MEAN GRADIENT: 1 MMHG
ECHO MV MEAN VELOCITY: 0.5 M/S
ECHO MV PEAK GRADIENT: 3 MMHG
ECHO MV PRESSURE HALF TIME (PHT): 49.9 MS
ECHO MV VTI: 17.7 CM
ECHO PV MAX VELOCITY: 0.7 M/S
ECHO PV PEAK GRADIENT: 2 MMHG
ECHO RIGHT VENTRICULAR SYSTOLIC PRESSURE (RVSP): 47 MMHG
ECHO TV REGURGITANT MAX VELOCITY: 3.23 M/S
ECHO TV REGURGITANT PEAK GRADIENT: 42 MMHG

## 2024-07-26 PROCEDURE — 93306 TTE W/DOPPLER COMPLETE: CPT

## 2024-07-26 PROCEDURE — 93306 TTE W/DOPPLER COMPLETE: CPT | Performed by: SPECIALIST

## 2024-07-29 ENCOUNTER — APPOINTMENT (OUTPATIENT)
Facility: HOSPITAL | Age: 39
End: 2024-07-29
Payer: COMMERCIAL

## 2024-07-30 DIAGNOSIS — I51.7 CONCENTRIC LEFT VENTRICULAR HYPERTROPHY: Primary | ICD-10-CM

## 2024-07-30 RX ORDER — HEPARIN 100 UNIT/ML
500 SYRINGE INTRAVENOUS PRN
OUTPATIENT
Start: 2024-08-05

## 2024-07-30 RX ORDER — DIPHENHYDRAMINE HYDROCHLORIDE 50 MG/ML
50 INJECTION INTRAMUSCULAR; INTRAVENOUS
OUTPATIENT
Start: 2024-08-05

## 2024-07-30 RX ORDER — ACETAMINOPHEN 325 MG/1
650 TABLET ORAL
OUTPATIENT
Start: 2024-08-05

## 2024-07-30 RX ORDER — SODIUM CHLORIDE 0.9 % (FLUSH) 0.9 %
5-40 SYRINGE (ML) INJECTION PRN
OUTPATIENT
Start: 2024-08-05

## 2024-07-30 RX ORDER — SODIUM CHLORIDE 9 MG/ML
5-250 INJECTION, SOLUTION INTRAVENOUS PRN
OUTPATIENT
Start: 2024-08-05

## 2024-07-30 RX ORDER — EPINEPHRINE 1 MG/ML
0.3 INJECTION, SOLUTION INTRAMUSCULAR; SUBCUTANEOUS PRN
OUTPATIENT
Start: 2024-08-05

## 2024-07-30 RX ORDER — ONDANSETRON 2 MG/ML
8 INJECTION INTRAMUSCULAR; INTRAVENOUS
OUTPATIENT
Start: 2024-08-05

## 2024-07-30 RX ORDER — ALBUTEROL SULFATE 90 UG/1
4 AEROSOL, METERED RESPIRATORY (INHALATION) PRN
OUTPATIENT
Start: 2024-08-05

## 2024-07-30 RX ORDER — SODIUM CHLORIDE 9 MG/ML
INJECTION, SOLUTION INTRAVENOUS CONTINUOUS
OUTPATIENT
Start: 2024-08-05

## 2024-08-02 ENCOUNTER — TELEPHONE (OUTPATIENT)
Age: 39
End: 2024-08-02

## 2024-08-02 NOTE — TELEPHONE ENCOUNTER
Verified Patient with two identifiers.    Patient requesting apt.    Patient was referred to see  Dr. Herrera    Please forward to    Thank you

## 2024-08-02 NOTE — PROGRESS NOTES
Cancer [place] at [facility]  [address], [address] [place] [address]  W: [phone]  F: [phone]    Reason for Visit:   Millie King is a 39 y.o. female seen today in office for follow up of Metastatic Breast Cancer.    Treatment History:   Abd US 10/3/22: There are multiple liver masses with 2 representative masses in  the right liver measuring 2.4 x 2.6 cm and 1.6 x 2.1 cm  CT A/P 10/3/22: Spiculated left breast mass suspicious for malignancy. Multiple lung and liver metastases. Multiple top normal size retroperitoneal lymph nodes are nonspecific with metastatic disease  not excluded  CT Chest 10/4/22: Indeterminate 1.4 x 1.9 cm left breast nodule. Primary malignancy not excluded. Mammographic correlation advised. Diffuse bilateral pulmonary nodules most compatible with metastatic disease.Partially imaged hepatic hypodensities concerning  for metastatic disease. Indeterminate 6 mm T10 lytic lesion, possibly benign. Attention on follow-up  Liver Biopsy 10/4/22: PATH - Metastatic adenocarcinoma, consistent with breast primary  ER/IL negative, Her2 3+  Port placed on 10/13/2   Palliative Taxol+Herceptin+Perjeta 10/17/22 - 10/31/22  Switched to Palliative Taxotere+Herceptin+Perjeta on 11/7/22 - 1/22/24   DR Taxotere to 60 mg/m2 with Cycle 8  CT C/A/P 12/27/22: Imaging findings consistent with significant interval response to therapy. Significantly diminished pulmonary metastatic disease  burden with numerous resolved or nearly resolved pulmonary nodules. Significantly diminished size of hepatic masses. Left breast lesion is not demonstrated on the current exam  Right Hip XRAY 1/2/23: Right basicervical femoral neck fracture with medial angulation  CT Pelv 1/2/23: Re-demonstrated acute right basicervical femoral neck fracture, with findings concerning for pathologic etiology  XR Femur 1/2/23: Mildly displaced right femoral neck fracture  Right Hip IMN with Ortho Dr Bermudez   Bone

## 2024-08-05 ENCOUNTER — CLINICAL DOCUMENTATION (OUTPATIENT)
Age: 39
End: 2024-08-05

## 2024-08-05 ENCOUNTER — HOSPITAL ENCOUNTER (OUTPATIENT)
Facility: HOSPITAL | Age: 39
Setting detail: INFUSION SERIES
Discharge: HOME | End: 2024-08-05
Payer: COMMERCIAL

## 2024-08-05 ENCOUNTER — OFFICE VISIT (OUTPATIENT)
Age: 39
End: 2024-08-05
Payer: COMMERCIAL

## 2024-08-05 VITALS
SYSTOLIC BLOOD PRESSURE: 120 MMHG | BODY MASS INDEX: 38.28 KG/M2 | DIASTOLIC BLOOD PRESSURE: 77 MMHG | OXYGEN SATURATION: 95 % | HEART RATE: 78 BPM | HEIGHT: 62 IN | TEMPERATURE: 98.7 F | WEIGHT: 208 LBS | RESPIRATION RATE: 18 BRPM

## 2024-08-05 VITALS
HEART RATE: 78 BPM | BODY MASS INDEX: 38.03 KG/M2 | SYSTOLIC BLOOD PRESSURE: 120 MMHG | TEMPERATURE: 98.7 F | WEIGHT: 208 LBS | OXYGEN SATURATION: 95 % | DIASTOLIC BLOOD PRESSURE: 77 MMHG | RESPIRATION RATE: 18 BRPM

## 2024-08-05 DIAGNOSIS — K52.1 CHEMOTHERAPY INDUCED DIARRHEA: ICD-10-CM

## 2024-08-05 DIAGNOSIS — C79.31 METASTASIS TO BRAIN (HCC): ICD-10-CM

## 2024-08-05 DIAGNOSIS — R19.7 DIARRHEA, UNSPECIFIED TYPE: ICD-10-CM

## 2024-08-05 DIAGNOSIS — C79.31 MALIGNANT NEOPLASM OF BREAST METASTATIC TO BRAIN, UNSPECIFIED LATERALITY (HCC): ICD-10-CM

## 2024-08-05 DIAGNOSIS — T45.1X5A CHEMOTHERAPY-INDUCED NEUROPATHY (HCC): ICD-10-CM

## 2024-08-05 DIAGNOSIS — C50.919 MALIGNANT NEOPLASM OF BREAST METASTATIC TO BRAIN, UNSPECIFIED LATERALITY (HCC): ICD-10-CM

## 2024-08-05 DIAGNOSIS — G89.3 CANCER RELATED PAIN: ICD-10-CM

## 2024-08-05 DIAGNOSIS — C79.51 MALIGNANT NEOPLASM METASTATIC TO BONE (HCC): Primary | ICD-10-CM

## 2024-08-05 DIAGNOSIS — R53.83 CHEMOTHERAPY-INDUCED FATIGUE: ICD-10-CM

## 2024-08-05 DIAGNOSIS — Z51.81 ENCOUNTER FOR MONITORING CARDIOTOXIC DRUG THERAPY: ICD-10-CM

## 2024-08-05 DIAGNOSIS — C78.02 MALIGNANT NEOPLASM METASTATIC TO BOTH LUNGS (HCC): ICD-10-CM

## 2024-08-05 DIAGNOSIS — C50.919 MALIGNANT NEOPLASM OF BREAST METASTATIC TO LIVER (HCC): ICD-10-CM

## 2024-08-05 DIAGNOSIS — C78.01 MALIGNANT NEOPLASM METASTATIC TO BOTH LUNGS (HCC): ICD-10-CM

## 2024-08-05 DIAGNOSIS — Z09 CHEMOTHERAPY FOLLOW-UP EXAMINATION: ICD-10-CM

## 2024-08-05 DIAGNOSIS — T45.1X5A CHEMOTHERAPY INDUCED DIARRHEA: ICD-10-CM

## 2024-08-05 DIAGNOSIS — Z95.828 PORT-A-CATH IN PLACE: ICD-10-CM

## 2024-08-05 DIAGNOSIS — C78.7 MALIGNANT NEOPLASM OF BREAST METASTATIC TO LIVER (HCC): ICD-10-CM

## 2024-08-05 DIAGNOSIS — Z79.899 ENCOUNTER FOR MONITORING CARDIOTOXIC DRUG THERAPY: ICD-10-CM

## 2024-08-05 DIAGNOSIS — G62.0 CHEMOTHERAPY-INDUCED NEUROPATHY (HCC): ICD-10-CM

## 2024-08-05 DIAGNOSIS — C78.7 MALIGNANT NEOPLASM METASTATIC TO LIVER (HCC): Primary | ICD-10-CM

## 2024-08-05 DIAGNOSIS — T45.1X5A CHEMOTHERAPY-INDUCED FATIGUE: ICD-10-CM

## 2024-08-05 LAB
ALBUMIN SERPL-MCNC: 3.4 G/DL (ref 3.5–5)
ALBUMIN/GLOB SERPL: 1.2 (ref 1.1–2.2)
ALP SERPL-CCNC: 101 U/L (ref 45–117)
ALT SERPL-CCNC: 36 U/L (ref 12–78)
ANION GAP SERPL CALC-SCNC: 4 MMOL/L (ref 5–15)
AST SERPL-CCNC: 25 U/L (ref 15–37)
BASOPHILS # BLD: 0 K/UL (ref 0–0.1)
BASOPHILS NFR BLD: 1 % (ref 0–1)
BILIRUB SERPL-MCNC: 0.3 MG/DL (ref 0.2–1)
BUN SERPL-MCNC: 13 MG/DL (ref 6–20)
BUN/CREAT SERPL: 14 (ref 12–20)
CALCIUM SERPL-MCNC: 9.2 MG/DL (ref 8.5–10.1)
CHLORIDE SERPL-SCNC: 107 MMOL/L (ref 97–108)
CO2 SERPL-SCNC: 29 MMOL/L (ref 21–32)
CREAT SERPL-MCNC: 0.96 MG/DL (ref 0.55–1.02)
DIFFERENTIAL METHOD BLD: ABNORMAL
EOSINOPHIL # BLD: 0.2 K/UL (ref 0–0.4)
EOSINOPHIL NFR BLD: 4 % (ref 0–7)
ERYTHROCYTE [DISTWIDTH] IN BLOOD BY AUTOMATED COUNT: 14.1 % (ref 11.5–14.5)
GLOBULIN SER CALC-MCNC: 2.9 G/DL (ref 2–4)
GLUCOSE SERPL-MCNC: 106 MG/DL (ref 65–100)
HCT VFR BLD AUTO: 33.8 % (ref 35–47)
HGB BLD-MCNC: 11.3 G/DL (ref 11.5–16)
IMM GRANULOCYTES # BLD AUTO: 0 K/UL (ref 0–0.04)
IMM GRANULOCYTES NFR BLD AUTO: 0 % (ref 0–0.5)
LYMPHOCYTES # BLD: 1.6 K/UL (ref 0.8–3.5)
LYMPHOCYTES NFR BLD: 34 % (ref 12–49)
MCH RBC QN AUTO: 32.8 PG (ref 26–34)
MCHC RBC AUTO-ENTMCNC: 33.4 G/DL (ref 30–36.5)
MCV RBC AUTO: 98.3 FL (ref 80–99)
MONOCYTES # BLD: 0.5 K/UL (ref 0–1)
MONOCYTES NFR BLD: 10 % (ref 5–13)
NEUTS SEG # BLD: 2.5 K/UL (ref 1.8–8)
NEUTS SEG NFR BLD: 51 % (ref 32–75)
NRBC # BLD: 0 K/UL (ref 0–0.01)
NRBC BLD-RTO: 0 PER 100 WBC
PLATELET # BLD AUTO: 204 K/UL (ref 150–400)
PMV BLD AUTO: 9.5 FL (ref 8.9–12.9)
POTASSIUM SERPL-SCNC: 4 MMOL/L (ref 3.5–5.1)
PROT SERPL-MCNC: 6.3 G/DL (ref 6.4–8.2)
RBC # BLD AUTO: 3.44 M/UL (ref 3.8–5.2)
SODIUM SERPL-SCNC: 140 MMOL/L (ref 136–145)
WBC # BLD AUTO: 4.9 K/UL (ref 3.6–11)

## 2024-08-05 PROCEDURE — 2580000003 HC RX 258: Performed by: INTERNAL MEDICINE

## 2024-08-05 PROCEDURE — 6360000002 HC RX W HCPCS: Performed by: INTERNAL MEDICINE

## 2024-08-05 PROCEDURE — 99215 OFFICE O/P EST HI 40 MIN: CPT | Performed by: INTERNAL MEDICINE

## 2024-08-05 PROCEDURE — 96360 HYDRATION IV INFUSION INIT: CPT

## 2024-08-05 PROCEDURE — 96361 HYDRATE IV INFUSION ADD-ON: CPT

## 2024-08-05 PROCEDURE — 96401 CHEMO ANTI-NEOPL SQ/IM: CPT

## 2024-08-05 PROCEDURE — 85025 COMPLETE CBC W/AUTO DIFF WBC: CPT

## 2024-08-05 PROCEDURE — 80053 COMPREHEN METABOLIC PANEL: CPT

## 2024-08-05 PROCEDURE — 36415 COLL VENOUS BLD VENIPUNCTURE: CPT

## 2024-08-05 RX ORDER — SODIUM CHLORIDE 9 MG/ML
5-250 INJECTION, SOLUTION INTRAVENOUS PRN
Status: DISCONTINUED | OUTPATIENT
Start: 2024-08-05 | End: 2024-08-06 | Stop reason: HOSPADM

## 2024-08-05 RX ORDER — 0.9 % SODIUM CHLORIDE 0.9 %
1000 INTRAVENOUS SOLUTION INTRAVENOUS ONCE
Status: COMPLETED | OUTPATIENT
Start: 2024-08-05 | End: 2024-08-05

## 2024-08-05 RX ORDER — HYDROMORPHONE HYDROCHLORIDE 4 MG/1
8 TABLET ORAL EVERY 4 HOURS PRN
Qty: 120 TABLET | Refills: 0 | Status: SHIPPED | OUTPATIENT
Start: 2024-08-05 | End: 2024-08-15

## 2024-08-05 RX ADMIN — TRASTUZUMAB AND HYALURONIDASE-OYSK 600 MG: 600; 10000 INJECTION, SOLUTION SUBCUTANEOUS at 09:09

## 2024-08-05 RX ADMIN — SODIUM CHLORIDE 1000 ML: 9 INJECTION, SOLUTION INTRAVENOUS at 08:55

## 2024-08-05 NOTE — TELEPHONE ENCOUNTER
Palliative Medicine Clinic   [place]: [phone] ([phone])    Patient Name: Millie King  YOB: 1985    Medication Refill Request    Patient is scheduled for follow up:  [x]  YES  []   NO  Next Pall Med Clinic Visit:     PDMP reviewed:  [x] YES   []  System down / Unable  []  NO- Patient fills out of state    Medication:HYDROmorphone (DILAUDID) 4 MG   Dose and directions:Take 2 tablets by mouth every 4 hours   Number dispensed:120  Date filled (PDMP or Pharmacy):7/23/2024  # left:      Appropriate for refill:  [x]  YES  []  Early Request - Requires MD/NP Review      Other pertinent information for prescriber:

## 2024-08-05 NOTE — PROGRESS NOTES
Millie King is a 39 y.o. female    Chief Complaint   Patient presents with    Follow-up     Metastatic Breast Cancer     1. Have you been to the ER, urgent care clinic since your last visit?  Hospitalized since your last visit?No    2. Have you seen or consulted any other health care providers outside of the [facility] since your last visit?  Include any pap smears or colon screening. No

## 2024-08-05 NOTE — TELEPHONE ENCOUNTER
----- Message from Millie King sent at 8/5/2024 12:50 PM EDT -----  Regarding: Refill  Contact: [phone]  Hi there!  Can you please refill my dilaudid? I would appreciate it!

## 2024-08-05 NOTE — PLAN OF CARE
[facility] Progress Note    Date: 2024    Name: Millie King    MRN: [id number]         : 1985    Ms. King arrived ambulatory and in no distress for C9 Herceptin Hylecta and IVF.      Assessment was completed and documented in flowsheets. No acute concerns at this time. Port accessed with positive blood return.     Ms. King's vital signs for this visit.  Vitals:    24 0802   BP: 120/77   Pulse: 78   Resp: 18   Temp: 98.7 °F (37.1 °C)   SpO2: 95%          Lab results were obtained and reviewed.  Recent Results (from the past 12 hour(s))   Comprehensive Metabolic Panel    Collection Time: 24  8:08 AM   Result Value Ref Range    Sodium 140 136 - 145 mmol/L    Potassium 4.0 3.5 - 5.1 mmol/L    Chloride 107 97 - 108 mmol/L    CO2 29 21 - 32 mmol/L    Anion Gap 4 (L) 5 - 15 mmol/L    Glucose 106 (H) 65 - 100 mg/dL    BUN 13 6 - 20 MG/DL    Creatinine 0.96 0.55 - 1.02 MG/DL    BUN/Creatinine Ratio 14 12 - 20      Est, Glom Filt Rate 77 >60 ml/min/1.73m2    Calcium 9.2 8.5 - 10.1 MG/DL    Total Bilirubin 0.3 0.2 - 1.0 MG/DL    ALT 36 12 - 78 U/L    AST 25 15 - 37 U/L    Alk Phosphatase 101 45 - 117 U/L    Total Protein 6.3 (L) 6.4 - 8.2 g/dL    Albumin 3.4 (L) 3.5 - 5.0 g/dL    Globulin 2.9 2.0 - 4.0 g/dL    Albumin/Globulin Ratio 1.2 1.1 - 2.2     CBC with Auto Differential    Collection Time: 24  8:08 AM   Result Value Ref Range    WBC 4.9 3.6 - 11.0 K/uL    RBC 3.44 (L) 3.80 - 5.20 M/uL    Hemoglobin 11.3 (L) 11.5 - 16.0 g/dL    Hematocrit 33.8 (L) 35.0 - 47.0 %    MCV 98.3 80.0 - 99.0 FL    MCH 32.8 26.0 - 34.0 PG    MCHC 33.4 30.0 - 36.5 g/dL    RDW 14.1 11.5 - 14.5 %    Platelets 204 150 - 400 K/uL    MPV 9.5 8.9 - 12.9 FL    Nucleated RBCs 0.0 0  WBC    nRBC 0.00 0.00 - 0.01 K/uL    Neutrophils % 51 32 - 75 %    Lymphocytes % 34 12 - 49 %    Monocytes % 10 5 - 13 %    Eosinophils % 4 0 - 7 %    Basophils % 1 0 - 1 %    Immature Granulocytes % 0 0.0 - 0.5 %    Neutrophils

## 2024-08-05 NOTE — PROGRESS NOTES
Oral Chemotherapy      Millie King is a  39 y.o.female  diagnosed with breast cancer . Ms. King is being treated with tucatinib, capecitabine, trastuzumab.      Medication name: capecitabine     Dose:  1500 mg (dose reduced 4/15 with Cycle 4)  Frequency: twice a day  Administration schedule: for 14 day on, 7 days off every 21 days        Medication name: tucatinib     Dose:  250 mg  (dose reduced 4/15 with Cycle 4)  Frequency: twice a day        Ordering provider: Fang Cespedes DO  Start date: 8/5/24 (Cycle 9)      Contacted Ms. King and informed her that she is good to start capecitabine this evening. ANC is 2.5    Lab Results   Component Value Date    WBC 4.9 08/05/2024    HGB 11.3 (L) 08/05/2024    HCT 33.8 (L) 08/05/2024    MCV 98.3 08/05/2024     08/05/2024    LYMPHOPCT 34 08/05/2024    RBC 3.44 (L) 08/05/2024    MCH 32.8 08/05/2024    MCHC 33.4 08/05/2024    RDW 14.1 08/05/2024     Lab Results   Component Value Date    NEUTROABS 2.5 08/05/2024         Expected follow up date: Labs/office visit each cycle. Next cycle start on 8/26/24     Patient  provided with an Oral Chemotherapy Journal.      Ms. King verbalized understanding of the information presented and all of the patient's questions were answered.        Beryl Sanchez, BETID, BCOP, BCPS    For Pharmacy Admin Tracking Only    Program: Medical Group  CPA in place:  Yes  Recommendation Provided To: Patient/Caregiver: 1 via In person    Intervention Accepted By: Patient/Caregiver: 1    Time Spent (min): 15        
no

## 2024-08-06 ENCOUNTER — TELEPHONE (OUTPATIENT)
Age: 39
End: 2024-08-06

## 2024-08-06 DIAGNOSIS — G89.3 CANCER RELATED PAIN: ICD-10-CM

## 2024-08-06 RX ORDER — METHADONE HYDROCHLORIDE 10 MG/1
10 TABLET ORAL EVERY 8 HOURS PRN
Qty: 45 TABLET | Refills: 0 | Status: SHIPPED | OUTPATIENT
Start: 2024-08-06 | End: 2024-09-05

## 2024-08-06 NOTE — TELEPHONE ENCOUNTER
Palliative Medicine Clinic   [place]: [phone])    Patient Name: Millie King  YOB: 1985    Medication Refill Request    Patient is scheduled for follow up:  [x]  YES  []   NO  Next [facility] Med Clinic Visit: 08/12/24    PDMP reviewed:  [x] YES   []  System down / Unable  []  NO- Patient fills out of state    Medication:    methadone (DOLOPHINE) 10 MG tablet     Dose and directions: Take 1 tablet by mouth every 8 hours as needed for Pain for up to 30 days   Number dispensed:45  Date filled (PDMP or Pharmacy):07/15/24  # left:unknown        Appropriate for refill:  [x]  YES  []  Early Request - Requires MD/NP Review      Other pertinent information for prescriber:

## 2024-08-06 NOTE — TELEPHONE ENCOUNTER
Called patient to advise/confirm upcoming appt with Dr. Nieves on 8/12/2024 at 11:00  at for a vv. Spoke with patient and confirmed appointment.

## 2024-08-12 ENCOUNTER — TELEMEDICINE (OUTPATIENT)
Age: 39
End: 2024-08-12
Payer: COMMERCIAL

## 2024-08-12 DIAGNOSIS — Z91.89 AT RISK FOR LONG QT SYNDROME: ICD-10-CM

## 2024-08-12 DIAGNOSIS — M79.2 NEUROPATHIC PAIN: ICD-10-CM

## 2024-08-12 DIAGNOSIS — C50.919 PRIMARY MALIGNANT NEOPLASM OF BREAST WITH METASTASIS (HCC): ICD-10-CM

## 2024-08-12 DIAGNOSIS — C50.919 MALIGNANT NEOPLASM OF BREAST METASTATIC TO LIVER (HCC): ICD-10-CM

## 2024-08-12 DIAGNOSIS — R53.0 NEOPLASTIC (MALIGNANT) RELATED FATIGUE: ICD-10-CM

## 2024-08-12 DIAGNOSIS — C78.7 MALIGNANT NEOPLASM OF BREAST METASTATIC TO LIVER (HCC): ICD-10-CM

## 2024-08-12 DIAGNOSIS — G89.3 CANCER RELATED PAIN: Primary | ICD-10-CM

## 2024-08-12 PROCEDURE — 99215 OFFICE O/P EST HI 40 MIN: CPT | Performed by: INTERNAL MEDICINE

## 2024-08-12 RX ORDER — METHADONE HYDROCHLORIDE 10 MG/1
TABLET ORAL
Qty: 53 TABLET | Refills: 0 | Status: SHIPPED | OUTPATIENT
Start: 2024-08-15 | End: 2024-08-30

## 2024-08-12 RX ORDER — OXYCODONE HYDROCHLORIDE 30 MG/1
30 TABLET ORAL 4 TIMES DAILY PRN
Qty: 60 TABLET | Refills: 0 | Status: SHIPPED | OUTPATIENT
Start: 2024-08-14 | End: 2024-08-29

## 2024-08-12 RX ORDER — METHYLPHENIDATE HYDROCHLORIDE 5 MG/1
5 TABLET ORAL 2 TIMES DAILY PRN
COMMUNITY

## 2024-08-12 ASSESSMENT — PATIENT HEALTH QUESTIONNAIRE - PHQ9
SUM OF ALL RESPONSES TO PHQ QUESTIONS 1-9: 0
SUM OF ALL RESPONSES TO PHQ9 QUESTIONS 1 & 2: 0
SUM OF ALL RESPONSES TO PHQ QUESTIONS 1-9: 0
1. LITTLE INTEREST OR PLEASURE IN DOING THINGS: NOT AT ALL

## 2024-08-12 NOTE — PROGRESS NOTES
Palliative Medicine Outpatient Clinic  Nurse Check in Note  [phone]    Patient Name: Millie King  YOB: 1985      Date of Visit: 08/12/2024  Visit Location:  [facility] Virtual Visit     Chief patient or family concern today:     Patient's Last Palliative Medicine Clinic Visit Date:  7/8/2024    Have you been to an ER or urgent care center since your last visit?  No  Have you been hospitalized since your last visit? No  Have you seen or consulted any health care providers outside of the [facility] system since your last visit?  No  If Yes, alert PSR to request appropriate records from non-[facility] offices    Medications:  Med reconciliation was performed with:  Patient    Requested refills:  Yes- pended for clinician    If prescribed an opioid, does patient have access to naloxone at home?:  YES  If No, pend naloxone nasal spray    Function and Symptoms:  Use of assist devices:  None    Palliative Performance Status (PPS):   Palliative Performance Scale (PPS)  PPS: 80    ESAS:  Modified-Tarrs Symptom Assessment Scale (ESAS)  Tiredness Score: 3  Drowsiness Score: 4  Depression Score: 1  Pain Score: 5  Anxiety Score: 2  Nausea Score: 1  Appetite Score: Best appetite  Dyspnea Score: 2  Constipation: No  Wellbeing Score: 3  Other Problem Score: Best possible response    Constipation?  No  Last BM: 8/12/2024    Advance Care Planning:  Currently listed healthcare agent:    Primary Decision Maker: Eric King - Spouse - [phone]    Is there an ACP Note within the past 12 months?  NO  If No, Alert Clinician and/or Social Work      Sadia Pelaez LPN

## 2024-08-12 NOTE — PROGRESS NOTES
Palliative Medicine Outpatient Services  [place]: [phone] ([phone])    Patient Name: Millie King  YOB: 1985    Date of Current Visit: 08/12/24  Location of Current Visit:    [] [facility] Office  [] [facility] Office  [] [facility] Office  [] Home  [x] Synchronous real-time A/V virtual visit    Millie King, was evaluated through a synchronous (real-time) audio-video encounter. The patient (or guardian if applicable) is aware that this is a billable service, which includes applicable co-pays. This Virtual Visit was conducted with patient's (and/or legal guardian's) consent. Patient identification was verified, and a caregiver was present when appropriate.   The patient was located at Home: [address] [place] [address]  Provider was located at Facility (Appt Dept): [address] [place] [address] [address]  [place] [address]  Confirm you are appropriately licensed, registered, or certified to deliver care in the Replaced by [facility] where the patient is located as indicated above. If you are not or unsure, please re-schedule the visit: Yes, I confirm.      Total time spent for this encounter: Not billed by time    --Sonia Nieves MD on 8/12/2024 at 12:06 PM    An electronic signature was used to authenticate this note.    Date of Initial Palliative Medicine Visit: 6/8/23   Referral from: Dr. Manrique    REASON FOR VISIT:   38 year old woman with MBC seen for follow up pain and symptom management.     FOLLOW UP:   Return in about 4 weeks (around 9/9/2024).     ASSESSMENT AND PLAN:     Cancer related pain- uncontrolled  - right hip pain related to pathologic partially healed fracture s/p fixation.  There are no additional surgical options.   There is a significant neuropathic component to the hip pain; addition of Lyrica has been helpful.    Another scan scheduled considering worsening pain again.    - she also has diffuse joint aches related to chemotherapy.    - previously on Oxycontin 20 mg BID in 2023, insurance stopped

## 2024-08-14 DIAGNOSIS — T45.1X5A CHEMOTHERAPY-INDUCED NEUROPATHY (HCC): ICD-10-CM

## 2024-08-14 DIAGNOSIS — G62.0 CHEMOTHERAPY-INDUCED NEUROPATHY (HCC): ICD-10-CM

## 2024-08-14 RX ORDER — PREGABALIN 150 MG/1
150 CAPSULE ORAL 3 TIMES DAILY
Qty: 90 CAPSULE | Refills: 0 | Status: SHIPPED | OUTPATIENT
Start: 2024-08-14 | End: 2024-09-13

## 2024-08-14 NOTE — TELEPHONE ENCOUNTER
Palliative Medicine Clinic   [place]: [phone])    Patient Name: Millie King  YOB: 1985    Medication Refill Request    Patient is scheduled for follow up:  []  YES  [x]   NO  Next [facility] Med Clinic Visit:     PDMP reviewed:  [x] YES   []  System down / Unable  []  NO- Patient fills out of state    Medication:Pregabalin 150mg  Dose and directions: one capsule morning, noon and night  Number dispensed:90; 30 days  Date filled (PDMP or Pharmacy):7/9/24    Appropriate for refill:  [x]  YES  []  Early Request - Requires MD/NP Review      Other pertinent information for prescriber: Pend to Dr. Nieves for approval.

## 2024-08-15 DIAGNOSIS — F43.22 ADJUSTMENT DISORDER WITH ANXIOUS MOOD: ICD-10-CM

## 2024-08-15 RX ORDER — ESCITALOPRAM OXALATE 20 MG/1
20 TABLET ORAL DAILY
Qty: 90 TABLET | Refills: 1 | Status: SHIPPED | OUTPATIENT
Start: 2024-08-15

## 2024-08-15 RX ORDER — BUPROPION HYDROCHLORIDE 300 MG/1
300 TABLET ORAL DAILY
Qty: 90 TABLET | Refills: 1 | Status: SHIPPED | OUTPATIENT
Start: 2024-08-15

## 2024-08-15 NOTE — TELEPHONE ENCOUNTER
Palliative Medicine Clinic   [place]: [phone] ([phone])    Patient Name: Millie King  YOB: 1985    Medication Refill Request    Patient is scheduled for follow up:  []  YES  [x]   NO  Next Pall Med Clinic Visit:     PDMP reviewed:  [x] YES   []  System down / Unable  []  NO- Patient fills out of state    Medication:buPROPion (WELLBUTRIN XL) 300 MG   Dose and directions:Take 1 tablet by mouth daily   Number dispensed:90  Date filled (PDMP or Pharmacy):3/26/2024  # left:    Medication:    escitalopram (LEXAPRO) 20 MG     Dose and directions:Take 1 tablet by mouth anmol   Number dispensed:90  Date filled (PDMP or Pharmacy):3/26/2024  #left:    Appropriate for refill:  [x]  YES  []  Early Request - Requires MD/NP Review      Other pertinent information for prescriber:

## 2024-08-19 ENCOUNTER — APPOINTMENT (OUTPATIENT)
Facility: HOSPITAL | Age: 39
End: 2024-08-19
Payer: COMMERCIAL

## 2024-08-19 DIAGNOSIS — F43.22 ADJUSTMENT DISORDER WITH ANXIOUS MOOD: Primary | ICD-10-CM

## 2024-08-19 RX ORDER — ESCITALOPRAM OXALATE 20 MG/1
20 TABLET ORAL DAILY
Qty: 90 TABLET | Refills: 1 | Status: SHIPPED | OUTPATIENT
Start: 2024-08-19

## 2024-08-19 NOTE — TELEPHONE ENCOUNTER
Palliative Medicine Clinic   [place]: [phone])    Patient Name: Millie King  YOB: 1985    Medication Refill Request    Patient is scheduled for follow up:  [x]  YES  []   NO  Next [facility] Med Clinic Visit: 09/10/24    PDMP reviewed:  [] YES   []  System down / Unable  []  NO- Patient fills out of state    Medication:    escitalopram (LEXAPRO) 20 MG tablet     Dose and directions:Take 1 tablet by mouth daily,   Number dispensed:90  Date filled (PDMP or Pharmacy):08/15/24  # left:unknown      Appropriate for refill:  [x]  YES  []  Early Request - Requires MD/NP Review      Other pertinent information for prescriber:

## 2024-08-22 DIAGNOSIS — C79.31 MALIGNANT NEOPLASM OF BREAST METASTATIC TO BRAIN, UNSPECIFIED LATERALITY (HCC): ICD-10-CM

## 2024-08-22 DIAGNOSIS — C50.919 MALIGNANT NEOPLASM OF BREAST METASTATIC TO BRAIN, UNSPECIFIED LATERALITY (HCC): ICD-10-CM

## 2024-08-22 RX ORDER — CAPECITABINE 500 MG/1
TABLET, FILM COATED ORAL
Qty: 84 TABLET | Refills: 5 | Status: ACTIVE | OUTPATIENT
Start: 2024-08-22

## 2024-08-22 NOTE — PROGRESS NOTES
Cancer [place] at [facility]  [address], [address] [place] [address]  W: [phone]  F: [phone]    Reason for Visit:   Millie King is a 39 y.o. female seen today in office for follow up of Metastatic Breast Cancer.    Treatment History:   Abd US 10/3/22: There are multiple liver masses with 2 representative masses in  the right liver measuring 2.4 x 2.6 cm and 1.6 x 2.1 cm  CT A/P 10/3/22: Spiculated left breast mass suspicious for malignancy. Multiple lung and liver metastases. Multiple top normal size retroperitoneal lymph nodes are nonspecific with metastatic disease  not excluded  CT Chest 10/4/22: Indeterminate 1.4 x 1.9 cm left breast nodule. Primary malignancy not excluded. Mammographic correlation advised. Diffuse bilateral pulmonary nodules most compatible with metastatic disease.Partially imaged hepatic hypodensities concerning  for metastatic disease. Indeterminate 6 mm T10 lytic lesion, possibly benign. Attention on follow-up  Liver Biopsy 10/4/22: PATH - Metastatic adenocarcinoma, consistent with breast primary  ER/VA negative, Her2 3+  Port placed on 10/13/2   Palliative Taxol+Herceptin+Perjeta 10/17/22 - 10/31/22  Switched to Palliative Taxotere+Herceptin+Perjeta on 11/7/22 - 1/22/24   DR Taxotere to 60 mg/m2 with Cycle 8  CT C/A/P 12/27/22: Imaging findings consistent with significant interval response to therapy. Significantly diminished pulmonary metastatic disease  burden with numerous resolved or nearly resolved pulmonary nodules. Significantly diminished size of hepatic masses. Left breast lesion is not demonstrated on the current exam  Right Hip XRAY 1/2/23: Right basicervical femoral neck fracture with medial angulation  CT Pelv 1/2/23: Re-demonstrated acute right basicervical femoral neck fracture, with findings concerning for pathologic etiology  XR Femur 1/2/23: Mildly displaced right femoral neck fracture  Right Hip IMN with Ortho Dr Bermudez   Bone  enhancement within the right breast to indicate breast carcinoma.There are no suspicious internal mammary or axillary chain lymph nodes. LEFT BREAST: Background parenchymal enhancement: Mild. There is no suspicious masslike or nonmasslike enhancement within the left breast to indicate breast carcinoma. There are no suspicious internal mammary or axillary chain lymph nodes  Brain MRI 6/13/23: No significant abnormality or acute process  CT C/A/P 7/21/23: Imaging findings consistent with interval response to therapy. Slightly diminished hepatic mass lesions. Small right-sided pleural effusion is new. No new suspicious pulmonary mass or nodule  CT C/A/P 10/18/23: Minimal right pleural fluid, decreased in quantity. Continued interval decrease in size, number and conspicuity of hepatic lesions  Brain MRI 1/11/24: Imaging findings concerning for interval metastatic disease. There is a small 2.9 mm enhancing focus in the periventricular white matter of the right frontal lobe. No associated midline shift or mass effect. No intracranial hemorrhage or evidence of acute infarction  CT C/A/P 1/11/24: Stable small hepatic lesions and overall hepatic morphology with no new hepatic or other metastatic disease in the chest, abdomen or pelvis. Stable tiny bilateral pleural effusions, significance uncertain  Gamma Knife 1/31/24 with Dr Martinez   Tucatinib 300 mg PO BID + Xeloda 2,000 mg PO BID Days 1-14 of 21 Day Cycle + Herceptin Q3W started on 2/13/24  Cycle 2 started on 3/4/24  Tucatinib held starting 3/21/24 due to severe diarrhea; resumed on 3/25/24  Cycle 3 started on 3/25/24  Cycle 4 started on 4/15/24  DR Garcia to 1500 mg PO BID   DR Benson to 250 mg PO BID  Stopped Tucatinib on 4/30/24 due to severe diarrhea   HOLD oral chemo with Cycle 5 - Herceptin only this cycle  DPD negative  Restarted meds on 5/13/24 but did not take week off   Xeloda Cycle 6 started on 5/28/24 (took for 1 week only, then week off)  Xeloda Cycle 7

## 2024-08-26 ENCOUNTER — CLINICAL DOCUMENTATION (OUTPATIENT)
Age: 39
End: 2024-08-26

## 2024-08-26 ENCOUNTER — HOSPITAL ENCOUNTER (OUTPATIENT)
Facility: HOSPITAL | Age: 39
Setting detail: INFUSION SERIES
Discharge: HOME | End: 2024-08-26
Payer: COMMERCIAL

## 2024-08-26 ENCOUNTER — OFFICE VISIT (OUTPATIENT)
Age: 39
End: 2024-08-26
Payer: COMMERCIAL

## 2024-08-26 VITALS
BODY MASS INDEX: 39.38 KG/M2 | HEIGHT: 62 IN | TEMPERATURE: 97.6 F | HEART RATE: 92 BPM | WEIGHT: 214 LBS | SYSTOLIC BLOOD PRESSURE: 128 MMHG | DIASTOLIC BLOOD PRESSURE: 77 MMHG | OXYGEN SATURATION: 96 % | RESPIRATION RATE: 18 BRPM

## 2024-08-26 VITALS
OXYGEN SATURATION: 96 % | DIASTOLIC BLOOD PRESSURE: 77 MMHG | SYSTOLIC BLOOD PRESSURE: 128 MMHG | WEIGHT: 214 LBS | BODY MASS INDEX: 39.13 KG/M2 | RESPIRATION RATE: 18 BRPM | TEMPERATURE: 97.6 F | HEART RATE: 92 BPM

## 2024-08-26 DIAGNOSIS — R53.83 CHEMOTHERAPY-INDUCED FATIGUE: ICD-10-CM

## 2024-08-26 DIAGNOSIS — C78.7 MALIGNANT NEOPLASM OF BREAST METASTATIC TO LIVER (HCC): ICD-10-CM

## 2024-08-26 DIAGNOSIS — Z79.899 ENCOUNTER FOR MONITORING CARDIOTOXIC DRUG THERAPY: ICD-10-CM

## 2024-08-26 DIAGNOSIS — Z95.828 PORT-A-CATH IN PLACE: ICD-10-CM

## 2024-08-26 DIAGNOSIS — C50.919 MALIGNANT NEOPLASM OF BREAST METASTATIC TO BRAIN, UNSPECIFIED LATERALITY (HCC): ICD-10-CM

## 2024-08-26 DIAGNOSIS — F98.8 ATTENTION DEFICIT DISORDER, UNSPECIFIED HYPERACTIVITY PRESENCE: ICD-10-CM

## 2024-08-26 DIAGNOSIS — G62.0 CHEMOTHERAPY-INDUCED NEUROPATHY (HCC): ICD-10-CM

## 2024-08-26 DIAGNOSIS — R19.7 DIARRHEA, UNSPECIFIED TYPE: ICD-10-CM

## 2024-08-26 DIAGNOSIS — C79.51 MALIGNANT NEOPLASM METASTATIC TO BONE (HCC): Primary | ICD-10-CM

## 2024-08-26 DIAGNOSIS — C78.02 MALIGNANT NEOPLASM METASTATIC TO BOTH LUNGS (HCC): ICD-10-CM

## 2024-08-26 DIAGNOSIS — R06.09 DOE (DYSPNEA ON EXERTION): ICD-10-CM

## 2024-08-26 DIAGNOSIS — T45.1X5A CHEMOTHERAPY-INDUCED NEUROPATHY (HCC): ICD-10-CM

## 2024-08-26 DIAGNOSIS — G89.3 CANCER RELATED PAIN: ICD-10-CM

## 2024-08-26 DIAGNOSIS — C78.01 MALIGNANT NEOPLASM METASTATIC TO BOTH LUNGS (HCC): ICD-10-CM

## 2024-08-26 DIAGNOSIS — Z09 CHEMOTHERAPY FOLLOW-UP EXAMINATION: ICD-10-CM

## 2024-08-26 DIAGNOSIS — C79.31 METASTASIS TO BRAIN (HCC): ICD-10-CM

## 2024-08-26 DIAGNOSIS — T45.1X5A CHEMOTHERAPY-INDUCED FATIGUE: ICD-10-CM

## 2024-08-26 DIAGNOSIS — Z87.311 HISTORY OF PATHOLOGIC FRACTURE: ICD-10-CM

## 2024-08-26 DIAGNOSIS — C78.7 MALIGNANT NEOPLASM METASTATIC TO LIVER (HCC): Primary | ICD-10-CM

## 2024-08-26 DIAGNOSIS — C79.31 MALIGNANT NEOPLASM OF BREAST METASTATIC TO BRAIN, UNSPECIFIED LATERALITY (HCC): ICD-10-CM

## 2024-08-26 DIAGNOSIS — Z51.81 ENCOUNTER FOR MONITORING CARDIOTOXIC DRUG THERAPY: ICD-10-CM

## 2024-08-26 DIAGNOSIS — C50.919 MALIGNANT NEOPLASM OF BREAST METASTATIC TO LIVER (HCC): ICD-10-CM

## 2024-08-26 LAB
ALBUMIN SERPL-MCNC: 3.4 G/DL (ref 3.5–5)
ALBUMIN/GLOB SERPL: 1.1 (ref 1.1–2.2)
ALP SERPL-CCNC: 105 U/L (ref 45–117)
ALT SERPL-CCNC: 50 U/L (ref 12–78)
ANION GAP SERPL CALC-SCNC: 2 MMOL/L (ref 5–15)
AST SERPL-CCNC: 30 U/L (ref 15–37)
BASOPHILS # BLD: 0 K/UL (ref 0–0.1)
BASOPHILS NFR BLD: 1 % (ref 0–1)
BILIRUB SERPL-MCNC: 0.2 MG/DL (ref 0.2–1)
BUN SERPL-MCNC: 12 MG/DL (ref 6–20)
BUN/CREAT SERPL: 15 (ref 12–20)
CALCIUM SERPL-MCNC: 9.1 MG/DL (ref 8.5–10.1)
CHLORIDE SERPL-SCNC: 109 MMOL/L (ref 97–108)
CO2 SERPL-SCNC: 30 MMOL/L (ref 21–32)
CREAT SERPL-MCNC: 0.82 MG/DL (ref 0.55–1.02)
DIFFERENTIAL METHOD BLD: ABNORMAL
EOSINOPHIL # BLD: 0.1 K/UL (ref 0–0.4)
EOSINOPHIL NFR BLD: 2 % (ref 0–7)
ERYTHROCYTE [DISTWIDTH] IN BLOOD BY AUTOMATED COUNT: 14.1 % (ref 11.5–14.5)
GLOBULIN SER CALC-MCNC: 3.2 G/DL (ref 2–4)
GLUCOSE SERPL-MCNC: 108 MG/DL (ref 65–100)
HCT VFR BLD AUTO: 34.6 % (ref 35–47)
HGB BLD-MCNC: 11.4 G/DL (ref 11.5–16)
IMM GRANULOCYTES # BLD AUTO: 0 K/UL (ref 0–0.04)
IMM GRANULOCYTES NFR BLD AUTO: 1 % (ref 0–0.5)
LYMPHOCYTES # BLD: 1.6 K/UL (ref 0.8–3.5)
LYMPHOCYTES NFR BLD: 24 % (ref 12–49)
MCH RBC QN AUTO: 32.9 PG (ref 26–34)
MCHC RBC AUTO-ENTMCNC: 32.9 G/DL (ref 30–36.5)
MCV RBC AUTO: 100 FL (ref 80–99)
MONOCYTES # BLD: 0.7 K/UL (ref 0–1)
MONOCYTES NFR BLD: 11 % (ref 5–13)
NEUTS SEG # BLD: 4.1 K/UL (ref 1.8–8)
NEUTS SEG NFR BLD: 61 % (ref 32–75)
NRBC # BLD: 0 K/UL (ref 0–0.01)
NRBC BLD-RTO: 0 PER 100 WBC
PLATELET # BLD AUTO: 207 K/UL (ref 150–400)
PMV BLD AUTO: 9.2 FL (ref 8.9–12.9)
POTASSIUM SERPL-SCNC: 4.1 MMOL/L (ref 3.5–5.1)
PROT SERPL-MCNC: 6.6 G/DL (ref 6.4–8.2)
RBC # BLD AUTO: 3.46 M/UL (ref 3.8–5.2)
SODIUM SERPL-SCNC: 141 MMOL/L (ref 136–145)
WBC # BLD AUTO: 6.6 K/UL (ref 3.6–11)

## 2024-08-26 PROCEDURE — 6360000002 HC RX W HCPCS: Performed by: INTERNAL MEDICINE

## 2024-08-26 PROCEDURE — 2580000003 HC RX 258: Performed by: NURSE PRACTITIONER

## 2024-08-26 PROCEDURE — 36415 COLL VENOUS BLD VENIPUNCTURE: CPT

## 2024-08-26 PROCEDURE — 96360 HYDRATION IV INFUSION INIT: CPT

## 2024-08-26 PROCEDURE — 80053 COMPREHEN METABOLIC PANEL: CPT

## 2024-08-26 PROCEDURE — 85025 COMPLETE CBC W/AUTO DIFF WBC: CPT

## 2024-08-26 PROCEDURE — 99215 OFFICE O/P EST HI 40 MIN: CPT | Performed by: NURSE PRACTITIONER

## 2024-08-26 PROCEDURE — 96372 THER/PROPH/DIAG INJ SC/IM: CPT

## 2024-08-26 PROCEDURE — 96401 CHEMO ANTI-NEOPL SQ/IM: CPT

## 2024-08-26 RX ORDER — ONDANSETRON 2 MG/ML
8 INJECTION INTRAMUSCULAR; INTRAVENOUS
OUTPATIENT
Start: 2024-08-26

## 2024-08-26 RX ORDER — SODIUM CHLORIDE 0.9 % (FLUSH) 0.9 %
5-40 SYRINGE (ML) INJECTION PRN
Status: DISCONTINUED | OUTPATIENT
Start: 2024-08-26 | End: 2024-08-27 | Stop reason: HOSPADM

## 2024-08-26 RX ORDER — SODIUM CHLORIDE 9 MG/ML
INJECTION, SOLUTION INTRAVENOUS CONTINUOUS
OUTPATIENT
Start: 2024-09-29

## 2024-08-26 RX ORDER — ACETAMINOPHEN 325 MG/1
650 TABLET ORAL
OUTPATIENT
Start: 2024-09-29

## 2024-08-26 RX ORDER — EPINEPHRINE 1 MG/ML
0.3 INJECTION, SOLUTION INTRAMUSCULAR; SUBCUTANEOUS PRN
OUTPATIENT
Start: 2024-09-29

## 2024-08-26 RX ORDER — DIPHENHYDRAMINE HYDROCHLORIDE 50 MG/ML
50 INJECTION INTRAMUSCULAR; INTRAVENOUS
OUTPATIENT
Start: 2024-08-26

## 2024-08-26 RX ORDER — ALBUTEROL SULFATE 90 UG/1
4 AEROSOL, METERED RESPIRATORY (INHALATION) PRN
OUTPATIENT
Start: 2024-08-26

## 2024-08-26 RX ORDER — SODIUM CHLORIDE 9 MG/ML
5-250 INJECTION, SOLUTION INTRAVENOUS PRN
Status: DISCONTINUED | OUTPATIENT
Start: 2024-08-26 | End: 2024-08-27 | Stop reason: HOSPADM

## 2024-08-26 RX ORDER — EPINEPHRINE 1 MG/ML
0.3 INJECTION, SOLUTION INTRAMUSCULAR; SUBCUTANEOUS PRN
Status: DISCONTINUED | OUTPATIENT
Start: 2024-08-26 | End: 2024-08-27 | Stop reason: HOSPADM

## 2024-08-26 RX ORDER — ALBUTEROL SULFATE 90 UG/1
4 AEROSOL, METERED RESPIRATORY (INHALATION) PRN
Status: DISCONTINUED | OUTPATIENT
Start: 2024-08-26 | End: 2024-08-27 | Stop reason: HOSPADM

## 2024-08-26 RX ORDER — EPINEPHRINE 1 MG/ML
0.3 INJECTION, SOLUTION INTRAMUSCULAR; SUBCUTANEOUS PRN
OUTPATIENT
Start: 2024-08-26

## 2024-08-26 RX ORDER — DIPHENHYDRAMINE HYDROCHLORIDE 50 MG/ML
50 INJECTION INTRAMUSCULAR; INTRAVENOUS
Status: DISCONTINUED | OUTPATIENT
Start: 2024-08-26 | End: 2024-08-27 | Stop reason: HOSPADM

## 2024-08-26 RX ORDER — DIPHENHYDRAMINE HYDROCHLORIDE 50 MG/ML
50 INJECTION INTRAMUSCULAR; INTRAVENOUS
OUTPATIENT
Start: 2024-09-29

## 2024-08-26 RX ORDER — SODIUM CHLORIDE 0.9 % (FLUSH) 0.9 %
5-40 SYRINGE (ML) INJECTION PRN
OUTPATIENT
Start: 2024-08-26

## 2024-08-26 RX ORDER — HEPARIN 100 UNIT/ML
500 SYRINGE INTRAVENOUS PRN
Status: DISCONTINUED | OUTPATIENT
Start: 2024-08-26 | End: 2024-08-27 | Stop reason: HOSPADM

## 2024-08-26 RX ORDER — SODIUM CHLORIDE 9 MG/ML
INJECTION, SOLUTION INTRAVENOUS CONTINUOUS
Status: DISCONTINUED | OUTPATIENT
Start: 2024-08-26 | End: 2024-08-27 | Stop reason: HOSPADM

## 2024-08-26 RX ORDER — SODIUM CHLORIDE 9 MG/ML
5-250 INJECTION, SOLUTION INTRAVENOUS PRN
OUTPATIENT
Start: 2024-08-26

## 2024-08-26 RX ORDER — 0.9 % SODIUM CHLORIDE 0.9 %
1000 INTRAVENOUS SOLUTION INTRAVENOUS ONCE
Status: COMPLETED | OUTPATIENT
Start: 2024-08-26 | End: 2024-08-26

## 2024-08-26 RX ORDER — ONDANSETRON 2 MG/ML
8 INJECTION INTRAMUSCULAR; INTRAVENOUS
Status: DISCONTINUED | OUTPATIENT
Start: 2024-08-26 | End: 2024-08-27 | Stop reason: HOSPADM

## 2024-08-26 RX ORDER — ALBUTEROL SULFATE 90 UG/1
4 AEROSOL, METERED RESPIRATORY (INHALATION) PRN
OUTPATIENT
Start: 2024-09-29

## 2024-08-26 RX ORDER — ACETAMINOPHEN 325 MG/1
650 TABLET ORAL
Status: DISCONTINUED | OUTPATIENT
Start: 2024-08-26 | End: 2024-08-27 | Stop reason: HOSPADM

## 2024-08-26 RX ORDER — HEPARIN 100 UNIT/ML
500 SYRINGE INTRAVENOUS PRN
OUTPATIENT
Start: 2024-08-26

## 2024-08-26 RX ORDER — ALBUTEROL SULFATE 90 UG/1
2 AEROSOL, METERED RESPIRATORY (INHALATION) EVERY 6 HOURS PRN
Qty: 18 G | Refills: 0 | Status: SHIPPED | OUTPATIENT
Start: 2024-08-26

## 2024-08-26 RX ORDER — ONDANSETRON 2 MG/ML
8 INJECTION INTRAMUSCULAR; INTRAVENOUS
OUTPATIENT
Start: 2024-09-29

## 2024-08-26 RX ORDER — 0.9 % SODIUM CHLORIDE 0.9 %
1000 INTRAVENOUS SOLUTION INTRAVENOUS ONCE
Status: DISCONTINUED | OUTPATIENT
Start: 2024-08-26 | End: 2024-08-27 | Stop reason: HOSPADM

## 2024-08-26 RX ORDER — ACETAMINOPHEN 325 MG/1
650 TABLET ORAL
OUTPATIENT
Start: 2024-08-26

## 2024-08-26 RX ORDER — SODIUM CHLORIDE 9 MG/ML
INJECTION, SOLUTION INTRAVENOUS CONTINUOUS
OUTPATIENT
Start: 2024-08-26

## 2024-08-26 RX ORDER — 0.9 % SODIUM CHLORIDE 0.9 %
1000 INTRAVENOUS SOLUTION INTRAVENOUS ONCE
OUTPATIENT
Start: 2024-08-26 | End: 2024-08-26

## 2024-08-26 RX ADMIN — SODIUM CHLORIDE 1000 ML: 9 INJECTION, SOLUTION INTRAVENOUS at 11:53

## 2024-08-26 RX ADMIN — DENOSUMAB 120 MG: 120 INJECTION SUBCUTANEOUS at 12:30

## 2024-08-26 RX ADMIN — TRASTUZUMAB AND HYALURONIDASE-OYSK 600 MG: 600; 10000 INJECTION, SOLUTION SUBCUTANEOUS at 12:34

## 2024-08-26 NOTE — PROGRESS NOTES
[facility] Progress Note    Date: August 26, 2024       Pt arrived ambulatory to [facility] for Herceptin, Xgeva, and hydration in stable condition.  Assessment completed. Port accessed with positive blood return. Labs drawn and sent for processing.         Patient Vitals for the past 12 hrs:   Temp Pulse Resp BP SpO2   08/26/24 1030 97.6 °F (36.4 °C) 92 18 128/77 96 %       Medications Administered         denosumab (XGEVA) SC injection 120 mg Admin Date  08/26/2024 Action  Given Dose  120 mg Rate   Route  SubCUTAneous Documented By  Shaylee Morrison, BLADE        sodium chloride 0.9 % bolus 1,000 mL Admin Date  08/26/2024 Action  New Bag Dose  1,000 mL Rate  983.6 mL/hr Route  IntraVENous Documented By  Shaylee Morrison, BLADE        trastuzumab-hyaluronidase-oysk (HERCEPTIN HYLECTA) injection 600 mg Admin Date  08/26/2024 Action  Given Dose  600 mg Rate   Route  SubCUTAneous Documented By  Shaylee Morrison, BLADE          Herceptin Hylecta  given in left Thigh  XGEVA given in right arm       Tolerated treatment well, no adverse reactions noted. Port flushed and de- accessed per protocol. D/Cd from [facility] ambulatory and in no distress.  Patient is aware of next scheduled [facility] appointment.    Future Appointments   Date Time Provider Department Center   8/30/2024 11:40 AM Tsering Herrera MD [facility]   9/10/2024  3:00 PM Sonia Nieves MD [facility]   9/16/2024  8:00 AM [facility]   9/16/2024  8:15 AM [facility]   10/7/2024  8:00 AM [facility]   10/7/2024  8:30 AM [facility]   10/28/2024  8:00 AM [facility]   11/18/2024  8:00 AM [facility]   1/14/2025 10:40 AM Christy Enriquez MD [facility]           Shaylee Morrison, BLADE, RN  August 26, 2024

## 2024-08-26 NOTE — PROGRESS NOTES
Oral Chemotherapy      Millie King is a  39 y.o.female  diagnosed with breast cancer . Ms. King is being treated with tucatinib, capecitabine, trastuzumab.      Medication name: capecitabine     Dose:  1500 mg (dose reduced 4/15 with Cycle 4)  Frequency: twice a day  Administration schedule: for 14 day on, 7 days off every 21 days        Medication name: tucatinib     Dose:  250 mg  (dose reduced 4/15 with Cycle 4)  Frequency: twice a day        Ordering provider: Fang Cespedes DO  Start date: 8/26/24 (Cycle 10)      ANC 4.1 - ok to start treatment tonight.     Lab Results   Component Value Date    WBC 6.6 08/26/2024    HGB 11.4 (L) 08/26/2024    HCT 34.6 (L) 08/26/2024    .0 (H) 08/26/2024     08/26/2024    LYMPHOPCT 24 08/26/2024    RBC 3.46 (L) 08/26/2024    MCH 32.9 08/26/2024    MCHC 32.9 08/26/2024    RDW 14.1 08/26/2024     Lab Results   Component Value Date    NEUTROABS 4.1 08/26/2024         Expected follow up date: Labs/office visit each cycle. Next cycle start on 9/16/24     Patient  provided with an Oral Chemotherapy Journal.      Ms. iKng verbalized understanding of the information presented and all of the patient's questions were answered.        Beryl Sanchez, BETID, BCOP, BCPS    For Pharmacy Admin Tracking Only    Program: Medical Group  CPA in place:  Yes  Recommendation Provided To: Patient/Caregiver: 1 via In person    Intervention Accepted By: Patient/Caregiver: 1    Time Spent (min): 15

## 2024-08-26 NOTE — PROGRESS NOTES
Millie King is a 39 y.o. female    Chief Complaint   Patient presents with    Follow-up     Metastatic Breast Cancer       1. Have you been to the ER, urgent care clinic since your last visit?  Hospitalized since your last visit?No    2. Have you seen or consulted any other health care providers outside of the [facility] since your last visit?  Include any pap smears or colon screening. No

## 2024-08-27 ENCOUNTER — TELEPHONE (OUTPATIENT)
Age: 39
End: 2024-08-27

## 2024-08-27 DIAGNOSIS — G89.3 CANCER RELATED PAIN: ICD-10-CM

## 2024-08-27 RX ORDER — OXYCODONE HYDROCHLORIDE 30 MG/1
30 TABLET ORAL 4 TIMES DAILY PRN
Qty: 60 TABLET | Refills: 0 | Status: SHIPPED | OUTPATIENT
Start: 2024-08-27 | End: 2024-09-11

## 2024-08-27 NOTE — TELEPHONE ENCOUNTER
Dr. Chantal Christopher Office   Acupuncture Nursing Note  [phone]  Fax [phone]     Name:  Millie King  YOB: 1985    Received acupuncture referral for Millie King from  Param Patel, Patient [facility].       Dr. Christopher can see patient at 2pm on 9/10/24, prior to patient's 3pm appointment with Dr. Nieves that day.  Nurse called patient, no answer, left message requesting call back if patient would like to schedule an appointment for 9/10/24 at 2pm.      Aline Sousa, JOSEN, RN, CHPN  Clinical Referral Navigator

## 2024-08-27 NOTE — TELEPHONE ENCOUNTER
Palliative Medicine Clinic   [place]: [phone])    Patient Name: Millie King  YOB: 1985    Medication Refill Request    Patient is scheduled for follow up:  [x]  YES  []   NO  Next [facility] Med Clinic Visit: 09/10/24    PDMP reviewed:  [x] YES   []  System down / Unable  []  NO- Patient fills out of state    Medication:    oxyCODONE (OXY-IR) 30 MG immediate release tablet     Dose and directions:Take 1 tablet by mouth 4 times daily as needed for Pain for up to 15 days. Max Daily Amount: 120 mg   Number dispensed:60  Date filled (PDMP or Pharmacy):08/15/24  # left:unknown        Appropriate for refill:  [x]  YES  []  Early Request - Requires MD/NP Review      Other pertinent information for prescriber:  Dated for fill 08/29/24

## 2024-09-04 DIAGNOSIS — G89.3 CANCER RELATED PAIN: ICD-10-CM

## 2024-09-04 DIAGNOSIS — F43.22 ADJUSTMENT DISORDER WITH ANXIOUS MOOD: ICD-10-CM

## 2024-09-04 PROBLEM — Z09 CHEMOTHERAPY FOLLOW-UP EXAMINATION: Status: RESOLVED | Noted: 2022-10-24 | Resolved: 2024-09-04

## 2024-09-04 RX ORDER — METHADONE HYDROCHLORIDE 10 MG/1
TABLET ORAL
Qty: 53 TABLET | Refills: 0 | Status: SHIPPED | OUTPATIENT
Start: 2024-09-04 | End: 2024-09-18 | Stop reason: SDUPTHER

## 2024-09-04 RX ORDER — ESCITALOPRAM OXALATE 20 MG/1
20 TABLET ORAL DAILY
Qty: 90 TABLET | Refills: 1 | Status: SHIPPED | OUTPATIENT
Start: 2024-09-04 | End: 2024-10-17 | Stop reason: SDUPTHER

## 2024-09-04 NOTE — TELEPHONE ENCOUNTER
Palliative Medicine Clinic   [place]: [phone]    Patient Name: Millie King  YOB: 1985    Medication Refill Request    Patient is scheduled for follow up:  [x]  YES  []   NO  Next [facility] Clinic Visit: 09/10/24    PDMP reviewed:  [x] YES   []  System down / Unable  []  NO- Patient fills out of state    Medication:    escitalopram (LEXAPRO) 20 MG tablet     Dose and directions:Take 1 tablet by mouth daily,   Number dispensed:90  Date filled (PDMP or Pharmacy):08/19/4  # left:    Medication:    methadone (DOLOPHINE) 10 MG tablet      Dose and directions:Take 1 tablet by mouth every morning AND 1 tablet Daily with lunch AND 1.5 tablets at bedtime. Do all this for 15 days. Max Daily Amount: 35 mg   Number dispensed:53  Date filled (PDMP or Pharmacy):08/16/24  #left:unknown    Appropriate for refill:  [x]  YES  []  Early Request - Requires MD/NP Review      Other pertinent information for prescriber:

## 2024-09-04 NOTE — TELEPHONE ENCOUNTER
I need an ECG updated on Millie, I have in my notes that she was due to see cardiology on 8/30, can we please call to get these records from cardiology?  They likely did an ECG in office which we can use. Thanks!

## 2024-09-06 ENCOUNTER — TELEPHONE (OUTPATIENT)
Age: 39
End: 2024-09-06

## 2024-09-06 NOTE — TELEPHONE ENCOUNTER
Called patient to advise/confirm upcoming vv appt with Dr. Nieves on 09/10/2024 at 3:00  at virtual. Spoke with Millie King and confirmed appointment.

## 2024-09-09 ENCOUNTER — APPOINTMENT (OUTPATIENT)
Facility: HOSPITAL | Age: 39
End: 2024-09-09
Payer: COMMERCIAL

## 2024-09-09 DIAGNOSIS — G89.3 CANCER RELATED PAIN: ICD-10-CM

## 2024-09-09 DIAGNOSIS — R25.3 TWITCHING: ICD-10-CM

## 2024-09-09 DIAGNOSIS — C79.31 METASTASIS TO BRAIN (HCC): Primary | ICD-10-CM

## 2024-09-09 RX ORDER — OXYCODONE HYDROCHLORIDE 30 MG/1
30 TABLET ORAL 4 TIMES DAILY PRN
Qty: 60 TABLET | Refills: 0 | Status: SHIPPED | OUTPATIENT
Start: 2024-09-10 | End: 2024-09-25

## 2024-09-10 ENCOUNTER — TELEMEDICINE (OUTPATIENT)
Age: 39
End: 2024-09-10
Payer: COMMERCIAL

## 2024-09-10 DIAGNOSIS — T45.1X5A CHEMOTHERAPY-INDUCED NEUROPATHY (HCC): ICD-10-CM

## 2024-09-10 DIAGNOSIS — G89.3 CANCER RELATED PAIN: Primary | ICD-10-CM

## 2024-09-10 DIAGNOSIS — G62.0 CHEMOTHERAPY-INDUCED NEUROPATHY (HCC): ICD-10-CM

## 2024-09-10 DIAGNOSIS — R53.0 NEOPLASTIC (MALIGNANT) RELATED FATIGUE: ICD-10-CM

## 2024-09-10 DIAGNOSIS — C79.31 METASTASIS TO BRAIN (HCC): ICD-10-CM

## 2024-09-10 DIAGNOSIS — C50.919 MALIGNANT NEOPLASM OF BREAST METASTATIC TO LIVER (HCC): ICD-10-CM

## 2024-09-10 DIAGNOSIS — C78.7 MALIGNANT NEOPLASM OF BREAST METASTATIC TO LIVER (HCC): ICD-10-CM

## 2024-09-10 PROCEDURE — 99214 OFFICE O/P EST MOD 30 MIN: CPT | Performed by: INTERNAL MEDICINE

## 2024-09-10 RX ORDER — PREGABALIN 150 MG/1
150 CAPSULE ORAL 3 TIMES DAILY
Qty: 90 CAPSULE | Refills: 0 | Status: SHIPPED | OUTPATIENT
Start: 2024-09-10 | End: 2024-10-10

## 2024-09-12 ENCOUNTER — PATIENT MESSAGE (OUTPATIENT)
Age: 39
End: 2024-09-12

## 2024-09-17 DIAGNOSIS — C78.7 MALIGNANT NEOPLASM METASTATIC TO LIVER (HCC): Primary | ICD-10-CM

## 2024-09-17 DIAGNOSIS — C79.31 METASTASIS TO BRAIN (HCC): ICD-10-CM

## 2024-09-18 ENCOUNTER — PATIENT MESSAGE (OUTPATIENT)
Age: 39
End: 2024-09-18

## 2024-09-18 DIAGNOSIS — G89.3 CANCER RELATED PAIN: ICD-10-CM

## 2024-09-19 RX ORDER — OXYCODONE HYDROCHLORIDE 30 MG/1
30 TABLET ORAL EVERY 4 HOURS PRN
Qty: 180 TABLET | Refills: 0 | Status: SHIPPED | OUTPATIENT
Start: 2024-09-19 | End: 2024-10-19

## 2024-09-19 RX ORDER — OXYCODONE HYDROCHLORIDE 30 MG/1
30 TABLET ORAL 4 TIMES DAILY PRN
Qty: 120 TABLET | Refills: 0 | Status: SHIPPED | OUTPATIENT
Start: 2024-09-19 | End: 2024-09-19

## 2024-09-19 RX ORDER — METHADONE HYDROCHLORIDE 10 MG/1
TABLET ORAL
Qty: 105 TABLET | Refills: 0 | Status: SHIPPED | OUTPATIENT
Start: 2024-09-19 | End: 2024-10-19

## 2024-09-22 DIAGNOSIS — R06.09 DOE (DYSPNEA ON EXERTION): ICD-10-CM

## 2024-09-22 DIAGNOSIS — C78.7 MALIGNANT NEOPLASM METASTATIC TO LIVER (HCC): ICD-10-CM

## 2024-09-23 ENCOUNTER — OFFICE VISIT (OUTPATIENT)
Age: 39
End: 2024-09-23
Payer: COMMERCIAL

## 2024-09-23 ENCOUNTER — TELEPHONE (OUTPATIENT)
Age: 39
End: 2024-09-23

## 2024-09-23 ENCOUNTER — CLINICAL DOCUMENTATION (OUTPATIENT)
Age: 39
End: 2024-09-23

## 2024-09-23 ENCOUNTER — HOSPITAL ENCOUNTER (OUTPATIENT)
Facility: HOSPITAL | Age: 39
Setting detail: INFUSION SERIES
Discharge: HOME | End: 2024-09-23
Payer: COMMERCIAL

## 2024-09-23 VITALS
DIASTOLIC BLOOD PRESSURE: 77 MMHG | OXYGEN SATURATION: 95 % | HEART RATE: 102 BPM | WEIGHT: 217 LBS | RESPIRATION RATE: 18 BRPM | BODY MASS INDEX: 39.69 KG/M2 | SYSTOLIC BLOOD PRESSURE: 119 MMHG | TEMPERATURE: 97.9 F

## 2024-09-23 VITALS
BODY MASS INDEX: 40.08 KG/M2 | HEIGHT: 62 IN | RESPIRATION RATE: 18 BRPM | HEART RATE: 102 BPM | WEIGHT: 217.8 LBS | TEMPERATURE: 97.9 F | DIASTOLIC BLOOD PRESSURE: 77 MMHG | SYSTOLIC BLOOD PRESSURE: 119 MMHG | OXYGEN SATURATION: 95 %

## 2024-09-23 DIAGNOSIS — C78.01 MALIGNANT NEOPLASM METASTATIC TO BOTH LUNGS (HCC): ICD-10-CM

## 2024-09-23 DIAGNOSIS — Z95.828 PORT-A-CATH IN PLACE: ICD-10-CM

## 2024-09-23 DIAGNOSIS — C78.02 MALIGNANT NEOPLASM METASTATIC TO BOTH LUNGS (HCC): ICD-10-CM

## 2024-09-23 DIAGNOSIS — C79.31 METASTASIS TO BRAIN (HCC): ICD-10-CM

## 2024-09-23 DIAGNOSIS — C50.919 MALIGNANT NEOPLASM OF BREAST METASTATIC TO LIVER (HCC): ICD-10-CM

## 2024-09-23 DIAGNOSIS — C79.51 MALIGNANT NEOPLASM METASTATIC TO BONE (HCC): Primary | ICD-10-CM

## 2024-09-23 DIAGNOSIS — C78.7 MALIGNANT NEOPLASM METASTATIC TO LIVER (HCC): Primary | ICD-10-CM

## 2024-09-23 DIAGNOSIS — C50.919 MALIGNANT NEOPLASM OF BREAST METASTATIC TO BRAIN, UNSPECIFIED LATERALITY (HCC): ICD-10-CM

## 2024-09-23 DIAGNOSIS — C78.7 MALIGNANT NEOPLASM OF BREAST METASTATIC TO LIVER (HCC): ICD-10-CM

## 2024-09-23 DIAGNOSIS — R19.7 DIARRHEA, UNSPECIFIED TYPE: ICD-10-CM

## 2024-09-23 DIAGNOSIS — C79.31 MALIGNANT NEOPLASM OF BREAST METASTATIC TO BRAIN, UNSPECIFIED LATERALITY (HCC): ICD-10-CM

## 2024-09-23 LAB
ALBUMIN SERPL-MCNC: 3.2 G/DL (ref 3.5–5)
ALBUMIN/GLOB SERPL: 1.1 (ref 1.1–2.2)
ALP SERPL-CCNC: 128 U/L (ref 45–117)
ALT SERPL-CCNC: 69 U/L (ref 12–78)
ANION GAP SERPL CALC-SCNC: 5 MMOL/L (ref 2–12)
AST SERPL-CCNC: 49 U/L (ref 15–37)
BASOPHILS # BLD: 0 K/UL (ref 0–0.1)
BASOPHILS NFR BLD: 0 % (ref 0–1)
BILIRUB SERPL-MCNC: 0.2 MG/DL (ref 0.2–1)
BUN SERPL-MCNC: 17 MG/DL (ref 6–20)
BUN/CREAT SERPL: 24 (ref 12–20)
CALCIUM SERPL-MCNC: 8.3 MG/DL (ref 8.5–10.1)
CHLORIDE SERPL-SCNC: 105 MMOL/L (ref 97–108)
CO2 SERPL-SCNC: 29 MMOL/L (ref 21–32)
CREAT SERPL-MCNC: 0.72 MG/DL (ref 0.55–1.02)
DIFFERENTIAL METHOD BLD: ABNORMAL
EOSINOPHIL # BLD: 0.1 K/UL (ref 0–0.4)
EOSINOPHIL NFR BLD: 2 % (ref 0–7)
ERYTHROCYTE [DISTWIDTH] IN BLOOD BY AUTOMATED COUNT: 13.5 % (ref 11.5–14.5)
GLOBULIN SER CALC-MCNC: 2.8 G/DL (ref 2–4)
GLUCOSE SERPL-MCNC: 140 MG/DL (ref 65–100)
HCT VFR BLD AUTO: 34.7 % (ref 35–47)
HGB BLD-MCNC: 11.5 G/DL (ref 11.5–16)
IMM GRANULOCYTES # BLD AUTO: 0 K/UL (ref 0–0.04)
IMM GRANULOCYTES NFR BLD AUTO: 1 % (ref 0–0.5)
LYMPHOCYTES # BLD: 2.2 K/UL (ref 0.8–3.5)
LYMPHOCYTES NFR BLD: 42 % (ref 12–49)
MCH RBC QN AUTO: 33 PG (ref 26–34)
MCHC RBC AUTO-ENTMCNC: 33.1 G/DL (ref 30–36.5)
MCV RBC AUTO: 99.4 FL (ref 80–99)
MONOCYTES # BLD: 0.6 K/UL (ref 0–1)
MONOCYTES NFR BLD: 11 % (ref 5–13)
NEUTS SEG # BLD: 2.3 K/UL (ref 1.8–8)
NEUTS SEG NFR BLD: 44 % (ref 32–75)
NRBC # BLD: 0 K/UL (ref 0–0.01)
NRBC BLD-RTO: 0 PER 100 WBC
PLATELET # BLD AUTO: 241 K/UL (ref 150–400)
PMV BLD AUTO: 9.6 FL (ref 8.9–12.9)
POTASSIUM SERPL-SCNC: 4.2 MMOL/L (ref 3.5–5.1)
PROT SERPL-MCNC: 6 G/DL (ref 6.4–8.2)
RBC # BLD AUTO: 3.49 M/UL (ref 3.8–5.2)
SODIUM SERPL-SCNC: 139 MMOL/L (ref 136–145)
WBC # BLD AUTO: 5.2 K/UL (ref 3.6–11)

## 2024-09-23 PROCEDURE — 96360 HYDRATION IV INFUSION INIT: CPT

## 2024-09-23 PROCEDURE — 85025 COMPLETE CBC W/AUTO DIFF WBC: CPT

## 2024-09-23 PROCEDURE — 96361 HYDRATE IV INFUSION ADD-ON: CPT

## 2024-09-23 PROCEDURE — 2580000003 HC RX 258: Performed by: INTERNAL MEDICINE

## 2024-09-23 PROCEDURE — 80053 COMPREHEN METABOLIC PANEL: CPT

## 2024-09-23 PROCEDURE — 6360000002 HC RX W HCPCS: Performed by: INTERNAL MEDICINE

## 2024-09-23 PROCEDURE — 99215 OFFICE O/P EST HI 40 MIN: CPT | Performed by: INTERNAL MEDICINE

## 2024-09-23 PROCEDURE — 96401 CHEMO ANTI-NEOPL SQ/IM: CPT

## 2024-09-23 PROCEDURE — 36415 COLL VENOUS BLD VENIPUNCTURE: CPT

## 2024-09-23 RX ORDER — 0.9 % SODIUM CHLORIDE 0.9 %
1000 INTRAVENOUS SOLUTION INTRAVENOUS ONCE
Status: COMPLETED | OUTPATIENT
Start: 2024-09-23 | End: 2024-09-23

## 2024-09-23 RX ORDER — SODIUM CHLORIDE 9 MG/ML
5-250 INJECTION, SOLUTION INTRAVENOUS PRN
Status: DISCONTINUED | OUTPATIENT
Start: 2024-09-23 | End: 2024-09-24 | Stop reason: HOSPADM

## 2024-09-23 RX ORDER — ALBUTEROL SULFATE 90 UG/1
INHALANT RESPIRATORY (INHALATION)
Qty: 1 EACH | Refills: 0 | Status: SHIPPED | OUTPATIENT
Start: 2024-09-23

## 2024-09-23 RX ADMIN — TRASTUZUMAB AND HYALURONIDASE-OYSK 600 MG: 600; 10000 INJECTION, SOLUTION SUBCUTANEOUS at 10:08

## 2024-09-23 RX ADMIN — SODIUM CHLORIDE 1000 ML: 9 INJECTION, SOLUTION INTRAVENOUS at 09:06

## 2024-09-23 ASSESSMENT — PAIN SCALES - GENERAL: PAINLEVEL_OUTOF10: 0

## 2024-09-23 NOTE — PLAN OF CARE
[facility] Short Note                       Date: 2024    Name: Millie King    MRN: [id number]         : 1985      Pt admit to [facility] for Herceptin Hylecta + hydration ambulatory in stable condition. Assessment completed and documented in flowsheets. Port accessed with positive blood return. Labs drawn and sent for processing.    Ms. King's vitals were reviewed prior to and after treatment.   Patient Vitals for the past 12 hrs:   Temp Pulse Resp BP SpO2   24 0815 97.9 °F (36.6 °C) (!) 102 18 119/77 95 %         Lab results were obtained and reviewed. Labs within parameter for treatment.  Recent Results (from the past 12 hour(s))   CBC with Auto Differential    Collection Time: 24  8:19 AM   Result Value Ref Range    WBC 5.2 3.6 - 11.0 K/uL    RBC 3.49 (L) 3.80 - 5.20 M/uL    Hemoglobin 11.5 11.5 - 16.0 g/dL    Hematocrit 34.7 (L) 35.0 - 47.0 %    MCV 99.4 (H) 80.0 - 99.0 FL    MCH 33.0 26.0 - 34.0 PG    MCHC 33.1 30.0 - 36.5 g/dL    RDW 13.5 11.5 - 14.5 %    Platelets 241 150 - 400 K/uL    MPV 9.6 8.9 - 12.9 FL    Nucleated RBCs 0.0 0  WBC    nRBC 0.00 0.00 - 0.01 K/uL    Neutrophils % 44 32 - 75 %    Lymphocytes % 42 12 - 49 %    Monocytes % 11 5 - 13 %    Eosinophils % 2 0 - 7 %    Basophils % 0 0 - 1 %    Immature Granulocytes % 1 (H) 0.0 - 0.5 %    Neutrophils Absolute 2.3 1.8 - 8.0 K/UL    Lymphocytes Absolute 2.2 0.8 - 3.5 K/UL    Monocytes Absolute 0.6 0.0 - 1.0 K/UL    Eosinophils Absolute 0.1 0.0 - 0.4 K/UL    Basophils Absolute 0.0 0.0 - 0.1 K/UL    Immature Granulocytes Absolute 0.0 0.00 - 0.04 K/UL    Differential Type AUTOMATED     Comprehensive Metabolic Panel    Collection Time: 24  8:19 AM   Result Value Ref Range    Sodium 139 136 - 145 mmol/L    Potassium 4.2 3.5 - 5.1 mmol/L    Chloride 105 97 - 108 mmol/L    CO2 29 21 - 32 mmol/L    Anion Gap 5 2 - 12 mmol/L    Glucose 140 (H) 65 - 100 mg/dL    BUN 17 6 - 20 MG/DL    Creatinine 0.72 0.55 - 1.02 MG/DL

## 2024-09-30 DIAGNOSIS — C50.919 MALIGNANT NEOPLASM OF BREAST METASTATIC TO BRAIN, UNSPECIFIED LATERALITY (HCC): ICD-10-CM

## 2024-09-30 DIAGNOSIS — C79.31 MALIGNANT NEOPLASM OF BREAST METASTATIC TO BRAIN, UNSPECIFIED LATERALITY (HCC): ICD-10-CM

## 2024-09-30 RX ORDER — TUCATINIB 50 MG/1
TABLET ORAL
Qty: 120 TABLET | Refills: 5 | Status: ACTIVE | OUTPATIENT
Start: 2024-09-30

## 2024-10-01 ENCOUNTER — HOSPITAL ENCOUNTER (OUTPATIENT)
Facility: HOSPITAL | Age: 39
Discharge: HOME | End: 2024-10-04
Attending: NEUROLOGICAL SURGERY
Payer: COMMERCIAL

## 2024-10-01 VITALS — BODY MASS INDEX: 39.69 KG/M2 | WEIGHT: 217 LBS

## 2024-10-01 DIAGNOSIS — C79.31 METASTASIS TO BRAIN (HCC): ICD-10-CM

## 2024-10-01 PROCEDURE — 70553 MRI BRAIN STEM W/O & W/DYE: CPT

## 2024-10-01 PROCEDURE — A9579 GAD-BASE MR CONTRAST NOS,1ML: HCPCS | Performed by: RADIOLOGY

## 2024-10-01 PROCEDURE — 6360000004 HC RX CONTRAST MEDICATION: Performed by: RADIOLOGY

## 2024-10-01 RX ADMIN — GADOTERIDOL 19 ML: 279.3 INJECTION, SOLUTION INTRAVENOUS at 17:30

## 2024-10-07 ENCOUNTER — APPOINTMENT (OUTPATIENT)
Facility: HOSPITAL | Age: 39
End: 2024-10-07
Payer: COMMERCIAL

## 2024-10-07 RX ORDER — EPINEPHRINE 1 MG/ML
0.3 INJECTION, SOLUTION INTRAMUSCULAR; SUBCUTANEOUS PRN
Status: CANCELLED | OUTPATIENT
Start: 2024-10-14

## 2024-10-07 RX ORDER — SODIUM CHLORIDE 9 MG/ML
5-250 INJECTION, SOLUTION INTRAVENOUS PRN
Status: CANCELLED | OUTPATIENT
Start: 2024-10-14

## 2024-10-07 RX ORDER — SODIUM CHLORIDE 9 MG/ML
INJECTION, SOLUTION INTRAVENOUS CONTINUOUS
Status: CANCELLED | OUTPATIENT
Start: 2024-10-14

## 2024-10-07 RX ORDER — SODIUM CHLORIDE 0.9 % (FLUSH) 0.9 %
5-40 SYRINGE (ML) INJECTION PRN
Status: CANCELLED | OUTPATIENT
Start: 2024-10-14

## 2024-10-07 RX ORDER — ACETAMINOPHEN 325 MG/1
650 TABLET ORAL
Status: CANCELLED | OUTPATIENT
Start: 2024-10-14

## 2024-10-07 RX ORDER — DIPHENHYDRAMINE HYDROCHLORIDE 50 MG/ML
50 INJECTION INTRAMUSCULAR; INTRAVENOUS
Status: CANCELLED | OUTPATIENT
Start: 2024-10-14

## 2024-10-07 RX ORDER — HEPARIN 100 UNIT/ML
500 SYRINGE INTRAVENOUS PRN
Status: CANCELLED | OUTPATIENT
Start: 2024-10-14

## 2024-10-07 RX ORDER — ONDANSETRON 2 MG/ML
8 INJECTION INTRAMUSCULAR; INTRAVENOUS
Status: CANCELLED | OUTPATIENT
Start: 2024-10-14

## 2024-10-07 RX ORDER — ALBUTEROL SULFATE 90 UG/1
4 INHALANT RESPIRATORY (INHALATION) PRN
Status: CANCELLED | OUTPATIENT
Start: 2024-10-14

## 2024-10-07 RX ORDER — 0.9 % SODIUM CHLORIDE 0.9 %
1000 INTRAVENOUS SOLUTION INTRAVENOUS ONCE
Status: CANCELLED
Start: 2024-10-14 | End: 2024-10-14

## 2024-10-08 ENCOUNTER — OFFICE VISIT (OUTPATIENT)
Age: 39
End: 2024-10-08
Payer: COMMERCIAL

## 2024-10-08 VITALS
OXYGEN SATURATION: 95 % | HEART RATE: 100 BPM | SYSTOLIC BLOOD PRESSURE: 120 MMHG | HEIGHT: 62 IN | WEIGHT: 217.1 LBS | BODY MASS INDEX: 39.95 KG/M2 | DIASTOLIC BLOOD PRESSURE: 78 MMHG

## 2024-10-08 DIAGNOSIS — R00.0 TACHYCARDIA: ICD-10-CM

## 2024-10-08 DIAGNOSIS — C78.7 MALIGNANT NEOPLASM OF BREAST METASTATIC TO LIVER (HCC): ICD-10-CM

## 2024-10-08 DIAGNOSIS — Z79.899 ENCOUNTER FOR MONITORING CARDIOTOXIC DRUG THERAPY: Primary | ICD-10-CM

## 2024-10-08 DIAGNOSIS — C50.919 MALIGNANT NEOPLASM OF BREAST METASTATIC TO LIVER (HCC): ICD-10-CM

## 2024-10-08 DIAGNOSIS — R07.2 PRECORDIAL CHEST PAIN: ICD-10-CM

## 2024-10-08 DIAGNOSIS — Z51.81 ENCOUNTER FOR MONITORING CARDIOTOXIC DRUG THERAPY: Primary | ICD-10-CM

## 2024-10-08 DIAGNOSIS — I10 HYPERTENSION, ESSENTIAL: ICD-10-CM

## 2024-10-08 DIAGNOSIS — R06.02 SOB (SHORTNESS OF BREATH): ICD-10-CM

## 2024-10-08 PROCEDURE — 3074F SYST BP LT 130 MM HG: CPT | Performed by: SPECIALIST

## 2024-10-08 PROCEDURE — 99204 OFFICE O/P NEW MOD 45 MIN: CPT | Performed by: SPECIALIST

## 2024-10-08 PROCEDURE — 3078F DIAST BP <80 MM HG: CPT | Performed by: SPECIALIST

## 2024-10-08 ASSESSMENT — PATIENT HEALTH QUESTIONNAIRE - PHQ9
SUM OF ALL RESPONSES TO PHQ9 QUESTIONS 1 & 2: 0
2. FEELING DOWN, DEPRESSED OR HOPELESS: NOT AT ALL
SUM OF ALL RESPONSES TO PHQ QUESTIONS 1-9: 0
SUM OF ALL RESPONSES TO PHQ QUESTIONS 1-9: 0
1. LITTLE INTEREST OR PLEASURE IN DOING THINGS: NOT AT ALL
SUM OF ALL RESPONSES TO PHQ QUESTIONS 1-9: 0
SUM OF ALL RESPONSES TO PHQ QUESTIONS 1-9: 0

## 2024-10-08 NOTE — PROGRESS NOTES
(KEPPRA) 500 MG tablet, Take 1 tablet by mouth 2 times daily, Disp: 60 tablet, Rfl: 1    Tucatinib 150 MG TABS, Take 150 mg by mouth 2 times daily Along with 2- 50 mg tablets for a total 250 mg twice day, Disp: 60 tablet, Rfl: 5    ondansetron (ZOFRAN-ODT) 4 MG disintegrating tablet, Take 1-2 tablets by mouth every 8 hours as needed for Nausea or Vomiting, Disp: 60 tablet, Rfl: 1    naloxone 4 MG/0.1ML LIQD nasal spray, 1 spray by Nasal route as needed for Opioid Reversal, Disp: 1 each, Rfl: 0    lisinopril (PRINIVIL;ZESTRIL) 10 MG tablet, Take 1 tablet by mouth daily, Disp: , Rfl:     amoxicillin-clavulanate (AUGMENTIN) 875-125 MG per tablet, Take 1 tablet by mouth in the morning and 1 tablet in the evening. for 10 days. (Patient not taking: Reported on 10/7/2024), Disp: , Rfl:     lidocaine-prilocaine (EMLA) 2.5-2.5 % cream, Apply thin layer to port a cath 30-60 minutes prior to port access with each chemotherapy session (Patient not taking: Reported on 10/7/2024), Disp: , Rfl:     prochlorperazine (COMPAZINE) 5 MG tablet, , Disp: , Rfl:    Impression see above.

## 2024-10-08 NOTE — PATIENT INSTRUCTIONS
You have been scheduled for a full echo in our office.    Call [phone] to schedule a limited echo in January and April.    We will see you back in April after the echo.

## 2024-10-10 ENCOUNTER — TELEPHONE (OUTPATIENT)
Age: 39
End: 2024-10-10

## 2024-10-10 DIAGNOSIS — C79.31 METASTASIS TO BRAIN (HCC): ICD-10-CM

## 2024-10-10 NOTE — TELEPHONE ENCOUNTER
Future Appointments   Date Time Provider Department Center   10/14/2024  9:00 AM MOE CHEMO CHAIR 5 [facility]   10/14/2024  9:15 AM Fang Manrique, DO [facility] BS AMB   10/17/2024  8:00 AM [facility] 1 [facility]   10/17/2024  9:00 AM [facility] NM INJ RM 1 [facility]   10/17/2024 12:00 PM [facility] NM RM 1 MR[facility]NM [facility]   10/28/2024  8:00 AM [facility] AM[facility]   11/4/2024  8:00 AM MOE CHEMO CHAIR 4 [facility]   11/4/2024  8:45 AM Fang Manrique, DO [facility] BS AMB   11/14/2024 11:00 AM Sonia Nieves MD [facility] BS AMB   11/25/2024  8:00 AM MOE CHEMO CHAIR 4 [facility]   11/25/2024  8:15 AM Fang Manrique, DO [facility] BS AMB   12/16/2024  8:00 AM Fang Manrique, DO [facility] BS AMB   12/16/2024  8:00 AM MOE CHEMO CHAIR 4 [facility]   1/6/2025  8:00 AM MOE CHEMO CHAIR 4 [facility]   1/6/2025  8:15 AM Fang Manrique, DO [facility] BS AMB   1/14/2025 10:40 AM Christy Enriquez MD [facility] BS AMB   1/27/2025  8:00 AM MOE CHEMO CHAIR 4 [facility]   1/27/2025  8:15 AM Fang Manrique, DO [facility] BS AMB   4/15/2025  1:40 PM Tsering Herrera MD [facility] BS AMB

## 2024-10-11 ENCOUNTER — HOSPITAL ENCOUNTER (OUTPATIENT)
Facility: HOSPITAL | Age: 39
Discharge: HOME | End: 2024-10-14
Attending: ORTHOPAEDIC SURGERY
Payer: COMMERCIAL

## 2024-10-11 DIAGNOSIS — M25.451 HIP SWELLING, RIGHT: ICD-10-CM

## 2024-10-11 DIAGNOSIS — M84.551S PATHOLOGICAL FRACTURE OF RIGHT HIP DUE TO NEOPLASTIC DISEASE, SEQUELA: ICD-10-CM

## 2024-10-11 LAB
APPEARANCE SNV: CLEAR
COLOR SNV: COLORLESS
RBC # SNV: 16 /CU MM
SPECIMEN SOURCE FLD: ABNORMAL
TOTAL CELLS COUNTED SPEC: 0
WBC # SNV: 3 /CU MM (ref 0–150)

## 2024-10-11 PROCEDURE — 87070 CULTURE OTHR SPECIMN AEROBIC: CPT

## 2024-10-11 PROCEDURE — 87205 SMEAR GRAM STAIN: CPT

## 2024-10-11 PROCEDURE — 87075 CULTR BACTERIA EXCEPT BLOOD: CPT

## 2024-10-11 PROCEDURE — 2580000003 HC RX 258: Performed by: ORTHOPAEDIC SURGERY

## 2024-10-11 PROCEDURE — 2500000003 HC RX 250 WO HCPCS: Performed by: STUDENT IN AN ORGANIZED HEALTH CARE EDUCATION/TRAINING PROGRAM

## 2024-10-11 PROCEDURE — 20610 DRAIN/INJ JOINT/BURSA W/O US: CPT

## 2024-10-11 PROCEDURE — 89050 BODY FLUID CELL COUNT: CPT

## 2024-10-11 RX ORDER — LIDOCAINE HYDROCHLORIDE 10 MG/ML
5 INJECTION, SOLUTION EPIDURAL; INFILTRATION; INTRACAUDAL; PERINEURAL ONCE
Status: COMPLETED | OUTPATIENT
Start: 2024-10-11 | End: 2024-10-11

## 2024-10-11 RX ORDER — LEVETIRACETAM 500 MG/1
500 TABLET ORAL 2 TIMES DAILY
Qty: 180 TABLET | Refills: 1 | Status: SHIPPED | OUTPATIENT
Start: 2024-10-11

## 2024-10-11 RX ORDER — SODIUM CHLORIDE 9 MG/ML
10 INJECTION, SOLUTION INTRAMUSCULAR; INTRAVENOUS; SUBCUTANEOUS ONCE
Status: COMPLETED | OUTPATIENT
Start: 2024-10-11 | End: 2024-10-11

## 2024-10-11 RX ADMIN — SODIUM CHLORIDE 10 ML: 9 INJECTION INTRAMUSCULAR; INTRAVENOUS; SUBCUTANEOUS at 15:08

## 2024-10-11 RX ADMIN — LIDOCAINE HYDROCHLORIDE 5 ML: 10 INJECTION, SOLUTION EPIDURAL; INFILTRATION; INTRACAUDAL; PERINEURAL at 15:08

## 2024-10-11 NOTE — TELEPHONE ENCOUNTER
Please call Millie and please check if anyone else is prescribing her Keppra- looks like I last prescribed in June for 30 days and 1 refill.  I'm wondering if dose was changed or refill sent in from another office.

## 2024-10-11 NOTE — TELEPHONE ENCOUNTER
Palliative Medicine Clinic   [place]: [phone] ([phone])    Patient Name: Millie King  YOB: 1985    Medication Refill Request    Patient is scheduled for follow up:  [x]  YES  []   NO  Next [facility] Med Clinic Visit: 11/14/24    PDMP reviewed:  [] YES   []  System down / Unable  []  NO- Patient fills out of state    Medication:    levETIRAcetam (KEPPRA) 500 MG tablet     Dose and directions:Take 1 tablet by mouth 2 times daily,   Number dispensed:60  Date filled (PDMP or Pharmacy):06/04/24  # left:unknown        Appropriate for refill:  [x]  YES  []  Early Request - Requires MD/NP Review      Other pertinent information for prescriber:

## 2024-10-12 LAB
BACTERIA SPEC CULT: NORMAL
BACTERIA SPEC CULT: NORMAL
GRAM STN SPEC: NORMAL
GRAM STN SPEC: NORMAL
SERVICE CMNT-IMP: NORMAL
SERVICE CMNT-IMP: NORMAL

## 2024-10-14 ENCOUNTER — HOSPITAL ENCOUNTER (OUTPATIENT)
Facility: HOSPITAL | Age: 39
Setting detail: INFUSION SERIES
Discharge: HOME | End: 2024-10-14
Payer: COMMERCIAL

## 2024-10-14 ENCOUNTER — CLINICAL DOCUMENTATION (OUTPATIENT)
Facility: HOSPITAL | Age: 39
End: 2024-10-14

## 2024-10-14 ENCOUNTER — CLINICAL DOCUMENTATION (OUTPATIENT)
Age: 39
End: 2024-10-14

## 2024-10-14 ENCOUNTER — OFFICE VISIT (OUTPATIENT)
Age: 39
End: 2024-10-14

## 2024-10-14 VITALS
WEIGHT: 220 LBS | DIASTOLIC BLOOD PRESSURE: 75 MMHG | RESPIRATION RATE: 18 BRPM | BODY MASS INDEX: 40.24 KG/M2 | HEART RATE: 110 BPM | SYSTOLIC BLOOD PRESSURE: 112 MMHG | TEMPERATURE: 97.4 F | OXYGEN SATURATION: 94 %

## 2024-10-14 VITALS
RESPIRATION RATE: 18 BRPM | DIASTOLIC BLOOD PRESSURE: 75 MMHG | HEART RATE: 106 BPM | TEMPERATURE: 97.4 F | SYSTOLIC BLOOD PRESSURE: 112 MMHG

## 2024-10-14 DIAGNOSIS — Z51.81 ENCOUNTER FOR MONITORING CARDIOTOXIC DRUG THERAPY: ICD-10-CM

## 2024-10-14 DIAGNOSIS — C79.31 MALIGNANT NEOPLASM OF BREAST METASTATIC TO BRAIN, UNSPECIFIED LATERALITY (HCC): Primary | ICD-10-CM

## 2024-10-14 DIAGNOSIS — C78.7 MALIGNANT NEOPLASM OF BREAST METASTATIC TO LIVER (HCC): ICD-10-CM

## 2024-10-14 DIAGNOSIS — R19.7 DIARRHEA, UNSPECIFIED TYPE: ICD-10-CM

## 2024-10-14 DIAGNOSIS — G62.0 CHEMOTHERAPY-INDUCED NEUROPATHY (HCC): ICD-10-CM

## 2024-10-14 DIAGNOSIS — Z95.828 PORT-A-CATH IN PLACE: ICD-10-CM

## 2024-10-14 DIAGNOSIS — Z09 CHEMOTHERAPY FOLLOW-UP EXAMINATION: ICD-10-CM

## 2024-10-14 DIAGNOSIS — C50.919 MALIGNANT NEOPLASM OF BREAST METASTATIC TO LIVER (HCC): ICD-10-CM

## 2024-10-14 DIAGNOSIS — C78.7 MALIGNANT NEOPLASM METASTATIC TO LIVER (HCC): ICD-10-CM

## 2024-10-14 DIAGNOSIS — C78.01 MALIGNANT NEOPLASM METASTATIC TO BOTH LUNGS (HCC): ICD-10-CM

## 2024-10-14 DIAGNOSIS — C50.919 MALIGNANT NEOPLASM OF BREAST METASTATIC TO BRAIN, UNSPECIFIED LATERALITY (HCC): ICD-10-CM

## 2024-10-14 DIAGNOSIS — C78.02 MALIGNANT NEOPLASM METASTATIC TO BOTH LUNGS (HCC): ICD-10-CM

## 2024-10-14 DIAGNOSIS — T45.1X5A CHEMOTHERAPY-INDUCED NEUROPATHY (HCC): ICD-10-CM

## 2024-10-14 DIAGNOSIS — C50.919 MALIGNANT NEOPLASM OF BREAST METASTATIC TO BRAIN, UNSPECIFIED LATERALITY (HCC): Primary | ICD-10-CM

## 2024-10-14 DIAGNOSIS — C79.51 MALIGNANT NEOPLASM METASTATIC TO BONE (HCC): Primary | ICD-10-CM

## 2024-10-14 DIAGNOSIS — C79.31 MALIGNANT NEOPLASM OF BREAST METASTATIC TO BRAIN, UNSPECIFIED LATERALITY (HCC): ICD-10-CM

## 2024-10-14 DIAGNOSIS — Z79.899 ENCOUNTER FOR MONITORING CARDIOTOXIC DRUG THERAPY: ICD-10-CM

## 2024-10-14 LAB
ALBUMIN SERPL-MCNC: 3.3 G/DL (ref 3.5–5)
ALBUMIN/GLOB SERPL: 1.3 (ref 1.1–2.2)
ALP SERPL-CCNC: 85 U/L (ref 45–117)
ALT SERPL-CCNC: 33 U/L (ref 12–78)
ANION GAP SERPL CALC-SCNC: 6 MMOL/L (ref 2–12)
AST SERPL-CCNC: 22 U/L (ref 15–37)
BASOPHILS # BLD: 0 K/UL (ref 0–0.1)
BASOPHILS NFR BLD: 1 % (ref 0–1)
BILIRUB SERPL-MCNC: 0.4 MG/DL (ref 0.2–1)
BUN SERPL-MCNC: 11 MG/DL (ref 6–20)
BUN/CREAT SERPL: 16 (ref 12–20)
CALCIUM SERPL-MCNC: 8.7 MG/DL (ref 8.5–10.1)
CHLORIDE SERPL-SCNC: 105 MMOL/L (ref 97–108)
CO2 SERPL-SCNC: 28 MMOL/L (ref 21–32)
CREAT SERPL-MCNC: 0.7 MG/DL (ref 0.55–1.02)
CRP SERPL HS-MCNC: 8.3 MG/L
DIFFERENTIAL METHOD BLD: ABNORMAL
EOSINOPHIL # BLD: 0.1 K/UL (ref 0–0.4)
EOSINOPHIL NFR BLD: 3 % (ref 0–7)
ERYTHROCYTE [DISTWIDTH] IN BLOOD BY AUTOMATED COUNT: 13.9 % (ref 11.5–14.5)
ERYTHROCYTE [SEDIMENTATION RATE] IN BLOOD: 107 MM/HR (ref 0–20)
GLOBULIN SER CALC-MCNC: 2.5 G/DL (ref 2–4)
GLUCOSE SERPL-MCNC: 130 MG/DL (ref 65–100)
HCT VFR BLD AUTO: 33.5 % (ref 35–47)
HGB BLD-MCNC: 11.4 G/DL (ref 11.5–16)
IMM GRANULOCYTES # BLD AUTO: 0 K/UL (ref 0–0.04)
IMM GRANULOCYTES NFR BLD AUTO: 0 % (ref 0–0.5)
LYMPHOCYTES # BLD: 2 K/UL (ref 0.8–3.5)
LYMPHOCYTES NFR BLD: 43 % (ref 12–49)
MAGNESIUM SERPL-MCNC: 2.1 MG/DL (ref 1.6–2.4)
MCH RBC QN AUTO: 32.7 PG (ref 26–34)
MCHC RBC AUTO-ENTMCNC: 34 G/DL (ref 30–36.5)
MCV RBC AUTO: 96 FL (ref 80–99)
MONOCYTES # BLD: 0.6 K/UL (ref 0–1)
MONOCYTES NFR BLD: 13 % (ref 5–13)
NEUTS SEG # BLD: 1.9 K/UL (ref 1.8–8)
NEUTS SEG NFR BLD: 40 % (ref 32–75)
NRBC # BLD: 0 K/UL (ref 0–0.01)
NRBC BLD-RTO: 0 PER 100 WBC
PHOSPHATE SERPL-MCNC: 3.5 MG/DL (ref 2.6–4.7)
PLATELET # BLD AUTO: 225 K/UL (ref 150–400)
PMV BLD AUTO: 9.1 FL (ref 8.9–12.9)
POTASSIUM SERPL-SCNC: 3.7 MMOL/L (ref 3.5–5.1)
PROT SERPL-MCNC: 5.8 G/DL (ref 6.4–8.2)
RBC # BLD AUTO: 3.49 M/UL (ref 3.8–5.2)
SODIUM SERPL-SCNC: 139 MMOL/L (ref 136–145)
WBC # BLD AUTO: 4.8 K/UL (ref 3.6–11)

## 2024-10-14 PROCEDURE — 6360000002 HC RX W HCPCS: Performed by: INTERNAL MEDICINE

## 2024-10-14 PROCEDURE — 85652 RBC SED RATE AUTOMATED: CPT

## 2024-10-14 PROCEDURE — 80053 COMPREHEN METABOLIC PANEL: CPT

## 2024-10-14 PROCEDURE — 96401 CHEMO ANTI-NEOPL SQ/IM: CPT

## 2024-10-14 PROCEDURE — 84100 ASSAY OF PHOSPHORUS: CPT

## 2024-10-14 PROCEDURE — 96372 THER/PROPH/DIAG INJ SC/IM: CPT

## 2024-10-14 PROCEDURE — 86141 C-REACTIVE PROTEIN HS: CPT

## 2024-10-14 PROCEDURE — 36415 COLL VENOUS BLD VENIPUNCTURE: CPT

## 2024-10-14 PROCEDURE — 83735 ASSAY OF MAGNESIUM: CPT

## 2024-10-14 PROCEDURE — 85025 COMPLETE CBC W/AUTO DIFF WBC: CPT

## 2024-10-14 RX ORDER — SODIUM CHLORIDE 9 MG/ML
INJECTION, SOLUTION INTRAVENOUS CONTINUOUS
OUTPATIENT
Start: 2024-11-17

## 2024-10-14 RX ORDER — EPINEPHRINE 1 MG/ML
0.3 INJECTION, SOLUTION INTRAMUSCULAR; SUBCUTANEOUS PRN
OUTPATIENT
Start: 2024-11-17

## 2024-10-14 RX ORDER — DIPHENHYDRAMINE HYDROCHLORIDE 50 MG/ML
50 INJECTION INTRAMUSCULAR; INTRAVENOUS
OUTPATIENT
Start: 2024-11-17

## 2024-10-14 RX ORDER — ACETAMINOPHEN 325 MG/1
650 TABLET ORAL
OUTPATIENT
Start: 2024-11-17

## 2024-10-14 RX ORDER — ONDANSETRON 2 MG/ML
8 INJECTION INTRAMUSCULAR; INTRAVENOUS
OUTPATIENT
Start: 2024-11-17

## 2024-10-14 RX ORDER — ALBUTEROL SULFATE 90 UG/1
4 INHALANT RESPIRATORY (INHALATION) PRN
OUTPATIENT
Start: 2024-11-17

## 2024-10-14 RX ADMIN — DENOSUMAB 120 MG: 120 INJECTION SUBCUTANEOUS at 10:51

## 2024-10-14 RX ADMIN — TRASTUZUMAB AND HYALURONIDASE-OYSK 600 MG: 600; 10000 INJECTION, SOLUTION SUBCUTANEOUS at 10:32

## 2024-10-14 ASSESSMENT — PAIN SCALES - GENERAL: PAINLEVEL_OUTOF10: 5

## 2024-10-14 NOTE — PROGRESS NOTES
Oral Chemotherapy      Millie King is a  39 y.o.female  diagnosed with breast cancer . Ms. King is being treated with tucatinib, capecitabine, trastuzumab.      Medication name: capecitabine     Dose:  1500 mg (dose reduced 4/15 with Cycle 4)  Frequency: twice a day  Administration schedule: for 14 day on, 7 days off every 21 days        Medication name: tucatinib     Dose:  250 mg  (dose reduced 4/15 with Cycle 4)  Frequency: twice a day        Ordering provider: Fang Cespedes DO  Start date: 10/14/24 (Cycle 12)      ANC 1.9 - ok to start treatment tonight.     Lab Results   Component Value Date    WBC 4.8 10/14/2024    HGB 11.4 (L) 10/14/2024    HCT 33.5 (L) 10/14/2024    MCV 96.0 10/14/2024     10/14/2024    LYMPHOPCT 43 10/14/2024    RBC 3.49 (L) 10/14/2024    MCH 32.7 10/14/2024    MCHC 34.0 10/14/2024    RDW 13.9 10/14/2024     Lab Results   Component Value Date    NEUTROABS 1.9 10/14/2024         Expected follow up date: Labs/office visit each cycle. Next cycle start on 11/4/24     Patient  provided with an Oral Chemotherapy Journal.              Beryl Sanchez, BETID, BCOP, BCPS    For Pharmacy Admin Tracking Only    Program: Medical Group  CPA in place:  Yes  Recommendation Provided To: Patient/Caregiver: 1 via In person    Intervention Accepted By: Patient/Caregiver: 1    Time Spent (min): 10

## 2024-10-14 NOTE — PROGRESS NOTES
Left message for nohemi Parker at [facility], asking for phone number or assistance in reaching ortho oncology to get an appt for the patient.

## 2024-10-14 NOTE — PROGRESS NOTES
Millie King is a 39 y.o. female    Chief Complaint   Patient presents with    Follow-up     Metastatic Breast Cancer       1. Have you been to the ER, urgent care clinic since your last visit?  Hospitalized since your last visit?No    2. Have you seen or consulted any other health care providers outside of the [facility] since your last visit?  Include any pap smears or colon screening. No

## 2024-10-14 NOTE — PROGRESS NOTES
[facility] Progress Note    Date: October 14, 2024       Pt arrived ambulatory to [facility] for Herceptin Hylecta/Xgeva in stable condition.  Assessment completed. No acute concerns at this time. Port accessed with positive blood return. Labs drawn and sent for processing.         Patient Vitals for the past 12 hrs:   Temp Pulse Resp BP   10/14/24 0900 97.4 °F (36.3 °C) (!) 106 18 112/75       Medications Administered         denosumab (XGEVA) SC injection 120 mg Admin Date  10/14/2024 Action  Given Dose  120 mg Route  SubCUTAneous Documented By  Sonia Duff, RN        trastuzumab-hyaluronidase-oysk (HERCEPTIN HYLECTA) injection 600 mg Admin Date  10/14/2024 Action  Given Dose  600 mg Route  SubCUTAneous Documented By  Sonia Duff, RN          Herceptin Hylecta  given in left Thigh  XGEVA given in right arm      Tolerated treatment well, no adverse reactions noted. Port flushed and de- accessed per protocol. D/Cd from [facility] ambulatory and in no distress.  Patient is aware of next scheduled [facility] appointment.    Future Appointments   Date Time Provider Department Center   10[facility]17/2024  [facility]   10/17/2024  9[facility]   10/17/2024 12:00 PM [facility] NM RM 1 [facility]   10/28/2024  8:00 AM [facility]   11/4/2024  8:00 AM MOE CHEMO CHAIR 4 [facility]   11/4/2024  8:45 AM Fang Manrique DO [facility] BS AMB   11/14/2024 11:00 AM Sonia Nieves MD [facility] BS AMB   11/25/2024  8:00 AM MOE CHEMO CHAIR 4 [facility]   11/25/2024  8:15 AM Fang Manrique DO [facility] BS AMB   12/16/2024  8:00 AM Fang Manrique DO [facility] BS AMB   12/16/2024  8:00 AM MOE CHEMO CHAIR 4 [facility]   1/6/2025  8:00 AM MOE CHEMO CHAIR 4 [facility]   1/6/2025  8:15 AM Fang Manrique DO [facility] BS AMB   1/14/2025 10:40 AM Christy Enriquez MD [facility] BS AMB   1/27/2025  8:00 AM [facility] 4 [facility]   1/27/2025  8:15 AM Fang Manrique DO [facility] BS AMB   4/15/2025  1:40 PM

## 2024-10-14 NOTE — PROGRESS NOTES
[facility] [place] at [facility]  [address], [address] [place] [address]  W: [phone]  F: [phone]    Reason for Visit:   Millie King is a 39 y.o. female seen today in office for follow up of Metastatic Breast Cancer.    Treatment History:   Abd US 10/3/22: There are multiple liver masses with 2 representative masses in  the right liver measuring 2.4 x 2.6 cm and 1.6 x 2.1 cm  CT A/P 10/3/22: Spiculated left breast mass suspicious for malignancy. Multiple lung and liver metastases. Multiple top normal size retroperitoneal lymph nodes are nonspecific with metastatic disease  not excluded  CT Chest 10/4/22: Indeterminate 1.4 x 1.9 cm left breast nodule. Primary malignancy not excluded. Mammographic correlation advised. Diffuse bilateral pulmonary nodules most compatible with metastatic disease.Partially imaged hepatic hypodensities concerning  for metastatic disease. Indeterminate 6 mm T10 lytic lesion, possibly benign. Attention on follow-up  Liver Biopsy 10/4/22: PATH - Metastatic adenocarcinoma, consistent with breast primary  ER/SD negative, Her2 3+  Port placed on 10/13/2   Palliative Taxol+Herceptin+Perjeta 10/17/22 - 10/31/22  Switched to Palliative Taxotere+Herceptin+Perjeta on 11/7/22 - 1/22/24   DR Taxotere to 60 mg/m2 with Cycle 8  CT C/A/P 12/27/22: Imaging findings consistent with significant interval response to therapy. Significantly diminished pulmonary metastatic disease  burden with numerous resolved or nearly resolved pulmonary nodules. Significantly diminished size of hepatic masses. Left breast lesion is not demonstrated on the current exam  Right Hip XRAY 1/2/23: Right basicervical femoral neck fracture with medial angulation  CT Pelv 1/2/23: Re-demonstrated acute right basicervical femoral neck fracture, with findings concerning for pathologic etiology  XR Femur 1/2/23: Mildly displaced right femoral neck fracture  Right Hip IMN with Ortho Dr Bermudez   Bone

## 2024-10-16 ENCOUNTER — TELEPHONE (OUTPATIENT)
Facility: HOSPITAL | Age: 39
End: 2024-10-16

## 2024-10-16 NOTE — TELEPHONE ENCOUNTER
[facility]  Breast Navigator Encounter    Name:    Millie King  Age:    39 y.o.  Diagnosis:   LEFT breast cancer, metastatic    Called patient to follow-up on her appt with Dr. Oscar.  I had reached out to the breast NN at [facility], but never heard back.  I don't see that the patient has an appt yet.     She said that she has called almost daily for the last week to try to get an appt.  When she called yesterday, they told her that she should hear from someone by the end of yesterday, which she did not.     I called Dr. Oscar's office.  Her  is out until tomorrow 10/17 so the call went to a call center.  That person offered to send a message to Dr. Oscar again, but while I was on the phone she could see that there was a discussion going on about the patient's appt.  She told me the patient should hear about an appt by the end of the day.      Called patient later in the day.  She had gotten an appt for next Tuesday 10/22 at the [facility] office with Dr. Oscar.  She was offered an appt earlier, but was asked to bring a disc with all of her scans on it so she needs a couple of days to get this.      She was very appreciative of the assistance.                             Angelica Hammond RN, BSN, [facility]  Oncology Breast Navigator     [facility]  [address] [place]  [address]  W: [phone]  F: [phone]  [email or web address]  Good Help to Those in Need®

## 2024-10-17 ENCOUNTER — HOSPITAL ENCOUNTER (OUTPATIENT)
Facility: HOSPITAL | Age: 39
Discharge: HOME | End: 2024-10-20
Payer: COMMERCIAL

## 2024-10-17 DIAGNOSIS — C78.7 MALIGNANT NEOPLASM METASTATIC TO LIVER (HCC): ICD-10-CM

## 2024-10-17 DIAGNOSIS — C79.31 METASTASIS TO BRAIN (HCC): ICD-10-CM

## 2024-10-17 DIAGNOSIS — G89.3 CANCER RELATED PAIN: ICD-10-CM

## 2024-10-17 DIAGNOSIS — T45.1X5A CHEMOTHERAPY-INDUCED NEUROPATHY (HCC): ICD-10-CM

## 2024-10-17 DIAGNOSIS — F43.22 ADJUSTMENT DISORDER WITH ANXIOUS MOOD: ICD-10-CM

## 2024-10-17 DIAGNOSIS — G62.0 CHEMOTHERAPY-INDUCED NEUROPATHY (HCC): ICD-10-CM

## 2024-10-17 PROCEDURE — 6360000004 HC RX CONTRAST MEDICATION: Performed by: NURSE PRACTITIONER

## 2024-10-17 PROCEDURE — 74177 CT ABD & PELVIS W/CONTRAST: CPT

## 2024-10-17 PROCEDURE — 78306 BONE IMAGING WHOLE BODY: CPT | Performed by: NURSE PRACTITIONER

## 2024-10-17 PROCEDURE — A9503 TC99M MEDRONATE: HCPCS | Performed by: NURSE PRACTITIONER

## 2024-10-17 PROCEDURE — 3430000000 HC RX DIAGNOSTIC RADIOPHARMACEUTICAL: Performed by: NURSE PRACTITIONER

## 2024-10-17 RX ORDER — TC 99M MEDRONATE 20 MG/10ML
21.8 INJECTION, POWDER, LYOPHILIZED, FOR SOLUTION INTRAVENOUS
Status: COMPLETED | OUTPATIENT
Start: 2024-10-17 | End: 2024-10-17

## 2024-10-17 RX ORDER — IOPAMIDOL 755 MG/ML
100 INJECTION, SOLUTION INTRAVASCULAR
Status: COMPLETED | OUTPATIENT
Start: 2024-10-17 | End: 2024-10-17

## 2024-10-17 RX ADMIN — TC 99M MEDRONATE 21.8 MILLICURIE: 20 INJECTION, POWDER, LYOPHILIZED, FOR SOLUTION INTRAVENOUS at 09:30

## 2024-10-17 RX ADMIN — IOPAMIDOL 100 ML: 755 INJECTION, SOLUTION INTRAVENOUS at 09:00

## 2024-10-18 ENCOUNTER — TELEPHONE (OUTPATIENT)
Age: 39
End: 2024-10-18

## 2024-10-18 RX ORDER — ESCITALOPRAM OXALATE 20 MG/1
20 TABLET ORAL DAILY
Qty: 90 TABLET | Refills: 1 | Status: SHIPPED | OUTPATIENT
Start: 2024-10-18

## 2024-10-18 RX ORDER — METHADONE HYDROCHLORIDE 10 MG/1
TABLET ORAL
Qty: 105 TABLET | Refills: 0 | Status: SHIPPED | OUTPATIENT
Start: 2024-10-18 | End: 2024-11-17

## 2024-10-18 RX ORDER — PREGABALIN 150 MG/1
150 CAPSULE ORAL 3 TIMES DAILY
Qty: 90 CAPSULE | Refills: 0 | Status: SHIPPED | OUTPATIENT
Start: 2024-10-18 | End: 2024-11-17

## 2024-10-18 RX ORDER — OXYCODONE HYDROCHLORIDE 30 MG/1
30 TABLET ORAL EVERY 4 HOURS PRN
Qty: 180 TABLET | Refills: 0 | Status: SHIPPED | OUTPATIENT
Start: 2024-10-21 | End: 2024-11-20

## 2024-10-18 NOTE — TELEPHONE ENCOUNTER
Received call from [facility] File Room that CD's of images needed for her Ortho Onc consult were completed.  Call to patient, ID verified X 2, notified discs were ready, she will .   Update to Provider.

## 2024-10-28 ENCOUNTER — APPOINTMENT (OUTPATIENT)
Facility: HOSPITAL | Age: 39
End: 2024-10-28
Payer: COMMERCIAL

## 2024-11-04 ENCOUNTER — HOSPITAL ENCOUNTER (OUTPATIENT)
Facility: HOSPITAL | Age: 39
Setting detail: INFUSION SERIES
End: 2024-11-04

## 2024-11-04 DIAGNOSIS — C79.51 MALIGNANT NEOPLASM METASTATIC TO BONE (HCC): Primary | ICD-10-CM

## 2024-11-04 DIAGNOSIS — C78.02 MALIGNANT NEOPLASM METASTATIC TO BOTH LUNGS (HCC): ICD-10-CM

## 2024-11-04 DIAGNOSIS — C78.01 MALIGNANT NEOPLASM METASTATIC TO BOTH LUNGS (HCC): ICD-10-CM

## 2024-11-14 ENCOUNTER — TELEMEDICINE (OUTPATIENT)
Age: 39
End: 2024-11-14

## 2024-11-14 DIAGNOSIS — C78.7 MALIGNANT NEOPLASM OF BREAST METASTATIC TO LIVER (HCC): Primary | ICD-10-CM

## 2024-11-14 DIAGNOSIS — K59.01 CONSTIPATION BY DELAYED COLONIC TRANSIT: ICD-10-CM

## 2024-11-14 DIAGNOSIS — F43.22 ADJUSTMENT DISORDER WITH ANXIOUS MOOD: ICD-10-CM

## 2024-11-14 DIAGNOSIS — C50.919 MALIGNANT NEOPLASM OF BREAST METASTATIC TO LIVER (HCC): Primary | ICD-10-CM

## 2024-11-14 DIAGNOSIS — G62.0 CHEMOTHERAPY-INDUCED NEUROPATHY (HCC): ICD-10-CM

## 2024-11-14 DIAGNOSIS — G47.30 SLEEP APNEA, UNSPECIFIED TYPE: ICD-10-CM

## 2024-11-14 DIAGNOSIS — R53.83 CHEMOTHERAPY-INDUCED FATIGUE: ICD-10-CM

## 2024-11-14 DIAGNOSIS — T45.1X5A CHEMOTHERAPY-INDUCED NEUROPATHY (HCC): ICD-10-CM

## 2024-11-14 DIAGNOSIS — G89.3 CANCER RELATED PAIN: ICD-10-CM

## 2024-11-14 DIAGNOSIS — C79.31 METASTASIS TO BRAIN (HCC): ICD-10-CM

## 2024-11-14 DIAGNOSIS — R53.0 NEOPLASTIC (MALIGNANT) RELATED FATIGUE: ICD-10-CM

## 2024-11-14 DIAGNOSIS — T45.1X5A CHEMOTHERAPY-INDUCED FATIGUE: ICD-10-CM

## 2024-11-14 RX ORDER — PREGABALIN 150 MG/1
150 CAPSULE ORAL 3 TIMES DAILY
Qty: 90 CAPSULE | Refills: 0 | Status: SHIPPED | OUTPATIENT
Start: 2024-11-18 | End: 2024-12-18

## 2024-11-14 RX ORDER — DOXYCYCLINE 100 MG/1
100 CAPSULE ORAL 2 TIMES DAILY
COMMUNITY

## 2024-11-14 RX ORDER — METHADONE HYDROCHLORIDE 10 MG/1
TABLET ORAL
Qty: 105 TABLET | Refills: 0 | Status: SHIPPED | OUTPATIENT
Start: 2024-11-18 | End: 2024-12-18

## 2024-11-14 RX ORDER — SENNOSIDES A AND B 8.6 MG/1
1 TABLET, FILM COATED ORAL DAILY
COMMUNITY

## 2024-11-14 ASSESSMENT — PATIENT HEALTH QUESTIONNAIRE - PHQ9
SUM OF ALL RESPONSES TO PHQ QUESTIONS 1-9: 0
1. LITTLE INTEREST OR PLEASURE IN DOING THINGS: NOT AT ALL
SUM OF ALL RESPONSES TO PHQ9 QUESTIONS 1 & 2: 0
SUM OF ALL RESPONSES TO PHQ QUESTIONS 1-9: 0
2. FEELING DOWN, DEPRESSED OR HOPELESS: NOT AT ALL

## 2024-11-14 NOTE — PROGRESS NOTES
Palliative Medicine Outpatient Clinic  Nurse Check in Note  [phone]    Patient Name: Millie King  YOB: 1985      Date of Visit: 11/14/2024  Visit Location:  [facility] Virtual Visit     Chief patient or family concern today: follow up.  Had hip replacement a week ago    Patient's Last Palliative Medicine Clinic Visit Date:  9/10/2024    Have you been to an ER or urgent care center since your last visit?  No  Have you been hospitalized since your last visit? Yes - s/p hip replacement*  Have you seen or consulted any health care providers outside of the [facility] system since your last visit?  No  If Yes, alert PSR to request appropriate records from non-[facility] offices    Medications:  Med reconciliation was performed with:  Patient    Requested refills:  None    If prescribed an opioid, does patient have access to naloxone at home?:  YES  If No, pend naloxone nasal spray    Function and Symptoms:  Use of assist devices:  None    Palliative Performance Status (PPS):   Palliative Performance Scale (PPS)  PPS: 70    ESAS:  Modified-[place] Symptom Assessment Scale (ESAS)  Tiredness Score: 6  Drowsiness Score: 4  Depression Score: Not depressed  Pain Score: 6  Anxiety Score: Not anxious  Nausea Score: 4  Appetite Score: 7  Dyspnea Score: No shortness of breath  Constipation: Yes  Wellbeing Score: Best feeling of wellbeing    Constipation?  Yes  Last BM: 11/13/24    Advance Care Planning:  Currently listed healthcare agent:    Primary Decision Maker: Eric King - Spouse - [phone]    Is there an ACP Note within the past 12 months?  YES  If No, Alert Clinician and/or Social Work      Divya Simms LPN        
pain.  Currently taking oxycodone IR 10 mg at 9 pm and then again at 1 am.  Wakes up again at 3 am.  Typically wakes to go the bathroom, the ambulation worsens the pain.  The 10 mg does help the pain but she is unable to take more than a half tab (5 mg) during the day due to fatigue as SE as she does continue to work throughout the day.  Her priority is to remain as functional and alert as possible, but it is getting harder to manage the pain.  Due to ongoing pain post fixation, she had repeat CT imaging on 5/24 as ordered by oncology.   Reviewed imaging report today which showed stable right femoral and proximal stem fracture, new bone formation, no new lytic lesion.      Also discussed her mood and psychiatric regimen.  She was started on Wellbutrin and Lexpro post second child by a psychiatrist in [place]- has stayed on the 300 mg and 10 mg every since (past 10 years).  She has been concerned about lack of efficacy of late as low mood exacerbated by pain and loss of function.      7/8/24:  Millie is here today for in person follow up.   She is doing really well.  Reports that ultimately the shifts in her opioid regimen did pan out and her pain has lessened.  She did not however increase the methadone to every 8 hrs as recommended on the 28th, instead kept it at th every 12 hrs.  Continues to require Dilaudid 8 mg every 4 hrs routinely.  Feels more alert, less groggy on the methadone as compared to the Opana.  Some slight increase in neuropathic sx in her left hand, more difficulty with  but no significant worsening of her pain.  Gabapentin was prescribed today at oncology visit but she is taking the Lyrica TID as prescribed.      8/12/24:  Millie is seen virtually today for routine fu.   Reports her right hip pain spikes in the morning and evening time, and that the Dilaudid 8 mg is not lessening the pain at all, it is no longer effective.  Reviewed methadone schedule- taking at 7:30/8am, 2 pm and at

## 2024-11-17 ENCOUNTER — PATIENT MESSAGE (OUTPATIENT)
Age: 39
End: 2024-11-17

## 2024-11-17 DIAGNOSIS — G89.3 CANCER RELATED PAIN: ICD-10-CM

## 2024-11-18 ENCOUNTER — APPOINTMENT (OUTPATIENT)
Facility: HOSPITAL | Age: 39
End: 2024-11-18
Payer: COMMERCIAL

## 2024-11-18 RX ORDER — ONDANSETRON 2 MG/ML
8 INJECTION INTRAMUSCULAR; INTRAVENOUS
OUTPATIENT
Start: 2024-11-25

## 2024-11-18 RX ORDER — HEPARIN 100 UNIT/ML
500 SYRINGE INTRAVENOUS PRN
OUTPATIENT
Start: 2024-11-25

## 2024-11-18 RX ORDER — EPINEPHRINE 1 MG/ML
0.3 INJECTION, SOLUTION INTRAMUSCULAR; SUBCUTANEOUS PRN
OUTPATIENT
Start: 2024-11-25

## 2024-11-18 RX ORDER — SODIUM CHLORIDE 0.9 % (FLUSH) 0.9 %
5-40 SYRINGE (ML) INJECTION PRN
OUTPATIENT
Start: 2024-11-25

## 2024-11-18 RX ORDER — SODIUM CHLORIDE 9 MG/ML
5-250 INJECTION, SOLUTION INTRAVENOUS PRN
OUTPATIENT
Start: 2024-11-25

## 2024-11-18 RX ORDER — ALBUTEROL SULFATE 90 UG/1
4 INHALANT RESPIRATORY (INHALATION) PRN
OUTPATIENT
Start: 2024-11-25

## 2024-11-18 RX ORDER — SODIUM CHLORIDE 9 MG/ML
INJECTION, SOLUTION INTRAVENOUS CONTINUOUS
OUTPATIENT
Start: 2024-11-25

## 2024-11-18 RX ORDER — DIPHENHYDRAMINE HYDROCHLORIDE 50 MG/ML
50 INJECTION INTRAMUSCULAR; INTRAVENOUS
OUTPATIENT
Start: 2024-11-25

## 2024-11-18 RX ORDER — HYDROCORTISONE SODIUM SUCCINATE 100 MG/2ML
100 INJECTION INTRAMUSCULAR; INTRAVENOUS
OUTPATIENT
Start: 2024-11-25

## 2024-11-18 RX ORDER — ACETAMINOPHEN 325 MG/1
650 TABLET ORAL
OUTPATIENT
Start: 2024-11-25

## 2024-11-18 RX ORDER — 0.9 % SODIUM CHLORIDE 0.9 %
1000 INTRAVENOUS SOLUTION INTRAVENOUS ONCE
Start: 2024-11-25 | End: 2024-11-25

## 2024-11-18 RX ORDER — OXYCODONE HYDROCHLORIDE 30 MG/1
30 TABLET ORAL EVERY 4 HOURS PRN
Qty: 180 TABLET | Refills: 0 | Status: SHIPPED | OUTPATIENT
Start: 2024-11-19 | End: 2024-12-19

## 2024-11-18 NOTE — TELEPHONE ENCOUNTER
Palliative Medicine Clinic   [place]: [phone] ([phone])    Patient Name: Millie King  YOB: 1985    Medication Refill Request    Patient is scheduled for follow up:  [x]  YES  []   NO  Next [facility] Med Clinic Visit: 12/26/24    PDMP reviewed:  [x] YES   []  System down / Unable  []  NO- Patient fills out of state    Medication:Oxycodone IR 30 mg  Dose and directions:1 every 4 as needed  Number dispensed:180; 30 days  Date filled (PDMP or Pharmacy):10/21  # left:    Appropriate for refill:  [x]  YES  []  Early Request - Requires MD/NP Review      Other pertinent information for prescriber:  Pend to Dr. Nieves for review and approval.

## 2024-11-20 DIAGNOSIS — C79.31 MALIGNANT NEOPLASM OF BREAST METASTATIC TO BRAIN, UNSPECIFIED LATERALITY (HCC): ICD-10-CM

## 2024-11-20 DIAGNOSIS — C50.919 MALIGNANT NEOPLASM OF BREAST METASTATIC TO BRAIN, UNSPECIFIED LATERALITY (HCC): ICD-10-CM

## 2024-11-20 RX ORDER — TUCATINIB 150 MG/1
TABLET ORAL
Qty: 60 TABLET | Refills: 5 | Status: ACTIVE | OUTPATIENT
Start: 2024-11-20

## 2024-11-25 ENCOUNTER — HOSPITAL ENCOUNTER (OUTPATIENT)
Facility: HOSPITAL | Age: 39
Setting detail: INFUSION SERIES
Discharge: HOME | End: 2024-11-25
Payer: COMMERCIAL

## 2024-11-25 ENCOUNTER — CLINICAL DOCUMENTATION (OUTPATIENT)
Age: 39
End: 2024-11-25

## 2024-11-25 ENCOUNTER — OFFICE VISIT (OUTPATIENT)
Age: 39
End: 2024-11-25
Payer: COMMERCIAL

## 2024-11-25 VITALS
TEMPERATURE: 98 F | SYSTOLIC BLOOD PRESSURE: 115 MMHG | BODY MASS INDEX: 39.69 KG/M2 | HEART RATE: 85 BPM | WEIGHT: 217 LBS | RESPIRATION RATE: 16 BRPM | DIASTOLIC BLOOD PRESSURE: 74 MMHG | OXYGEN SATURATION: 95 %

## 2024-11-25 VITALS
SYSTOLIC BLOOD PRESSURE: 115 MMHG | HEART RATE: 85 BPM | OXYGEN SATURATION: 95 % | RESPIRATION RATE: 18 BRPM | DIASTOLIC BLOOD PRESSURE: 74 MMHG | TEMPERATURE: 98 F

## 2024-11-25 DIAGNOSIS — T45.1X5A CHEMOTHERAPY-INDUCED FATIGUE: ICD-10-CM

## 2024-11-25 DIAGNOSIS — C79.31 METASTASIS TO BRAIN (HCC): ICD-10-CM

## 2024-11-25 DIAGNOSIS — Z09 CHEMOTHERAPY FOLLOW-UP EXAMINATION: ICD-10-CM

## 2024-11-25 DIAGNOSIS — Z95.828 PORT-A-CATH IN PLACE: ICD-10-CM

## 2024-11-25 DIAGNOSIS — R19.7 DIARRHEA, UNSPECIFIED TYPE: ICD-10-CM

## 2024-11-25 DIAGNOSIS — G62.0 CHEMOTHERAPY-INDUCED NEUROPATHY (HCC): ICD-10-CM

## 2024-11-25 DIAGNOSIS — T45.1X5A CHEMOTHERAPY-INDUCED NEUROPATHY (HCC): ICD-10-CM

## 2024-11-25 DIAGNOSIS — C50.919 MALIGNANT NEOPLASM OF BREAST METASTATIC TO BRAIN, UNSPECIFIED LATERALITY (HCC): Primary | ICD-10-CM

## 2024-11-25 DIAGNOSIS — R53.83 CHEMOTHERAPY-INDUCED FATIGUE: ICD-10-CM

## 2024-11-25 DIAGNOSIS — C79.51 MALIGNANT NEOPLASM METASTATIC TO BONE (HCC): Primary | ICD-10-CM

## 2024-11-25 DIAGNOSIS — C79.31 MALIGNANT NEOPLASM OF BREAST METASTATIC TO BRAIN, UNSPECIFIED LATERALITY (HCC): ICD-10-CM

## 2024-11-25 DIAGNOSIS — C78.7 MALIGNANT NEOPLASM OF BREAST METASTATIC TO LIVER (HCC): ICD-10-CM

## 2024-11-25 DIAGNOSIS — C50.919 MALIGNANT NEOPLASM OF BREAST METASTATIC TO BRAIN, UNSPECIFIED LATERALITY (HCC): ICD-10-CM

## 2024-11-25 DIAGNOSIS — C78.01 MALIGNANT NEOPLASM METASTATIC TO BOTH LUNGS (HCC): ICD-10-CM

## 2024-11-25 DIAGNOSIS — C78.02 MALIGNANT NEOPLASM METASTATIC TO BOTH LUNGS (HCC): ICD-10-CM

## 2024-11-25 DIAGNOSIS — C79.31 MALIGNANT NEOPLASM OF BREAST METASTATIC TO BRAIN, UNSPECIFIED LATERALITY (HCC): Primary | ICD-10-CM

## 2024-11-25 DIAGNOSIS — C50.919 MALIGNANT NEOPLASM OF BREAST METASTATIC TO LIVER (HCC): ICD-10-CM

## 2024-11-25 LAB
ALBUMIN SERPL-MCNC: 3.4 G/DL (ref 3.5–5)
ALBUMIN/GLOB SERPL: 1.3 (ref 1.1–2.2)
ALP SERPL-CCNC: 111 U/L (ref 45–117)
ALT SERPL-CCNC: 25 U/L (ref 12–78)
ANION GAP BLD CALC-SCNC: 10.3 MMOL/L (ref 10–20)
ANION GAP SERPL CALC-SCNC: 7 MMOL/L (ref 2–12)
AST SERPL-CCNC: 23 U/L (ref 15–37)
BASOPHILS # BLD: 0 K/UL (ref 0–0.1)
BASOPHILS NFR BLD: 1 % (ref 0–1)
BILIRUB SERPL-MCNC: 0.4 MG/DL (ref 0.2–1)
BUN SERPL-MCNC: 8 MG/DL (ref 6–20)
BUN/CREAT SERPL: 11 (ref 12–20)
CA-I BLD-MCNC: 1.13 MMOL/L (ref 1.15–1.33)
CALCIUM SERPL-MCNC: 8.6 MG/DL (ref 8.5–10.1)
CHLORIDE BLD-SCNC: 105 MMOL/L (ref 98–107)
CHLORIDE SERPL-SCNC: 108 MMOL/L (ref 97–108)
CO2 BLD-SCNC: 22.7 MMOL/L (ref 21–32)
CO2 SERPL-SCNC: 24 MMOL/L (ref 21–32)
CREAT BLD-MCNC: 0.7 MG/DL (ref 0.6–1.3)
CREAT SERPL-MCNC: 0.73 MG/DL (ref 0.55–1.02)
DIFFERENTIAL METHOD BLD: ABNORMAL
EOSINOPHIL # BLD: 0.3 K/UL (ref 0–0.4)
EOSINOPHIL NFR BLD: 6 % (ref 0–7)
ERYTHROCYTE [DISTWIDTH] IN BLOOD BY AUTOMATED COUNT: 14.3 % (ref 11.5–14.5)
GLOBULIN SER CALC-MCNC: 2.7 G/DL (ref 2–4)
GLUCOSE BLD-MCNC: 140 MG/DL (ref 74–99)
GLUCOSE SERPL-MCNC: 123 MG/DL (ref 65–100)
HCT VFR BLD AUTO: 28.2 % (ref 35–47)
HGB BLD-MCNC: 8.8 G/DL (ref 11.5–16)
IMM GRANULOCYTES # BLD AUTO: 0 K/UL (ref 0–0.04)
IMM GRANULOCYTES NFR BLD AUTO: 0 % (ref 0–0.5)
LYMPHOCYTES # BLD: 1.3 K/UL (ref 0.8–3.5)
LYMPHOCYTES NFR BLD: 27 % (ref 12–49)
MAGNESIUM SERPL-MCNC: 1.9 MG/DL (ref 1.6–2.4)
MCH RBC QN AUTO: 28.9 PG (ref 26–34)
MCHC RBC AUTO-ENTMCNC: 31.2 G/DL (ref 30–36.5)
MCV RBC AUTO: 92.8 FL (ref 80–99)
MONOCYTES # BLD: 0.6 K/UL (ref 0–1)
MONOCYTES NFR BLD: 11 % (ref 5–13)
NEUTS SEG # BLD: 2.7 K/UL (ref 1.8–8)
NEUTS SEG NFR BLD: 55 % (ref 32–75)
NRBC # BLD: 0 K/UL (ref 0–0.01)
NRBC BLD-RTO: 0 PER 100 WBC
PHOSPHATE SERPL-MCNC: 3.2 MG/DL (ref 2.6–4.7)
PLATELET # BLD AUTO: 388 K/UL (ref 150–400)
PMV BLD AUTO: 9.6 FL (ref 8.9–12.9)
POTASSIUM BLD-SCNC: 3.7 MMOL/L (ref 3.5–5.1)
POTASSIUM SERPL-SCNC: 3.9 MMOL/L (ref 3.5–5.1)
PROT SERPL-MCNC: 6.1 G/DL (ref 6.4–8.2)
RBC # BLD AUTO: 3.04 M/UL (ref 3.8–5.2)
SERVICE CMNT-IMP: ABNORMAL
SODIUM BLD-SCNC: 138 MMOL/L (ref 136–145)
SODIUM SERPL-SCNC: 139 MMOL/L (ref 136–145)
WBC # BLD AUTO: 5 K/UL (ref 3.6–11)

## 2024-11-25 PROCEDURE — 36415 COLL VENOUS BLD VENIPUNCTURE: CPT

## 2024-11-25 PROCEDURE — 96372 THER/PROPH/DIAG INJ SC/IM: CPT

## 2024-11-25 PROCEDURE — 83735 ASSAY OF MAGNESIUM: CPT

## 2024-11-25 PROCEDURE — 85025 COMPLETE CBC W/AUTO DIFF WBC: CPT

## 2024-11-25 PROCEDURE — 84100 ASSAY OF PHOSPHORUS: CPT

## 2024-11-25 PROCEDURE — 96375 TX/PRO/DX INJ NEW DRUG ADDON: CPT

## 2024-11-25 PROCEDURE — 2580000003 HC RX 258: Performed by: INTERNAL MEDICINE

## 2024-11-25 PROCEDURE — 6360000002 HC RX W HCPCS: Performed by: INTERNAL MEDICINE

## 2024-11-25 PROCEDURE — 36593 DECLOT VASCULAR DEVICE: CPT

## 2024-11-25 PROCEDURE — 99215 OFFICE O/P EST HI 40 MIN: CPT | Performed by: INTERNAL MEDICINE

## 2024-11-25 PROCEDURE — 96401 CHEMO ANTI-NEOPL SQ/IM: CPT

## 2024-11-25 PROCEDURE — 80053 COMPREHEN METABOLIC PANEL: CPT

## 2024-11-25 PROCEDURE — 80047 BASIC METABLC PNL IONIZED CA: CPT

## 2024-11-25 RX ORDER — ONDANSETRON 2 MG/ML
8 INJECTION INTRAMUSCULAR; INTRAVENOUS
OUTPATIENT
Start: 2025-01-06

## 2024-11-25 RX ORDER — ACETAMINOPHEN 325 MG/1
650 TABLET ORAL
OUTPATIENT
Start: 2025-01-06

## 2024-11-25 RX ORDER — HYDROCORTISONE SODIUM SUCCINATE 100 MG/2ML
100 INJECTION INTRAMUSCULAR; INTRAVENOUS
OUTPATIENT
Start: 2025-01-06

## 2024-11-25 RX ORDER — EPINEPHRINE 1 MG/ML
0.3 INJECTION, SOLUTION INTRAMUSCULAR; SUBCUTANEOUS PRN
OUTPATIENT
Start: 2025-01-06

## 2024-11-25 RX ORDER — SODIUM CHLORIDE 9 MG/ML
INJECTION, SOLUTION INTRAVENOUS CONTINUOUS
OUTPATIENT
Start: 2025-01-06

## 2024-11-25 RX ORDER — ALBUTEROL SULFATE 90 UG/1
4 INHALANT RESPIRATORY (INHALATION) PRN
OUTPATIENT
Start: 2025-01-06

## 2024-11-25 RX ORDER — DIPHENHYDRAMINE HYDROCHLORIDE 50 MG/ML
50 INJECTION INTRAMUSCULAR; INTRAVENOUS
OUTPATIENT
Start: 2025-01-06

## 2024-11-25 RX ORDER — ALBUTEROL SULFATE 90 UG/1
4 INHALANT RESPIRATORY (INHALATION) PRN
Status: DISCONTINUED | OUTPATIENT
Start: 2024-11-25 | End: 2024-11-26 | Stop reason: HOSPADM

## 2024-11-25 RX ADMIN — DENOSUMAB 120 MG: 120 INJECTION SUBCUTANEOUS at 09:36

## 2024-11-25 RX ADMIN — WATER 2 MG: 1 INJECTION INTRAMUSCULAR; INTRAVENOUS; SUBCUTANEOUS at 08:24

## 2024-11-25 RX ADMIN — TRASTUZUMAB AND HYALURONIDASE-OYSK 600 MG: 600; 10000 INJECTION, SOLUTION SUBCUTANEOUS at 09:45

## 2024-11-25 NOTE — PLAN OF CARE
[facility] Chemo Progress Note    Date: 2024    Name: Millie King    MRN: [id number]         : 1985    Ms. King arrived ambulatory and in no distress for cycle 14 day 1 of Herceptin Hylecta and Xgeva.      Assessment was completed and documented in flowsheets. No acute concerns at this time. Port accessed without difficulty, no blood return initially.  Alteplase instilled and blood return noted.    Follow Up: Proceed with treatment  ECHO every 12-16 weeks per plan. Last ECHO 24. OK to treat today per LAKSHMI Ocampo NP.    Ms. King's vitals were reviewed.  Patient Vitals for the past 12 hrs:   Temp Pulse Resp BP SpO2   24 1015 98 °F (36.7 °C) 85 18 115/74 95 %       Lab results were obtained and reviewed.  Labs within parameter for treatment.   Recent Results (from the past 12 hour(s))   Phosphorus    Collection Time: 24  8:07 AM   Result Value Ref Range    Phosphorus 3.2 2.6 - 4.7 MG/DL   Magnesium    Collection Time: 24  8:07 AM   Result Value Ref Range    Magnesium 1.9 1.6 - 2.4 mg/dL   Comprehensive Metabolic Panel    Collection Time: 24  8:07 AM   Result Value Ref Range    Sodium 139 136 - 145 mmol/L    Potassium 3.9 3.5 - 5.1 mmol/L    Chloride 108 97 - 108 mmol/L    CO2 24 21 - 32 mmol/L    Anion Gap 7 2 - 12 mmol/L    Glucose 123 (H) 65 - 100 mg/dL    BUN 8 6 - 20 MG/DL    Creatinine 0.73 0.55 - 1.02 MG/DL    BUN/Creatinine Ratio 11 (L) 12 - 20      Est, Glom Filt Rate >90 >60 ml/min/1.73m2    Calcium 8.6 8.5 - 10.1 MG/DL    Total Bilirubin 0.4 0.2 - 1.0 MG/DL    ALT 25 12 - 78 U/L    AST 23 15 - 37 U/L    Alk Phosphatase 111 45 - 117 U/L    Total Protein 6.1 (L) 6.4 - 8.2 g/dL    Albumin 3.4 (L) 3.5 - 5.0 g/dL    Globulin 2.7 2.0 - 4.0 g/dL    Albumin/Globulin Ratio 1.3 1.1 - 2.2     CBC with Auto Differential    Collection Time: 24  8:07 AM   Result Value Ref Range    WBC 5.0 3.6 - 11.0 K/uL    RBC 3.04 (L) 3.80 - 5.20 M/uL    Hemoglobin 8.8 (L) 11.5 -

## 2024-11-25 NOTE — PROGRESS NOTES
Oral Chemotherapy      Millie King is a  39 y.o.female  diagnosed with breast cancer . Ms. King is being treated with tucatinib, capecitabine, trastuzumab.      Medication name: capecitabine     Dose:  1500 mg (dose reduced 4/15 with Cycle 4)  Frequency: twice a day  Administration schedule: for 14 day on, 7 days off every 21 days        Medication name: tucatinib     Dose:  250 mg  (dose reduced 4/15 with Cycle 4)  Frequency: twice a day        Ordering provider: Fang Cespedes DO  Start date: 11/25/24 (Cycle 13)      ANC 2.7 - ok to start treatment tonight.     Lab Results   Component Value Date    WBC 5.0 11/25/2024    HGB 8.8 (L) 11/25/2024    HCT 28.2 (L) 11/25/2024    MCV 92.8 11/25/2024     11/25/2024    LYMPHOPCT 27 11/25/2024    RBC 3.04 (L) 11/25/2024    MCH 28.9 11/25/2024    MCHC 31.2 11/25/2024    RDW 14.3 11/25/2024     Lab Results   Component Value Date    NEUTROABS 2.7 11/25/2024         Expected follow up date: Labs/office visit each cycle. Next cycle start on 12/16/24     Patient  provided with an Oral Chemotherapy Journal.              Beryl Sanchez, BETID, BCOP, BCPS    For Pharmacy Admin Tracking Only    Program: Medical Group  CPA in place:  Yes  Recommendation Provided To: Patient/Caregiver: 1 via In person    Intervention Accepted By: Patient/Caregiver: 1    Time Spent (min): 15

## 2024-11-25 NOTE — PROGRESS NOTES
Cancer [place] at [facility]  [address], [address] [place] [address]  W: [phone]  F: [phone]    Reason for Visit:   Millie King is a 39 y.o. female seen today in office for follow up of Metastatic Breast Cancer.    Treatment History:   Abd US 10/3/22: There are multiple liver masses with 2 representative masses in  the right liver measuring 2.4 x 2.6 cm and 1.6 x 2.1 cm  CT A/P 10/3/22: Spiculated left breast mass suspicious for malignancy. Multiple lung and liver metastases. Multiple top normal size retroperitoneal lymph nodes are nonspecific with metastatic disease  not excluded  CT Chest 10/4/22: Indeterminate 1.4 x 1.9 cm left breast nodule. Primary malignancy not excluded. Mammographic correlation advised. Diffuse bilateral pulmonary nodules most compatible with metastatic disease.Partially imaged hepatic hypodensities concerning  for metastatic disease. Indeterminate 6 mm T10 lytic lesion, possibly benign. Attention on follow-up  Liver Biopsy 10/4/22: PATH - Metastatic adenocarcinoma, consistent with breast primary  ER/SD negative, Her2 3+  Port placed on 10/13/2   Palliative Taxol+Herceptin+Perjeta 10/17/22 - 10/31/22  Switched to Palliative Taxotere+Herceptin+Perjeta on 11/7/22 - 1/22/24   DR Taxotere to 60 mg/m2 with Cycle 8  CT C/A/P 12/27/22: Imaging findings consistent with significant interval response to therapy. Significantly diminished pulmonary metastatic disease  burden with numerous resolved or nearly resolved pulmonary nodules. Significantly diminished size of hepatic masses. Left breast lesion is not demonstrated on the current exam  Right Hip XRAY 1/2/23: Right basicervical femoral neck fracture with medial angulation  CT Pelv 1/2/23: Re-demonstrated acute right basicervical femoral neck fracture, with findings concerning for pathologic etiology  XR Femur 1/2/23: Mildly displaced right femoral neck fracture  Right Hip IMN with Ortho Dr Bermudez   Bone

## 2024-11-25 NOTE — PROGRESS NOTES
Millie King is a 39 y.o. female    Chief Complaint   Patient presents with    Follow-up     Metastatic Breast Cancer     1. Have you been to the ER, urgent care clinic since your last visit?  Hospitalized since your last visit?No    2. Have you seen or consulted any other health care providers outside of the [facility] since your last visit?  Include any pap smears or colon screening. Yes, Dr. Dick Campa [facility]

## 2024-12-09 RX ORDER — ALBUTEROL SULFATE 90 UG/1
4 INHALANT RESPIRATORY (INHALATION) PRN
Status: CANCELLED | OUTPATIENT
Start: 2024-12-16

## 2024-12-09 RX ORDER — SODIUM CHLORIDE 9 MG/ML
5-250 INJECTION, SOLUTION INTRAVENOUS PRN
Status: CANCELLED | OUTPATIENT
Start: 2024-12-16

## 2024-12-09 RX ORDER — SODIUM CHLORIDE 0.9 % (FLUSH) 0.9 %
5-40 SYRINGE (ML) INJECTION PRN
Status: CANCELLED | OUTPATIENT
Start: 2024-12-16

## 2024-12-09 RX ORDER — ACETAMINOPHEN 325 MG/1
650 TABLET ORAL
Status: CANCELLED | OUTPATIENT
Start: 2024-12-16

## 2024-12-09 RX ORDER — ONDANSETRON 2 MG/ML
8 INJECTION INTRAMUSCULAR; INTRAVENOUS
Status: CANCELLED | OUTPATIENT
Start: 2024-12-16

## 2024-12-09 RX ORDER — SODIUM CHLORIDE 9 MG/ML
INJECTION, SOLUTION INTRAVENOUS CONTINUOUS
Status: CANCELLED | OUTPATIENT
Start: 2024-12-16

## 2024-12-09 RX ORDER — DIPHENHYDRAMINE HYDROCHLORIDE 50 MG/ML
50 INJECTION INTRAMUSCULAR; INTRAVENOUS
Status: CANCELLED | OUTPATIENT
Start: 2024-12-16

## 2024-12-09 RX ORDER — HYDROCORTISONE SODIUM SUCCINATE 100 MG/2ML
100 INJECTION INTRAMUSCULAR; INTRAVENOUS
Status: CANCELLED | OUTPATIENT
Start: 2024-12-16

## 2024-12-09 RX ORDER — 0.9 % SODIUM CHLORIDE 0.9 %
1000 INTRAVENOUS SOLUTION INTRAVENOUS ONCE
Status: CANCELLED
Start: 2024-12-16 | End: 2024-12-16

## 2024-12-09 RX ORDER — HEPARIN 100 UNIT/ML
500 SYRINGE INTRAVENOUS PRN
Status: CANCELLED | OUTPATIENT
Start: 2024-12-16

## 2024-12-09 RX ORDER — EPINEPHRINE 1 MG/ML
0.3 INJECTION, SOLUTION INTRAMUSCULAR; SUBCUTANEOUS PRN
Status: CANCELLED | OUTPATIENT
Start: 2024-12-16

## 2024-12-10 ENCOUNTER — TELEPHONE (OUTPATIENT)
Age: 39
End: 2024-12-10

## 2024-12-10 NOTE — TELEPHONE ENCOUNTER
FU call to patient, ID verified X 2, [phone].    Reminded patient to contact [facility] to schedule ECHO ordered in 10/2024, has [facility]IC/OV on 12/16/24.  Understanding verbalized, confirmed that she has phone number to [facility], understands can schedule anywhere in [facility] for first available appt.  Update to Provider.

## 2024-12-11 ENCOUNTER — HOSPITAL ENCOUNTER (OUTPATIENT)
Facility: HOSPITAL | Age: 39
Discharge: HOME | End: 2024-12-13
Attending: SPECIALIST
Payer: COMMERCIAL

## 2024-12-11 VITALS
SYSTOLIC BLOOD PRESSURE: 162 MMHG | DIASTOLIC BLOOD PRESSURE: 82 MMHG | WEIGHT: 215 LBS | BODY MASS INDEX: 39.56 KG/M2 | HEIGHT: 62 IN

## 2024-12-11 DIAGNOSIS — Z79.899 ENCOUNTER FOR MONITORING CARDIOTOXIC DRUG THERAPY: ICD-10-CM

## 2024-12-11 DIAGNOSIS — R07.2 PRECORDIAL CHEST PAIN: ICD-10-CM

## 2024-12-11 DIAGNOSIS — R00.0 TACHYCARDIA: ICD-10-CM

## 2024-12-11 DIAGNOSIS — C50.919 MALIGNANT NEOPLASM OF BREAST METASTATIC TO LIVER (HCC): ICD-10-CM

## 2024-12-11 DIAGNOSIS — R06.02 SOB (SHORTNESS OF BREATH): ICD-10-CM

## 2024-12-11 DIAGNOSIS — Z51.81 ENCOUNTER FOR MONITORING CARDIOTOXIC DRUG THERAPY: ICD-10-CM

## 2024-12-11 DIAGNOSIS — C78.7 MALIGNANT NEOPLASM OF BREAST METASTATIC TO LIVER (HCC): ICD-10-CM

## 2024-12-11 LAB
ECHO BSA: 2.07 M2
ECHO LV EDV A2C: 79 ML
ECHO LV EDV A4C: 97 ML
ECHO LV EDV BP: 87 ML (ref 56–104)
ECHO LV EDV INDEX A4C: 49 ML/M2
ECHO LV EDV INDEX BP: 44 ML/M2
ECHO LV EDV NDEX A2C: 40 ML/M2
ECHO LV EJECTION FRACTION A2C: 61 %
ECHO LV EJECTION FRACTION A4C: 65 %
ECHO LV EJECTION FRACTION BIPLANE: 63 % (ref 55–100)
ECHO LV ESV A2C: 30 ML
ECHO LV ESV A4C: 33 ML
ECHO LV ESV BP: 32 ML (ref 19–49)
ECHO LV ESV INDEX A2C: 15 ML/M2
ECHO LV ESV INDEX A4C: 17 ML/M2
ECHO LV ESV INDEX BP: 16 ML/M2
ECHO LV FRACTIONAL SHORTENING: 29 % (ref 28–44)
ECHO LV GLOBAL LONGITUDINAL STRAIN (GLS): -21 %
ECHO LV INTERNAL DIMENSION DIASTOLE INDEX: 2.08 CM/M2
ECHO LV INTERNAL DIMENSION DIASTOLIC: 4.1 CM (ref 3.9–5.3)
ECHO LV INTERNAL DIMENSION SYSTOLIC INDEX: 1.47 CM/M2
ECHO LV INTERNAL DIMENSION SYSTOLIC: 2.9 CM
ECHO LV IVSD: 0.9 CM (ref 0.6–0.9)
ECHO LV MASS 2D: 123 G (ref 67–162)
ECHO LV MASS INDEX 2D: 62.4 G/M2 (ref 43–95)
ECHO LV POSTERIOR WALL DIASTOLIC: 1 CM (ref 0.6–0.9)
ECHO LV RELATIVE WALL THICKNESS RATIO: 0.49

## 2024-12-11 PROCEDURE — 93356 MYOCRD STRAIN IMG SPCKL TRCK: CPT

## 2024-12-11 NOTE — RESULT ENCOUNTER NOTE
Patient previously seen in our office with questionable LVH.  I went back and looked at the 7/26/2024 echo  and do not feel that there is LVH on that echocardiogram.  This current echocardiogram shows normal wall thickness and normal EF.  Normal GLS.  Please inform patient that echo's are normal and she does not need specific follow-up or to change to a new echo in our office since she had this 1 done at the hospital.  If she is doing well she does not need to see us in April for follow-up she can just follow-up as needed on referral from the oncologist.  Cancel appointment with patient.  Future Appointments  12/16/2024 8:00 AM    MOE CHEMO CHAIR 4          BREMOSINF           [facility]  12/16/2024 8:15 AM    Beryl Ocampo, APR* MEDONC              BS AMB  12/26/2024 2:00 PM    Sonia Nieves MD       [facility]               BS AMB  1/2/2025   1:15 PM    [facility] MRI 1                  [facility]             [place]  1/6/2025   8:00 AM    MOE CHEMO CHAIR 4          BREMOSINF           [facility]  1/6/2025   8:15 AM    Fang Manrique DO      MEDONC              BS AMB  1/14/2025  10:40 AM   Christy Enriquez MD      [facility]                 BS AMB  1/27/2025  8:00 AM    MOE CHEMO CHAIR 4          BREMOSINF           [facility]  1/27/2025  8:15 AM    Fang Manrique DO      MEDONC              BS AMB  4/15/2025  1:40 PM    Tsering Herrera MD       [facility]                 BS AMB

## 2024-12-16 ENCOUNTER — OFFICE VISIT (OUTPATIENT)
Age: 39
End: 2024-12-16
Payer: COMMERCIAL

## 2024-12-16 ENCOUNTER — HOSPITAL ENCOUNTER (OUTPATIENT)
Facility: HOSPITAL | Age: 39
Setting detail: INFUSION SERIES
Discharge: HOME | End: 2024-12-16
Payer: COMMERCIAL

## 2024-12-16 ENCOUNTER — CLINICAL DOCUMENTATION (OUTPATIENT)
Age: 39
End: 2024-12-16

## 2024-12-16 VITALS
RESPIRATION RATE: 16 BRPM | OXYGEN SATURATION: 97 % | HEART RATE: 86 BPM | TEMPERATURE: 97.9 F | DIASTOLIC BLOOD PRESSURE: 78 MMHG | SYSTOLIC BLOOD PRESSURE: 120 MMHG

## 2024-12-16 VITALS
HEART RATE: 86 BPM | WEIGHT: 219 LBS | RESPIRATION RATE: 16 BRPM | BODY MASS INDEX: 40.06 KG/M2 | OXYGEN SATURATION: 97 % | TEMPERATURE: 97.9 F | SYSTOLIC BLOOD PRESSURE: 120 MMHG | DIASTOLIC BLOOD PRESSURE: 78 MMHG

## 2024-12-16 DIAGNOSIS — C78.01 MALIGNANT NEOPLASM METASTATIC TO BOTH LUNGS (HCC): ICD-10-CM

## 2024-12-16 DIAGNOSIS — T45.1X5A CHEMOTHERAPY-INDUCED FATIGUE: ICD-10-CM

## 2024-12-16 DIAGNOSIS — Z87.311 HISTORY OF PATHOLOGIC FRACTURE: ICD-10-CM

## 2024-12-16 DIAGNOSIS — T45.1X5A CHEMOTHERAPY-INDUCED NEUROPATHY (HCC): ICD-10-CM

## 2024-12-16 DIAGNOSIS — G62.0 CHEMOTHERAPY-INDUCED NEUROPATHY (HCC): ICD-10-CM

## 2024-12-16 DIAGNOSIS — C79.51 MALIGNANT NEOPLASM METASTATIC TO BONE (HCC): Primary | ICD-10-CM

## 2024-12-16 DIAGNOSIS — C78.02 MALIGNANT NEOPLASM METASTATIC TO BOTH LUNGS (HCC): ICD-10-CM

## 2024-12-16 DIAGNOSIS — R53.83 CHEMOTHERAPY-INDUCED FATIGUE: ICD-10-CM

## 2024-12-16 DIAGNOSIS — C50.919 MALIGNANT NEOPLASM OF BREAST METASTATIC TO BRAIN, UNSPECIFIED LATERALITY (HCC): ICD-10-CM

## 2024-12-16 DIAGNOSIS — Z95.828 PORT-A-CATH IN PLACE: ICD-10-CM

## 2024-12-16 DIAGNOSIS — Z79.899 ENCOUNTER FOR MONITORING CARDIOTOXIC DRUG THERAPY: ICD-10-CM

## 2024-12-16 DIAGNOSIS — C79.31 METASTASIS TO BRAIN (HCC): ICD-10-CM

## 2024-12-16 DIAGNOSIS — G89.3 CANCER RELATED PAIN: ICD-10-CM

## 2024-12-16 DIAGNOSIS — C79.31 MALIGNANT NEOPLASM OF BREAST METASTATIC TO BRAIN, UNSPECIFIED LATERALITY (HCC): Primary | ICD-10-CM

## 2024-12-16 DIAGNOSIS — C78.7 MALIGNANT NEOPLASM OF BREAST METASTATIC TO LIVER (HCC): ICD-10-CM

## 2024-12-16 DIAGNOSIS — R19.7 DIARRHEA, UNSPECIFIED TYPE: ICD-10-CM

## 2024-12-16 DIAGNOSIS — C79.31 MALIGNANT NEOPLASM OF BREAST METASTATIC TO BRAIN, UNSPECIFIED LATERALITY (HCC): ICD-10-CM

## 2024-12-16 DIAGNOSIS — R06.09 DOE (DYSPNEA ON EXERTION): ICD-10-CM

## 2024-12-16 DIAGNOSIS — C50.919 MALIGNANT NEOPLASM OF BREAST METASTATIC TO BRAIN, UNSPECIFIED LATERALITY (HCC): Primary | ICD-10-CM

## 2024-12-16 DIAGNOSIS — C50.919 MALIGNANT NEOPLASM OF BREAST METASTATIC TO LIVER (HCC): ICD-10-CM

## 2024-12-16 DIAGNOSIS — Z51.81 ENCOUNTER FOR MONITORING CARDIOTOXIC DRUG THERAPY: ICD-10-CM

## 2024-12-16 DIAGNOSIS — Z09 CHEMOTHERAPY FOLLOW-UP EXAMINATION: ICD-10-CM

## 2024-12-16 LAB
ALBUMIN SERPL-MCNC: 3.5 G/DL (ref 3.5–5)
ALBUMIN/GLOB SERPL: 1.3 (ref 1.1–2.2)
ALP SERPL-CCNC: 103 U/L (ref 45–117)
ALT SERPL-CCNC: 26 U/L (ref 12–78)
ANION GAP SERPL CALC-SCNC: 2 MMOL/L (ref 2–12)
AST SERPL-CCNC: 21 U/L (ref 15–37)
BASOPHILS # BLD: 0 K/UL (ref 0–0.1)
BASOPHILS NFR BLD: 1 % (ref 0–1)
BILIRUB SERPL-MCNC: 0.3 MG/DL (ref 0.2–1)
BUN SERPL-MCNC: 8 MG/DL (ref 6–20)
BUN/CREAT SERPL: 13 (ref 12–20)
CALCIUM SERPL-MCNC: 8.1 MG/DL (ref 8.5–10.1)
CHLORIDE SERPL-SCNC: 109 MMOL/L (ref 97–108)
CO2 SERPL-SCNC: 29 MMOL/L (ref 21–32)
CREAT SERPL-MCNC: 0.62 MG/DL (ref 0.55–1.02)
DIFFERENTIAL METHOD BLD: ABNORMAL
EOSINOPHIL # BLD: 0.3 K/UL (ref 0–0.4)
EOSINOPHIL NFR BLD: 4 % (ref 0–7)
ERYTHROCYTE [DISTWIDTH] IN BLOOD BY AUTOMATED COUNT: 15.9 % (ref 11.5–14.5)
GLOBULIN SER CALC-MCNC: 2.8 G/DL (ref 2–4)
GLUCOSE SERPL-MCNC: 103 MG/DL (ref 65–100)
HCT VFR BLD AUTO: 29.6 % (ref 35–47)
HGB BLD-MCNC: 9.2 G/DL (ref 11.5–16)
IMM GRANULOCYTES # BLD AUTO: 0 K/UL (ref 0–0.04)
IMM GRANULOCYTES NFR BLD AUTO: 0 % (ref 0–0.5)
LYMPHOCYTES # BLD: 1.9 K/UL (ref 0.8–3.5)
LYMPHOCYTES NFR BLD: 27 % (ref 12–49)
MCH RBC QN AUTO: 28.3 PG (ref 26–34)
MCHC RBC AUTO-ENTMCNC: 31.1 G/DL (ref 30–36.5)
MCV RBC AUTO: 91.1 FL (ref 80–99)
MONOCYTES # BLD: 0.6 K/UL (ref 0–1)
MONOCYTES NFR BLD: 8 % (ref 5–13)
NEUTS SEG # BLD: 4.2 K/UL (ref 1.8–8)
NEUTS SEG NFR BLD: 60 % (ref 32–75)
NRBC # BLD: 0 K/UL (ref 0–0.01)
NRBC BLD-RTO: 0 PER 100 WBC
PLATELET # BLD AUTO: 298 K/UL (ref 150–400)
PMV BLD AUTO: 10.1 FL (ref 8.9–12.9)
POTASSIUM SERPL-SCNC: 3.8 MMOL/L (ref 3.5–5.1)
PROT SERPL-MCNC: 6.3 G/DL (ref 6.4–8.2)
RBC # BLD AUTO: 3.25 M/UL (ref 3.8–5.2)
SODIUM SERPL-SCNC: 140 MMOL/L (ref 136–145)
WBC # BLD AUTO: 6.9 K/UL (ref 3.6–11)

## 2024-12-16 PROCEDURE — 99215 OFFICE O/P EST HI 40 MIN: CPT | Performed by: NURSE PRACTITIONER

## 2024-12-16 PROCEDURE — 36415 COLL VENOUS BLD VENIPUNCTURE: CPT

## 2024-12-16 PROCEDURE — 6360000002 HC RX W HCPCS: Performed by: INTERNAL MEDICINE

## 2024-12-16 PROCEDURE — 80053 COMPREHEN METABOLIC PANEL: CPT

## 2024-12-16 PROCEDURE — 96360 HYDRATION IV INFUSION INIT: CPT

## 2024-12-16 PROCEDURE — 2580000003 HC RX 258: Performed by: NURSE PRACTITIONER

## 2024-12-16 PROCEDURE — 96401 CHEMO ANTI-NEOPL SQ/IM: CPT

## 2024-12-16 PROCEDURE — 85025 COMPLETE CBC W/AUTO DIFF WBC: CPT

## 2024-12-16 RX ORDER — ONDANSETRON 2 MG/ML
8 INJECTION INTRAMUSCULAR; INTRAVENOUS
OUTPATIENT
Start: 2024-12-16

## 2024-12-16 RX ORDER — HYDROCORTISONE SODIUM SUCCINATE 100 MG/2ML
100 INJECTION INTRAMUSCULAR; INTRAVENOUS
OUTPATIENT
Start: 2024-12-16

## 2024-12-16 RX ORDER — 0.9 % SODIUM CHLORIDE 0.9 %
1000 INTRAVENOUS SOLUTION INTRAVENOUS ONCE
OUTPATIENT
Start: 2024-12-16 | End: 2024-12-16

## 2024-12-16 RX ORDER — EPINEPHRINE 1 MG/ML
0.3 INJECTION, SOLUTION INTRAMUSCULAR; SUBCUTANEOUS PRN
OUTPATIENT
Start: 2024-12-16

## 2024-12-16 RX ORDER — DIPHENHYDRAMINE HYDROCHLORIDE 50 MG/ML
50 INJECTION INTRAMUSCULAR; INTRAVENOUS
OUTPATIENT
Start: 2024-12-16

## 2024-12-16 RX ORDER — 0.9 % SODIUM CHLORIDE 0.9 %
1000 INTRAVENOUS SOLUTION INTRAVENOUS ONCE
Status: COMPLETED | OUTPATIENT
Start: 2024-12-16 | End: 2024-12-16

## 2024-12-16 RX ORDER — HEPARIN 100 UNIT/ML
500 SYRINGE INTRAVENOUS PRN
OUTPATIENT
Start: 2024-12-16

## 2024-12-16 RX ORDER — SODIUM CHLORIDE 9 MG/ML
INJECTION, SOLUTION INTRAVENOUS CONTINUOUS
OUTPATIENT
Start: 2024-12-16

## 2024-12-16 RX ORDER — SODIUM CHLORIDE 9 MG/ML
5-250 INJECTION, SOLUTION INTRAVENOUS PRN
OUTPATIENT
Start: 2024-12-16

## 2024-12-16 RX ORDER — SODIUM CHLORIDE 0.9 % (FLUSH) 0.9 %
5-40 SYRINGE (ML) INJECTION PRN
OUTPATIENT
Start: 2024-12-16

## 2024-12-16 RX ORDER — ACETAMINOPHEN 325 MG/1
650 TABLET ORAL
OUTPATIENT
Start: 2024-12-16

## 2024-12-16 RX ORDER — ALBUTEROL SULFATE 90 UG/1
4 INHALANT RESPIRATORY (INHALATION) PRN
OUTPATIENT
Start: 2024-12-16

## 2024-12-16 RX ADMIN — SODIUM CHLORIDE 1000 ML: 9 INJECTION, SOLUTION INTRAVENOUS at 08:53

## 2024-12-16 RX ADMIN — TRASTUZUMAB AND HYALURONIDASE-OYSK 600 MG: 600; 10000 INJECTION, SOLUTION SUBCUTANEOUS at 08:55

## 2024-12-16 ASSESSMENT — PAIN SCALES - GENERAL: PAINLEVEL_OUTOF10: 0

## 2024-12-16 NOTE — PROGRESS NOTES
[facility] Progress Note    Date: December 16, 2024       Pt arrived ambulatory to [facility] for Herceptin Hylecta injection and fluid bolus in stable condition.  Assessment completed. Port accessed with positive blood return. Labs drawn and sent for processing.         Patient Vitals for the past 12 hrs:   Temp Pulse Resp BP SpO2   12/16/24 0800 97.9 °F (36.6 °C) 86 16 120/78 97 %       Medications Administered         sodium chloride 0.9 % bolus 1,000 mL Admin Date  12/16/2024 Action  New Bag Dose  1,000 mL Rate  983.6 mL/hr Route  IntraVENous Documented By  Shaylee Morrison, RN        trastuzumab-hyaluronidase-oysk (HERCEPTIN HYLECTA) injection 600 mg Admin Date  12/16/2024 Action  Given Dose  600 mg Rate   Route  SubCUTAneous Documented By  Shaylee Morrison, BLADE          Injection given in right thigh.        Tolerated treatment well, no adverse reactions noted. Port flushed, heparinized and de- accessed per protocol. D/Cd from [facility] ambulatory and in no distress.  Patient is aware of next scheduled [facility] appointment.    Future Appointments   Date Time Provider Department Center   12/26/2024  2:00 PM Sonia Nieves MD [facility] BS AMB   1/2/2025  1:15 PM [facility] MRI 1 [facility]   1/6/2025  8:00 AM MOE CHEMO CHAIR 4 [facility]   1/6/2025  8:15 AM Fang aMnrique DO MEDONC BS AMB   1/14/2025 10:40 AM Christy Enriquez MD [facility] BS AMB   1/27/2025  8:00 AM MOE CHEMO CHAIR 4 [facility]   1/27/2025  8:15 AM Fang Manrique DO MEDONC BS AMB   4/15/2025  1:40 PM Tsering Herrera MD [facility] BS AMB           Shaylee Morrison, RN, RN  December 16, 2024

## 2024-12-16 NOTE — PROGRESS NOTES
Millie King is a 39 y.o. female    Chief Complaint   Patient presents with    Follow-up     Metastatic Breast Cancer       1. Have you been to the ER, urgent care clinic since your last visit?  Hospitalized since your last visit?No    2. Have you seen or consulted any other health care providers outside of the [facility] since your last visit?  Include any pap smears or colon screening. No    
started on 6/17/24  Brain MRI 4/29/24:Interval resolution of enhancing focus in the right frontal lobe. There is no evidence of new/recurrent metastatic intracranial disease. No acute intracranial process. No intracranial hemorrhage or evidence of acute infarction  CT C/A/P 4/29/24: No evidence of recurrent/metastatic disease in the chest, abdomen or pelvis. No acute intraperitoneal/intrathoracic process is identified  Bone Scan 6/12/24: Left hip mild activity likely degenerative. Prominent right proximal femoral activity  Xeloda Cycle 8 started on 7/8/24   CT C/A/P 7/17/24: Nodular hepatic contour suggestive of treated metastases including a hypodense focus in the right lobe in segment 8. No new liver lesion. No new lymphadenopathy. No osseous metastases  Xeloda Cycle 9 started on 8/5/24  Xeloda Cycle 10 started on 8/26/24  Xeloda Cycle 11 started on 9/23/24  Xeloda Cycle 12 started on 10/14/24  CT C/A/P 10/17/24: Nodular hepatic contour likely related to treated metastasis/cirrhotic change. There is no evidence of recurrent/metastatic disease in the chest, abdomen or pelvis. There is no acute intrathoracic or intra-abdominal process  Bone Scan 10/17/24: Improved postoperative activity right femur. Otherwise stable whole-body bone scan, no new scintigraphic evidence of osseous metastatic disease.  Skipped cycle due to hip surgery at [facility]  Xeloda Cycle 13 started on 11/25/24  Xeloda Cycle 14 to start on 12/16/24    STAGE: 4 ER/[facility] negative Her2 3+    History of Present Illness:   Millie King is a 39 y.o. female seen today in office for follow up of MBC on palliative Tucatinib+Xeloda+Herceptin. Tucatinib and Xeloda were both dose reduced due to side effects (mostly diarrhea). Follow up Brain MRI 10/1/24 was negative/good. Follow up CT C/A/P and Bone Scan on 10/17/24 stable/good. She is here today for Cycle 14 of Tucatinib+Xeloda+Herceptin. She reports that she feels well overall today. She has been tolerating current

## 2024-12-16 NOTE — PROGRESS NOTES
Oral Chemotherapy      Millie King is a  39 y.o.female  diagnosed with breast cancer . Ms. King is being treated with tucatinib, capecitabine, trastuzumab.      Medication name: capecitabine     Dose:  1500 mg (dose reduced 4/15 with Cycle 4)  Frequency: twice a day  Administration schedule: for 14 day on, 7 days off every 21 days        Medication name: tucatinib     Dose:  250 mg  (dose reduced 4/15 with Cycle 4)  Frequency: twice a day        Ordering provider: Fang Cespedes DO  Start date: 12/16/24 (Cycle 14)      ANC 4.2 - ok to start treatment tonight.     Lab Results   Component Value Date    WBC 6.9 12/16/2024    HGB 9.2 (L) 12/16/2024    HCT 29.6 (L) 12/16/2024    MCV 91.1 12/16/2024     12/16/2024    LYMPHOPCT 27 12/16/2024    RBC 3.25 (L) 12/16/2024    MCH 28.3 12/16/2024    MCHC 31.1 12/16/2024    RDW 15.9 (H) 12/16/2024     Lab Results   Component Value Date    NEUTROABS 4.2 12/16/2024         Expected follow up date: Labs/office visit each cycle. Next cycle start on 1/6/25     Patient  provided with an Oral Chemotherapy Journal.              Beryl Sanchez, BETID, BCOP, BCPS    For Pharmacy Admin Tracking Only    Program: Medical Group  CPA in place:  Yes  Recommendation Provided To: Patient/Caregiver: 1 via In person    Intervention Accepted By: Patient/Caregiver: 1    Time Spent (min): 15

## 2024-12-19 ENCOUNTER — TELEPHONE (OUTPATIENT)
Age: 39
End: 2024-12-19

## 2024-12-19 NOTE — TELEPHONE ENCOUNTER
Called patient to advise/confirm upcoming appt with Dr. Nieves on 12/26/2024 at 2:00  at [facility].  Spoke with Millie King and confirmed appointment.

## 2024-12-25 PROBLEM — Z09 CHEMOTHERAPY FOLLOW-UP EXAMINATION: Status: RESOLVED | Noted: 2022-10-24 | Resolved: 2024-12-25

## 2024-12-26 ENCOUNTER — TELEPHONE (OUTPATIENT)
Age: 39
End: 2024-12-26

## 2024-12-26 ENCOUNTER — TELEMEDICINE (OUTPATIENT)
Age: 39
End: 2024-12-26
Payer: COMMERCIAL

## 2024-12-26 DIAGNOSIS — K59.01 CONSTIPATION BY DELAYED COLONIC TRANSIT: ICD-10-CM

## 2024-12-26 DIAGNOSIS — G62.0 CHEMOTHERAPY-INDUCED NEUROPATHY (HCC): ICD-10-CM

## 2024-12-26 DIAGNOSIS — G89.3 CANCER RELATED PAIN: ICD-10-CM

## 2024-12-26 DIAGNOSIS — T45.1X5A CHEMOTHERAPY-INDUCED NEUROPATHY (HCC): ICD-10-CM

## 2024-12-26 DIAGNOSIS — R53.0 NEOPLASTIC (MALIGNANT) RELATED FATIGUE: Primary | ICD-10-CM

## 2024-12-26 DIAGNOSIS — C78.7 MALIGNANT NEOPLASM OF BREAST METASTATIC TO LIVER (HCC): ICD-10-CM

## 2024-12-26 DIAGNOSIS — R68.2 DRY MOUTH: ICD-10-CM

## 2024-12-26 DIAGNOSIS — C50.919 MALIGNANT NEOPLASM OF BREAST METASTATIC TO LIVER (HCC): ICD-10-CM

## 2024-12-26 PROCEDURE — 99215 OFFICE O/P EST HI 40 MIN: CPT | Performed by: INTERNAL MEDICINE

## 2024-12-26 RX ORDER — METHADONE HYDROCHLORIDE 10 MG/1
15 TABLET ORAL EVERY 8 HOURS
Qty: 135 TABLET | Refills: 0 | Status: SHIPPED | OUTPATIENT
Start: 2024-12-26 | End: 2024-12-27 | Stop reason: SDUPTHER

## 2024-12-26 RX ORDER — METHYLPHENIDATE HYDROCHLORIDE 5 MG/1
5 TABLET ORAL 2 TIMES DAILY PRN
Qty: 60 TABLET | Refills: 0 | Status: SHIPPED | OUTPATIENT
Start: 2024-12-26 | End: 2025-01-25

## 2024-12-26 RX ORDER — PREGABALIN 150 MG/1
150 CAPSULE ORAL 3 TIMES DAILY
Qty: 90 CAPSULE | Refills: 0 | Status: SHIPPED | OUTPATIENT
Start: 2024-12-26 | End: 2025-01-25

## 2024-12-26 ASSESSMENT — PATIENT HEALTH QUESTIONNAIRE - PHQ9
2. FEELING DOWN, DEPRESSED OR HOPELESS: NOT AT ALL
SUM OF ALL RESPONSES TO PHQ QUESTIONS 1-9: 0
SUM OF ALL RESPONSES TO PHQ9 QUESTIONS 1 & 2: 0
SUM OF ALL RESPONSES TO PHQ QUESTIONS 1-9: 0
1. LITTLE INTEREST OR PLEASURE IN DOING THINGS: NOT AT ALL

## 2024-12-26 NOTE — PROGRESS NOTES
Palliative Medicine Outpatient Clinic  Nurse Check in Note  [phone]    Patient Name: Millie King  YOB: 1985      Date of Visit: 12/26/2024  Visit Location:  [facility] Virtual Visit     Nurse verified patient is located in [place] for today's visit: Yes    Chief patient or family concern today: follow up    Patient's Last Palliative Medicine Clinic Visit Date:  11/14/2024    Have you been to an ER or urgent care center since your last visit?  No  Have you been hospitalized since your last visit? No  Have you seen or consulted any health care providers outside of the [facility] system since your last visit?  No  If Yes, alert PSR to request appropriate records from non-[facility] offices    Medications:  Med reconciliation was performed with:  Patient    Requested refills:  Yes- pended for clinician    If prescribed an opioid, does patient have access to naloxone at home?:  YES  If No, pend naloxone nasal spray    Function and Symptoms:  Use of assist devices:  None    Palliative Performance Status (PPS):   Palliative Performance Scale (PPS)  PPS: 70    ESAS:  Modified-[place] Symptom Assessment Scale (ESAS)  Tiredness Score: 8  Drowsiness Score: Not drowsy  Depression Score: Not depressed  Pain Score: 7  Anxiety Score: 4  Nausea Score: 2  Appetite Score: Best appetite  Dyspnea Score: No shortness of breath  Constipation: No  Wellbeing Score: Best feeling of wellbeing    Constipation?  No  Last BM: 12/25/24    Advance Care Planning:  Currently listed healthcare agent:    Primary Decision Maker: Eric King - Spouse - [phone]    Is there an ACP Note within the past 12 months?  YES  If No, Alert Clinician and/or Social Work      Divya Simms LPN

## 2024-12-26 NOTE — PROGRESS NOTES
Palliative Medicine Outpatient Services  [place]: [phone] ([phone])    Patient Name: Millie King  YOB: 1985    Date of Current Visit: 12/26/24  Location of Current Visit:    [] [facility] Office  [] [facility] Office  [] [facility] Office  [] Home  [x] Synchronous real-time A/V virtual visit    Millie King, was evaluated through a synchronous (real-time) audio-video encounter. The patient (or guardian if applicable) is aware that this is a billable service, which includes applicable co-pays. This Virtual Visit was conducted with patient's (and/or legal guardian's) consent. Patient identification was verified, and a caregiver was present when appropriate.   The patient was located at Home: [address] [place] [address]  Provider was located at Facility (Appt Dept): [address] [place] [address] [address]  [place] [address]  Confirm you are appropriately licensed, registered, or certified to deliver care in the [facility] where the patient is located as indicated above. If you are not or unsure, please re-schedule the visit: Yes, I confirm.      Total time spent for this encounter: Not billed by time    --Sonia Nieves MD on 12/26/2024 at 10:47 PM    An electronic signature was used to authenticate this note.    Date of Initial Palliative Medicine Visit: 6/8/23   Referral from: Dr. Manrique    REASON FOR VISIT:   38 year old woman with MBC seen for follow up pain and symptom management.     FOLLOW UP:   Return in about 6 weeks (around 2/6/2025).    ORDERS PLACED THIS ENCOUNTER:     Orders Placed This Encounter    EKG 12 Lead     Standing Status:   Future     Standing Expiration Date:   12/26/2025     Order Specific Question:   Reason for Exam?     Answer:   Other     Comments:   monitor QT    pregabalin (LYRICA) 150 MG capsule     Sig: Take 1 capsule by mouth in the morning, at noon, and at bedtime for 30 days. Max Daily Amount: 450 mg     Dispense:  90 capsule     Refill:  0    methadone (DOLOPHINE) 10 MG tablet

## 2024-12-27 ENCOUNTER — PATIENT MESSAGE (OUTPATIENT)
Age: 39
End: 2024-12-27

## 2024-12-27 DIAGNOSIS — G89.3 CANCER RELATED PAIN: ICD-10-CM

## 2024-12-27 DIAGNOSIS — C79.31 MALIGNANT NEOPLASM OF BREAST METASTATIC TO BRAIN, UNSPECIFIED LATERALITY (HCC): Primary | ICD-10-CM

## 2024-12-27 DIAGNOSIS — R13.10 DYSPHAGIA, UNSPECIFIED TYPE: ICD-10-CM

## 2024-12-27 DIAGNOSIS — C50.919 MALIGNANT NEOPLASM OF BREAST METASTATIC TO BRAIN, UNSPECIFIED LATERALITY (HCC): Primary | ICD-10-CM

## 2024-12-27 RX ORDER — ONDANSETRON 2 MG/ML
8 INJECTION INTRAMUSCULAR; INTRAVENOUS
Status: CANCELLED | OUTPATIENT
Start: 2025-01-13

## 2024-12-27 RX ORDER — ACETAMINOPHEN 325 MG/1
650 TABLET ORAL
Status: CANCELLED | OUTPATIENT
Start: 2025-01-13

## 2024-12-27 RX ORDER — SODIUM CHLORIDE 9 MG/ML
5-250 INJECTION, SOLUTION INTRAVENOUS PRN
Status: CANCELLED | OUTPATIENT
Start: 2025-01-13

## 2024-12-27 RX ORDER — EPINEPHRINE 1 MG/ML
0.3 INJECTION, SOLUTION INTRAMUSCULAR; SUBCUTANEOUS PRN
Status: CANCELLED | OUTPATIENT
Start: 2025-01-13

## 2024-12-27 RX ORDER — HEPARIN 100 UNIT/ML
500 SYRINGE INTRAVENOUS PRN
Status: CANCELLED | OUTPATIENT
Start: 2025-01-13

## 2024-12-27 RX ORDER — DIPHENHYDRAMINE HYDROCHLORIDE 50 MG/ML
50 INJECTION INTRAMUSCULAR; INTRAVENOUS
Status: CANCELLED | OUTPATIENT
Start: 2025-01-13

## 2024-12-27 RX ORDER — HYDROCORTISONE SODIUM SUCCINATE 100 MG/2ML
100 INJECTION INTRAMUSCULAR; INTRAVENOUS
Status: CANCELLED | OUTPATIENT
Start: 2025-01-13

## 2024-12-27 RX ORDER — ALBUTEROL SULFATE 90 UG/1
4 INHALANT RESPIRATORY (INHALATION) PRN
Status: CANCELLED | OUTPATIENT
Start: 2025-01-13

## 2024-12-27 RX ORDER — SODIUM CHLORIDE 0.9 % (FLUSH) 0.9 %
5-40 SYRINGE (ML) INJECTION PRN
Status: CANCELLED | OUTPATIENT
Start: 2025-01-13

## 2024-12-27 RX ORDER — 0.9 % SODIUM CHLORIDE 0.9 %
1000 INTRAVENOUS SOLUTION INTRAVENOUS ONCE
Status: CANCELLED
Start: 2025-01-13 | End: 2025-01-06

## 2024-12-27 RX ORDER — SODIUM CHLORIDE 9 MG/ML
INJECTION, SOLUTION INTRAVENOUS CONTINUOUS
Status: CANCELLED | OUTPATIENT
Start: 2025-01-13

## 2024-12-27 RX ORDER — METHADONE HYDROCHLORIDE 10 MG/1
15 TABLET ORAL EVERY 8 HOURS
Qty: 135 TABLET | Refills: 0 | Status: SHIPPED | OUTPATIENT
Start: 2024-12-27 | End: 2025-01-26

## 2024-12-27 NOTE — TELEPHONE ENCOUNTER
Palliative Medicine  Nursing Note  [phone]  Fax [phone]      Telephone Call  Patient Name: Millie King  YOB: 1985    12/27/2024    Call placed to [facility].  They do not have full amount of rx (135 tabs but do have some in stock.  Can get the full amount on Monday.          Divya Simms LPN  Palliative Medicine

## 2024-12-27 NOTE — TELEPHONE ENCOUNTER
Resent methadone from [facility] to [facility] for refill.

## 2025-01-02 ENCOUNTER — HOSPITAL ENCOUNTER (OUTPATIENT)
Facility: HOSPITAL | Age: 40
Discharge: HOME | End: 2025-01-02
Payer: COMMERCIAL

## 2025-01-02 DIAGNOSIS — C79.31 METASTATIC CANCER TO BRAIN (HCC): ICD-10-CM

## 2025-01-02 PROCEDURE — 70553 MRI BRAIN STEM W/O & W/DYE: CPT

## 2025-01-02 PROCEDURE — 6360000004 HC RX CONTRAST MEDICATION: Performed by: NURSE PRACTITIONER

## 2025-01-02 PROCEDURE — A9579 GAD-BASE MR CONTRAST NOS,1ML: HCPCS | Performed by: NURSE PRACTITIONER

## 2025-01-02 RX ADMIN — GADOTERIDOL 19 ML: 279.3 INJECTION, SOLUTION INTRAVENOUS at 13:48

## 2025-01-06 ENCOUNTER — HOSPITAL ENCOUNTER (OUTPATIENT)
Facility: HOSPITAL | Age: 40
Setting detail: INFUSION SERIES
End: 2025-01-06
Payer: COMMERCIAL

## 2025-01-06 DIAGNOSIS — C79.31 MALIGNANT NEOPLASM OF BREAST METASTATIC TO BRAIN, UNSPECIFIED LATERALITY (HCC): ICD-10-CM

## 2025-01-06 DIAGNOSIS — C78.01 MALIGNANT NEOPLASM METASTATIC TO BOTH LUNGS (HCC): ICD-10-CM

## 2025-01-06 DIAGNOSIS — C78.7 MALIGNANT NEOPLASM OF BREAST METASTATIC TO LIVER (HCC): ICD-10-CM

## 2025-01-06 DIAGNOSIS — C79.51 MALIGNANT NEOPLASM METASTATIC TO BONE (HCC): Primary | ICD-10-CM

## 2025-01-06 DIAGNOSIS — C50.919 MALIGNANT NEOPLASM OF BREAST METASTATIC TO BRAIN, UNSPECIFIED LATERALITY (HCC): ICD-10-CM

## 2025-01-06 DIAGNOSIS — C78.02 MALIGNANT NEOPLASM METASTATIC TO BOTH LUNGS (HCC): ICD-10-CM

## 2025-01-06 DIAGNOSIS — R19.7 DIARRHEA, UNSPECIFIED TYPE: ICD-10-CM

## 2025-01-06 DIAGNOSIS — C50.919 MALIGNANT NEOPLASM OF BREAST METASTATIC TO LIVER (HCC): ICD-10-CM

## 2025-01-08 ENCOUNTER — HOSPITAL ENCOUNTER (OUTPATIENT)
Facility: HOSPITAL | Age: 40
Discharge: HOME | End: 2025-01-11
Attending: STUDENT IN AN ORGANIZED HEALTH CARE EDUCATION/TRAINING PROGRAM

## 2025-01-08 ENCOUNTER — HOSPITAL ENCOUNTER (OUTPATIENT)
Facility: HOSPITAL | Age: 40
Setting detail: INFUSION SERIES
End: 2025-01-08
Payer: COMMERCIAL

## 2025-01-13 ENCOUNTER — CLINICAL DOCUMENTATION (OUTPATIENT)
Age: 40
End: 2025-01-13

## 2025-01-13 ENCOUNTER — OFFICE VISIT (OUTPATIENT)
Age: 40
End: 2025-01-13
Payer: COMMERCIAL

## 2025-01-13 ENCOUNTER — HOSPITAL ENCOUNTER (OUTPATIENT)
Facility: HOSPITAL | Age: 40
Setting detail: INFUSION SERIES
Discharge: HOME | End: 2025-01-13
Payer: COMMERCIAL

## 2025-01-13 VITALS
OXYGEN SATURATION: 97 % | RESPIRATION RATE: 16 BRPM | HEART RATE: 75 BPM | SYSTOLIC BLOOD PRESSURE: 121 MMHG | DIASTOLIC BLOOD PRESSURE: 80 MMHG | BODY MASS INDEX: 39.95 KG/M2 | WEIGHT: 218.4 LBS | TEMPERATURE: 96.7 F

## 2025-01-13 VITALS
TEMPERATURE: 98.5 F | OXYGEN SATURATION: 97 % | SYSTOLIC BLOOD PRESSURE: 121 MMHG | HEART RATE: 75 BPM | BODY MASS INDEX: 39.87 KG/M2 | WEIGHT: 218 LBS | DIASTOLIC BLOOD PRESSURE: 80 MMHG | RESPIRATION RATE: 16 BRPM

## 2025-01-13 DIAGNOSIS — C78.02 MALIGNANT NEOPLASM METASTATIC TO BOTH LUNGS (HCC): ICD-10-CM

## 2025-01-13 DIAGNOSIS — T45.1X5A CHEMOTHERAPY-INDUCED NEUROPATHY (HCC): ICD-10-CM

## 2025-01-13 DIAGNOSIS — C50.919 MALIGNANT NEOPLASM OF BREAST METASTATIC TO BRAIN, UNSPECIFIED LATERALITY (HCC): ICD-10-CM

## 2025-01-13 DIAGNOSIS — C50.919 MALIGNANT NEOPLASM OF BREAST METASTATIC TO BRAIN, UNSPECIFIED LATERALITY (HCC): Primary | ICD-10-CM

## 2025-01-13 DIAGNOSIS — C78.01 MALIGNANT NEOPLASM METASTATIC TO BOTH LUNGS (HCC): ICD-10-CM

## 2025-01-13 DIAGNOSIS — C79.31 MALIGNANT NEOPLASM OF BREAST METASTATIC TO BRAIN, UNSPECIFIED LATERALITY (HCC): ICD-10-CM

## 2025-01-13 DIAGNOSIS — G62.0 CHEMOTHERAPY-INDUCED NEUROPATHY (HCC): ICD-10-CM

## 2025-01-13 DIAGNOSIS — Z09 CHEMOTHERAPY FOLLOW-UP EXAMINATION: ICD-10-CM

## 2025-01-13 DIAGNOSIS — R53.83 CHEMOTHERAPY-INDUCED FATIGUE: ICD-10-CM

## 2025-01-13 DIAGNOSIS — C50.919 MALIGNANT NEOPLASM OF BREAST METASTATIC TO LIVER (HCC): ICD-10-CM

## 2025-01-13 DIAGNOSIS — T45.1X5A CHEMOTHERAPY-INDUCED FATIGUE: ICD-10-CM

## 2025-01-13 DIAGNOSIS — C79.51 MALIGNANT NEOPLASM METASTATIC TO BONE (HCC): Primary | ICD-10-CM

## 2025-01-13 DIAGNOSIS — Z87.311 HISTORY OF PATHOLOGIC FRACTURE: ICD-10-CM

## 2025-01-13 DIAGNOSIS — C79.31 METASTASIS TO BRAIN (HCC): ICD-10-CM

## 2025-01-13 DIAGNOSIS — G89.3 CANCER RELATED PAIN: ICD-10-CM

## 2025-01-13 DIAGNOSIS — C78.7 MALIGNANT NEOPLASM OF BREAST METASTATIC TO LIVER (HCC): ICD-10-CM

## 2025-01-13 DIAGNOSIS — Z95.828 PORT-A-CATH IN PLACE: ICD-10-CM

## 2025-01-13 DIAGNOSIS — Z51.81 ENCOUNTER FOR MONITORING CARDIOTOXIC DRUG THERAPY: ICD-10-CM

## 2025-01-13 DIAGNOSIS — Z79.899 ENCOUNTER FOR MONITORING CARDIOTOXIC DRUG THERAPY: ICD-10-CM

## 2025-01-13 DIAGNOSIS — R06.09 DOE (DYSPNEA ON EXERTION): ICD-10-CM

## 2025-01-13 DIAGNOSIS — C79.31 MALIGNANT NEOPLASM OF BREAST METASTATIC TO BRAIN, UNSPECIFIED LATERALITY (HCC): Primary | ICD-10-CM

## 2025-01-13 DIAGNOSIS — R19.7 DIARRHEA, UNSPECIFIED TYPE: ICD-10-CM

## 2025-01-13 LAB
ALBUMIN SERPL-MCNC: 3.7 G/DL (ref 3.5–5)
ALBUMIN/GLOB SERPL: 1.2 (ref 1.1–2.2)
ALP SERPL-CCNC: 118 U/L (ref 45–117)
ALT SERPL-CCNC: 20 U/L (ref 12–78)
ANION GAP SERPL CALC-SCNC: 5 MMOL/L (ref 2–12)
AST SERPL-CCNC: 12 U/L (ref 15–37)
BASOPHILS # BLD: 0.04 K/UL (ref 0–0.1)
BASOPHILS NFR BLD: 0.7 % (ref 0–1)
BILIRUB SERPL-MCNC: 0.3 MG/DL (ref 0.2–1)
BUN SERPL-MCNC: 10 MG/DL (ref 6–20)
BUN/CREAT SERPL: 11 (ref 12–20)
CALCIUM SERPL-MCNC: 9 MG/DL (ref 8.5–10.1)
CHLORIDE SERPL-SCNC: 104 MMOL/L (ref 97–108)
CO2 SERPL-SCNC: 27 MMOL/L (ref 21–32)
CREAT SERPL-MCNC: 0.92 MG/DL (ref 0.55–1.02)
DIFFERENTIAL METHOD BLD: ABNORMAL
EOSINOPHIL # BLD: 0.16 K/UL (ref 0–0.4)
EOSINOPHIL NFR BLD: 2.7 % (ref 0–7)
ERYTHROCYTE [DISTWIDTH] IN BLOOD BY AUTOMATED COUNT: 16 % (ref 11.5–14.5)
GLOBULIN SER CALC-MCNC: 3.2 G/DL (ref 2–4)
GLUCOSE SERPL-MCNC: 93 MG/DL (ref 65–100)
HCT VFR BLD AUTO: 33.6 % (ref 35–47)
HGB BLD-MCNC: 10.2 G/DL (ref 11.5–16)
IMM GRANULOCYTES # BLD AUTO: 0.02 K/UL (ref 0–0.04)
IMM GRANULOCYTES NFR BLD AUTO: 0.3 % (ref 0–0.5)
LYMPHOCYTES # BLD: 1.82 K/UL (ref 0.8–3.5)
LYMPHOCYTES NFR BLD: 31.3 % (ref 12–49)
MAGNESIUM SERPL-MCNC: 2.2 MG/DL (ref 1.6–2.4)
MCH RBC QN AUTO: 27.1 PG (ref 26–34)
MCHC RBC AUTO-ENTMCNC: 30.4 G/DL (ref 30–36.5)
MCV RBC AUTO: 89.4 FL (ref 80–99)
MONOCYTES # BLD: 0.64 K/UL (ref 0–1)
MONOCYTES NFR BLD: 11 % (ref 5–13)
NEUTS SEG # BLD: 3.14 K/UL (ref 1.8–8)
NEUTS SEG NFR BLD: 54 % (ref 32–75)
NRBC # BLD: 0 K/UL (ref 0–0.01)
NRBC BLD-RTO: 0 PER 100 WBC
PHOSPHATE SERPL-MCNC: 5.1 MG/DL (ref 2.6–4.7)
PLATELET # BLD AUTO: 268 K/UL (ref 150–400)
PMV BLD AUTO: 10.3 FL (ref 8.9–12.9)
POTASSIUM SERPL-SCNC: 4.1 MMOL/L (ref 3.5–5.1)
PROT SERPL-MCNC: 6.9 G/DL (ref 6.4–8.2)
RBC # BLD AUTO: 3.76 M/UL (ref 3.8–5.2)
SODIUM SERPL-SCNC: 136 MMOL/L (ref 136–145)
WBC # BLD AUTO: 5.8 K/UL (ref 3.6–11)

## 2025-01-13 PROCEDURE — 85025 COMPLETE CBC W/AUTO DIFF WBC: CPT

## 2025-01-13 PROCEDURE — 83735 ASSAY OF MAGNESIUM: CPT

## 2025-01-13 PROCEDURE — 96372 THER/PROPH/DIAG INJ SC/IM: CPT

## 2025-01-13 PROCEDURE — 36593 DECLOT VASCULAR DEVICE: CPT

## 2025-01-13 PROCEDURE — 2500000003 HC RX 250 WO HCPCS: Performed by: INTERNAL MEDICINE

## 2025-01-13 PROCEDURE — 84100 ASSAY OF PHOSPHORUS: CPT

## 2025-01-13 PROCEDURE — 96401 CHEMO ANTI-NEOPL SQ/IM: CPT

## 2025-01-13 PROCEDURE — 99215 OFFICE O/P EST HI 40 MIN: CPT | Performed by: NURSE PRACTITIONER

## 2025-01-13 PROCEDURE — 80053 COMPREHEN METABOLIC PANEL: CPT

## 2025-01-13 PROCEDURE — 36415 COLL VENOUS BLD VENIPUNCTURE: CPT

## 2025-01-13 PROCEDURE — 6360000002 HC RX W HCPCS: Performed by: INTERNAL MEDICINE

## 2025-01-13 RX ORDER — ACETAMINOPHEN 325 MG/1
650 TABLET ORAL
OUTPATIENT
Start: 2025-02-16

## 2025-01-13 RX ORDER — HYDROCORTISONE SODIUM SUCCINATE 100 MG/2ML
100 INJECTION INTRAMUSCULAR; INTRAVENOUS
OUTPATIENT
Start: 2025-02-16

## 2025-01-13 RX ORDER — ALBUTEROL SULFATE 90 UG/1
4 INHALANT RESPIRATORY (INHALATION) PRN
OUTPATIENT
Start: 2025-02-16

## 2025-01-13 RX ORDER — EPINEPHRINE 1 MG/ML
0.3 INJECTION, SOLUTION INTRAMUSCULAR; SUBCUTANEOUS PRN
OUTPATIENT
Start: 2025-02-16

## 2025-01-13 RX ORDER — ONDANSETRON 2 MG/ML
8 INJECTION INTRAMUSCULAR; INTRAVENOUS
OUTPATIENT
Start: 2025-02-16

## 2025-01-13 RX ORDER — SODIUM CHLORIDE 9 MG/ML
INJECTION, SOLUTION INTRAVENOUS CONTINUOUS
OUTPATIENT
Start: 2025-02-16

## 2025-01-13 RX ORDER — DIPHENHYDRAMINE HYDROCHLORIDE 50 MG/ML
50 INJECTION INTRAMUSCULAR; INTRAVENOUS
OUTPATIENT
Start: 2025-02-16

## 2025-01-13 RX ADMIN — WATER 2 MG: 1 INJECTION INTRAMUSCULAR; INTRAVENOUS; SUBCUTANEOUS at 08:25

## 2025-01-13 RX ADMIN — TRASTUZUMAB AND HYALURONIDASE-OYSK 600 MG: 600; 10000 INJECTION, SOLUTION SUBCUTANEOUS at 11:18

## 2025-01-13 RX ADMIN — DENOSUMAB 120 MG: 120 INJECTION SUBCUTANEOUS at 11:16

## 2025-01-13 ASSESSMENT — PAIN SCALES - GENERAL: PAINLEVEL_OUTOF10: 0

## 2025-01-13 NOTE — PROGRESS NOTES
Millie King is a 39 y.o. female    Chief Complaint   Patient presents with    Follow-up     Metastatic Breast Cancer       1. Have you been to the ER, urgent care clinic since your last visit?  Hospitalized since your last visit?No    2. Have you seen or consulted any other health care providers outside of the [facility] since your last visit?  Include any pap smears or colon screening. Yes, Gamma Knife with [facility]      
bedtime for 30 days. Max Daily Amount: 450 mg    methylphenidate (RITALIN) 5 MG tablet Take 1 tablet by mouth 2 times daily as needed (for chemo related fatigue) for up to 30 days. Max Daily Amount: 10 mg    TUKYSA 150 MG TABS TAKE ONE TABLET (150MG) BY MOUTH TWICE A DAY ALONG WITH 2 (50MG) TABLETS FOR A TOTAL DOSE OF 250MG 2 TIMES A DAY    doxycycline hyclate (VIBRAMYCIN) 100 MG capsule Take 1 capsule by mouth 2 times daily    senna (SENOKOT) 8.6 MG tablet Take 1 tablet by mouth daily    escitalopram (LEXAPRO) 20 MG tablet Take 1 tablet by mouth daily    levETIRAcetam (KEPPRA) 500 MG tablet Take 1 tablet by mouth 2 times daily    TUKYSA 50 MG TABS TAKE TWO TABLETS BY MOUTH TWICE A DAY ALONG WITH 150MG TABLET FOR A TOTAL DOSE OF 250MG 2 TIMES A DAY    albuterol sulfate HFA (PROVENTIL;VENTOLIN;PROAIR) 108 (90 Base) MCG/ACT inhaler INHALE 2 PUFFS INTO THE LUNGS EVERY 6 HOURS AS NEEDED FOR WHEEZING OR FOR SHORTNESS OF BREATH    capecitabine (XELODA) 500 MG chemo tablet TAKE THREE TABLETS BY MOUTH TWICE A DAY ON DAYS 1-14 OF 21 DAY CYCLE    buPROPion (WELLBUTRIN XL) 300 MG extended release tablet Take 1 tablet by mouth daily    Cranial Prosthesis MISC by Does not apply route Wig for chemo induced alopecia    ondansetron (ZOFRAN-ODT) 4 MG disintegrating tablet Take 1-2 tablets by mouth every 8 hours as needed for Nausea or Vomiting    naloxone 4 MG/0.1ML LIQD nasal spray 1 spray by Nasal route as needed for Opioid Reversal    lisinopril (PRINIVIL;ZESTRIL) 10 MG tablet Take 1 tablet by mouth daily    prochlorperazine (COMPAZINE) 5 MG tablet     oxyCODONE (OXY-IR) 30 MG immediate release tablet Take 1 tablet by mouth every 4 hours as needed for Pain for up to 30 days. Max Daily Amount: 180 mg     No current facility-administered medications for this visit.     Facility-Administered Medications Ordered in Other Visits   Medication Dose Route Frequency    ALTEplase (CATHFLO) 2 mg in sterile water 2 mL injection  2 mg

## 2025-01-13 NOTE — PROGRESS NOTES
Oral Chemotherapy      Millie King is a  39 y.o.female  diagnosed with breast cancer . Ms. King is being treated with tucatinib, capecitabine, trastuzumab.      Medication name: capecitabine     Dose:  1500 mg (dose reduced 4/15 with Cycle 4)  Frequency: twice a day  Administration schedule: for 14 day on, 7 days off every 21 days        Medication name: tucatinib     Dose:  250 mg  (dose reduced 4/15 with Cycle 4)  Frequency: twice a day        Ordering provider: Fang Cespedes DO  Start date: 1/13/25 (Cycle 15)      ANC 3.14 - ok to start treatment tonight.     Lab Results   Component Value Date    WBC 5.8 01/13/2025    HGB 10.2 (L) 01/13/2025    HCT 33.6 (L) 01/13/2025    MCV 89.4 01/13/2025     01/13/2025    LYMPHOPCT 31.3 01/13/2025    RBC 3.76 (L) 01/13/2025    MCH 27.1 01/13/2025    MCHC 30.4 01/13/2025    RDW 16.0 (H) 01/13/2025     Lab Results   Component Value Date    NEUTROABS 3.14 01/13/2025         Expected follow up date: Labs/office visit each cycle. Next cycle start on 2/3/25     Patient  provided with an Oral Chemotherapy Journal.              Beryl Sanchez, BETID, BCOP, BCPS    For Pharmacy Admin Tracking Only    Program: Medical Group  CPA in place:  Yes  Recommendation Provided To: Patient/Caregiver: 1 via In person    Intervention Accepted By: Patient/Caregiver: 1    Time Spent (min): 15

## 2025-01-13 NOTE — PROGRESS NOTES
[facility] Chemotherapy Progress Note  Date: 2025  Name: Millie King  MRN: [id number]       : 1985    Pt admit to [facility] for C16 Herceptin Hylecta/Xgeva  Assessment and port access completed by Jen SCHNEIDER RN     Port without positive blood return.     [id number] instilled.   Pt left [facility] for follow up appt.     915-Port with positive blood return. 10 mL whole blood pulled from line. Labs drawn and sent.      Ms. King's vitals were reviewed.  Patient Vitals for the past 12 hrs:   Temp Pulse Resp BP SpO2   25 0808 (!) 96.7 °F (35.9 °C) 75 16 121/80 97 %       Lab results were obtained and reviewed.  Recent Results (from the past 12 hour(s))   Comprehensive Metabolic Panel    Collection Time: 25  9:17 AM   Result Value Ref Range    Sodium 136 136 - 145 mmol/L    Potassium 4.1 3.5 - 5.1 mmol/L    Chloride 104 97 - 108 mmol/L    CO2 27 21 - 32 mmol/L    Anion Gap 5 2 - 12 mmol/L    Glucose 93 65 - 100 mg/dL    BUN 10 6 - 20 MG/DL    Creatinine 0.92 0.55 - 1.02 MG/DL    BUN/Creatinine Ratio 11 (L) 12 - 20      Est, Glom Filt Rate 81 >60 ml/min/1.73m2    Calcium 9.0 8.5 - 10.1 MG/DL    Total Bilirubin 0.3 0.2 - 1.0 MG/DL    ALT 20 12 - 78 U/L    AST 12 (L) 15 - 37 U/L    Alk Phosphatase 118 (H) 45 - 117 U/L    Total Protein 6.9 6.4 - 8.2 g/dL    Albumin 3.7 3.5 - 5.0 g/dL    Globulin 3.2 2.0 - 4.0 g/dL    Albumin/Globulin Ratio 1.2 1.1 - 2.2     CBC with Auto Differential    Collection Time: 25  9:17 AM   Result Value Ref Range    WBC 5.8 3.6 - 11.0 K/uL    RBC 3.76 (L) 3.80 - 5.20 M/uL    Hemoglobin 10.2 (L) 11.5 - 16.0 g/dL    Hematocrit 33.6 (L) 35.0 - 47.0 %    MCV 89.4 80.0 - 99.0 FL    MCH 27.1 26.0 - 34.0 PG    MCHC 30.4 30.0 - 36.5 g/dL    RDW 16.0 (H) 11.5 - 14.5 %    Platelets 268 150 - 400 K/uL    MPV 10.3 8.9 - 12.9 FL    Nucleated RBCs 0.0 0  WBC    nRBC 0.00 0.00 - 0.01 K/uL    Neutrophils % 54.0 32.0 - 75.0 %    Lymphocytes % 31.3 12.0 - 49.0 %    Monocytes % 11.0  scheduled.     Future Appointments   Date Time Provider Department Center   1/22/2025  8:00 AM [facility] CT 2 SFMRCT [facility]   1/22/2025  8:45 AM [facility] FLUORO 1 SFMRAD [facility]   1/22/2025 10:30 AM [facility]   1/22/2025 12:30 PM [facility] CT 2 SFMRCT [facility]   1/22/2025  1:30 PM SF NM RM 1 SFMRNM [facility]   2/3/2025  8:00 AM MOE CHEMO CHAIR 4 B[facility]OS[facility]   2/3/2025  8:15 AM Fang Manrique DO [facility] BS AMB   4/15/2025  1:40 PM Tseirng Herrera MD [facility] BS AMB   5/29/2025 10:40 AM Christy Enriquez MD [facility] BS AMB       Jen Herbert, BLADE  January 13, 2025

## 2025-01-15 ENCOUNTER — APPOINTMENT (OUTPATIENT)
Facility: HOSPITAL | Age: 40
End: 2025-01-15
Payer: COMMERCIAL

## 2025-01-22 ENCOUNTER — HOSPITAL ENCOUNTER (OUTPATIENT)
Facility: HOSPITAL | Age: 40
Discharge: HOME | End: 2025-01-25
Payer: COMMERCIAL

## 2025-01-22 DIAGNOSIS — C79.31 MALIGNANT NEOPLASM OF BREAST METASTATIC TO BRAIN, UNSPECIFIED LATERALITY (HCC): ICD-10-CM

## 2025-01-22 DIAGNOSIS — C50.919 MALIGNANT NEOPLASM OF BREAST METASTATIC TO BRAIN, UNSPECIFIED LATERALITY (HCC): ICD-10-CM

## 2025-01-22 DIAGNOSIS — R13.10 DYSPHAGIA, UNSPECIFIED TYPE: ICD-10-CM

## 2025-01-22 PROCEDURE — A9503 TC99M MEDRONATE: HCPCS | Performed by: NURSE PRACTITIONER

## 2025-01-22 PROCEDURE — 78306 BONE IMAGING WHOLE BODY: CPT | Performed by: NURSE PRACTITIONER

## 2025-01-22 PROCEDURE — 74177 CT ABD & PELVIS W/CONTRAST: CPT

## 2025-01-22 PROCEDURE — 6360000004 HC RX CONTRAST MEDICATION: Performed by: NURSE PRACTITIONER

## 2025-01-22 PROCEDURE — 70491 CT SOFT TISSUE NECK W/DYE: CPT

## 2025-01-22 PROCEDURE — 74240 X-RAY XM UPR GI TRC 1CNTRST: CPT

## 2025-01-22 PROCEDURE — 3430000000 HC RX DIAGNOSTIC RADIOPHARMACEUTICAL: Performed by: NURSE PRACTITIONER

## 2025-01-22 RX ORDER — TC 99M MEDRONATE 20 MG/10ML
24 INJECTION, POWDER, LYOPHILIZED, FOR SOLUTION INTRAVENOUS
Status: COMPLETED | OUTPATIENT
Start: 2025-01-22 | End: 2025-01-22

## 2025-01-22 RX ORDER — IOPAMIDOL 755 MG/ML
100 INJECTION, SOLUTION INTRAVASCULAR
Status: COMPLETED | OUTPATIENT
Start: 2025-01-22 | End: 2025-01-22

## 2025-01-22 RX ADMIN — IOPAMIDOL 100 ML: 755 INJECTION, SOLUTION INTRAVENOUS at 10:56

## 2025-01-22 RX ADMIN — TC 99M MEDRONATE 24 MILLICURIE: 20 INJECTION, POWDER, LYOPHILIZED, FOR SOLUTION INTRAVENOUS at 10:50

## 2025-01-27 ENCOUNTER — APPOINTMENT (OUTPATIENT)
Facility: HOSPITAL | Age: 40
End: 2025-01-27
Payer: COMMERCIAL

## 2025-01-27 ENCOUNTER — PATIENT MESSAGE (OUTPATIENT)
Age: 40
End: 2025-01-27

## 2025-01-27 DIAGNOSIS — G62.0 CHEMOTHERAPY-INDUCED NEUROPATHY: ICD-10-CM

## 2025-01-27 DIAGNOSIS — G89.3 CANCER RELATED PAIN: ICD-10-CM

## 2025-01-27 DIAGNOSIS — G62.0 CHEMOTHERAPY-INDUCED NEUROPATHY (HCC): ICD-10-CM

## 2025-01-27 DIAGNOSIS — T45.1X5A CHEMOTHERAPY-INDUCED NEUROPATHY (HCC): ICD-10-CM

## 2025-01-27 DIAGNOSIS — T45.1X5A CHEMOTHERAPY-INDUCED NEUROPATHY: ICD-10-CM

## 2025-01-27 RX ORDER — SODIUM CHLORIDE 0.9 % (FLUSH) 0.9 %
5-40 SYRINGE (ML) INJECTION PRN
Status: CANCELLED | OUTPATIENT
Start: 2025-02-10

## 2025-01-27 RX ORDER — 0.9 % SODIUM CHLORIDE 0.9 %
1000 INTRAVENOUS SOLUTION INTRAVENOUS ONCE
Status: CANCELLED
Start: 2025-02-10 | End: 2025-02-03

## 2025-01-27 RX ORDER — ONDANSETRON 2 MG/ML
8 INJECTION INTRAMUSCULAR; INTRAVENOUS
Status: CANCELLED | OUTPATIENT
Start: 2025-02-10

## 2025-01-27 RX ORDER — PREGABALIN 150 MG/1
150 CAPSULE ORAL 3 TIMES DAILY
Qty: 90 CAPSULE | Refills: 0 | Status: SHIPPED | OUTPATIENT
Start: 2025-01-27 | End: 2025-01-28 | Stop reason: SDUPTHER

## 2025-01-27 RX ORDER — SODIUM CHLORIDE 9 MG/ML
5-250 INJECTION, SOLUTION INTRAVENOUS PRN
Status: CANCELLED | OUTPATIENT
Start: 2025-02-10

## 2025-01-27 RX ORDER — METHADONE HYDROCHLORIDE 10 MG/1
15 TABLET ORAL EVERY 8 HOURS
Qty: 135 TABLET | Refills: 0 | Status: SHIPPED | OUTPATIENT
Start: 2025-01-27 | End: 2025-01-28 | Stop reason: SDUPTHER

## 2025-01-27 RX ORDER — HEPARIN 100 UNIT/ML
500 SYRINGE INTRAVENOUS PRN
Status: CANCELLED | OUTPATIENT
Start: 2025-02-10

## 2025-01-27 RX ORDER — SODIUM CHLORIDE 9 MG/ML
INJECTION, SOLUTION INTRAVENOUS CONTINUOUS
Status: CANCELLED | OUTPATIENT
Start: 2025-02-10

## 2025-01-27 RX ORDER — PREGABALIN 150 MG/1
150 CAPSULE ORAL 3 TIMES DAILY
Qty: 90 CAPSULE | Refills: 0 | Status: CANCELLED | OUTPATIENT
Start: 2025-01-27 | End: 2025-02-26

## 2025-01-27 RX ORDER — PREGABALIN 150 MG/1
150 CAPSULE ORAL 3 TIMES DAILY
Qty: 90 CAPSULE | Refills: 0 | OUTPATIENT
Start: 2025-01-27 | End: 2025-02-26

## 2025-01-27 RX ORDER — HYDROCORTISONE SODIUM SUCCINATE 100 MG/2ML
100 INJECTION INTRAMUSCULAR; INTRAVENOUS
Status: CANCELLED | OUTPATIENT
Start: 2025-02-10

## 2025-01-27 RX ORDER — EPINEPHRINE 1 MG/ML
0.3 INJECTION, SOLUTION INTRAMUSCULAR; SUBCUTANEOUS PRN
Status: CANCELLED | OUTPATIENT
Start: 2025-02-10

## 2025-01-27 RX ORDER — ACETAMINOPHEN 325 MG/1
650 TABLET ORAL
Status: CANCELLED | OUTPATIENT
Start: 2025-02-10

## 2025-01-27 RX ORDER — DIPHENHYDRAMINE HYDROCHLORIDE 50 MG/ML
50 INJECTION INTRAMUSCULAR; INTRAVENOUS
Status: CANCELLED | OUTPATIENT
Start: 2025-02-10

## 2025-01-27 RX ORDER — METHADONE HYDROCHLORIDE 10 MG/1
15 TABLET ORAL EVERY 8 HOURS
Qty: 135 TABLET | Refills: 0 | OUTPATIENT
Start: 2025-01-27 | End: 2025-02-26

## 2025-01-27 RX ORDER — ALBUTEROL SULFATE 90 UG/1
4 INHALANT RESPIRATORY (INHALATION) PRN
Status: CANCELLED | OUTPATIENT
Start: 2025-02-10

## 2025-01-27 RX ORDER — METHADONE HYDROCHLORIDE 10 MG/1
15 TABLET ORAL EVERY 8 HOURS
Qty: 135 TABLET | Refills: 0 | Status: CANCELLED | OUTPATIENT
Start: 2025-01-27 | End: 2025-02-26

## 2025-01-27 NOTE — TELEPHONE ENCOUNTER
Palliative Medicine Clinic   [place]: [phone] ([phone])    Patient Name: Millie King  YOB: 1985    Medication Refill Request    Patient is scheduled for follow up:  [x]  YES  []   NO  Next [facility] Visit: none    PDMP reviewed:  [x] YES   []  System down / Unable  []  NO- Patient fills out of state    Medication:methadone (DOLOPHINE) 10 MG tablet    Dose and directions:Take 1.5 tablets by mouth every 8 (eight) hours for 30 days. Max Daily Amount: 45 mg   Number dispensed:135  Date filled (PDMP or Pharmacy):12/27/24  # left:unknown    Medication:    pregabalin (LYRICA) 150 MG capsule     Dose and directions:Take 1 capsule by mouth in the morning, at noon, and at bedtime for 30 days. Max Daily Amount: 450 mg   Number dispensed:90  Date filled (PDMP or Pharmacy):12/26/24  #left:unknown    Appropriate for refill:  []  YES  []  Early Request - Requires MD/NP Review      Other pertinent information for prescriber:

## 2025-01-28 ENCOUNTER — TELEPHONE (OUTPATIENT)
Age: 40
End: 2025-01-28

## 2025-01-28 DIAGNOSIS — T45.1X5A CHEMOTHERAPY-INDUCED NEUROPATHY (HCC): ICD-10-CM

## 2025-01-28 DIAGNOSIS — G62.0 CHEMOTHERAPY-INDUCED NEUROPATHY (HCC): ICD-10-CM

## 2025-01-28 RX ORDER — PREGABALIN 150 MG/1
150 CAPSULE ORAL 3 TIMES DAILY
Qty: 90 CAPSULE | Refills: 0 | Status: SHIPPED | OUTPATIENT
Start: 2025-01-28 | End: 2025-02-27

## 2025-01-28 RX ORDER — PREGABALIN 150 MG/1
150 CAPSULE ORAL 3 TIMES DAILY
Qty: 90 CAPSULE | Refills: 0 | Status: SHIPPED | OUTPATIENT
Start: 2025-01-28 | End: 2025-01-28 | Stop reason: SDUPTHER

## 2025-01-28 RX ORDER — METHADONE HYDROCHLORIDE 10 MG/1
15 TABLET ORAL EVERY 8 HOURS
Qty: 135 TABLET | Refills: 0 | Status: SHIPPED | OUTPATIENT
Start: 2025-01-28 | End: 2025-02-27

## 2025-02-03 ENCOUNTER — OFFICE VISIT (OUTPATIENT)
Age: 40
End: 2025-02-03

## 2025-02-03 ENCOUNTER — HOSPITAL ENCOUNTER (OUTPATIENT)
Facility: HOSPITAL | Age: 40
Setting detail: INFUSION SERIES
End: 2025-02-03
Payer: COMMERCIAL

## 2025-02-03 DIAGNOSIS — C50.919 MALIGNANT NEOPLASM OF BREAST METASTATIC TO LIVER (HCC): ICD-10-CM

## 2025-02-03 DIAGNOSIS — C79.51 MALIGNANT NEOPLASM METASTATIC TO BONE (HCC): Primary | ICD-10-CM

## 2025-02-03 DIAGNOSIS — K22.4 ESOPHAGEAL DYSMOTILITY: ICD-10-CM

## 2025-02-03 DIAGNOSIS — C79.31 METASTASIS TO BRAIN (HCC): ICD-10-CM

## 2025-02-03 DIAGNOSIS — R53.83 CHEMOTHERAPY-INDUCED FATIGUE: ICD-10-CM

## 2025-02-03 DIAGNOSIS — C79.31 MALIGNANT NEOPLASM OF BREAST METASTATIC TO BRAIN, UNSPECIFIED LATERALITY (HCC): Primary | ICD-10-CM

## 2025-02-03 DIAGNOSIS — Z51.81 ENCOUNTER FOR MONITORING CARDIOTOXIC DRUG THERAPY: ICD-10-CM

## 2025-02-03 DIAGNOSIS — C78.01 MALIGNANT NEOPLASM METASTATIC TO BOTH LUNGS (HCC): ICD-10-CM

## 2025-02-03 DIAGNOSIS — Z79.899 ENCOUNTER FOR MONITORING CARDIOTOXIC DRUG THERAPY: ICD-10-CM

## 2025-02-03 DIAGNOSIS — C50.919 MALIGNANT NEOPLASM OF BREAST METASTATIC TO BRAIN, UNSPECIFIED LATERALITY (HCC): Primary | ICD-10-CM

## 2025-02-03 DIAGNOSIS — Z09 CHEMOTHERAPY FOLLOW-UP EXAMINATION: ICD-10-CM

## 2025-02-03 DIAGNOSIS — C78.7 MALIGNANT NEOPLASM OF BREAST METASTATIC TO LIVER (HCC): ICD-10-CM

## 2025-02-03 DIAGNOSIS — T45.1X5A CHEMOTHERAPY-INDUCED NAUSEA: ICD-10-CM

## 2025-02-03 DIAGNOSIS — R11.0 CHEMOTHERAPY-INDUCED NAUSEA: ICD-10-CM

## 2025-02-03 DIAGNOSIS — T45.1X5A CHEMOTHERAPY-INDUCED NEUROPATHY (HCC): ICD-10-CM

## 2025-02-03 DIAGNOSIS — C50.919 MALIGNANT NEOPLASM OF BREAST METASTATIC TO BRAIN, UNSPECIFIED LATERALITY (HCC): ICD-10-CM

## 2025-02-03 DIAGNOSIS — T45.1X5A CHEMOTHERAPY-INDUCED FATIGUE: ICD-10-CM

## 2025-02-03 DIAGNOSIS — C79.31 MALIGNANT NEOPLASM OF BREAST METASTATIC TO BRAIN, UNSPECIFIED LATERALITY (HCC): ICD-10-CM

## 2025-02-03 DIAGNOSIS — G89.3 CANCER RELATED PAIN: ICD-10-CM

## 2025-02-03 DIAGNOSIS — C78.02 MALIGNANT NEOPLASM METASTATIC TO BOTH LUNGS (HCC): ICD-10-CM

## 2025-02-03 DIAGNOSIS — G62.0 CHEMOTHERAPY-INDUCED NEUROPATHY (HCC): ICD-10-CM

## 2025-02-03 DIAGNOSIS — Z95.828 PORT-A-CATH IN PLACE: ICD-10-CM

## 2025-02-03 DIAGNOSIS — R19.7 DIARRHEA, UNSPECIFIED TYPE: ICD-10-CM

## 2025-02-05 ENCOUNTER — APPOINTMENT (OUTPATIENT)
Facility: HOSPITAL | Age: 40
End: 2025-02-05
Payer: COMMERCIAL

## 2025-02-10 ENCOUNTER — OFFICE VISIT (OUTPATIENT)
Age: 40
End: 2025-02-10
Payer: COMMERCIAL

## 2025-02-10 ENCOUNTER — CLINICAL DOCUMENTATION (OUTPATIENT)
Age: 40
End: 2025-02-10

## 2025-02-10 ENCOUNTER — HOSPITAL ENCOUNTER (OUTPATIENT)
Facility: HOSPITAL | Age: 40
Setting detail: INFUSION SERIES
Discharge: HOME | End: 2025-02-10
Payer: COMMERCIAL

## 2025-02-10 VITALS
BODY MASS INDEX: 39.69 KG/M2 | OXYGEN SATURATION: 94 % | DIASTOLIC BLOOD PRESSURE: 77 MMHG | RESPIRATION RATE: 16 BRPM | TEMPERATURE: 97.6 F | WEIGHT: 217 LBS | SYSTOLIC BLOOD PRESSURE: 116 MMHG | HEART RATE: 102 BPM

## 2025-02-10 VITALS
SYSTOLIC BLOOD PRESSURE: 116 MMHG | TEMPERATURE: 97.6 F | DIASTOLIC BLOOD PRESSURE: 77 MMHG | WEIGHT: 217.6 LBS | HEART RATE: 84 BPM | RESPIRATION RATE: 16 BRPM | BODY MASS INDEX: 39.8 KG/M2

## 2025-02-10 DIAGNOSIS — C50.919 MALIGNANT NEOPLASM OF BREAST METASTATIC TO BRAIN, UNSPECIFIED LATERALITY (HCC): ICD-10-CM

## 2025-02-10 DIAGNOSIS — C50.919 MALIGNANT NEOPLASM OF BREAST METASTATIC TO BRAIN, UNSPECIFIED LATERALITY (HCC): Primary | ICD-10-CM

## 2025-02-10 DIAGNOSIS — C79.31 METASTASIS TO BRAIN (HCC): ICD-10-CM

## 2025-02-10 DIAGNOSIS — C78.01 MALIGNANT NEOPLASM METASTATIC TO BOTH LUNGS (HCC): ICD-10-CM

## 2025-02-10 DIAGNOSIS — C79.31 MALIGNANT NEOPLASM OF BREAST METASTATIC TO BRAIN, UNSPECIFIED LATERALITY (HCC): ICD-10-CM

## 2025-02-10 DIAGNOSIS — R53.83 CHEMOTHERAPY-INDUCED FATIGUE: ICD-10-CM

## 2025-02-10 DIAGNOSIS — K22.4 ESOPHAGEAL DYSMOTILITY: ICD-10-CM

## 2025-02-10 DIAGNOSIS — C79.51 MALIGNANT NEOPLASM METASTATIC TO BONE (HCC): ICD-10-CM

## 2025-02-10 DIAGNOSIS — C78.02 MALIGNANT NEOPLASM METASTATIC TO BOTH LUNGS (HCC): ICD-10-CM

## 2025-02-10 DIAGNOSIS — Z09 CHEMOTHERAPY FOLLOW-UP EXAMINATION: ICD-10-CM

## 2025-02-10 DIAGNOSIS — R19.7 DIARRHEA, UNSPECIFIED TYPE: Primary | ICD-10-CM

## 2025-02-10 DIAGNOSIS — G89.3 CANCER RELATED PAIN: ICD-10-CM

## 2025-02-10 DIAGNOSIS — Z79.899 ENCOUNTER FOR MONITORING CARDIOTOXIC DRUG THERAPY: ICD-10-CM

## 2025-02-10 DIAGNOSIS — C79.31 MALIGNANT NEOPLASM OF BREAST METASTATIC TO BRAIN, UNSPECIFIED LATERALITY (HCC): Primary | ICD-10-CM

## 2025-02-10 DIAGNOSIS — T45.1X5A CHEMOTHERAPY-INDUCED FATIGUE: ICD-10-CM

## 2025-02-10 DIAGNOSIS — Z95.828 PORT-A-CATH IN PLACE: ICD-10-CM

## 2025-02-10 DIAGNOSIS — T45.1X5A CHEMOTHERAPY-INDUCED NEUROPATHY (HCC): ICD-10-CM

## 2025-02-10 DIAGNOSIS — C50.919 MALIGNANT NEOPLASM OF BREAST METASTATIC TO LIVER (HCC): ICD-10-CM

## 2025-02-10 DIAGNOSIS — C78.7 MALIGNANT NEOPLASM OF BREAST METASTATIC TO LIVER (HCC): ICD-10-CM

## 2025-02-10 DIAGNOSIS — G62.0 CHEMOTHERAPY-INDUCED NEUROPATHY (HCC): ICD-10-CM

## 2025-02-10 DIAGNOSIS — Z51.81 ENCOUNTER FOR MONITORING CARDIOTOXIC DRUG THERAPY: ICD-10-CM

## 2025-02-10 LAB
ALBUMIN SERPL-MCNC: 3.7 G/DL (ref 3.5–5)
ALBUMIN/GLOB SERPL: 1.4 (ref 1.1–2.2)
ALP SERPL-CCNC: 117 U/L (ref 45–117)
ALT SERPL-CCNC: 28 U/L (ref 12–78)
ANION GAP SERPL CALC-SCNC: 6 MMOL/L (ref 2–12)
AST SERPL-CCNC: 10 U/L (ref 15–37)
BASOPHILS # BLD: 0.03 K/UL (ref 0–0.1)
BASOPHILS NFR BLD: 0.6 % (ref 0–1)
BILIRUB SERPL-MCNC: 0.3 MG/DL (ref 0.2–1)
BUN SERPL-MCNC: 12 MG/DL (ref 6–20)
BUN/CREAT SERPL: 14 (ref 12–20)
CALCIUM SERPL-MCNC: 9.2 MG/DL (ref 8.5–10.1)
CHLORIDE SERPL-SCNC: 104 MMOL/L (ref 97–108)
CO2 SERPL-SCNC: 28 MMOL/L (ref 21–32)
CREAT SERPL-MCNC: 0.85 MG/DL (ref 0.55–1.02)
DIFFERENTIAL METHOD BLD: ABNORMAL
EOSINOPHIL # BLD: 0.2 K/UL (ref 0–0.4)
EOSINOPHIL NFR BLD: 3.7 % (ref 0–7)
ERYTHROCYTE [DISTWIDTH] IN BLOOD BY AUTOMATED COUNT: 17.2 % (ref 11.5–14.5)
GLOBULIN SER CALC-MCNC: 2.7 G/DL (ref 2–4)
GLUCOSE SERPL-MCNC: 118 MG/DL (ref 65–100)
HCT VFR BLD AUTO: 36.9 % (ref 35–47)
HGB BLD-MCNC: 11.3 G/DL (ref 11.5–16)
IMM GRANULOCYTES # BLD AUTO: 0.02 K/UL (ref 0–0.04)
IMM GRANULOCYTES NFR BLD AUTO: 0.4 % (ref 0–0.5)
LYMPHOCYTES # BLD: 2.04 K/UL (ref 0.8–3.5)
LYMPHOCYTES NFR BLD: 37.8 % (ref 12–49)
MCH RBC QN AUTO: 26.6 PG (ref 26–34)
MCHC RBC AUTO-ENTMCNC: 30.6 G/DL (ref 30–36.5)
MCV RBC AUTO: 86.8 FL (ref 80–99)
MONOCYTES # BLD: 0.48 K/UL (ref 0–1)
MONOCYTES NFR BLD: 8.9 % (ref 5–13)
NEUTS SEG # BLD: 2.63 K/UL (ref 1.8–8)
NEUTS SEG NFR BLD: 48.6 % (ref 32–75)
NRBC # BLD: 0 K/UL (ref 0–0.01)
NRBC BLD-RTO: 0 PER 100 WBC
PLATELET # BLD AUTO: 260 K/UL (ref 150–400)
PMV BLD AUTO: 10.6 FL (ref 8.9–12.9)
POTASSIUM SERPL-SCNC: 3.9 MMOL/L (ref 3.5–5.1)
PROT SERPL-MCNC: 6.4 G/DL (ref 6.4–8.2)
RBC # BLD AUTO: 4.25 M/UL (ref 3.8–5.2)
SODIUM SERPL-SCNC: 138 MMOL/L (ref 136–145)
WBC # BLD AUTO: 5.4 K/UL (ref 3.6–11)

## 2025-02-10 PROCEDURE — 99215 OFFICE O/P EST HI 40 MIN: CPT | Performed by: NURSE PRACTITIONER

## 2025-02-10 PROCEDURE — 96401 CHEMO ANTI-NEOPL SQ/IM: CPT

## 2025-02-10 PROCEDURE — 80053 COMPREHEN METABOLIC PANEL: CPT

## 2025-02-10 PROCEDURE — 85025 COMPLETE CBC W/AUTO DIFF WBC: CPT

## 2025-02-10 PROCEDURE — 36415 COLL VENOUS BLD VENIPUNCTURE: CPT

## 2025-02-10 PROCEDURE — 6360000002 HC RX W HCPCS: Performed by: INTERNAL MEDICINE

## 2025-02-10 RX ADMIN — TRASTUZUMAB AND HYALURONIDASE-OYSK 600 MG: 600; 10000 INJECTION, SOLUTION SUBCUTANEOUS at 09:00

## 2025-02-10 NOTE — PROGRESS NOTES
Millie King is a 39 y.o. female    Chief Complaint   Patient presents with    Follow-up     Metastatic Breast Cancer     1. Have you been to the ER, urgent care clinic since your last visit?  Hospitalized since your last visit?No    2. Have you seen or consulted any other health care providers outside of the [facility] since your last visit?  Include any pap smears or colon screening. No    Lost balance on sat  
MRI 6/13/23 negative for brain mets.  Brain MRI 1/11/24 with 2.9 mm brain met.  Brain MRI with [facility]K Team showed 9 lesions.  She had Gamma Knie on 1/31/24.  Brain MRI 4/11/24 showed resolved mets.  Brain MRI 8/1/24 showed resolved mets.   Brain MRI 10/1/24 showed resolved mets.   Follow up Brain MRI 1/2/24 showed new 3 mm brain met.  She had Gamma Knife again on 1/8/25 with Dr Martinez.   She will check and see when next MRI is due.     4) Hx of Right Hip Pain  S/p right femur fracture.  S/p IMN with Ortho Dr Bermudez on 1/3/23.  Hx of XRT from 2/6/23 - 2/10/23.   Ultrasound negative for DVT.  She had recent surgery with Ortho at [facility] - notes reviewed in CE.   Pain management per Palliative Medicine.      5) Pulmonary Nodules on CT Chest  Likely mets. Resolved.   She has been referred to Pulmonary - she is holding off on this for now.   CT Chest stable 1/25.     6) ADD    Management per PCP.    7) Management of High Risk Medications - Chemotherapy   On Drug Therapy Requiring Intensive Monitoring for Toxicity   Toxicities include Grade 1-2 Fatigue, Grade 1 PPE, Grade 1 Neuropathy.  ECHO 7/24 with mild changes and had an appointment with Cardio on 8/30/24.   Follow up ECHO 12/11/24 was normal/good with EF 63%.   She remains on Lyrica per PallMed for neuropathy.   Labs (CBC and CMP) reviewed today.  Cotinue Xeloda+Tucatinib and Herceptin.  No additional dose adjustments needed today.  Continue ECHOs every 3 months - next due in 3/24 and ordered today.   Will continue to monitor for side effects.     8) Psychosocial  Mood good, coping well.   She has good family support.   She is ; has a daughter and lives in [place].   SW/NN support as needed.    Call if questions.  Follow up in 3 weeks in [facility]/office.  I appreciate the opportunity to participate in Ms. Millie King's care.    Signed By: AGUSTINA Perez - NP

## 2025-02-10 NOTE — PROGRESS NOTES
[facility] Chemo Progress Note    Date: February 10, 2025      0800 Ms. King Arrived to [facility] for  Herceptin Hylecta and labs ambulatory in stable condition.  Assessment was completed, no acute issues at this time, no new complaints voiced. Labs drawn  from port and sent for processing. Went to provider appointment with Medical Oncology.    0900: Returned from provider appointment.        Ms. King's vitals were reviewed.  Vitals:    02/10/25 0800   BP: 116/77   Pulse: 84   Resp: 16   Temp: 97.6 °F (36.4 °C)          Lab results were obtained and reviewed.    Medications Administered         trastuzumab-hyaluronidase-oysk (HERCEPTIN HYLECTA) injection 600 mg Admin Date  02/10/2025 Action  Given Dose  600 mg Route  SubCUTAneous Documented By  Kanchan Luna, RN           Given Right thigh      Two nurses verified prior to administering: Drug name, Drug dose, Infusion volume or drug volume when prepared in a syringe, Rate of administration, Route of administration, Expiration dates and/or times, Appearance and physical integrity of the drugs, Rate set on infusion pump, when used, and Sequencing of drug administration.     Patient tolerated treatment well.  Patient was discharged in stable condition. Patient is aware of next scheduled [facility] appointment.    Future Appointments   Date Time Provider Department Center   4/15/2025  1:40 PM Tsering Herrera MD [facility] BS AMB   5/29/2025 10:40 AM Christy Enriquez MD [facility] BS AMB         Kanchan Luna RN, RN  February 10, 2025

## 2025-02-10 NOTE — PROGRESS NOTES
Oral Chemotherapy      Millie King is a  39 y.o.female  diagnosed with breast cancer . Ms. King is being treated with tucatinib, capecitabine, trastuzumab.      Medication name: capecitabine     Dose:  1500 mg (dose reduced 4/15 with Cycle 4)  Frequency: twice a day  Administration schedule: for 14 day on, 7 days off every 21 days        Medication name: tucatinib     Dose:  250 mg  (dose reduced 4/15 with Cycle 4)  Frequency: twice a day        Ordering provider: Fang Cespedes DO  Start date: 2/3/25 (Cycle 16)      Ms. King started Cycle 16 on 2/3/25    Lab Results   Component Value Date    WBC 5.4 02/10/2025    HGB 11.3 (L) 02/10/2025    HCT 36.9 02/10/2025    MCV 86.8 02/10/2025     02/10/2025    LYMPHOPCT 37.8 02/10/2025    RBC 4.25 02/10/2025    MCH 26.6 02/10/2025    MCHC 30.6 02/10/2025    RDW 17.2 (H) 02/10/2025     Lab Results   Component Value Date    NEUTROABS 2.63 02/10/2025         Expected follow up date: Labs/office visit each cycle. Next cycle start on 3/3/25     Patient  provided with an Oral Chemotherapy Journal.              Beryl Sanchez, BETID, BCOP, BCPS    For Pharmacy Admin Tracking Only    Program: Medical Group  CPA in place:  Yes  Recommendation Provided To: Patient/Caregiver: 1 via In person    Intervention Accepted By: Patient/Caregiver: 1    Time Spent (min): 20

## 2025-02-14 DIAGNOSIS — C50.919 MALIGNANT NEOPLASM OF BREAST METASTATIC TO BRAIN, UNSPECIFIED LATERALITY (HCC): Primary | ICD-10-CM

## 2025-02-14 DIAGNOSIS — C79.31 MALIGNANT NEOPLASM OF BREAST METASTATIC TO BRAIN, UNSPECIFIED LATERALITY (HCC): Primary | ICD-10-CM

## 2025-02-16 DIAGNOSIS — G89.3 CANCER RELATED PAIN: ICD-10-CM

## 2025-02-17 RX ORDER — OXYCODONE HYDROCHLORIDE 30 MG/1
30 TABLET ORAL EVERY 4 HOURS PRN
Qty: 180 TABLET | Refills: 0 | Status: SHIPPED | OUTPATIENT
Start: 2025-02-17 | End: 2025-03-19

## 2025-02-17 NOTE — TELEPHONE ENCOUNTER
Palliative Medicine Clinic   [place]: [phone])    Patient Name: Millie King  YOB: 1985    Medication Refill Request    Patient is scheduled for follow up:  []  YES  [x]   NO  Next [facility] Med Clinic Visit: none    PDMP reviewed:  [x] YES   []  System down / Unable  []  NO- Patient fills out of state    Medication:    oxyCODONE (OXY-IR) 30 MG immediate release tablet     Dose and directions:Take 1 tablet by mouth every 4 hours as needed for Pain for up to 30 days. Max Daily Amount: 180 mg   Number dispensed:180  Date filled (PDMP or Pharmacy):01/07/25  # left:unknown        Appropriate for refill:  [x]  YES  []  Early Request - Requires MD/NP Review      Other pertinent information for prescriber:

## 2025-02-18 ENCOUNTER — TELEPHONE (OUTPATIENT)
Age: 40
End: 2025-02-18

## 2025-02-18 NOTE — TELEPHONE ENCOUNTER
left vm on self identified vm seeking clarity regarding next appt due on 3/3 want to know if pt wants this appt at [facility] or [facility]

## 2025-02-21 RX ORDER — ALBUTEROL SULFATE 90 UG/1
4 INHALANT RESPIRATORY (INHALATION) PRN
Status: CANCELLED | OUTPATIENT
Start: 2025-03-05

## 2025-02-21 RX ORDER — 0.9 % SODIUM CHLORIDE 0.9 %
1000 INTRAVENOUS SOLUTION INTRAVENOUS ONCE
Status: CANCELLED
Start: 2025-03-05 | End: 2025-03-03

## 2025-02-21 RX ORDER — HYDROCORTISONE SODIUM SUCCINATE 100 MG/2ML
100 INJECTION INTRAMUSCULAR; INTRAVENOUS
Status: CANCELLED | OUTPATIENT
Start: 2025-03-05

## 2025-02-21 RX ORDER — DIPHENHYDRAMINE HYDROCHLORIDE 50 MG/ML
50 INJECTION INTRAMUSCULAR; INTRAVENOUS
Status: CANCELLED | OUTPATIENT
Start: 2025-03-05

## 2025-02-21 RX ORDER — ONDANSETRON 2 MG/ML
8 INJECTION INTRAMUSCULAR; INTRAVENOUS
Status: CANCELLED | OUTPATIENT
Start: 2025-03-05

## 2025-02-21 RX ORDER — SODIUM CHLORIDE 9 MG/ML
5-250 INJECTION, SOLUTION INTRAVENOUS PRN
Status: CANCELLED | OUTPATIENT
Start: 2025-03-05

## 2025-02-21 RX ORDER — ACETAMINOPHEN 325 MG/1
650 TABLET ORAL
Status: CANCELLED | OUTPATIENT
Start: 2025-03-05

## 2025-02-21 RX ORDER — HEPARIN 100 UNIT/ML
500 SYRINGE INTRAVENOUS PRN
Status: CANCELLED | OUTPATIENT
Start: 2025-03-05

## 2025-02-21 RX ORDER — SODIUM CHLORIDE 0.9 % (FLUSH) 0.9 %
5-40 SYRINGE (ML) INJECTION PRN
Status: CANCELLED | OUTPATIENT
Start: 2025-03-05

## 2025-02-21 RX ORDER — SODIUM CHLORIDE 9 MG/ML
INJECTION, SOLUTION INTRAVENOUS CONTINUOUS
Status: CANCELLED | OUTPATIENT
Start: 2025-03-05

## 2025-02-21 RX ORDER — EPINEPHRINE 1 MG/ML
0.3 INJECTION, SOLUTION INTRAMUSCULAR; SUBCUTANEOUS PRN
Status: CANCELLED | OUTPATIENT
Start: 2025-03-05

## 2025-02-21 RX ORDER — FAMOTIDINE 10 MG/ML
20 INJECTION, SOLUTION INTRAVENOUS
Status: CANCELLED | OUTPATIENT
Start: 2025-03-05

## 2025-02-25 DIAGNOSIS — T45.1X5A CHEMOTHERAPY-INDUCED NEUROPATHY: ICD-10-CM

## 2025-02-25 DIAGNOSIS — G62.0 CHEMOTHERAPY-INDUCED NEUROPATHY: ICD-10-CM

## 2025-02-25 DIAGNOSIS — G89.3 CANCER RELATED PAIN: ICD-10-CM

## 2025-02-26 RX ORDER — PREGABALIN 150 MG/1
150 CAPSULE ORAL 3 TIMES DAILY
Qty: 90 CAPSULE | Refills: 0 | Status: SHIPPED | OUTPATIENT
Start: 2025-02-26 | End: 2025-03-28

## 2025-02-26 RX ORDER — METHADONE HYDROCHLORIDE 10 MG/1
15 TABLET ORAL EVERY 8 HOURS
Qty: 135 TABLET | Refills: 0 | Status: SHIPPED | OUTPATIENT
Start: 2025-02-26 | End: 2025-03-28

## 2025-02-26 NOTE — TELEPHONE ENCOUNTER
Palliative Medicine Clinic   [place]: [phone])    Patient Name: Millie King  YOB: 1985    Medication Refill Request    Patient is scheduled for follow up:  []  YES  [x]   NO  Next \[facility]\"" Med Clinic Visit: none    PDMP reviewed:  [x] YES   []  System down / Unable  []  NO- Patient fills out of state    Medication:pregabalin (LYRICA) 150 MG capsule   Dose and directions:Take 1 capsule by mouth in the morning, at noon, and at bedtime for 30 days. Max Daily Amount: 450 mg   Number dispensed:90  Date filled (PDMP or Pharmacy):01/28/25  # left: unknown        Appropriate for refill:  [x]  YES  []  Early Request - Requires MD/NP Review      Other pertinent information for prescriber:

## 2025-02-26 NOTE — TELEPHONE ENCOUNTER
Palliative Medicine Clinic   [place]: [phone])    Patient Name: Millie King  YOB: 1985    Medication Refill Request    Patient is scheduled for follow up:  []  YES  [x]   NO  Next [facility] Med Clinic Visit: none    PDMP reviewed:  [x] YES   []  System down / Unable  []  NO- Patient fills out of state    Medication:    methadone (DOLOPHINE) 10 MG tablet     Dose and directions:Take 1.5 tablets by mouth every 8 (eight) hours for 30 days. Max Daily Amount: 45 mg   Number dispensed:135  Date filled (PDMP or Pharmacy):01/28/25  # left:unknown        Appropriate for refill:  []  YES  []  Early Request - Requires MD/NP Review      Other pertinent information for prescriber:

## 2025-02-28 DIAGNOSIS — C79.31 METASTASIS TO BRAIN (HCC): ICD-10-CM

## 2025-02-28 RX ORDER — LEVETIRACETAM 500 MG/1
500 TABLET ORAL 2 TIMES DAILY
Qty: 180 TABLET | Refills: 1 | Status: SHIPPED | OUTPATIENT
Start: 2025-02-28

## 2025-02-28 NOTE — TELEPHONE ENCOUNTER
Palliative Medicine Clinic   [place]: [phone] ([phone])    Patient Name: Millie King  YOB: 1985    Medication Refill Request    Patient is scheduled for follow up:  []  YES  [x]   NO  Next [facility] Med Clinic Visit: none    PDMP reviewed:  [] YES   []  System down / Unable  []  NO- Patient fills out of state    Medication:    levETIRAcetam (KEPPRA) 500 MG tablet     Dose and directions:Take 1 tablet by mouth 2 times daily   Number dispensed:180  Date filled (PDMP or Pharmacy):10/11/24  # left:unknown        Appropriate for refill:  [x]  YES  []  Early Request - Requires MD/NP Review      Other pertinent information for prescriber:

## 2025-03-03 ENCOUNTER — TELEPHONE (OUTPATIENT)
Age: 40
End: 2025-03-03

## 2025-03-03 ENCOUNTER — HOSPITAL ENCOUNTER (OUTPATIENT)
Facility: HOSPITAL | Age: 40
Setting detail: INFUSION SERIES
End: 2025-03-03

## 2025-03-03 DIAGNOSIS — R19.7 DIARRHEA, UNSPECIFIED TYPE: ICD-10-CM

## 2025-03-03 DIAGNOSIS — C79.51 MALIGNANT NEOPLASM METASTATIC TO BONE (HCC): Primary | ICD-10-CM

## 2025-03-03 DIAGNOSIS — C78.7 MALIGNANT NEOPLASM OF BREAST METASTATIC TO LIVER (HCC): ICD-10-CM

## 2025-03-03 DIAGNOSIS — C50.919 MALIGNANT NEOPLASM OF BREAST METASTATIC TO LIVER (HCC): ICD-10-CM

## 2025-03-03 DIAGNOSIS — C78.02 MALIGNANT NEOPLASM METASTATIC TO BOTH LUNGS (HCC): ICD-10-CM

## 2025-03-03 DIAGNOSIS — C79.31 MALIGNANT NEOPLASM OF BREAST METASTATIC TO BRAIN, UNSPECIFIED LATERALITY (HCC): ICD-10-CM

## 2025-03-03 DIAGNOSIS — C50.919 MALIGNANT NEOPLASM OF BREAST METASTATIC TO BRAIN, UNSPECIFIED LATERALITY (HCC): ICD-10-CM

## 2025-03-03 DIAGNOSIS — C78.01 MALIGNANT NEOPLASM METASTATIC TO BOTH LUNGS (HCC): ICD-10-CM

## 2025-03-03 NOTE — TELEPHONE ENCOUNTER
Call to patient, ID verified.   Patient states in [facility] she overslept and needs to reschedule her OPIC and VV.  States notified [facility] and was told she must contact office. Request rescheduling.  Update to Scheduling/Provider.

## 2025-03-04 ENCOUNTER — TELEMEDICINE (OUTPATIENT)
Age: 40
End: 2025-03-04
Payer: COMMERCIAL

## 2025-03-04 DIAGNOSIS — R53.0 NEOPLASTIC (MALIGNANT) RELATED FATIGUE: ICD-10-CM

## 2025-03-04 DIAGNOSIS — G62.0 CHEMOTHERAPY-INDUCED NEUROPATHY: ICD-10-CM

## 2025-03-04 DIAGNOSIS — C79.31 METASTASIS TO BRAIN (HCC): ICD-10-CM

## 2025-03-04 DIAGNOSIS — T45.1X5A CHEMOTHERAPY-INDUCED NEUROPATHY: ICD-10-CM

## 2025-03-04 DIAGNOSIS — C50.919 MALIGNANT NEOPLASM OF BREAST METASTATIC TO LIVER (HCC): ICD-10-CM

## 2025-03-04 DIAGNOSIS — Z87.898 H/O MOTION SICKNESS: ICD-10-CM

## 2025-03-04 DIAGNOSIS — G89.3 CANCER RELATED PAIN: Primary | ICD-10-CM

## 2025-03-04 DIAGNOSIS — C78.7 MALIGNANT NEOPLASM OF BREAST METASTATIC TO LIVER (HCC): ICD-10-CM

## 2025-03-04 PROCEDURE — 99215 OFFICE O/P EST HI 40 MIN: CPT | Performed by: INTERNAL MEDICINE

## 2025-03-04 RX ORDER — SCOPOLAMINE 1 MG/3D
1 PATCH, EXTENDED RELEASE TRANSDERMAL
Qty: 4 PATCH | Refills: 0 | Status: SHIPPED | OUTPATIENT
Start: 2025-03-04

## 2025-03-04 RX ORDER — CYCLOBENZAPRINE HCL 10 MG
10 TABLET ORAL DAILY PRN
Qty: 20 TABLET | Refills: 0 | Status: SHIPPED | OUTPATIENT
Start: 2025-03-04 | End: 2025-03-24

## 2025-03-04 ASSESSMENT — PATIENT HEALTH QUESTIONNAIRE - PHQ9
SUM OF ALL RESPONSES TO PHQ QUESTIONS 1-9: 0
1. LITTLE INTEREST OR PLEASURE IN DOING THINGS: NOT AT ALL
2. FEELING DOWN, DEPRESSED OR HOPELESS: NOT AT ALL
SUM OF ALL RESPONSES TO PHQ QUESTIONS 1-9: 0

## 2025-03-04 NOTE — PROGRESS NOTES
Palliative Medicine Outpatient Clinic  Nurse Check in Note  [phone]    Patient Name: Millie iKng  YOB: 1985      Date of Visit: 03/04/2025  Visit Location:  [facility] Virtual Visit     Nurse verified patient is located in [place] for today's visit: Yes    Chief patient or family concern today: follow up.  Discuss pain regimen    Patient's Last Palliative Medicine Clinic Visit Date:  12/26/2024    Have you been to an ER or urgent care center since your last visit?  No  Have you been hospitalized since your last visit? No  Have you seen or consulted any health care providers outside of the [facility] system since your last visit?  No  If Yes, alert PSR to request appropriate records from non-[facility] offices    Medications:  Med reconciliation was performed with:  Patient    Requested refills:  Yes- not pended, clinician review requested    If prescribed an opioid, does patient have access to naloxone at home?:  YES  If No, pend naloxone nasal spray    Function and Symptoms:  Use of assist devices:  None    Palliative Performance Status (PPS):   Palliative Performance Scale (PPS)  PPS: 80    ESAS:  Modified-[place] Symptom Assessment Scale (ESAS)  Tiredness Score: 5  Drowsiness Score: Not drowsy  Depression Score: Not depressed  Pain Score: 6  Anxiety Score: 3  Nausea Score: Not nauseated  Appetite Score: Best appetite  Dyspnea Score: No shortness of breath  Constipation: No  Wellbeing Score: 2    Constipation?  No  Last BM: 03/03/25    Advance Care Planning:  Currently listed healthcare agent:    Primary Decision Maker: Eric King - Spouse - [phone]    Is there an ACP Note within the past 12 months?  YES  If No, Alert Clinician and/or Social Work      Divya Simms LPN        
pain, has occasional pain in RUQ but this is fleeting.  Has intermittent left thigh pain.   Acetaminophen does not help the muscle pain.  Currently taking oxycodone IR 10 mg at 9 pm and then again at 1 am.  Wakes up again at 3 am.  Typically wakes to go the bathroom, the ambulation worsens the pain.  The 10 mg does help the pain but she is unable to take more than a half tab (5 mg) during the day due to fatigue as SE as she does continue to work throughout the day.  Her priority is to remain as functional and alert as possible, but it is getting harder to manage the pain.  Due to ongoing pain post fixation, she had repeat CT imaging on 5/24 as ordered by oncology.   Reviewed imaging report today which showed stable right femoral and proximal stem fracture, new bone formation, no new lytic lesion.      Also discussed her mood and psychiatric regimen.  She was started on Wellbutrin and Lexpro post second child by a psychiatrist in [place]- has stayed on the 300 mg and 10 mg every since (past 10 years).  She has been concerned about lack of efficacy of late as low mood exacerbated by pain and loss of function.      7/8/24:  Millie is here today for in person follow up.   She is doing really well.  Reports that ultimately the shifts in her opioid regimen did pan out and her pain has lessened.  She did not however increase the methadone to every 8 hrs as recommended on the 28th, instead kept it at th every 12 hrs.  Continues to require Dilaudid 8 mg every 4 hrs routinely.  Feels more alert, less groggy on the methadone as compared to the Opana.  Some slight increase in neuropathic sx in her left hand, more difficulty with  but no significant worsening of her pain.  Gabapentin was prescribed today at oncology visit but she is taking the Lyrica TID as prescribed.      8/12/24:  Millie is seen virtually today for routine fu.   Reports her right hip pain spikes in the morning and evening time, and that the Dilaudid 8 
P/w SOB, and dizziness.  Dx with stage IV Lung Ca (adenocarcinoma), with mets to liver/bone s/p palliative chemotherapy.   He also has brain MRI in 4/2021 showed two small brain mets  s/p XRT. ECOG 2-3.  Followed Dr. Solano at [facility].    Pt wants to continue Palliative care chemotherapy, if does can be reduced.     Pt remains a FULL CODE.
P/w SOB, and dizziness.  Dx with stage IV Lung Ca (adenocarcinoma), with mets to liver/bone s/p palliative chemotherapy/immunotherapy.   He also has brain MRI in 4/2021 showed two small brain mets  s/p XRT. ECOG 2-3.  [facility]    Pt wants to continue Palliative care chemotherapy, if dose can be reduced.   Pt to f/u outpt  Pt remains a FULL CODE.

## 2025-03-05 ENCOUNTER — INITIAL CONSULT (OUTPATIENT)
Age: 40
End: 2025-03-05
Payer: COMMERCIAL

## 2025-03-05 ENCOUNTER — CLINICAL DOCUMENTATION (OUTPATIENT)
Age: 40
End: 2025-03-05

## 2025-03-05 ENCOUNTER — CLINICAL DOCUMENTATION (OUTPATIENT)
Facility: HOSPITAL | Age: 40
End: 2025-03-05

## 2025-03-05 VITALS
HEART RATE: 101 BPM | SYSTOLIC BLOOD PRESSURE: 113 MMHG | RESPIRATION RATE: 18 BRPM | OXYGEN SATURATION: 91 % | BODY MASS INDEX: 40.12 KG/M2 | TEMPERATURE: 98.2 F | HEIGHT: 62 IN | DIASTOLIC BLOOD PRESSURE: 79 MMHG | WEIGHT: 218 LBS

## 2025-03-05 DIAGNOSIS — C50.919 MALIGNANT NEOPLASM OF BREAST METASTATIC TO LIVER (HCC): ICD-10-CM

## 2025-03-05 DIAGNOSIS — C79.31 MALIGNANT NEOPLASM OF BREAST METASTATIC TO BRAIN, UNSPECIFIED LATERALITY (HCC): ICD-10-CM

## 2025-03-05 DIAGNOSIS — C79.51 MALIGNANT NEOPLASM METASTATIC TO BONE (HCC): Primary | ICD-10-CM

## 2025-03-05 DIAGNOSIS — C78.01 MALIGNANT NEOPLASM METASTATIC TO BOTH LUNGS (HCC): ICD-10-CM

## 2025-03-05 DIAGNOSIS — C50.919 MALIGNANT NEOPLASM OF BREAST METASTATIC TO BRAIN, UNSPECIFIED LATERALITY (HCC): Primary | ICD-10-CM

## 2025-03-05 DIAGNOSIS — C78.02 MALIGNANT NEOPLASM METASTATIC TO BOTH LUNGS (HCC): ICD-10-CM

## 2025-03-05 DIAGNOSIS — C79.31 MALIGNANT NEOPLASM OF BREAST METASTATIC TO BRAIN, UNSPECIFIED LATERALITY (HCC): Primary | ICD-10-CM

## 2025-03-05 DIAGNOSIS — C50.919 MALIGNANT NEOPLASM OF BREAST METASTATIC TO BRAIN, UNSPECIFIED LATERALITY (HCC): ICD-10-CM

## 2025-03-05 DIAGNOSIS — C78.7 MALIGNANT NEOPLASM OF BREAST METASTATIC TO LIVER (HCC): ICD-10-CM

## 2025-03-05 PROCEDURE — 99215 OFFICE O/P EST HI 40 MIN: CPT | Performed by: INTERNAL MEDICINE

## 2025-03-05 RX ORDER — PROCHLORPERAZINE MALEATE 10 MG
10 TABLET ORAL EVERY 6 HOURS PRN
Qty: 60 TABLET | Refills: 3 | Status: SHIPPED | OUTPATIENT
Start: 2025-03-05

## 2025-03-05 RX ORDER — FAMOTIDINE 10 MG/ML
20 INJECTION, SOLUTION INTRAVENOUS
OUTPATIENT
Start: 2025-03-10

## 2025-03-05 RX ORDER — DEXAMETHASONE SODIUM PHOSPHATE 10 MG/ML
10 INJECTION, SOLUTION INTRA-ARTICULAR; INTRALESIONAL; INTRAMUSCULAR; INTRAVENOUS; SOFT TISSUE ONCE
OUTPATIENT
Start: 2025-03-10 | End: 2025-03-10

## 2025-03-05 RX ORDER — HEPARIN SODIUM (PORCINE) LOCK FLUSH IV SOLN 100 UNIT/ML 100 UNIT/ML
500 SOLUTION INTRAVENOUS PRN
OUTPATIENT
Start: 2025-03-10

## 2025-03-05 RX ORDER — ONDANSETRON 2 MG/ML
8 INJECTION INTRAMUSCULAR; INTRAVENOUS
OUTPATIENT
Start: 2025-03-10

## 2025-03-05 RX ORDER — SODIUM CHLORIDE 9 MG/ML
INJECTION, SOLUTION INTRAVENOUS CONTINUOUS
OUTPATIENT
Start: 2025-03-10

## 2025-03-05 RX ORDER — PALONOSETRON 0.05 MG/ML
0.25 INJECTION, SOLUTION INTRAVENOUS ONCE
OUTPATIENT
Start: 2025-03-10 | End: 2025-03-10

## 2025-03-05 RX ORDER — PROCHLORPERAZINE EDISYLATE 5 MG/ML
5 INJECTION INTRAMUSCULAR; INTRAVENOUS
OUTPATIENT
Start: 2025-03-10

## 2025-03-05 RX ORDER — SODIUM CHLORIDE 0.9 % (FLUSH) 0.9 %
5-40 SYRINGE (ML) INJECTION PRN
OUTPATIENT
Start: 2025-03-10

## 2025-03-05 RX ORDER — ACETAMINOPHEN 325 MG/1
650 TABLET ORAL
OUTPATIENT
Start: 2025-03-10

## 2025-03-05 RX ORDER — EPINEPHRINE 1 MG/ML
0.3 INJECTION, SOLUTION, CONCENTRATE INTRAVENOUS PRN
OUTPATIENT
Start: 2025-03-10

## 2025-03-05 RX ORDER — SODIUM CHLORIDE 9 MG/ML
5-250 INJECTION, SOLUTION INTRAVENOUS PRN
OUTPATIENT
Start: 2025-03-10

## 2025-03-05 RX ORDER — OLANZAPINE 2.5 MG/1
2.5 TABLET, FILM COATED ORAL NIGHTLY
Qty: 30 TABLET | Refills: 5 | Status: SHIPPED | OUTPATIENT
Start: 2025-03-05

## 2025-03-05 RX ORDER — DIPHENHYDRAMINE HYDROCHLORIDE 50 MG/ML
50 INJECTION INTRAMUSCULAR; INTRAVENOUS
OUTPATIENT
Start: 2025-03-10

## 2025-03-05 RX ORDER — HYDROCORTISONE SODIUM SUCCINATE 100 MG/2ML
100 INJECTION INTRAMUSCULAR; INTRAVENOUS
OUTPATIENT
Start: 2025-03-10

## 2025-03-05 RX ORDER — DEXTROSE MONOHYDRATE 50 MG/ML
5-250 INJECTION, SOLUTION INTRAVENOUS PRN
OUTPATIENT
Start: 2025-03-10

## 2025-03-05 RX ORDER — ONDANSETRON 8 MG/1
8 TABLET, FILM COATED ORAL EVERY 8 HOURS PRN
Qty: 60 TABLET | Refills: 3 | Status: SHIPPED | OUTPATIENT
Start: 2025-03-05

## 2025-03-05 RX ORDER — ALBUTEROL SULFATE 90 UG/1
4 INHALANT RESPIRATORY (INHALATION) PRN
OUTPATIENT
Start: 2025-03-10

## 2025-03-05 ASSESSMENT — PATIENT HEALTH QUESTIONNAIRE - PHQ9
SUM OF ALL RESPONSES TO PHQ QUESTIONS 1-9: 0
1. LITTLE INTEREST OR PLEASURE IN DOING THINGS: NOT AT ALL
SUM OF ALL RESPONSES TO PHQ QUESTIONS 1-9: 0
SUM OF ALL RESPONSES TO PHQ QUESTIONS 1-9: 0
2. FEELING DOWN, DEPRESSED OR HOPELESS: NOT AT ALL
SUM OF ALL RESPONSES TO PHQ QUESTIONS 1-9: 0

## 2025-03-05 NOTE — PROGRESS NOTES
Pharmacy Note- Chemotherapy Education    Millie King is a  39 y.o.female  diagnosed with breast cancer here today for chemotherapy counseling. Ms. King is being treated with fam-trastuzumab deruxtecan.   Provided education on side effects and premedications.    Side effects of chemotherapy reviewed included s/s infection, anemia, appetite changes, thrombocytopenia, nausea/vomiting, fatigue, hair loss/alopecia, diarrhea/constipation and rare but serious side effects of changes in the heart, lungs and/or liver.    Patient given ways to manage these side effects and when to contact office.     We discussed the supportive medications in detail.   - Olanzapine 2.5 mg qHS  - Compazine/Zofran PRN or scheduled, depending on nausea response.    [facility] [place] Handout of medications provided to patient. Ms. King verbalized understanding of the information presented and all of the patient's questions were answered.    Chemotherapy/Immunotherapy education and consent discussed with the patient and the patient denied any questions or concerns.  A hard copy of the chemotherapy consent was offered to the patient and the patient declined (uploaded to [employer]).    Salome Castellano, PharmD, BCPS

## 2025-03-05 NOTE — PROGRESS NOTES
[facility] [place] at [facility]  [phone]          Reason for Visit:   Millie King is a 39 y.o. female seen today in office for follow up of Metastatic Breast Cancer.    Treatment History:   Abd US 10/3/22: There are multiple liver masses with 2 representative masses in  the right liver measuring 2.4 x 2.6 cm and 1.6 x 2.1 cm  CT A/P 10/3/22: Spiculated left breast mass suspicious for malignancy. Multiple lung and liver metastases. Multiple top normal size retroperitoneal lymph nodes are nonspecific with metastatic disease  not excluded  CT Chest 10/4/22: Indeterminate 1.4 x 1.9 cm left breast nodule. Primary malignancy not excluded. Mammographic correlation advised. Diffuse bilateral pulmonary nodules most compatible with metastatic disease.Partially imaged hepatic hypodensities concerning  for metastatic disease. Indeterminate 6 mm T10 lytic lesion, possibly benign. Attention on follow-up  Liver Biopsy 10/4/22: PATH - Metastatic adenocarcinoma, consistent with breast primary  ER negative, MA negative, Her 2 positive at IHC  3+, ki67 60%  Port placed on 10/13/22   Palliative Taxol+Herceptin+Perjeta 10/17/22 - 10/31/22  Switched to Palliative Taxotere+Herceptin+Perjeta on 11/7/22 - 1/22/24   DR Taxotere to 60 mg/m2 with Cycle 8  CT C/A/P 12/27/22: Imaging findings consistent with significant interval response to therapy. Significantly diminished pulmonary metastatic disease  burden with numerous resolved or nearly resolved pulmonary nodules. Significantly diminished size of hepatic masses. Left breast lesion is not demonstrated on the current exam  Right Hip XRAY 1/2/23: Right basicervical femoral neck fracture with medial angulation  CT Pelv 1/2/23: Re-demonstrated acute right basicervical femoral neck fracture, with findings concerning for pathologic etiology  XR Femur 1/2/23: Mildly displaced right femoral neck fracture  Right Hip IMN with Ortho Dr Bermudez   Bone Scan 1/5/23:

## 2025-03-06 ENCOUNTER — HOSPITAL ENCOUNTER (OUTPATIENT)
Facility: HOSPITAL | Age: 40
Discharge: HOME | End: 2025-03-09
Attending: INTERNAL MEDICINE
Payer: COMMERCIAL

## 2025-03-06 DIAGNOSIS — C79.31 MALIGNANT NEOPLASM OF BREAST METASTATIC TO BRAIN, UNSPECIFIED LATERALITY (HCC): ICD-10-CM

## 2025-03-06 DIAGNOSIS — C50.919 MALIGNANT NEOPLASM OF BREAST METASTATIC TO BRAIN, UNSPECIFIED LATERALITY (HCC): ICD-10-CM

## 2025-03-06 PROCEDURE — 6360000004 HC RX CONTRAST MEDICATION: Performed by: INTERNAL MEDICINE

## 2025-03-06 PROCEDURE — 74177 CT ABD & PELVIS W/CONTRAST: CPT

## 2025-03-06 PROCEDURE — A9579 GAD-BASE MR CONTRAST NOS,1ML: HCPCS | Performed by: INTERNAL MEDICINE

## 2025-03-06 PROCEDURE — 70553 MRI BRAIN STEM W/O & W/DYE: CPT

## 2025-03-06 RX ORDER — IOPAMIDOL 755 MG/ML
INJECTION, SOLUTION INTRAVASCULAR
Status: CANCELLED | OUTPATIENT
Start: 2025-03-06

## 2025-03-06 RX ORDER — IOPAMIDOL 755 MG/ML
100 INJECTION, SOLUTION INTRAVASCULAR
Status: COMPLETED | OUTPATIENT
Start: 2025-03-06 | End: 2025-03-06

## 2025-03-06 RX ADMIN — IOPAMIDOL 100 ML: 755 INJECTION, SOLUTION INTRAVENOUS at 17:04

## 2025-03-06 RX ADMIN — GADOTERIDOL 19 ML: 279.3 INJECTION, SOLUTION INTRAVENOUS at 16:52

## 2025-03-06 NOTE — PROGRESS NOTES
NCCN Distress Thermometer    Medical Oncology at [facility]    Date Screening Completed: 3/5/2025    Screening Declined:  [] Yes    Number that best describes how much distress you've experienced in the past week, including today?  0 [x] - No distress 1 []      2 []      3 []      4 []       5 []       6 []      7 []      8 []      9 []       10 [] - Extreme distress    PROBLEM LIST  Have you had concerns about any of the items below in the past week, including today?      Physical Concerns Practical Concerns   [] Pain [] Taking care of myself    [] Sleep [] Taking care of others    [] Fatigue [] Safety   [] Tobacco use  [] Work   [] Substance use  [] School   [] Memory or concentration [] Housing/Utilities   [] Sexual health [] Finances   [] Changes in eating  [] Insurance   [] Loss or change of physical abilities  [] Transportation    []    Emotional Concerns [] Having enough food   [] Worry or anxiety [] Access to medicine   [] Sadness or depression [] Treatment decisions   [] Loss of interest or enjoyment     [] Grief or loss  Spiritual or Anabaptist Concerns   [] Fear [] Sense of meaning or purpose   [] Loneliness  [] Changes in suyapa or beliefs   [] Anger [] Death, dying, or afterlife   [] Changes in appearance [] Conflict between beliefs and cancer treatments    [] Feelings of worthlessness or being a burden [] Relationship with the sacred    [] Ritual or dietary needs    Social Concerns     [] Relationship with spouse or partner     [] Relationship with children    [] Relationship with family members     [] Relationship with friends or coworkers     [] Communication with health care team     [] Ability to have children     [] Prejudice or discrimination        Other Concerns:

## 2025-03-07 ENCOUNTER — TELEPHONE (OUTPATIENT)
Age: 40
End: 2025-03-07

## 2025-03-07 ENCOUNTER — HOSPITAL ENCOUNTER (OUTPATIENT)
Facility: HOSPITAL | Age: 40
Discharge: HOME | End: 2025-03-10
Attending: INTERNAL MEDICINE
Payer: COMMERCIAL

## 2025-03-07 DIAGNOSIS — C79.31 MALIGNANT NEOPLASM OF BREAST METASTATIC TO BRAIN, UNSPECIFIED LATERALITY (HCC): ICD-10-CM

## 2025-03-07 DIAGNOSIS — C50.919 MALIGNANT NEOPLASM OF BREAST METASTATIC TO BRAIN, UNSPECIFIED LATERALITY (HCC): ICD-10-CM

## 2025-03-07 PROCEDURE — A9503 TC99M MEDRONATE: HCPCS | Performed by: INTERNAL MEDICINE

## 2025-03-07 PROCEDURE — 78306 BONE IMAGING WHOLE BODY: CPT | Performed by: INTERNAL MEDICINE

## 2025-03-07 PROCEDURE — 3430000000 HC RX DIAGNOSTIC RADIOPHARMACEUTICAL: Performed by: INTERNAL MEDICINE

## 2025-03-07 RX ORDER — TC 99M MEDRONATE 20 MG/10ML
26 INJECTION, POWDER, LYOPHILIZED, FOR SOLUTION INTRAVENOUS
Status: COMPLETED | OUTPATIENT
Start: 2025-03-07 | End: 2025-03-07

## 2025-03-07 RX ADMIN — TC 99M MEDRONATE 26 MILLICURIE: 20 INJECTION, POWDER, LYOPHILIZED, FOR SOLUTION INTRAVENOUS at 09:10

## 2025-03-07 NOTE — TELEPHONE ENCOUNTER
----- Message from Dr. Carlton Prasad MD sent at 3/6/2025  5:38 PM EST -----  Please let her know this is stable, thanks

## 2025-03-07 NOTE — TELEPHONE ENCOUNTER
[facility] [place] at [facility]  [phone]    3/7/25 3699 Called patient to notify of CT scan results. Per Dr. Prasad CT scan looks stable at this time. No answer, left detailed message and requested call back with any questions.

## 2025-03-10 ENCOUNTER — HOSPITAL ENCOUNTER (OUTPATIENT)
Facility: HOSPITAL | Age: 40
Setting detail: INFUSION SERIES
Discharge: HOME | End: 2025-03-10
Payer: COMMERCIAL

## 2025-03-10 VITALS
OXYGEN SATURATION: 90 % | BODY MASS INDEX: 41.18 KG/M2 | HEART RATE: 109 BPM | RESPIRATION RATE: 18 BRPM | HEIGHT: 62 IN | DIASTOLIC BLOOD PRESSURE: 80 MMHG | TEMPERATURE: 97.5 F | SYSTOLIC BLOOD PRESSURE: 129 MMHG | WEIGHT: 223.8 LBS

## 2025-03-10 DIAGNOSIS — C78.7 MALIGNANT NEOPLASM OF BREAST METASTATIC TO LIVER: Primary | ICD-10-CM

## 2025-03-10 DIAGNOSIS — C78.02 MALIGNANT NEOPLASM METASTATIC TO BOTH LUNGS (HCC): ICD-10-CM

## 2025-03-10 DIAGNOSIS — C79.51 MALIGNANT NEOPLASM METASTATIC TO BONE (HCC): ICD-10-CM

## 2025-03-10 DIAGNOSIS — C50.919 MALIGNANT NEOPLASM OF BREAST METASTATIC TO LIVER: Primary | ICD-10-CM

## 2025-03-10 DIAGNOSIS — C78.01 MALIGNANT NEOPLASM METASTATIC TO BOTH LUNGS (HCC): ICD-10-CM

## 2025-03-10 DIAGNOSIS — C50.919 MALIGNANT NEOPLASM OF BREAST METASTATIC TO BRAIN, UNSPECIFIED LATERALITY: ICD-10-CM

## 2025-03-10 DIAGNOSIS — C79.31 MALIGNANT NEOPLASM OF BREAST METASTATIC TO BRAIN, UNSPECIFIED LATERALITY: ICD-10-CM

## 2025-03-10 LAB
ALBUMIN SERPL-MCNC: 3.4 G/DL (ref 3.5–5)
ALBUMIN/GLOB SERPL: 1.1 (ref 1.1–2.2)
ALP SERPL-CCNC: 116 U/L (ref 45–117)
ALT SERPL-CCNC: 28 U/L (ref 12–78)
ANION GAP SERPL CALC-SCNC: 6 MMOL/L (ref 2–12)
AST SERPL-CCNC: 21 U/L (ref 15–37)
BASOPHILS # BLD: 0.03 K/UL (ref 0–0.1)
BASOPHILS NFR BLD: 0.6 % (ref 0–1)
BILIRUB SERPL-MCNC: 0.7 MG/DL (ref 0.2–1)
BUN SERPL-MCNC: 9 MG/DL (ref 6–20)
BUN/CREAT SERPL: 13 (ref 12–20)
CALCIUM SERPL-MCNC: 8.9 MG/DL (ref 8.5–10.1)
CHLORIDE SERPL-SCNC: 104 MMOL/L (ref 97–108)
CO2 SERPL-SCNC: 29 MMOL/L (ref 21–32)
CREAT SERPL-MCNC: 0.72 MG/DL (ref 0.55–1.02)
DIFFERENTIAL METHOD BLD: ABNORMAL
EOSINOPHIL # BLD: 0.14 K/UL (ref 0–0.4)
EOSINOPHIL NFR BLD: 2.7 % (ref 0–7)
ERYTHROCYTE [DISTWIDTH] IN BLOOD BY AUTOMATED COUNT: 17.4 % (ref 11.5–14.5)
GLOBULIN SER CALC-MCNC: 3 G/DL (ref 2–4)
GLUCOSE SERPL-MCNC: 197 MG/DL (ref 65–100)
HBV SURFACE AB SER QL: NONREACTIVE
HBV SURFACE AB SER-ACNC: 8.57 MIU/ML
HBV SURFACE AG SER QL: <0.1 INDEX
HBV SURFACE AG SER QL: NEGATIVE
HCT VFR BLD AUTO: 34.5 % (ref 35–47)
HGB BLD-MCNC: 10.9 G/DL (ref 11.5–16)
IMM GRANULOCYTES # BLD AUTO: 0.03 K/UL (ref 0–0.04)
IMM GRANULOCYTES NFR BLD AUTO: 0.6 % (ref 0–0.5)
LYMPHOCYTES # BLD: 1.44 K/UL (ref 0.8–3.5)
LYMPHOCYTES NFR BLD: 28 % (ref 12–49)
MCH RBC QN AUTO: 27.6 PG (ref 26–34)
MCHC RBC AUTO-ENTMCNC: 31.6 G/DL (ref 30–36.5)
MCV RBC AUTO: 87.3 FL (ref 80–99)
MONOCYTES # BLD: 0.4 K/UL (ref 0–1)
MONOCYTES NFR BLD: 7.8 % (ref 5–13)
NEUTS SEG # BLD: 3.11 K/UL (ref 1.8–8)
NEUTS SEG NFR BLD: 60.3 % (ref 32–75)
NRBC # BLD: 0 K/UL (ref 0–0.01)
NRBC BLD-RTO: 0 PER 100 WBC
PLATELET # BLD AUTO: 275 K/UL (ref 150–400)
PMV BLD AUTO: 9.7 FL (ref 8.9–12.9)
POTASSIUM SERPL-SCNC: 3.6 MMOL/L (ref 3.5–5.1)
PROT SERPL-MCNC: 6.4 G/DL (ref 6.4–8.2)
RBC # BLD AUTO: 3.95 M/UL (ref 3.8–5.2)
SODIUM SERPL-SCNC: 139 MMOL/L (ref 136–145)
WBC # BLD AUTO: 5.2 K/UL (ref 3.6–11)

## 2025-03-10 PROCEDURE — 87340 HEPATITIS B SURFACE AG IA: CPT

## 2025-03-10 PROCEDURE — 80053 COMPREHEN METABOLIC PANEL: CPT

## 2025-03-10 PROCEDURE — 96375 TX/PRO/DX INJ NEW DRUG ADDON: CPT

## 2025-03-10 PROCEDURE — 2580000003 HC RX 258: Performed by: INTERNAL MEDICINE

## 2025-03-10 PROCEDURE — 96367 TX/PROPH/DG ADDL SEQ IV INF: CPT

## 2025-03-10 PROCEDURE — 85025 COMPLETE CBC W/AUTO DIFF WBC: CPT

## 2025-03-10 PROCEDURE — 86704 HEP B CORE ANTIBODY TOTAL: CPT

## 2025-03-10 PROCEDURE — 86706 HEP B SURFACE ANTIBODY: CPT

## 2025-03-10 PROCEDURE — 6360000002 HC RX W HCPCS: Performed by: INTERNAL MEDICINE

## 2025-03-10 PROCEDURE — 96415 CHEMO IV INFUSION ADDL HR: CPT

## 2025-03-10 PROCEDURE — 96413 CHEMO IV INFUSION 1 HR: CPT

## 2025-03-10 RX ORDER — ALBUTEROL SULFATE 90 UG/1
4 INHALANT RESPIRATORY (INHALATION) PRN
Status: DISCONTINUED | OUTPATIENT
Start: 2025-03-10 | End: 2025-03-11 | Stop reason: HOSPADM

## 2025-03-10 RX ORDER — DIPHENHYDRAMINE HYDROCHLORIDE 50 MG/ML
50 INJECTION, SOLUTION INTRAMUSCULAR; INTRAVENOUS
Status: DISCONTINUED | OUTPATIENT
Start: 2025-03-10 | End: 2025-03-11 | Stop reason: HOSPADM

## 2025-03-10 RX ORDER — EPINEPHRINE 1 MG/ML
0.3 INJECTION, SOLUTION INTRAMUSCULAR; SUBCUTANEOUS PRN
Status: DISCONTINUED | OUTPATIENT
Start: 2025-03-10 | End: 2025-03-11 | Stop reason: HOSPADM

## 2025-03-10 RX ORDER — DEXAMETHASONE SODIUM PHOSPHATE 10 MG/ML
10 INJECTION, SOLUTION INTRA-ARTICULAR; INTRALESIONAL; INTRAMUSCULAR; INTRAVENOUS; SOFT TISSUE ONCE
Status: COMPLETED | OUTPATIENT
Start: 2025-03-10 | End: 2025-03-10

## 2025-03-10 RX ORDER — SODIUM CHLORIDE 9 MG/ML
INJECTION, SOLUTION INTRAVENOUS CONTINUOUS
Status: DISCONTINUED | OUTPATIENT
Start: 2025-03-10 | End: 2025-03-11 | Stop reason: HOSPADM

## 2025-03-10 RX ORDER — ACETAMINOPHEN 325 MG/1
650 TABLET ORAL
Status: DISCONTINUED | OUTPATIENT
Start: 2025-03-10 | End: 2025-03-11 | Stop reason: HOSPADM

## 2025-03-10 RX ORDER — ONDANSETRON 2 MG/ML
8 INJECTION INTRAMUSCULAR; INTRAVENOUS
Status: DISCONTINUED | OUTPATIENT
Start: 2025-03-10 | End: 2025-03-11 | Stop reason: HOSPADM

## 2025-03-10 RX ORDER — HYDROCORTISONE SODIUM SUCCINATE 100 MG/2ML
100 INJECTION INTRAMUSCULAR; INTRAVENOUS
Status: DISCONTINUED | OUTPATIENT
Start: 2025-03-10 | End: 2025-03-11 | Stop reason: HOSPADM

## 2025-03-10 RX ORDER — PALONOSETRON 0.05 MG/ML
0.25 INJECTION, SOLUTION INTRAVENOUS ONCE
Status: COMPLETED | OUTPATIENT
Start: 2025-03-10 | End: 2025-03-10

## 2025-03-10 RX ORDER — FAMOTIDINE 10 MG/ML
20 INJECTION, SOLUTION INTRAVENOUS
Status: DISCONTINUED | OUTPATIENT
Start: 2025-03-10 | End: 2025-03-11 | Stop reason: HOSPADM

## 2025-03-10 RX ORDER — DEXTROSE MONOHYDRATE 50 MG/ML
5-250 INJECTION, SOLUTION INTRAVENOUS PRN
Status: DISCONTINUED | OUTPATIENT
Start: 2025-03-10 | End: 2025-03-11 | Stop reason: HOSPADM

## 2025-03-10 RX ADMIN — PALONOSETRON HYDROCHLORIDE 0.25 MG: 0.25 INJECTION INTRAVENOUS at 12:19

## 2025-03-10 RX ADMIN — DEXAMETHASONE SODIUM PHOSPHATE 10 MG: 10 INJECTION INTRAMUSCULAR; INTRAVENOUS at 12:22

## 2025-03-10 RX ADMIN — SODIUM CHLORIDE 150 MG: 9 INJECTION, SOLUTION INTRAVENOUS at 12:28

## 2025-03-10 RX ADMIN — FAM-TRASTUZUMAB DERUXTECAN-NXKI 534 MG: 100 INJECTION, POWDER, LYOPHILIZED, FOR SOLUTION INTRAVENOUS at 13:20

## 2025-03-10 ASSESSMENT — PAIN SCALES - GENERAL: PAINLEVEL_OUTOF10: 0

## 2025-03-10 NOTE — PROGRESS NOTES
hospitals Progress Note    Date: March 10, 2025    Name: Millie King    MRN: [id number]         : 1985    Ms. King Arrived ambulatory with cane and in no distress for C1D1 of [place] Regimen.  Assessment was completed, no acute issues at this time, no new complaints voiced. Chest wall port accessed without difficulty, labs drawn & sent for processing.       Ms. King's vitals were reviewed.  Vitals:    03/10/25 1100   BP: 129/80   Pulse: (!) 109   Resp: 18   Temp: 97.5 °F (36.4 °C)   SpO2: 90%       Lab results were obtained and reviewed.  Recent Results (from the past 12 hours)   CBC With Auto Differential    Collection Time: 03/10/25 11:17 AM   Result Value Ref Range    WBC 5.2 3.6 - 11.0 K/uL    RBC 3.95 3.80 - 5.20 M/uL    Hemoglobin 10.9 (L) 11.5 - 16.0 g/dL    Hematocrit 34.5 (L) 35.0 - 47.0 %    MCV 87.3 80.0 - 99.0 FL    MCH 27.6 26.0 - 34.0 PG    MCHC 31.6 30.0 - 36.5 g/dL    RDW 17.4 (H) 11.5 - 14.5 %    Platelets 275 150 - 400 K/uL    MPV 9.7 8.9 - 12.9 FL    Nucleated RBCs 0.0 0  WBC    nRBC 0.00 0.00 - 0.01 K/uL    Neutrophils % 60.3 32.0 - 75.0 %    Lymphocytes % 28.0 12.0 - 49.0 %    Monocytes % 7.8 5.0 - 13.0 %    Eosinophils % 2.7 0.0 - 7.0 %    Basophils % 0.6 0.0 - 1.0 %    Immature Granulocytes % 0.6 (H) 0.0 - 0.5 %    Neutrophils Absolute 3.11 1.80 - 8.00 K/UL    Lymphocytes Absolute 1.44 0.80 - 3.50 K/UL    Monocytes Absolute 0.40 0.00 - 1.00 K/UL    Eosinophils Absolute 0.14 0.00 - 0.40 K/UL    Basophils Absolute 0.03 0.00 - 0.10 K/UL    Immature Granulocytes Absolute 0.03 0.00 - 0.04 K/UL    Differential Type AUTOMATED     Comprehensive metabolic panel    Collection Time: 03/10/25 11:17 AM   Result Value Ref Range    Sodium 139 136 - 145 mmol/L    Potassium 3.6 3.5 - 5.1 mmol/L    Chloride 104 97 - 108 mmol/L    CO2 29 21 - 32 mmol/L    Anion Gap 6 2 - 12 mmol/L    Glucose 197 (H) 65 - 100 mg/dL    BUN 9 6 - 20 MG/DL    Creatinine 0.72 0.55 - 1.02 MG/DL    BUN/Creatinine Ratio  [facility]   4/15/2025  1:40 PM Tsering Herrera MD [facility] BS AMB   4/17/2025 11:00 AM Sonia Nieves MD [facility] BS AMB   4/28/2025 11:00 AM SS CHEMO CHAIR 3 [facility] INF [facility]   5/19/2025 11:00 AM SS CHEMO CHAIR 3 [facility] IN[facility]   5/29/2025 10:40 AM Christy Enriquez MD [facility] AM[facility]   6/9/2025 11:00 AM SS CHEMO CHAIR 3 [facility]        Mary Bailey, RN  March 10, 2025

## 2025-03-12 LAB — HBV CORE AB SERPL QL IA: NEGATIVE

## 2025-03-17 ENCOUNTER — HOSPITAL ENCOUNTER (OUTPATIENT)
Facility: HOSPITAL | Age: 40
Setting detail: INFUSION SERIES
Discharge: HOME | End: 2025-03-17
Payer: COMMERCIAL

## 2025-03-17 ENCOUNTER — TELEPHONE (OUTPATIENT)
Age: 40
End: 2025-03-17

## 2025-03-17 ENCOUNTER — OFFICE VISIT (OUTPATIENT)
Age: 40
End: 2025-03-17
Payer: COMMERCIAL

## 2025-03-17 VITALS
OXYGEN SATURATION: 95 % | TEMPERATURE: 98.1 F | SYSTOLIC BLOOD PRESSURE: 147 MMHG | WEIGHT: 220 LBS | DIASTOLIC BLOOD PRESSURE: 81 MMHG | BODY MASS INDEX: 40.24 KG/M2 | RESPIRATION RATE: 18 BRPM | HEART RATE: 105 BPM

## 2025-03-17 VITALS
SYSTOLIC BLOOD PRESSURE: 121 MMHG | OXYGEN SATURATION: 90 % | HEART RATE: 96 BPM | DIASTOLIC BLOOD PRESSURE: 66 MMHG | TEMPERATURE: 97.8 F

## 2025-03-17 DIAGNOSIS — C50.919 MALIGNANT NEOPLASM OF BREAST METASTATIC TO BRAIN, UNSPECIFIED LATERALITY: Primary | ICD-10-CM

## 2025-03-17 DIAGNOSIS — C79.31 MALIGNANT NEOPLASM OF BREAST METASTATIC TO BRAIN, UNSPECIFIED LATERALITY: Primary | ICD-10-CM

## 2025-03-17 PROCEDURE — 2580000003 HC RX 258: Performed by: NURSE PRACTITIONER

## 2025-03-17 PROCEDURE — 2500000003 HC RX 250 WO HCPCS: Performed by: NURSE PRACTITIONER

## 2025-03-17 PROCEDURE — 96360 HYDRATION IV INFUSION INIT: CPT

## 2025-03-17 PROCEDURE — 99215 OFFICE O/P EST HI 40 MIN: CPT | Performed by: INTERNAL MEDICINE

## 2025-03-17 RX ORDER — HEPARIN 100 UNIT/ML
500 SYRINGE INTRAVENOUS PRN
OUTPATIENT
Start: 2025-03-17

## 2025-03-17 RX ORDER — FAMOTIDINE 10 MG/ML
20 INJECTION, SOLUTION INTRAVENOUS
OUTPATIENT
Start: 2025-03-17

## 2025-03-17 RX ORDER — SODIUM CHLORIDE 9 MG/ML
5-250 INJECTION, SOLUTION INTRAVENOUS PRN
OUTPATIENT
Start: 2025-03-17

## 2025-03-17 RX ORDER — 0.9 % SODIUM CHLORIDE 0.9 %
1000 INTRAVENOUS SOLUTION INTRAVENOUS ONCE
Status: COMPLETED | OUTPATIENT
Start: 2025-03-17 | End: 2025-03-17

## 2025-03-17 RX ORDER — SODIUM CHLORIDE 0.9 % (FLUSH) 0.9 %
5-40 SYRINGE (ML) INJECTION PRN
OUTPATIENT
Start: 2025-03-17

## 2025-03-17 RX ORDER — ACETAMINOPHEN 325 MG/1
650 TABLET ORAL
OUTPATIENT
Start: 2025-03-17

## 2025-03-17 RX ORDER — 0.9 % SODIUM CHLORIDE 0.9 %
1000 INTRAVENOUS SOLUTION INTRAVENOUS ONCE
OUTPATIENT
Start: 2025-03-17 | End: 2025-03-17

## 2025-03-17 RX ORDER — ALBUTEROL SULFATE 90 UG/1
4 INHALANT RESPIRATORY (INHALATION) PRN
OUTPATIENT
Start: 2025-03-17

## 2025-03-17 RX ORDER — SODIUM CHLORIDE 9 MG/ML
INJECTION, SOLUTION INTRAVENOUS CONTINUOUS
OUTPATIENT
Start: 2025-03-17

## 2025-03-17 RX ORDER — DIPHENHYDRAMINE HYDROCHLORIDE 50 MG/ML
50 INJECTION, SOLUTION INTRAMUSCULAR; INTRAVENOUS
OUTPATIENT
Start: 2025-03-17

## 2025-03-17 RX ORDER — HYDROCORTISONE SODIUM SUCCINATE 100 MG/2ML
100 INJECTION INTRAMUSCULAR; INTRAVENOUS
OUTPATIENT
Start: 2025-03-17

## 2025-03-17 RX ORDER — SODIUM CHLORIDE 0.9 % (FLUSH) 0.9 %
5-40 SYRINGE (ML) INJECTION PRN
Status: DISCONTINUED | OUTPATIENT
Start: 2025-03-17 | End: 2025-03-18 | Stop reason: HOSPADM

## 2025-03-17 RX ORDER — EPINEPHRINE 1 MG/ML
0.3 INJECTION, SOLUTION INTRAMUSCULAR; SUBCUTANEOUS PRN
OUTPATIENT
Start: 2025-03-17

## 2025-03-17 RX ORDER — ONDANSETRON 2 MG/ML
8 INJECTION INTRAMUSCULAR; INTRAVENOUS
OUTPATIENT
Start: 2025-03-17

## 2025-03-17 RX ADMIN — SODIUM CHLORIDE 1000 ML: 9 INJECTION, SOLUTION INTRAVENOUS at 15:27

## 2025-03-17 RX ADMIN — SODIUM CHLORIDE, PRESERVATIVE FREE 20 ML: 5 INJECTION INTRAVENOUS at 16:42

## 2025-03-17 ASSESSMENT — PAIN SCALES - GENERAL: PAINLEVEL_OUTOF10: 0

## 2025-03-17 NOTE — PROGRESS NOTES
[facility] Progress Note    Date: March 17, 2025      1500: Pt arrived ambulatory to [facility] for Hydration in stable condition.  Assessment completed. Port accessed with positive blood return.      Patient Vitals for the past 12 hrs:   Temp Pulse BP SpO2   03/17/25 1630 -- 96 121/66 --   03/17/25 1515 97.8 °F (36.6 °C) (!) 118 120/72 90 %       Medications Administered         sodium chloride 0.9 % bolus 1,000 mL Admin Date  03/17/2025 Action  New Bag Dose  1,000 mL Rate  983.6 mL/hr Route  IntraVENous Documented By  Sally Caldwell, RN        sodium chloride flush 0.9 % injection 5-40 mL Admin Date  03/17/2025 Action  Given Dose  20 mL Rate   Route  IntraVENous Documented By  Sally Caldwell, BLADE                1640:  Tolerated treatment well, no adverse reactions noted. Port flushed, and de- accessed per protocol. D/Cd from [facility] ambulatory and in no distress.  Patient is aware of next scheduled [facility] appointment 4/9/25 at 1100.    Future Appointments   Date Time Provider Department Center   4/9/2025 11:00 AM [facility]   4/9/2025 11:45 AM Carlton Prasad MD [facility] BS AMB   4/10/2025  1:00 PM [facility] ECHO LAB [facility]   4/15/2025  1:40 PM Tsering Herrera MD [facility] BS AMB   4/17/2025 11:00 AM Sonia Nieves MD [facility] BS AMB   4/28/2025 11:00 AM [facility]   5/19/2025 11:00 AM [facility]   5/29/2025 10:40 AM Christy Enriquez MD [facility] BS AMB   6/9/2025 11:00 AM SS CHEMO CHAIR 3 [facility]           SALLY CALDWELL RN, RN  March 17, 2025

## 2025-03-17 NOTE — TELEPHONE ENCOUNTER
[facility] [place] at [facility]  [phone]    3/17/25 1640 Called patient to inform that per Dr. Prasad, Dr. Martinez has also reviewed MRI and agrees it looks stable. Patient stated understanding and has no further questions at this time.

## 2025-03-17 NOTE — PROGRESS NOTES
Millie King is a 39 y.o. female    1. Have you been to the ER, urgent care clinic since your last visit?  Hospitalized since your last visit?No    2. Have you seen or consulted any other health care providers outside of the [facility] since your last visit?  Include any pap smears or colon screening. No    
hepatic subcapsular hypodensity at 2-47 is not characterize, likely  chronic there is no intrahepatic duct dilatation. There is no hepatic  parenchymal mass. Hepatic enhancement pattern is within normal limits. Portal  vein is patent.  SPLEEN/PANCREAS: No splenomegaly. No mass lesion.  There is no pancreatic duct  dilatation.  ADRENALS/KIDNEYS: Punctate nonobstructive left renal calculus. There is no renal  mass. There is no perinephric mass.  STOMACH: No dilatation or wall thickening.  COLON AND SMALL BOWEL: No dilatation or wall thickening. There is no free  intraperitoneal air. There is no evidence of incarceration or obstruction. No  mesenteric adenopathy.  PERITONEUM: Unremarkable.  APPENDIX: Unremarkable.  BLADDER/REPRODUCTIVE ORGANS: No mass or calculus.  RETROPERITONEUM: Unremarkable. The abdominal aorta is normal in caliber. No  aneurysm. No retroperitoneal adenopathy.  OSSEOUS STRUCTURES: Right hip arthroplasty. L5-S1 degenerative change.    Impression  Stable examination.  Hilar and mediastinal lymph nodes are not significantly changed.  Subcapsular hypodensity abutting the posterior right hepatic margin is also  stable in overall appearance  There is no additional evidence of recurrent/metastatic disease in the chest,  abdomen or pelvis.    There is no acute intrathoracic or intra-abdominal process.  Incidental/nonemergent findings are as described above.    Electronically signed by KARMEN EISENBERG    NM Results (most recent):  NM BONE SCAN WHOLE BODY 03/07/2025    Narrative  EXAM: Whole Body Nuclear Bone Scan is performed with IV injection of 26 mCi  technetium 99m.    INDICATION: Malignant neoplasm of unspecified site of unspecified female breast  (HCC); Secondary malignant neoplasm of brain (HCC)    Comparison Bone Scan: 1/22/2025.  Correlation Imaging Studies: CT CAP.    TECHNIQUE: Anterior and posterior whole body scintigraphic planar images are  obtained.    FINDINGS:  There is stable right hip femoral

## 2025-03-18 DIAGNOSIS — R11.0 CHEMOTHERAPY-INDUCED NAUSEA: ICD-10-CM

## 2025-03-18 DIAGNOSIS — T45.1X5A CHEMOTHERAPY-INDUCED NAUSEA: ICD-10-CM

## 2025-03-18 DIAGNOSIS — Z87.898 H/O MOTION SICKNESS: Primary | ICD-10-CM

## 2025-03-18 RX ORDER — SCOPOLAMINE 1 MG/3D
1 PATCH, EXTENDED RELEASE TRANSDERMAL
Qty: 4 PATCH | Refills: 0 | OUTPATIENT
Start: 2025-03-18

## 2025-03-18 NOTE — TELEPHONE ENCOUNTER
Palliative Medicine Clinic   [place]: [phone] ([phone])    Patient Name: Millie King  YOB: 1985    Medication Refill Request    Patient is scheduled for follow up:  [x]  YES  []   NO  Next [facility] Med Clinic Visit: 04/17/25    PDMP reviewed:  [] YES   []  System down / Unable  []  NO- Patient fills out of state    Medication:    scopolamine (TRANSDERM-SCOP) transdermal patch     Dose and directions:Place 1 patch onto the skin every 72 hours,   Number dispensed:4  Date filled (PDMP or Pharmacy):03/04/25  # left:unknown        Appropriate for refill:  [x]  YES  []  Early Request - Requires MD/NP Review      Other pertinent information for prescriber:

## 2025-03-24 ENCOUNTER — PATIENT MESSAGE (OUTPATIENT)
Age: 40
End: 2025-03-24

## 2025-03-24 DIAGNOSIS — G89.3 CANCER RELATED PAIN: ICD-10-CM

## 2025-03-24 DIAGNOSIS — G62.0 CHEMOTHERAPY-INDUCED NEUROPATHY: ICD-10-CM

## 2025-03-24 DIAGNOSIS — T45.1X5A CHEMOTHERAPY-INDUCED NEUROPATHY: ICD-10-CM

## 2025-03-24 RX ORDER — PREGABALIN 150 MG/1
150 CAPSULE ORAL 3 TIMES DAILY
Qty: 90 CAPSULE | Refills: 0 | Status: SHIPPED | OUTPATIENT
Start: 2025-03-26 | End: 2025-04-25

## 2025-03-24 RX ORDER — OXYCODONE HYDROCHLORIDE 30 MG/1
30 TABLET ORAL EVERY 4 HOURS PRN
Qty: 180 TABLET | Refills: 0 | Status: SHIPPED | OUTPATIENT
Start: 2025-03-24 | End: 2025-03-25 | Stop reason: SDUPTHER

## 2025-03-24 RX ORDER — METHADONE HYDROCHLORIDE 10 MG/1
15 TABLET ORAL EVERY 8 HOURS
Qty: 135 TABLET | Refills: 0 | Status: SHIPPED | OUTPATIENT
Start: 2025-03-26 | End: 2025-03-25 | Stop reason: RX

## 2025-03-24 RX ORDER — CYCLOBENZAPRINE HCL 10 MG
10 TABLET ORAL DAILY PRN
Qty: 20 TABLET | Refills: 0 | Status: SHIPPED | OUTPATIENT
Start: 2025-03-26 | End: 2025-04-15

## 2025-03-24 NOTE — TELEPHONE ENCOUNTER
Palliative Medicine Clinic   [place]: [phone] ([phone])    Patient Name: Millie King  YOB: 1985    Medication Refill Request    Patient is scheduled for follow up:  [x]  YES  []   NO  Next [facility] Clinic Visit: 04/17/25    PDMP reviewed:  [x] YES   []  System down / Unable  []  NO- Patient fills out of state    Medication:cyclobenzaprine (FLEXERIL) 10 MG tablet   Dose and directions:Take 1 tablet by mouth daily as needed for Muscle spasms   Number dispensed:20  Date filled (PDMP or Pharmacy):03/04/25  # left:unknown    Medication:    methadone (DOLOPHINE) 10 MG tablet     Dose and directions:Take 1.5 tablets by mouth every 8 (eight) hours for 30 days. Max Daily Amount: 45 mg,   Number dispensed:135  Date filled (PDMP or Pharmacy):02/26/25  #left:unknown    Medication:    pregabalin (LYRICA) 150 MG capsule     Dose and directions:Take 1 capsule by mouth in the morning, at noon, and at bedtime for 30 days. Max Daily Amount: 450 mg   Number dispensed:90  Date filled (PDMP or Pharmacy):02/26/25  #left:unknown    Appropriate for refill:  [x]  YES  []  Early Request - Requires MD/NP Review      Other pertinent information for prescriber:

## 2025-03-24 NOTE — TELEPHONE ENCOUNTER
ON Call Palliative Note-    Received a call from Ms. King regarding concerns r/t current pain medication regimen. Unable to get Methadone filled. Patient requesting a different long acting pain medicine.  Patient stated OxyContin NOT an option, as insurance will not cover and MS Contin ineffective.   Ms. King denied having an adverse reaction to any pain meds in the past.    She reported Dilaudid has worked well for her in the past, so she is open to trying Hydromorphone ER.     NO changes made to tx regimen after hours, as patient stated she has several days left of both methadone and Oxy IR, so will  check in w/ Dr. Forte and clinic team tomorrow morning 3/25.    Patient also requested that her Oxycodone IR Rx be sent to the [facility] on [facility], [phone], apparently was sent [place] pharmacy, not her preference?     **PLEASE NOTE, Patient is going on a Vacation out of country, is leaving Friday 3/28, so will need new rx/refills by Wednesday or Thursday THIS WEEK..

## 2025-03-24 NOTE — TELEPHONE ENCOUNTER
Palliative Medicine Clinic   [place]: [phone] ([phone])    Patient Name: Millie King  YOB: 1985    Medication Refill Request    Patient is scheduled for follow up:  [x]  YES  []   NO  Next [facility] Visit: 04/17/25    PDMP reviewed:  [x] YES   []  System down / Unable  []  NO- Patient fills out of state    Medication:oxyCODONE (OXY-IR) 30 MG immediate release tablet    Dose and directions:Take 1 tablet by mouth every 4 hours as needed for Pain for up to 30 days. Max Daily Amount: 180 mg,   Number dispensed:180  Date filled (PDMP or Pharmacy):02/17/25  # left:unknown        Appropriate for refill:  [x]  YES  []  Early Request - Requires MD/NP Review      Other pertinent information for prescriber:

## 2025-03-25 DIAGNOSIS — G89.3 CANCER RELATED PAIN: ICD-10-CM

## 2025-03-25 RX ORDER — OXYCODONE HYDROCHLORIDE 30 MG/1
30 TABLET ORAL EVERY 4 HOURS PRN
Qty: 180 TABLET | Refills: 0 | Status: SHIPPED | OUTPATIENT
Start: 2025-03-25 | End: 2025-04-24

## 2025-03-25 RX ORDER — METHADONE HYDROCHLORIDE 10 MG/1
15 TABLET ORAL EVERY 8 HOURS
Qty: 135 TABLET | Refills: 0 | Status: SHIPPED | OUTPATIENT
Start: 2025-03-26 | End: 2025-04-25

## 2025-03-25 NOTE — TELEPHONE ENCOUNTER
Follow up call placed to patient regarding Methadone and increasing difficulty in finding a pharmacy that carries medication. Patient open to keeping Methadone prescription for current refill and re addressing issue with availability after her vacation.    This nurse also had difficulty finding pharmacy that carried either supply needed or medication itself. Nurse called [place] pharmacy, [facility] and [place] [facility]. Patient will be able to  thirty day supply at [place] pharmacy.

## 2025-03-25 NOTE — TELEPHONE ENCOUNTER
Per Elizabeth Cox, NP:  Received a call from Ms. King regarding concerns r/t current pain medication regimen. Unable to get Methadone filled. Patient requesting a different long acting pain medicine.   · Patient stated OxyContin NOT an option, as insurance will not cover and MS Contin ineffective.   · Ms. King denied having an adverse reaction to any pain meds in the past.      · She reported Dilaudid has worked well for her in the past, so she is open to trying Hydromorphone ER.      · NO changes made to tx regimen after hours, as patient stated she has several days left of both methadone and Oxy IR, so will  check in w/ Dr. Forte and clinic team tomorrow morning 3/25.      · Patient also requested that her Oxycodone IR Rx be sent to the [facility], [phone], apparently was sent [place] pharmacy, not her preference?      · **PLEASE NOTE, Patient is going on a Vacation out of country, this Friday 3/28, so she DOES need new rx/refills by Wednesday or Thursday THIS WEEK.

## 2025-03-28 DIAGNOSIS — C50.919 MALIGNANT NEOPLASM OF BREAST METASTATIC TO BRAIN, UNSPECIFIED LATERALITY: ICD-10-CM

## 2025-03-28 DIAGNOSIS — C79.31 MALIGNANT NEOPLASM OF BREAST METASTATIC TO BRAIN, UNSPECIFIED LATERALITY: ICD-10-CM

## 2025-03-31 ENCOUNTER — APPOINTMENT (OUTPATIENT)
Facility: HOSPITAL | Age: 40
End: 2025-03-31
Payer: COMMERCIAL

## 2025-03-31 RX ORDER — OLANZAPINE 2.5 MG/1
2.5 TABLET, FILM COATED ORAL NIGHTLY
Qty: 90 TABLET | Refills: 2 | Status: SHIPPED | OUTPATIENT
Start: 2025-03-31

## 2025-03-31 RX ORDER — HEPARIN 100 UNIT/ML
500 SYRINGE INTRAVENOUS PRN
Status: CANCELLED | OUTPATIENT
Start: 2025-04-09

## 2025-03-31 RX ORDER — PROCHLORPERAZINE EDISYLATE 5 MG/ML
5 INJECTION INTRAMUSCULAR; INTRAVENOUS
Status: CANCELLED | OUTPATIENT
Start: 2025-04-09

## 2025-03-31 RX ORDER — ALBUTEROL SULFATE 90 UG/1
4 INHALANT RESPIRATORY (INHALATION) PRN
Status: CANCELLED | OUTPATIENT
Start: 2025-04-09

## 2025-03-31 RX ORDER — DIPHENHYDRAMINE HYDROCHLORIDE 50 MG/ML
50 INJECTION, SOLUTION INTRAMUSCULAR; INTRAVENOUS
Status: CANCELLED | OUTPATIENT
Start: 2025-04-09

## 2025-03-31 RX ORDER — SODIUM CHLORIDE 9 MG/ML
INJECTION, SOLUTION INTRAVENOUS CONTINUOUS
Status: CANCELLED | OUTPATIENT
Start: 2025-04-09

## 2025-03-31 RX ORDER — ACETAMINOPHEN 325 MG/1
650 TABLET ORAL
Status: CANCELLED | OUTPATIENT
Start: 2025-04-09

## 2025-03-31 RX ORDER — SODIUM CHLORIDE 9 MG/ML
5-250 INJECTION, SOLUTION INTRAVENOUS PRN
Status: CANCELLED | OUTPATIENT
Start: 2025-04-09

## 2025-03-31 RX ORDER — HYDROCORTISONE SODIUM SUCCINATE 100 MG/2ML
100 INJECTION INTRAMUSCULAR; INTRAVENOUS
Status: CANCELLED | OUTPATIENT
Start: 2025-04-09

## 2025-03-31 RX ORDER — FAMOTIDINE 10 MG/ML
20 INJECTION, SOLUTION INTRAVENOUS
Status: CANCELLED | OUTPATIENT
Start: 2025-04-09

## 2025-03-31 RX ORDER — ONDANSETRON 2 MG/ML
8 INJECTION INTRAMUSCULAR; INTRAVENOUS
Status: CANCELLED | OUTPATIENT
Start: 2025-04-09

## 2025-03-31 RX ORDER — SODIUM CHLORIDE 0.9 % (FLUSH) 0.9 %
5-40 SYRINGE (ML) INJECTION PRN
Status: CANCELLED | OUTPATIENT
Start: 2025-04-09

## 2025-03-31 RX ORDER — EPINEPHRINE 1 MG/ML
0.3 INJECTION, SOLUTION INTRAMUSCULAR; SUBCUTANEOUS PRN
Status: CANCELLED | OUTPATIENT
Start: 2025-04-09

## 2025-04-02 ENCOUNTER — TELEPHONE (OUTPATIENT)
Age: 40
End: 2025-04-02

## 2025-04-02 NOTE — TELEPHONE ENCOUNTER
Dr. Chantal Christopher Office   Acupuncture Nursing Note  [phone]  Fax [phone]     Name:  Millie King  YOB: 1985    This nurse called patient to follow up on acupuncture referral.  This patient has an outstanding referral and has not yet been scheduled.  No answer, left message requesting call back if patient is interested in scheduling an acupuncture appointment.     Aline Sousa, JOSEN, RN, CHPN  Clinical Referral Navigator

## 2025-04-08 ENCOUNTER — TELEPHONE (OUTPATIENT)
Age: 40
End: 2025-04-08

## 2025-04-08 NOTE — TELEPHONE ENCOUNTER
Called patient to advise/confirm upcoming appt with Dr. Nieves on 04/17/25 at 11:00  at [facility].  Spoke with Millie King and confirmed appointment.

## 2025-04-09 ENCOUNTER — OFFICE VISIT (OUTPATIENT)
Age: 40
End: 2025-04-09
Payer: COMMERCIAL

## 2025-04-09 ENCOUNTER — HOSPITAL ENCOUNTER (OUTPATIENT)
Facility: HOSPITAL | Age: 40
Setting detail: INFUSION SERIES
Discharge: HOME | End: 2025-04-09
Payer: COMMERCIAL

## 2025-04-09 VITALS
RESPIRATION RATE: 18 BRPM | WEIGHT: 228.4 LBS | HEART RATE: 104 BPM | OXYGEN SATURATION: 96 % | SYSTOLIC BLOOD PRESSURE: 118 MMHG | HEIGHT: 62 IN | TEMPERATURE: 97.9 F | DIASTOLIC BLOOD PRESSURE: 74 MMHG | BODY MASS INDEX: 42.03 KG/M2

## 2025-04-09 VITALS
TEMPERATURE: 98.4 F | SYSTOLIC BLOOD PRESSURE: 137 MMHG | RESPIRATION RATE: 18 BRPM | OXYGEN SATURATION: 92 % | DIASTOLIC BLOOD PRESSURE: 84 MMHG | BODY MASS INDEX: 41.96 KG/M2 | HEART RATE: 111 BPM | HEIGHT: 62 IN | WEIGHT: 228 LBS

## 2025-04-09 DIAGNOSIS — C79.31 MALIGNANT NEOPLASM OF BREAST METASTATIC TO BRAIN, UNSPECIFIED LATERALITY: Primary | ICD-10-CM

## 2025-04-09 DIAGNOSIS — C79.51 MALIGNANT NEOPLASM METASTATIC TO BONE (HCC): Primary | ICD-10-CM

## 2025-04-09 DIAGNOSIS — C50.919 MALIGNANT NEOPLASM OF BREAST METASTATIC TO LIVER: ICD-10-CM

## 2025-04-09 DIAGNOSIS — C79.31 MALIGNANT NEOPLASM OF BREAST METASTATIC TO BRAIN, UNSPECIFIED LATERALITY: ICD-10-CM

## 2025-04-09 DIAGNOSIS — C78.01 MALIGNANT NEOPLASM METASTATIC TO BOTH LUNGS (HCC): ICD-10-CM

## 2025-04-09 DIAGNOSIS — C50.919 MALIGNANT NEOPLASM OF BREAST METASTATIC TO BRAIN, UNSPECIFIED LATERALITY: Primary | ICD-10-CM

## 2025-04-09 DIAGNOSIS — C78.02 MALIGNANT NEOPLASM METASTATIC TO BOTH LUNGS (HCC): ICD-10-CM

## 2025-04-09 DIAGNOSIS — C78.7 MALIGNANT NEOPLASM OF BREAST METASTATIC TO LIVER: ICD-10-CM

## 2025-04-09 DIAGNOSIS — C50.919 MALIGNANT NEOPLASM OF BREAST METASTATIC TO BRAIN, UNSPECIFIED LATERALITY: ICD-10-CM

## 2025-04-09 LAB
ALBUMIN SERPL-MCNC: 3.3 G/DL (ref 3.5–5)
ALBUMIN/GLOB SERPL: 1 (ref 1.1–2.2)
ALP SERPL-CCNC: 121 U/L (ref 45–117)
ALT SERPL-CCNC: 38 U/L (ref 12–78)
ANION GAP SERPL CALC-SCNC: 5 MMOL/L (ref 2–12)
AST SERPL-CCNC: 23 U/L (ref 15–37)
BASOPHILS # BLD: 0.04 K/UL (ref 0–0.1)
BASOPHILS NFR BLD: 0.5 % (ref 0–1)
BILIRUB SERPL-MCNC: 0.7 MG/DL (ref 0.2–1)
BUN SERPL-MCNC: 8 MG/DL (ref 6–20)
BUN/CREAT SERPL: 13 (ref 12–20)
CALCIUM SERPL-MCNC: 8.4 MG/DL (ref 8.5–10.1)
CHLORIDE SERPL-SCNC: 107 MMOL/L (ref 97–108)
CO2 SERPL-SCNC: 26 MMOL/L (ref 21–32)
CREAT SERPL-MCNC: 0.64 MG/DL (ref 0.55–1.02)
DIFFERENTIAL METHOD BLD: ABNORMAL
EOSINOPHIL # BLD: 0.21 K/UL (ref 0–0.4)
EOSINOPHIL NFR BLD: 2.8 % (ref 0–7)
ERYTHROCYTE [DISTWIDTH] IN BLOOD BY AUTOMATED COUNT: 17 % (ref 11.5–14.5)
GLOBULIN SER CALC-MCNC: 3.4 G/DL (ref 2–4)
GLUCOSE SERPL-MCNC: 140 MG/DL (ref 65–100)
HCT VFR BLD AUTO: 33.7 % (ref 35–47)
HGB BLD-MCNC: 10.4 G/DL (ref 11.5–16)
IMM GRANULOCYTES # BLD AUTO: 0.09 K/UL (ref 0–0.04)
IMM GRANULOCYTES NFR BLD AUTO: 1.2 % (ref 0–0.5)
LYMPHOCYTES # BLD: 1.52 K/UL (ref 0.8–3.5)
LYMPHOCYTES NFR BLD: 20 % (ref 12–49)
MAGNESIUM SERPL-MCNC: 2.2 MG/DL (ref 1.6–2.4)
MCH RBC QN AUTO: 26.9 PG (ref 26–34)
MCHC RBC AUTO-ENTMCNC: 30.9 G/DL (ref 30–36.5)
MCV RBC AUTO: 87.3 FL (ref 80–99)
MONOCYTES # BLD: 0.7 K/UL (ref 0–1)
MONOCYTES NFR BLD: 9.2 % (ref 5–13)
NEUTS SEG # BLD: 5.04 K/UL (ref 1.8–8)
NEUTS SEG NFR BLD: 66.3 % (ref 32–75)
NRBC # BLD: 0 K/UL (ref 0–0.01)
NRBC BLD-RTO: 0 PER 100 WBC
PHOSPHATE SERPL-MCNC: 2.1 MG/DL (ref 2.6–4.7)
PLATELET # BLD AUTO: 279 K/UL (ref 150–400)
PMV BLD AUTO: 9.6 FL (ref 8.9–12.9)
POTASSIUM SERPL-SCNC: 3.6 MMOL/L (ref 3.5–5.1)
PROT SERPL-MCNC: 6.7 G/DL (ref 6.4–8.2)
RBC # BLD AUTO: 3.86 M/UL (ref 3.8–5.2)
SODIUM SERPL-SCNC: 138 MMOL/L (ref 136–145)
WBC # BLD AUTO: 7.6 K/UL (ref 3.6–11)

## 2025-04-09 PROCEDURE — 85025 COMPLETE CBC W/AUTO DIFF WBC: CPT

## 2025-04-09 PROCEDURE — 84100 ASSAY OF PHOSPHORUS: CPT

## 2025-04-09 PROCEDURE — 96372 THER/PROPH/DIAG INJ SC/IM: CPT

## 2025-04-09 PROCEDURE — 96413 CHEMO IV INFUSION 1 HR: CPT

## 2025-04-09 PROCEDURE — 96367 TX/PROPH/DG ADDL SEQ IV INF: CPT

## 2025-04-09 PROCEDURE — 80053 COMPREHEN METABOLIC PANEL: CPT

## 2025-04-09 PROCEDURE — 2500000003 HC RX 250 WO HCPCS: Performed by: INTERNAL MEDICINE

## 2025-04-09 PROCEDURE — 96375 TX/PRO/DX INJ NEW DRUG ADDON: CPT

## 2025-04-09 PROCEDURE — 99215 OFFICE O/P EST HI 40 MIN: CPT | Performed by: INTERNAL MEDICINE

## 2025-04-09 PROCEDURE — 2580000003 HC RX 258: Performed by: INTERNAL MEDICINE

## 2025-04-09 PROCEDURE — 83735 ASSAY OF MAGNESIUM: CPT

## 2025-04-09 PROCEDURE — 6360000002 HC RX W HCPCS: Performed by: INTERNAL MEDICINE

## 2025-04-09 RX ORDER — ONDANSETRON 2 MG/ML
8 INJECTION INTRAMUSCULAR; INTRAVENOUS
OUTPATIENT
Start: 2025-06-22

## 2025-04-09 RX ORDER — ACETAMINOPHEN 325 MG/1
650 TABLET ORAL
OUTPATIENT
Start: 2025-06-22

## 2025-04-09 RX ORDER — FAMOTIDINE 10 MG/ML
20 INJECTION, SOLUTION INTRAVENOUS
OUTPATIENT
Start: 2025-06-22

## 2025-04-09 RX ORDER — EPINEPHRINE 1 MG/ML
0.3 INJECTION, SOLUTION INTRAMUSCULAR; SUBCUTANEOUS PRN
OUTPATIENT
Start: 2025-06-22

## 2025-04-09 RX ORDER — DIPHENHYDRAMINE HYDROCHLORIDE 50 MG/ML
50 INJECTION, SOLUTION INTRAMUSCULAR; INTRAVENOUS
OUTPATIENT
Start: 2025-06-22

## 2025-04-09 RX ORDER — SODIUM CHLORIDE 9 MG/ML
INJECTION, SOLUTION INTRAVENOUS CONTINUOUS
OUTPATIENT
Start: 2025-06-22

## 2025-04-09 RX ORDER — DEXAMETHASONE SODIUM PHOSPHATE 10 MG/ML
10 INJECTION, SOLUTION INTRA-ARTICULAR; INTRALESIONAL; INTRAMUSCULAR; INTRAVENOUS; SOFT TISSUE ONCE
Status: COMPLETED | OUTPATIENT
Start: 2025-04-09 | End: 2025-04-09

## 2025-04-09 RX ORDER — ALBUTEROL SULFATE 90 UG/1
4 INHALANT RESPIRATORY (INHALATION) PRN
OUTPATIENT
Start: 2025-06-22

## 2025-04-09 RX ORDER — DEXTROSE MONOHYDRATE 50 MG/ML
5-250 INJECTION, SOLUTION INTRAVENOUS PRN
Status: DISCONTINUED | OUTPATIENT
Start: 2025-04-09 | End: 2025-04-10 | Stop reason: HOSPADM

## 2025-04-09 RX ORDER — HYDROCORTISONE SODIUM SUCCINATE 100 MG/2ML
100 INJECTION INTRAMUSCULAR; INTRAVENOUS
OUTPATIENT
Start: 2025-06-22

## 2025-04-09 RX ORDER — PALONOSETRON 0.05 MG/ML
0.25 INJECTION, SOLUTION INTRAVENOUS ONCE
Status: COMPLETED | OUTPATIENT
Start: 2025-04-09 | End: 2025-04-09

## 2025-04-09 RX ADMIN — WATER 2 MG: 1 INJECTION INTRAMUSCULAR; INTRAVENOUS; SUBCUTANEOUS at 11:37

## 2025-04-09 RX ADMIN — DEXAMETHASONE SODIUM PHOSPHATE 10 MG: 10 INJECTION INTRAMUSCULAR; INTRAVENOUS at 13:15

## 2025-04-09 RX ADMIN — DENOSUMAB 120 MG: 120 INJECTION SUBCUTANEOUS at 15:25

## 2025-04-09 RX ADMIN — FAM-TRASTUZUMAB DERUXTECAN-NXKI 534 MG: 100 INJECTION, POWDER, LYOPHILIZED, FOR SOLUTION INTRAVENOUS at 14:24

## 2025-04-09 RX ADMIN — FOSAPREPITANT 150 MG: 150 INJECTION, POWDER, LYOPHILIZED, FOR SOLUTION INTRAVENOUS at 13:19

## 2025-04-09 RX ADMIN — DEXTROSE 25 ML/HR: 5 SOLUTION INTRAVENOUS at 13:10

## 2025-04-09 RX ADMIN — PALONOSETRON HYDROCHLORIDE 0.25 MG: 0.25 INJECTION INTRAVENOUS at 13:13

## 2025-04-09 ASSESSMENT — PATIENT HEALTH QUESTIONNAIRE - PHQ9
2. FEELING DOWN, DEPRESSED OR HOPELESS: NOT AT ALL
SUM OF ALL RESPONSES TO PHQ QUESTIONS 1-9: 0
SUM OF ALL RESPONSES TO PHQ QUESTIONS 1-9: 0
1. LITTLE INTEREST OR PLEASURE IN DOING THINGS: NOT AT ALL
SUM OF ALL RESPONSES TO PHQ QUESTIONS 1-9: 0
SUM OF ALL RESPONSES TO PHQ QUESTIONS 1-9: 0

## 2025-04-09 ASSESSMENT — PAIN SCALES - GENERAL: PAINLEVEL_OUTOF10: 0

## 2025-04-09 NOTE — PROGRESS NOTES
[facility] Progress Note    Date: 2025    Name: Millie King    MRN: [id number]         : 1985    Ms. King Arrived ambulatory and in no distress for Enhertu + Xgeva Regimen.  Assessment was completed, no acute issues at this time, no new complaints voiced. Right Chest wall port accessed and had to use Cathflo due to sluggish blood flow..  Labs were drawn peripherally & sent for processing.     Patient proceed to appointment with Dr. Prasad.    Ms. King's vitals were reviewed.  Vitals:    25 1100   BP: (!) 173/90   Pulse: (!) 106   Resp: 18   Temp: 97.9 °F (36.6 °C)       Lab results were obtained and reviewed.  Recent Results (from the past 12 hours)   Comprehensive metabolic panel    Collection Time: 25 11:51 AM   Result Value Ref Range    Sodium 138 136 - 145 mmol/L    Potassium 3.6 3.5 - 5.1 mmol/L    Chloride 107 97 - 108 mmol/L    CO2 26 21 - 32 mmol/L    Anion Gap 5 2 - 12 mmol/L    Glucose 140 (H) 65 - 100 mg/dL    BUN 8 6 - 20 MG/DL    Creatinine 0.64 0.55 - 1.02 MG/DL    BUN/Creatinine Ratio 13 12 - 20      Est, Glom Filt Rate >90 >60 ml/min/1.73m2    Calcium 8.4 (L) 8.5 - 10.1 MG/DL    Total Bilirubin 0.7 0.2 - 1.0 MG/DL    ALT 38 12 - 78 U/L    AST 23 15 - 37 U/L    Alk Phosphatase 121 (H) 45 - 117 U/L    Total Protein 6.7 6.4 - 8.2 g/dL    Albumin 3.3 (L) 3.5 - 5.0 g/dL    Globulin 3.4 2.0 - 4.0 g/dL    Albumin/Globulin Ratio 1.0 (L) 1.1 - 2.2     CBC With Auto Differential    Collection Time: 25 11:51 AM   Result Value Ref Range    WBC 7.6 3.6 - 11.0 K/uL    RBC 3.86 3.80 - 5.20 M/uL    Hemoglobin 10.4 (L) 11.5 - 16.0 g/dL    Hematocrit 33.7 (L) 35.0 - 47.0 %    MCV 87.3 80.0 - 99.0 FL    MCH 26.9 26.0 - 34.0 PG    MCHC 30.9 30.0 - 36.5 g/dL    RDW 17.0 (H) 11.5 - 14.5 %    Platelets 279 150 - 400 K/uL    MPV 9.6 8.9 - 12.9 FL    Nucleated RBCs 0.0 0  WBC    nRBC 0.00 0.00 - 0.01 K/uL    Neutrophils % 66.3 32.0 - 75.0 %    Lymphocytes % 20.0 12.0 - 49.0 %     Monocytes % 9.2 5.0 - 13.0 %    Eosinophils % 2.8 0.0 - 7.0 %    Basophils % 0.5 0.0 - 1.0 %    Immature Granulocytes % 1.2 (H) 0.0 - 0.5 %    Neutrophils Absolute 5.04 1.80 - 8.00 K/UL    Lymphocytes Absolute 1.52 0.80 - 3.50 K/UL    Monocytes Absolute 0.70 0.00 - 1.00 K/UL    Eosinophils Absolute 0.21 0.00 - 0.40 K/UL    Basophils Absolute 0.04 0.00 - 0.10 K/UL    Immature Granulocytes Absolute 0.09 (H) 0.00 - 0.04 K/UL    Differential Type AUTOMATED     Phosphorus    Collection Time: 04/09/25 11:51 AM   Result Value Ref Range    Phosphorus 2.1 (L) 2.6 - 4.7 MG/DL   Magnesium    Collection Time: 04/09/25 11:51 AM   Result Value Ref Range    Magnesium 2.2 1.6 - 2.4 mg/dL       Medications:  Medications Administered         ALTEplase (CATHFLO) 2 mg in sterile water 2 mL injection Admin Date  04/09/2025 Action  Given Dose  2 mg Rate   Route  IntraCATHeter Documented By  Nelda Keen RN        denosumab (XGEVA) SC injection 120 mg Admin Date  04/09/2025 Action  Given Dose  120 mg Rate   Route  SubCUTAneous Documented By  Nelda Keen RN        dexAMETHasone (DECADRON) injection 10 mg Admin Date  04/09/2025 Action  Given Dose  10 mg Rate   Route  IntraVENous Documented By  Nelda Keen RN        dextrose 5 % solution Admin Date  04/09/2025 Action  New Bag Dose  25 mL/hr Rate  25 mL/hr Route  IntraVENous Documented By  Nelda Keen RN        fam-trastuzumab deruxtec-nxki (ENHERTU) 534 mg in dextrose 5 % 100 mL chemo IVPB Admin Date  04/09/2025 Action  New Bag Dose  534 mg Rate  273.4 mL/hr Route  IntraVENous Documented By  Nelda Keen RN        fosaprepitant (EMEND) 150 mg in sodium chloride 0.9 % 250 mL IVPB (BUPR1TKI) Admin Date  04/09/2025 Action  New Bag Dose  150 mg Rate  750 mL/hr Route  IntraVENous Documented By  Nelda Keen RN        palonosetron (ALOXI) injection 0.25 mg Admin Date  04/09/2025 Action  Given Dose  0.25 mg Rate   Route  IntraVENous Documented By  Nelda Keen, RN

## 2025-04-09 NOTE — PROGRESS NOTES
Millie King is a 40 y.o. female is here today for a breast cancer follow up.    1. Have you been to the ER, urgent care clinic since your last visit?  Hospitalized since your last visit?No    2. Have you seen or consulted any other health care providers outside of the [facility] since your last visit?  Include any pap smears or colon screening. No    
sinus.    Few scattered foci of increased FLAIR signal intensity.  Otherwise;  There is no abnormal parenchymal enhancement. There is no abnormal meningeal  enhancement demonstrated. The brain architecture is normal. There is no evidence  of midline shift or mass-effect. The ventricles are normal in size, position and  configuration.  There are no extra-axial fluid collections. Major intracranial  vascular flow-voids are unremarkable. The orbits are grossly symmetric. Dural  venous sinuses are grossly unremarkable. There is no Chiari or syrinx. Pituitary  and infundibulum grossly unremarkable. Cerebellopontine angles are unremarkable.  No skull base mass is identified. Cavernous sinuses are symmetric. The mastoid  air cells and are well pneumatized and clear.    Impression  Normal MRI of the brain.    No intracranial mass, hemorrhage or evidence of acute infarction.            Electronically signed by KARMEN HABIB    CT Result (most recent):  CT CHEST ABDOMEN PELVIS W CONTRAST 03/06/2025    Narrative  Clinical history: Malignant neoplasm of unspecified site of unspecified female  breast (HCC); Secondary malignant neoplasm of brain (HCC)  INDICATION:   Malignant neoplasm of unspecified site of unspecified female  breast (HCC); Secondary malignant neoplasm of brain (HCC)  COMPARISON: 1/22/2025  CONTRAST: 100ml Isovue-370  TECHNIQUE:  Following the uneventful intravenous administration of Isovue-370, thin axial  images were obtained through the chest, abdomen and pelvis. Coronal and sagittal  reconstructions were generated.  ORAL CONTRAST: Oral contrast was administered for optimal bowel visualization.    CT dose reduction was achieved through use of a standardized protocol tailored  for this examination and automatic exposure control for dose modulation.  Adaptive statistical iterative reconstruction (ASIR) was utilized.    FINDINGS:    MEDIASTINUM: Right IJ catheter on the right is unchanged.  No right hilar lymph node

## 2025-04-10 DIAGNOSIS — C50.919 MALIGNANT NEOPLASM OF BREAST METASTATIC TO BRAIN, UNSPECIFIED LATERALITY: Primary | ICD-10-CM

## 2025-04-10 DIAGNOSIS — C79.31 MALIGNANT NEOPLASM OF BREAST METASTATIC TO BRAIN, UNSPECIFIED LATERALITY: Primary | ICD-10-CM

## 2025-04-12 ENCOUNTER — PATIENT MESSAGE (OUTPATIENT)
Age: 40
End: 2025-04-12

## 2025-04-12 DIAGNOSIS — R32 URINARY INCONTINENCE, UNSPECIFIED TYPE: Primary | ICD-10-CM

## 2025-04-14 ENCOUNTER — APPOINTMENT (OUTPATIENT)
Facility: HOSPITAL | Age: 40
End: 2025-04-14
Payer: COMMERCIAL

## 2025-04-15 ENCOUNTER — HOSPITAL ENCOUNTER (OUTPATIENT)
Facility: HOSPITAL | Age: 40
Discharge: HOME | End: 2025-04-15
Attending: INTERNAL MEDICINE | Admitting: RADIOLOGY
Payer: COMMERCIAL

## 2025-04-15 DIAGNOSIS — C79.31 MALIGNANT NEOPLASM OF BREAST METASTATIC TO BRAIN, UNSPECIFIED LATERALITY: Primary | ICD-10-CM

## 2025-04-15 DIAGNOSIS — C50.919 MALIGNANT NEOPLASM OF BREAST METASTATIC TO BRAIN, UNSPECIFIED LATERALITY: Primary | ICD-10-CM

## 2025-04-15 DIAGNOSIS — C79.31 MALIGNANT NEOPLASM OF BREAST METASTATIC TO BRAIN, UNSPECIFIED LATERALITY: ICD-10-CM

## 2025-04-15 DIAGNOSIS — C50.919 MALIGNANT NEOPLASM OF BREAST METASTATIC TO BRAIN, UNSPECIFIED LATERALITY: ICD-10-CM

## 2025-04-15 PROCEDURE — 6360000004 HC RX CONTRAST MEDICATION

## 2025-04-15 PROCEDURE — 36598 INJ W/FLUOR EVAL CV DEVICE: CPT

## 2025-04-15 RX ORDER — IOPAMIDOL 612 MG/ML
INJECTION, SOLUTION INTRAVASCULAR PRN
Status: COMPLETED | OUTPATIENT
Start: 2025-04-15 | End: 2025-04-15

## 2025-04-15 RX ADMIN — IOPAMIDOL 5 ML: 612 INJECTION, SOLUTION INTRAVENOUS at 10:19

## 2025-04-15 NOTE — PROGRESS NOTES
Patient arrived. ID and allergies verified verbally with patient. Pt voices understanding of procedure to be performed. Consent obtained. Pt prepped for procedure. Pt denies contrast allergy. Patient denies taking any blood thinners.      10:30 am  Patient escorted via wheelchair to entrance.  Friend  driving. Patient discharged into care of Friend.

## 2025-04-16 ENCOUNTER — HOSPITAL ENCOUNTER (OUTPATIENT)
Facility: HOSPITAL | Age: 40
Setting detail: INFUSION SERIES
End: 2025-04-16

## 2025-04-17 ENCOUNTER — HOSPITAL ENCOUNTER (OUTPATIENT)
Facility: HOSPITAL | Age: 40
Discharge: HOME | End: 2025-04-20
Attending: INTERNAL MEDICINE
Payer: COMMERCIAL

## 2025-04-17 ENCOUNTER — TRANSCRIBE ORDERS (OUTPATIENT)
Facility: HOSPITAL | Age: 40
End: 2025-04-17

## 2025-04-17 DIAGNOSIS — C50.919 MALIGNANT NEOPLASM OF BREAST METASTATIC TO BRAIN, UNSPECIFIED LATERALITY (HCC): ICD-10-CM

## 2025-04-17 DIAGNOSIS — C79.31 METASTASIS TO BRAIN (HCC): Primary | ICD-10-CM

## 2025-04-17 DIAGNOSIS — C79.31 MALIGNANT NEOPLASM OF BREAST METASTATIC TO BRAIN, UNSPECIFIED LATERALITY (HCC): ICD-10-CM

## 2025-04-17 PROCEDURE — 74177 CT ABD & PELVIS W/CONTRAST: CPT

## 2025-04-17 PROCEDURE — 6360000004 HC RX CONTRAST MEDICATION: Performed by: INTERNAL MEDICINE

## 2025-04-17 RX ORDER — IOPAMIDOL 755 MG/ML
100 INJECTION, SOLUTION INTRAVASCULAR
Status: COMPLETED | OUTPATIENT
Start: 2025-04-17 | End: 2025-04-17

## 2025-04-17 RX ADMIN — IOPAMIDOL 100 ML: 755 INJECTION, SOLUTION INTRAVENOUS at 12:25

## 2025-04-18 ENCOUNTER — HOSPITAL ENCOUNTER (OUTPATIENT)
Facility: HOSPITAL | Age: 40
Discharge: HOME | End: 2025-04-18
Attending: RADIOLOGY | Admitting: RADIOLOGY
Payer: COMMERCIAL

## 2025-04-18 VITALS
SYSTOLIC BLOOD PRESSURE: 116 MMHG | OXYGEN SATURATION: 92 % | HEART RATE: 97 BPM | RESPIRATION RATE: 13 BRPM | DIASTOLIC BLOOD PRESSURE: 85 MMHG

## 2025-04-18 DIAGNOSIS — Z95.828 PORT-A-CATH IN PLACE: ICD-10-CM

## 2025-04-18 PROCEDURE — 6360000002 HC RX W HCPCS: Performed by: PHYSICIAN ASSISTANT

## 2025-04-18 PROCEDURE — C1894 INTRO/SHEATH, NON-LASER: HCPCS

## 2025-04-18 PROCEDURE — 2500000003 HC RX 250 WO HCPCS: Performed by: PHYSICIAN ASSISTANT

## 2025-04-18 PROCEDURE — C1788 PORT, INDWELLING, IMP: HCPCS

## 2025-04-18 PROCEDURE — 99153 MOD SED SAME PHYS/QHP EA: CPT

## 2025-04-18 PROCEDURE — 2709999900 HC NON-CHARGEABLE SUPPLY

## 2025-04-18 PROCEDURE — 76937 US GUIDE VASCULAR ACCESS: CPT

## 2025-04-18 PROCEDURE — 99152 MOD SED SAME PHYS/QHP 5/>YRS: CPT

## 2025-04-18 RX ORDER — HEPARIN 100 UNIT/ML
SYRINGE INTRAVENOUS PRN
Status: COMPLETED | OUTPATIENT
Start: 2025-04-18 | End: 2025-04-18

## 2025-04-18 RX ORDER — LIDOCAINE HYDROCHLORIDE AND EPINEPHRINE BITARTRATE 20; .01 MG/ML; MG/ML
INJECTION, SOLUTION SUBCUTANEOUS PRN
Status: COMPLETED | OUTPATIENT
Start: 2025-04-18 | End: 2025-04-18

## 2025-04-18 RX ORDER — LIDOCAINE HYDROCHLORIDE 10 MG/ML
INJECTION, SOLUTION EPIDURAL; INFILTRATION; INTRACAUDAL; PERINEURAL PRN
Status: COMPLETED | OUTPATIENT
Start: 2025-04-18 | End: 2025-04-18

## 2025-04-18 RX ORDER — MIDAZOLAM HYDROCHLORIDE 2 MG/2ML
INJECTION, SOLUTION INTRAMUSCULAR; INTRAVENOUS PRN
Status: COMPLETED | OUTPATIENT
Start: 2025-04-18 | End: 2025-04-18

## 2025-04-18 RX ORDER — FENTANYL CITRATE 50 UG/ML
INJECTION, SOLUTION INTRAMUSCULAR; INTRAVENOUS PRN
Status: COMPLETED | OUTPATIENT
Start: 2025-04-18 | End: 2025-04-18

## 2025-04-18 RX ADMIN — MIDAZOLAM HYDROCHLORIDE 1 MG: 1 INJECTION, SOLUTION INTRAMUSCULAR; INTRAVENOUS at 12:12

## 2025-04-18 RX ADMIN — FENTANYL CITRATE 50 MCG: 50 INJECTION, SOLUTION INTRAMUSCULAR; INTRAVENOUS at 12:26

## 2025-04-18 RX ADMIN — FENTANYL CITRATE 50 MCG: 50 INJECTION, SOLUTION INTRAMUSCULAR; INTRAVENOUS at 12:12

## 2025-04-18 RX ADMIN — CEFAZOLIN SODIUM 2000 MG: 1 POWDER, FOR SOLUTION INTRAMUSCULAR; INTRAVENOUS at 12:05

## 2025-04-18 RX ADMIN — MIDAZOLAM HYDROCHLORIDE 1 MG: 1 INJECTION, SOLUTION INTRAMUSCULAR; INTRAVENOUS at 12:14

## 2025-04-18 RX ADMIN — HEPARIN 500 UNITS: 100 SYRINGE at 12:46

## 2025-04-18 RX ADMIN — LIDOCAINE HYDROCHLORIDE 15 ML: 10 INJECTION, SOLUTION EPIDURAL; INFILTRATION; INTRACAUDAL; PERINEURAL at 12:35

## 2025-04-18 RX ADMIN — FENTANYL CITRATE 50 MCG: 50 INJECTION, SOLUTION INTRAMUSCULAR; INTRAVENOUS at 12:11

## 2025-04-18 RX ADMIN — MIDAZOLAM HYDROCHLORIDE 1 MG: 1 INJECTION, SOLUTION INTRAMUSCULAR; INTRAVENOUS at 12:30

## 2025-04-18 RX ADMIN — MIDAZOLAM HYDROCHLORIDE 1 MG: 1 INJECTION, SOLUTION INTRAMUSCULAR; INTRAVENOUS at 12:11

## 2025-04-18 RX ADMIN — LIDOCAINE HYDROCHLORIDE,EPINEPHRINE BITARTRATE 6 ML: 20; .01 INJECTION, SOLUTION INFILTRATION; PERINEURAL at 12:35
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
## 2025-04-18 NOTE — PROGRESS NOTES
TRANSFER - OUT REPORT:    Verbal report given to CLPO on Millie King being transferred to CLPO for routine post-op       Report consisted of patient's Situation, Background, Assessment and   Recommendations(SBAR).     Information from the following report(s) Nurse Handoff Report was reviewed with the receiving nurse.    Opportunity for questions and clarification was provided.      Patient transported with:   Registered Nurse

## 2025-04-18 NOTE — PROGRESS NOTES
Patient arrived. ID and allergies verified verbally with patient. Pt voices understanding of procedure to be performed. Consent obtained. Pt prepped for procedure. Pt denies contrast allergy. Patient denies taking any blood thinners.    Right hip replacement      1:30 pm  Discharge instructions and prescriptions reviewed with  Patient and . Opportunity provided for questions. Patient and  verbalized understanding.     Patient ambulated tolerated well.  IV and tele removed.     1:45 pm  Patient escorted via wheelchair to entrance.   driving. Patient discharged into care of .

## 2025-04-21 ENCOUNTER — HOSPITAL ENCOUNTER (OUTPATIENT)
Facility: HOSPITAL | Age: 40
Setting detail: INFUSION SERIES
End: 2025-04-21

## 2025-04-21 ENCOUNTER — PATIENT MESSAGE (OUTPATIENT)
Age: 40
End: 2025-04-21

## 2025-04-21 ENCOUNTER — TELEPHONE (OUTPATIENT)
Age: 40
End: 2025-04-21

## 2025-04-21 ENCOUNTER — APPOINTMENT (OUTPATIENT)
Facility: HOSPITAL | Age: 40
End: 2025-04-21
Payer: COMMERCIAL

## 2025-04-21 RX ORDER — SODIUM CHLORIDE 9 MG/ML
5-250 INJECTION, SOLUTION INTRAVENOUS PRN
Status: CANCELLED | OUTPATIENT
Start: 2025-04-28

## 2025-04-21 RX ORDER — PROCHLORPERAZINE EDISYLATE 5 MG/ML
5 INJECTION INTRAMUSCULAR; INTRAVENOUS
Status: CANCELLED | OUTPATIENT
Start: 2025-04-28

## 2025-04-21 RX ORDER — FAMOTIDINE 10 MG/ML
20 INJECTION, SOLUTION INTRAVENOUS
Status: CANCELLED | OUTPATIENT
Start: 2025-04-28

## 2025-04-21 RX ORDER — SODIUM CHLORIDE 0.9 % (FLUSH) 0.9 %
5-40 SYRINGE (ML) INJECTION PRN
Status: CANCELLED | OUTPATIENT
Start: 2025-04-28

## 2025-04-21 RX ORDER — EPINEPHRINE 1 MG/ML
0.3 INJECTION, SOLUTION INTRAMUSCULAR; SUBCUTANEOUS PRN
Status: CANCELLED | OUTPATIENT
Start: 2025-04-28

## 2025-04-21 RX ORDER — ONDANSETRON 2 MG/ML
8 INJECTION INTRAMUSCULAR; INTRAVENOUS
Status: CANCELLED | OUTPATIENT
Start: 2025-04-28

## 2025-04-21 RX ORDER — ACETAMINOPHEN 325 MG/1
650 TABLET ORAL
Status: CANCELLED | OUTPATIENT
Start: 2025-04-28

## 2025-04-21 RX ORDER — HEPARIN 100 UNIT/ML
500 SYRINGE INTRAVENOUS PRN
Status: CANCELLED | OUTPATIENT
Start: 2025-04-28

## 2025-04-21 RX ORDER — HYDROCORTISONE SODIUM SUCCINATE 100 MG/2ML
100 INJECTION INTRAMUSCULAR; INTRAVENOUS
Status: CANCELLED | OUTPATIENT
Start: 2025-04-28

## 2025-04-21 RX ORDER — SODIUM CHLORIDE 9 MG/ML
INJECTION, SOLUTION INTRAVENOUS CONTINUOUS
Status: CANCELLED | OUTPATIENT
Start: 2025-04-28

## 2025-04-21 RX ORDER — ALBUTEROL SULFATE 90 UG/1
4 INHALANT RESPIRATORY (INHALATION) PRN
Status: CANCELLED | OUTPATIENT
Start: 2025-04-28

## 2025-04-21 RX ORDER — DIPHENHYDRAMINE HYDROCHLORIDE 50 MG/ML
50 INJECTION, SOLUTION INTRAMUSCULAR; INTRAVENOUS
Status: CANCELLED | OUTPATIENT
Start: 2025-04-28

## 2025-04-21 NOTE — TELEPHONE ENCOUNTER
Pt requested next available appt w/ Dr. Nieves; this writer advised that would be a month out; she stated she would run out of meds by then; requested callback for potential VV or earlier appt.

## 2025-04-25 ENCOUNTER — OFFICE VISIT (OUTPATIENT)
Age: 40
End: 2025-04-25
Payer: COMMERCIAL

## 2025-04-25 VITALS
HEART RATE: 112 BPM | DIASTOLIC BLOOD PRESSURE: 69 MMHG | HEIGHT: 62 IN | RESPIRATION RATE: 20 BRPM | OXYGEN SATURATION: 98 % | BODY MASS INDEX: 41.72 KG/M2 | SYSTOLIC BLOOD PRESSURE: 123 MMHG | WEIGHT: 226.7 LBS

## 2025-04-25 DIAGNOSIS — C79.31 METASTASIS TO BRAIN (HCC): ICD-10-CM

## 2025-04-25 DIAGNOSIS — T45.1X5A CHEMOTHERAPY-INDUCED NEUROPATHY: ICD-10-CM

## 2025-04-25 DIAGNOSIS — G62.0 CHEMOTHERAPY-INDUCED NEUROPATHY: ICD-10-CM

## 2025-04-25 DIAGNOSIS — C78.7 MALIGNANT NEOPLASM OF BREAST METASTATIC TO LIVER (HCC): ICD-10-CM

## 2025-04-25 DIAGNOSIS — G89.3 CANCER RELATED PAIN: Primary | ICD-10-CM

## 2025-04-25 DIAGNOSIS — Z79.891 ENCOUNTER FOR LONG-TERM OPIATE ANALGESIC USE: ICD-10-CM

## 2025-04-25 DIAGNOSIS — G47.30 SLEEP APNEA, UNSPECIFIED TYPE: ICD-10-CM

## 2025-04-25 DIAGNOSIS — C50.919 MALIGNANT NEOPLASM OF BREAST METASTATIC TO LIVER (HCC): ICD-10-CM

## 2025-04-25 PROCEDURE — 99215 OFFICE O/P EST HI 40 MIN: CPT | Performed by: INTERNAL MEDICINE

## 2025-04-25 RX ORDER — PREGABALIN 150 MG/1
150 CAPSULE ORAL 3 TIMES DAILY
Qty: 90 CAPSULE | Refills: 0 | Status: SHIPPED | OUTPATIENT
Start: 2025-04-28 | End: 2025-05-28

## 2025-04-25 RX ORDER — FENTANYL 50 UG/1
1 PATCH TRANSDERMAL
Qty: 5 PATCH | Refills: 0 | Status: SHIPPED | OUTPATIENT
Start: 2025-04-25 | End: 2025-05-25

## 2025-04-25 RX ORDER — OXYCODONE HYDROCHLORIDE 20 MG/1
40 TABLET ORAL EVERY 4 HOURS PRN
Qty: 168 TABLET | Refills: 0 | Status: SHIPPED | OUTPATIENT
Start: 2025-04-28 | End: 2025-05-12

## 2025-04-25 ASSESSMENT — PATIENT HEALTH QUESTIONNAIRE - PHQ9
1. LITTLE INTEREST OR PLEASURE IN DOING THINGS: NOT AT ALL
SUM OF ALL RESPONSES TO PHQ QUESTIONS 1-9: 1
2. FEELING DOWN, DEPRESSED OR HOPELESS: SEVERAL DAYS

## 2025-04-25 NOTE — PROGRESS NOTES
Palliative Medicine Outpatient Clinic  Nurse Check in Note  [phone]    Patient Name: Millie Coelho  YOB: 1985      Date of Visit: 04/25/2025  Visit Location:  [facility]    Nurse verified patient is located in [place] for today's visit: Yes    Chief patient or family concern today: follow up    Patient's Last Palliative Medicine Clinic Visit Date:  3/4/2025    Have you been to an ER or urgent care center since your last visit?  No  Have you been hospitalized since your last visit? No  Have you seen or consulted any health care providers outside of the [facility] system since your last visit?  No  If Yes, alert PSR to request appropriate records from non-[facility] offices    Medications:  Med reconciliation was performed with:  Patient    Requested refills:  Yes- not pended, clinician review requested    If prescribed an opioid, does patient have access to naloxone at home?:  YES  If No, pend naloxone nasal spray    Function and Symptoms:  Use of assist devices:  None    Palliative Performance Status (PPS):   Palliative Performance Scale (PPS)  PPS: 70    ESAS:  Modified-[place] Symptom Assessment Scale (ESAS)  Tiredness Score: 5  Drowsiness Score: 1  Depression Score: 1  Pain Score: 4  Anxiety Score: 1  Nausea Score: 1  Appetite Score: 1  Dyspnea Score: 5  Constipation: No  Wellbeing Score: 1    Constipation?  No  Last BM: 04/24/25    Advance Care Planning:  Currently listed healthcare agent:    Primary Decision Maker: JAVY COELHO - Spouse - [phone]    Is there an ACP Note within the past 12 months?  YES  If No, Alert Clinician and/or Social Work      Divya Simms LPN        
1  Nausea Score: 1  Appetite Score: 1  Dyspnea Score: 5  Constipation: No  Wellbeing Score: 1     FUNCTIONAL ASSESSMENT:     Use of assist devices:    [x] None  [] Bed bound       [] Wheelchair         [] Cane     Palliative Performance Scale (PPS)  PPS: 70     PHYSICAL EXAM:     Wt Readings from Last 3 Encounters:   04/25/25 102.8 kg (226 lb 11.2 oz)   04/09/25 103.6 kg (228 lb 6.4 oz)   04/09/25 103.4 kg (228 lb)     Blood pressure 123/69, pulse (!) 112, resp. rate 20, height 1.575 m (5' 2\"), weight 102.8 kg (226 lb 11.2 oz), SpO2 98%, not currently breastfeeding.  Last bowel movement: See Nursing Note    Constitutional    [x] Appears well-developed and well-nourished in no apparent distress    [] Abnormal:  Mental status  [x] Alert and awake  [x] Oriented to person/place/time  [x] Able to follow commands  [] Abnormal:   Eyes  [x] EOM normal   [x] Sclera normal   [x] No visible ocular discharge  [] Abnormal:   HENT  [x] Normocephalic, atraumatic  [x] Mouth/Throat: Moist mucous membranes   [x] External Ears normal  [] Abnormal:  Neck  [x] No visualized mass  [] Abnormal:  Pulmonary/Chest   [x] Respiratory effort normal  [x] No visualized signs of difficulty breathing or respiratory distress  [] Abnormal:  Musculoskeletal  [x] Normal gait with no signs of ataxia  [x] Normal range of motion of neck  [] Abnormal:  Neurological:   [x] No facial asymmetry (Cranial nerve 7 motor function)  [x] No gaze palsy  [] Abnormal:   Skin  [x] No significant exanthematous lesions or discoloration noted on facial skin  [] Abnormal:                                  Psychiatric  [x] Normal affect  [x] No hallucinations  [] Abnormal:    Other pertinent observable physical exam findings:    Due to this being a TeleHealth evaluation, many elements of the physical examination are unable to be assessed.      DATA REVIEWED:   CT Result (most recent):  CT CHEST ABDOMEN PELVIS W CONTRAST 04/17/2025    Narrative  INDICATION: Malignant neoplasm

## 2025-04-25 NOTE — PATIENT INSTRUCTIONS
Dear Millie King ,    It was a pleasure seeing you today at [facility].   Return in about 6 weeks (around 6/6/2025).    If labs or imaging tests have been ordered for you today, please call the office  at [phone] 48 hours after completion to obtain the results.      This is the plan we talked about:    Methadone:  4/25:  Take 1 x 10 mg tab this afternoon and evening  4/26-4/27:  Take 1 x 10 mg tab AM and PM  4/28-4/30:  Take 1/2 x 10 mg tab (5 mg) AM and PM   STOP    Fentanyl patch:  Place the Fentanyl patch on Saturday 4/26  Change the patch every 72 hours    Oxycodone immediate release:  Increase this dose to 40 mg every 4 hours as needed.  A new prescription was sent with a date of Monday   The Palliative Medicine Team is here to support you and your family.       Sincerely,  Sonia Nieves MD

## 2025-04-28 ENCOUNTER — HOSPITAL ENCOUNTER (OUTPATIENT)
Facility: HOSPITAL | Age: 40
Setting detail: INFUSION SERIES
Discharge: HOME | End: 2025-04-28
Payer: COMMERCIAL

## 2025-04-28 ENCOUNTER — OFFICE VISIT (OUTPATIENT)
Age: 40
End: 2025-04-28
Payer: COMMERCIAL

## 2025-04-28 VITALS
OXYGEN SATURATION: 96 % | SYSTOLIC BLOOD PRESSURE: 119 MMHG | DIASTOLIC BLOOD PRESSURE: 63 MMHG | TEMPERATURE: 97.7 F | HEART RATE: 84 BPM | RESPIRATION RATE: 18 BRPM

## 2025-04-28 VITALS
DIASTOLIC BLOOD PRESSURE: 79 MMHG | WEIGHT: 228 LBS | SYSTOLIC BLOOD PRESSURE: 128 MMHG | TEMPERATURE: 98.2 F | RESPIRATION RATE: 18 BRPM | OXYGEN SATURATION: 94 % | HEART RATE: 89 BPM | BODY MASS INDEX: 41.96 KG/M2 | HEIGHT: 62 IN

## 2025-04-28 DIAGNOSIS — C79.31 MALIGNANT NEOPLASM OF BREAST METASTATIC TO BRAIN, UNSPECIFIED LATERALITY (HCC): ICD-10-CM

## 2025-04-28 DIAGNOSIS — C78.01 MALIGNANT NEOPLASM METASTATIC TO BOTH LUNGS (HCC): ICD-10-CM

## 2025-04-28 DIAGNOSIS — C50.919 MALIGNANT NEOPLASM OF BREAST METASTATIC TO BRAIN, UNSPECIFIED LATERALITY (HCC): ICD-10-CM

## 2025-04-28 DIAGNOSIS — C79.31 MALIGNANT NEOPLASM OF BREAST METASTATIC TO BRAIN, UNSPECIFIED LATERALITY (HCC): Primary | ICD-10-CM

## 2025-04-28 DIAGNOSIS — C50.919 MALIGNANT NEOPLASM OF BREAST METASTATIC TO LIVER (HCC): ICD-10-CM

## 2025-04-28 DIAGNOSIS — C78.7 MALIGNANT NEOPLASM OF BREAST METASTATIC TO LIVER (HCC): ICD-10-CM

## 2025-04-28 DIAGNOSIS — C79.51 MALIGNANT NEOPLASM METASTATIC TO BONE (HCC): Primary | ICD-10-CM

## 2025-04-28 DIAGNOSIS — C50.919 MALIGNANT NEOPLASM OF BREAST METASTATIC TO BRAIN, UNSPECIFIED LATERALITY (HCC): Primary | ICD-10-CM

## 2025-04-28 DIAGNOSIS — C78.02 MALIGNANT NEOPLASM METASTATIC TO BOTH LUNGS (HCC): ICD-10-CM

## 2025-04-28 LAB
ALBUMIN SERPL-MCNC: 3.5 G/DL (ref 3.5–5)
ALBUMIN/GLOB SERPL: 1.1 (ref 1.1–2.2)
ALP SERPL-CCNC: 126 U/L (ref 45–117)
ALT SERPL-CCNC: 52 U/L (ref 12–78)
ANION GAP SERPL CALC-SCNC: 8 MMOL/L (ref 2–12)
AST SERPL-CCNC: 36 U/L (ref 15–37)
BASOPHILS # BLD: 0.05 K/UL (ref 0–0.1)
BASOPHILS NFR BLD: 1.1 % (ref 0–1)
BILIRUB SERPL-MCNC: 0.3 MG/DL (ref 0.2–1)
BUN SERPL-MCNC: 8 MG/DL (ref 6–20)
BUN/CREAT SERPL: 11 (ref 12–20)
CALCIUM SERPL-MCNC: 8.8 MG/DL (ref 8.5–10.1)
CHLORIDE SERPL-SCNC: 106 MMOL/L (ref 97–108)
CO2 SERPL-SCNC: 26 MMOL/L (ref 21–32)
CREAT SERPL-MCNC: 0.74 MG/DL (ref 0.55–1.02)
DIFFERENTIAL METHOD BLD: ABNORMAL
EOSINOPHIL # BLD: 0.33 K/UL (ref 0–0.4)
EOSINOPHIL NFR BLD: 6.9 % (ref 0–7)
ERYTHROCYTE [DISTWIDTH] IN BLOOD BY AUTOMATED COUNT: 17 % (ref 11.5–14.5)
GLOBULIN SER CALC-MCNC: 3.3 G/DL (ref 2–4)
GLUCOSE SERPL-MCNC: 187 MG/DL (ref 65–100)
HCT VFR BLD AUTO: 34.6 % (ref 35–47)
HGB BLD-MCNC: 10.9 G/DL (ref 11.5–16)
IMM GRANULOCYTES # BLD AUTO: 0.04 K/UL (ref 0–0.04)
IMM GRANULOCYTES NFR BLD AUTO: 0.8 % (ref 0–0.5)
LYMPHOCYTES # BLD: 1.64 K/UL (ref 0.8–3.5)
LYMPHOCYTES NFR BLD: 34.5 % (ref 12–49)
MCH RBC QN AUTO: 28.2 PG (ref 26–34)
MCHC RBC AUTO-ENTMCNC: 31.5 G/DL (ref 30–36.5)
MCV RBC AUTO: 89.4 FL (ref 80–99)
MONOCYTES # BLD: 0.53 K/UL (ref 0–1)
MONOCYTES NFR BLD: 11.1 % (ref 5–13)
NEUTS SEG # BLD: 2.17 K/UL (ref 1.8–8)
NEUTS SEG NFR BLD: 45.6 % (ref 32–75)
NRBC # BLD: 0 K/UL (ref 0–0.01)
NRBC BLD-RTO: 0 PER 100 WBC
PLATELET # BLD AUTO: 338 K/UL (ref 150–400)
PMV BLD AUTO: 9.6 FL (ref 8.9–12.9)
POTASSIUM SERPL-SCNC: 3.6 MMOL/L (ref 3.5–5.1)
PROT SERPL-MCNC: 6.8 G/DL (ref 6.4–8.2)
RBC # BLD AUTO: 3.87 M/UL (ref 3.8–5.2)
SODIUM SERPL-SCNC: 140 MMOL/L (ref 136–145)
WBC # BLD AUTO: 4.8 K/UL (ref 3.6–11)

## 2025-04-28 PROCEDURE — 80053 COMPREHEN METABOLIC PANEL: CPT

## 2025-04-28 PROCEDURE — 6360000002 HC RX W HCPCS: Performed by: INTERNAL MEDICINE

## 2025-04-28 PROCEDURE — 36415 COLL VENOUS BLD VENIPUNCTURE: CPT

## 2025-04-28 PROCEDURE — 85025 COMPLETE CBC W/AUTO DIFF WBC: CPT

## 2025-04-28 PROCEDURE — 96367 TX/PROPH/DG ADDL SEQ IV INF: CPT

## 2025-04-28 PROCEDURE — 96375 TX/PRO/DX INJ NEW DRUG ADDON: CPT

## 2025-04-28 PROCEDURE — 2580000003 HC RX 258: Performed by: INTERNAL MEDICINE

## 2025-04-28 PROCEDURE — 96413 CHEMO IV INFUSION 1 HR: CPT

## 2025-04-28 PROCEDURE — 99215 OFFICE O/P EST HI 40 MIN: CPT | Performed by: INTERNAL MEDICINE

## 2025-04-28 RX ORDER — DEXAMETHASONE SODIUM PHOSPHATE 10 MG/ML
10 INJECTION, SOLUTION INTRA-ARTICULAR; INTRALESIONAL; INTRAMUSCULAR; INTRAVENOUS; SOFT TISSUE ONCE
Status: COMPLETED | OUTPATIENT
Start: 2025-04-28 | End: 2025-04-28

## 2025-04-28 RX ORDER — DEXTROSE MONOHYDRATE 50 MG/ML
5-250 INJECTION, SOLUTION INTRAVENOUS PRN
Status: DISCONTINUED | OUTPATIENT
Start: 2025-04-28 | End: 2025-04-29 | Stop reason: HOSPADM

## 2025-04-28 RX ORDER — METHYLPREDNISOLONE 4 MG/1
TABLET ORAL
Qty: 21 TABLET | Refills: 0 | Status: SHIPPED | OUTPATIENT
Start: 2025-04-28 | End: 2025-05-04

## 2025-04-28 RX ORDER — PALONOSETRON 0.05 MG/ML
0.25 INJECTION, SOLUTION INTRAVENOUS ONCE
Status: COMPLETED | OUTPATIENT
Start: 2025-04-28 | End: 2025-04-28

## 2025-04-28 RX ADMIN — DEXAMETHASONE SODIUM PHOSPHATE 10 MG: 10 INJECTION INTRAMUSCULAR; INTRAVENOUS at 14:32

## 2025-04-28 RX ADMIN — SODIUM CHLORIDE 150 MG: 0.9 INJECTION, SOLUTION INTRAVENOUS at 14:43

## 2025-04-28 RX ADMIN — DEXTROSE MONOHYDRATE 150 ML/HR: 50 INJECTION, SOLUTION INTRAVENOUS at 14:30

## 2025-04-28 RX ADMIN — PALONOSETRON HYDROCHLORIDE 0.25 MG: 0.25 INJECTION INTRAVENOUS at 14:30

## 2025-04-28 RX ADMIN — FAM-TRASTUZUMAB DERUXTECAN-NXKI 534 MG: 100 INJECTION, POWDER, LYOPHILIZED, FOR SOLUTION INTRAVENOUS at 15:38

## 2025-04-28 ASSESSMENT — PATIENT HEALTH QUESTIONNAIRE - PHQ9
SUM OF ALL RESPONSES TO PHQ QUESTIONS 1-9: 0
SUM OF ALL RESPONSES TO PHQ QUESTIONS 1-9: 0
2. FEELING DOWN, DEPRESSED OR HOPELESS: NOT AT ALL
SUM OF ALL RESPONSES TO PHQ QUESTIONS 1-9: 0
1. LITTLE INTEREST OR PLEASURE IN DOING THINGS: NOT AT ALL
SUM OF ALL RESPONSES TO PHQ QUESTIONS 1-9: 0

## 2025-04-28 ASSESSMENT — PAIN SCALES - GENERAL: PAINLEVEL_OUTOF10: 0

## 2025-04-28 NOTE — PROGRESS NOTES
[facility] Chemo Progress Note    Date: April 28, 2025        1300: Ms. King arrived ambulatory and in stable condition for C3D1 of Enhertu.  Assessment was completed, pt reports itching from right chest port site. This port is new and pt believes she is having a reaction to adhesive from port placement. Skin appears red, swollen, bumpy, and oozing. MD notified. 22g PIV established to right arm without difficulty, labs drawn and sent for processing. Pt proceeded to appointment with Dr. Prasad.      Ms. King's vitals were reviewed.  Patient Vitals for the past 12 hrs:   Temp Pulse Resp BP SpO2   04/28/25 1615 -- 84 -- 119/63 --   04/28/25 1257 97.7 °F (36.5 °C) 75 18 -- 96 %         Lab results were reviewed, criteria for treatment met.  Results for orders placed or performed during the hospital encounter of 04/28/25   Comprehensive metabolic panel   Result Value Ref Range    Sodium 140 136 - 145 mmol/L    Potassium 3.6 3.5 - 5.1 mmol/L    Chloride 106 97 - 108 mmol/L    CO2 26 21 - 32 mmol/L    Anion Gap 8 2 - 12 mmol/L    Glucose 187 (H) 65 - 100 mg/dL    BUN 8 6 - 20 MG/DL    Creatinine 0.74 0.55 - 1.02 MG/DL    BUN/Creatinine Ratio 11 (L) 12 - 20      Est, Glom Filt Rate >90 >60 ml/min/1.73m2    Calcium 8.8 8.5 - 10.1 MG/DL    Total Bilirubin 0.3 0.2 - 1.0 MG/DL    ALT 52 12 - 78 U/L    AST 36 15 - 37 U/L    Alk Phosphatase 126 (H) 45 - 117 U/L    Total Protein 6.8 6.4 - 8.2 g/dL    Albumin 3.5 3.5 - 5.0 g/dL    Globulin 3.3 2.0 - 4.0 g/dL    Albumin/Globulin Ratio 1.1 1.1 - 2.2     CBC With Auto Differential   Result Value Ref Range    WBC 4.8 3.6 - 11.0 K/uL    RBC 3.87 3.80 - 5.20 M/uL    Hemoglobin 10.9 (L) 11.5 - 16.0 g/dL    Hematocrit 34.6 (L) 35.0 - 47.0 %    MCV 89.4 80.0 - 99.0 FL    MCH 28.2 26.0 - 34.0 PG    MCHC 31.5 30.0 - 36.5 g/dL    RDW 17.0 (H) 11.5 - 14.5 %    Platelets 338 150 - 400 K/uL    MPV 9.6 8.9 - 12.9 FL    Nucleated RBCs 0.0 0  WBC    nRBC 0.00 0.00 - 0.01 K/uL    Neutrophils %
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
## 2025-04-29 ENCOUNTER — PATIENT MESSAGE (OUTPATIENT)
Age: 40
End: 2025-04-29

## 2025-04-30 ENCOUNTER — RESULTS FOLLOW-UP (OUTPATIENT)
Age: 40
End: 2025-04-30

## 2025-04-30 ENCOUNTER — HOSPITAL ENCOUNTER (OUTPATIENT)
Facility: HOSPITAL | Age: 40
Discharge: HOME | End: 2025-05-02
Payer: COMMERCIAL

## 2025-04-30 ENCOUNTER — TELEPHONE (OUTPATIENT)
Age: 40
End: 2025-04-30

## 2025-04-30 VITALS
HEIGHT: 62 IN | BODY MASS INDEX: 41.96 KG/M2 | DIASTOLIC BLOOD PRESSURE: 81 MMHG | SYSTOLIC BLOOD PRESSURE: 127 MMHG | WEIGHT: 228 LBS

## 2025-04-30 DIAGNOSIS — Z51.81 ENCOUNTER FOR MONITORING CARDIOTOXIC DRUG THERAPY: ICD-10-CM

## 2025-04-30 DIAGNOSIS — Z79.899 ENCOUNTER FOR MONITORING CARDIOTOXIC DRUG THERAPY: ICD-10-CM

## 2025-04-30 LAB — DRUGS UR: NORMAL

## 2025-04-30 PROCEDURE — 93308 TTE F-UP OR LMTD: CPT

## 2025-04-30 NOTE — TELEPHONE ENCOUNTER
Called and spoke with the pharmacy tech at [facility]. Apparently there was a note not to fill the script from the pharmacist too early and to just add the medication to Millie's profile. The pharmacy tech tried to fill script and now it is telling her it requires a PA for the dose.     Information sent to Divya Simms and Dr. Nieves.

## 2025-05-01 LAB
ECHO BSA: 2.13 M2
ECHO LV EDV A2C: 78 ML
ECHO LV EDV A4C: 89 ML
ECHO LV EDV BP: 86 ML (ref 56–104)
ECHO LV EDV INDEX A4C: 44 ML/M2
ECHO LV EDV INDEX BP: 43 ML/M2
ECHO LV EDV NDEX A2C: 39 ML/M2
ECHO LV EF PHYSICIAN: 57 %
ECHO LV EJECTION FRACTION A2C: 50 %
ECHO LV EJECTION FRACTION A4C: 64 %
ECHO LV EJECTION FRACTION BIPLANE: 57 % (ref 55–100)
ECHO LV ESV A2C: 39 ML
ECHO LV ESV A4C: 32 ML
ECHO LV ESV BP: 37 ML (ref 19–49)
ECHO LV ESV INDEX A2C: 19 ML/M2
ECHO LV ESV INDEX A4C: 16 ML/M2
ECHO LV ESV INDEX BP: 18 ML/M2
ECHO LV FRACTIONAL SHORTENING: 34 % (ref 28–44)
ECHO LV GLOBAL LONGITUDINAL STRAIN (GLS): -18.5 %
ECHO LV INTERNAL DIMENSION DIASTOLE INDEX: 2.03 CM/M2
ECHO LV INTERNAL DIMENSION DIASTOLIC: 4.1 CM (ref 3.9–5.3)
ECHO LV INTERNAL DIMENSION SYSTOLIC INDEX: 1.34 CM/M2
ECHO LV INTERNAL DIMENSION SYSTOLIC: 2.7 CM
ECHO LV IVSD: 1.2 CM (ref 0.6–0.9)
ECHO LV MASS 2D: 161.4 G (ref 67–162)
ECHO LV MASS INDEX 2D: 79.9 G/M2 (ref 43–95)
ECHO LV POSTERIOR WALL DIASTOLIC: 1.1 CM (ref 0.6–0.9)
ECHO LV RELATIVE WALL THICKNESS RATIO: 0.54

## 2025-05-05 ENCOUNTER — APPOINTMENT (OUTPATIENT)
Facility: HOSPITAL | Age: 40
End: 2025-05-05
Payer: COMMERCIAL

## 2025-05-05 ENCOUNTER — PATIENT MESSAGE (OUTPATIENT)
Age: 40
End: 2025-05-05

## 2025-05-05 ENCOUNTER — TELEPHONE (OUTPATIENT)
Age: 40
End: 2025-05-05

## 2025-05-05 DIAGNOSIS — G89.3 CANCER RELATED PAIN: Primary | ICD-10-CM

## 2025-05-05 RX ORDER — OXYCODONE HCL 40 MG/1
40 TABLET, FILM COATED, EXTENDED RELEASE ORAL EVERY 12 HOURS
Qty: 60 EACH | Refills: 0 | Status: SHIPPED | OUTPATIENT
Start: 2025-05-05 | End: 2025-06-04

## 2025-05-05 NOTE — TELEPHONE ENCOUNTER
Palliative Medicine  Nursing Note  [phone]  Fax [phone]      Telephone Call  Patient Name: Millie King  YOB: 1985    5/5/2025    Call placed to patient regarding Mariot message received.     \"Good morning! This morning I noticed that the fentanyl patch had fallen off (I’ve had a heck of a time keeping them attached) and there was a big blister on my upper arm where the patch was. I’m wondering if my new adhesive allergy is going to be a problem. The patch was on my upper arm so it’s an odd place for a blister to form. Should we try something else?\"    Spoke with patient who reports the above. This nurse explained the difference between how the packaging on the Fentanyl patch says to prep the skin and how we actually prep the skin. Advised patient to use soap and water on skin followed by alcohol swap to remove any oils on the skin prior to placing patch. Also advised patient to place Flonase on area she going to place the patch, let it dry and place patch over area to keep her from having a reaction to the patch. She may also place a Tegaderm over the patch to keep it in place as well.     Patient says her current pain level is level 6/10. She was recently placed on Fentanyl 50 mcg / hr patch. Since starting the new patch she says she has not seen much improvement in management of her pain. She is taking Oxy IR 40 mg every 4 hours during waking hours. When she takes the Oxy IR her pain is reduced to a level 4/10. She is also using Voltaren gel which she says she does get some relief from.     The lowest her pain has ever been is a level 2/10 when she was using OxyContin. However, due to insurance this medication is no longer covered. She says her pain is manageable at a level 4/10 but would rather is be lower for comfort.     Encounter sent to Dr. Nieves for review and recommendations. Patient is also going to need a refill on her Fentayl patches to be sent to [facility] in

## 2025-05-05 NOTE — TELEPHONE ENCOUNTER
Will attempt once again to get Oxycontin as it appears that the 40 mg tab is the only dose covered- which is appropriate for her at this time.  I prescribed 40 mg q12h, sent to [facility]  (with note that we are discontinuing the Fentanyl Td if able to fill this).     Please check in with  pharmacy tomorrow to see if they are able to order / fill.     Dr. Nieves

## 2025-05-05 NOTE — TELEPHONE ENCOUNTER
Follow up phone call placed to patient to inform her per Dr. Nieves we are able to order her OxyContin 40 mg tablets to take morning and night at the [facility].     Prior Authorization has been completed and approved.     She is to discontinue the Fentanyl patch.     Patient verbalized understanding and will  new long-acting opioid tomorrow.     Cristal Caraballo RN  Palliative Medicine

## 2025-05-08 ENCOUNTER — PATIENT MESSAGE (OUTPATIENT)
Age: 40
End: 2025-05-08

## 2025-05-09 ENCOUNTER — TELEPHONE (OUTPATIENT)
Age: 40
End: 2025-05-09

## 2025-05-09 NOTE — TELEPHONE ENCOUNTER
[facility] [place] at [facility]  [phone]    05/09/25 2:13 PM EDT - A phone call was made to the patient to ask her if a claim number needed to be added to her paperwork. Per patient, no claim number has been assigned to her yet. Patient was inform that forms have been fax to [email or web address].

## 2025-05-12 ENCOUNTER — APPOINTMENT (OUTPATIENT)
Facility: HOSPITAL | Age: 40
End: 2025-05-12
Payer: COMMERCIAL

## 2025-05-12 RX ORDER — HEPARIN 100 UNIT/ML
500 SYRINGE INTRAVENOUS PRN
Status: CANCELLED | OUTPATIENT
Start: 2025-05-19

## 2025-05-12 RX ORDER — SODIUM CHLORIDE 9 MG/ML
5-250 INJECTION, SOLUTION INTRAVENOUS PRN
Status: CANCELLED | OUTPATIENT
Start: 2025-05-19

## 2025-05-12 RX ORDER — DEXAMETHASONE SODIUM PHOSPHATE 10 MG/ML
10 INJECTION, SOLUTION INTRA-ARTICULAR; INTRALESIONAL; INTRAMUSCULAR; INTRAVENOUS; SOFT TISSUE ONCE
Status: CANCELLED | OUTPATIENT
Start: 2025-05-19 | End: 2025-05-19

## 2025-05-12 RX ORDER — FAMOTIDINE 10 MG/ML
20 INJECTION, SOLUTION INTRAVENOUS
Status: CANCELLED | OUTPATIENT
Start: 2025-05-19

## 2025-05-12 RX ORDER — EPINEPHRINE 1 MG/ML
0.3 INJECTION, SOLUTION INTRAMUSCULAR; SUBCUTANEOUS PRN
Status: CANCELLED | OUTPATIENT
Start: 2025-05-19

## 2025-05-12 RX ORDER — ALBUTEROL SULFATE 90 UG/1
4 INHALANT RESPIRATORY (INHALATION) PRN
Status: CANCELLED | OUTPATIENT
Start: 2025-05-19

## 2025-05-12 RX ORDER — HYDROCORTISONE SODIUM SUCCINATE 100 MG/2ML
100 INJECTION INTRAMUSCULAR; INTRAVENOUS
Status: CANCELLED | OUTPATIENT
Start: 2025-05-19

## 2025-05-12 RX ORDER — PROCHLORPERAZINE EDISYLATE 5 MG/ML
5 INJECTION INTRAMUSCULAR; INTRAVENOUS
Status: CANCELLED | OUTPATIENT
Start: 2025-05-19

## 2025-05-12 RX ORDER — ACETAMINOPHEN 325 MG/1
650 TABLET ORAL
Status: CANCELLED | OUTPATIENT
Start: 2025-05-19

## 2025-05-12 RX ORDER — ONDANSETRON 2 MG/ML
8 INJECTION INTRAMUSCULAR; INTRAVENOUS
Status: CANCELLED | OUTPATIENT
Start: 2025-05-19

## 2025-05-12 RX ORDER — DIPHENHYDRAMINE HYDROCHLORIDE 50 MG/ML
50 INJECTION, SOLUTION INTRAMUSCULAR; INTRAVENOUS
Status: CANCELLED | OUTPATIENT
Start: 2025-05-19

## 2025-05-12 RX ORDER — SODIUM CHLORIDE 9 MG/ML
INJECTION, SOLUTION INTRAVENOUS CONTINUOUS
Status: CANCELLED | OUTPATIENT
Start: 2025-05-19

## 2025-05-13 ENCOUNTER — TELEPHONE (OUTPATIENT)
Age: 40
End: 2025-05-13

## 2025-05-13 ENCOUNTER — PATIENT MESSAGE (OUTPATIENT)
Age: 40
End: 2025-05-13

## 2025-05-13 DIAGNOSIS — F40.240 CLAUSTROPHOBIA: Primary | ICD-10-CM

## 2025-05-13 RX ORDER — LORAZEPAM 1 MG/1
TABLET ORAL
Qty: 1 TABLET | Refills: 0 | Status: SHIPPED | OUTPATIENT
Start: 2025-05-13 | End: 2025-05-16

## 2025-05-13 NOTE — TELEPHONE ENCOUNTER
[facility] [place] at [facility]  [phone]    05/13/25 11:57 AM EDT - A phone call was made to the patient to inform her that her MRI has been reschedule to our [facility] center on  5/14/2025 at 8:30am. Patient verbalized understanding and had no further questions.

## 2025-05-14 ENCOUNTER — HOSPITAL ENCOUNTER (OUTPATIENT)
Age: 40
Discharge: HOME | End: 2025-05-17
Payer: COMMERCIAL

## 2025-05-14 DIAGNOSIS — C79.31 METASTASIS TO BRAIN (HCC): ICD-10-CM

## 2025-05-14 PROCEDURE — 6360000004 HC RX CONTRAST MEDICATION: Performed by: RADIOLOGY

## 2025-05-14 PROCEDURE — 70553 MRI BRAIN STEM W/O & W/DYE: CPT

## 2025-05-14 PROCEDURE — A9579 GAD-BASE MR CONTRAST NOS,1ML: HCPCS | Performed by: RADIOLOGY

## 2025-05-14 RX ADMIN — GADOTERIDOL 20 ML: 279.3 INJECTION, SOLUTION INTRAVENOUS at 09:16

## 2025-05-16 DIAGNOSIS — G89.3 CANCER RELATED PAIN: ICD-10-CM

## 2025-05-16 RX ORDER — CYCLOBENZAPRINE HCL 10 MG
10 TABLET ORAL 2 TIMES DAILY PRN
Qty: 60 TABLET | Refills: 0 | Status: SHIPPED | OUTPATIENT
Start: 2025-05-16 | End: 2025-06-15

## 2025-05-18 DIAGNOSIS — G89.3 CANCER RELATED PAIN: ICD-10-CM

## 2025-05-19 ENCOUNTER — OFFICE VISIT (OUTPATIENT)
Age: 40
End: 2025-05-19
Payer: COMMERCIAL

## 2025-05-19 ENCOUNTER — HOSPITAL ENCOUNTER (OUTPATIENT)
Facility: HOSPITAL | Age: 40
Setting detail: INFUSION SERIES
Discharge: HOME | End: 2025-05-19
Payer: COMMERCIAL

## 2025-05-19 VITALS
BODY MASS INDEX: 42.69 KG/M2 | OXYGEN SATURATION: 92 % | HEART RATE: 95 BPM | WEIGHT: 232 LBS | HEIGHT: 62 IN | TEMPERATURE: 97.2 F | DIASTOLIC BLOOD PRESSURE: 68 MMHG | RESPIRATION RATE: 18 BRPM | SYSTOLIC BLOOD PRESSURE: 136 MMHG

## 2025-05-19 VITALS
SYSTOLIC BLOOD PRESSURE: 116 MMHG | WEIGHT: 232 LBS | BODY MASS INDEX: 42.69 KG/M2 | DIASTOLIC BLOOD PRESSURE: 77 MMHG | HEIGHT: 62 IN | OXYGEN SATURATION: 92 % | HEART RATE: 95 BPM | TEMPERATURE: 97.2 F | RESPIRATION RATE: 18 BRPM

## 2025-05-19 DIAGNOSIS — C50.919 MALIGNANT NEOPLASM OF BREAST METASTATIC TO BRAIN, UNSPECIFIED LATERALITY (HCC): Primary | ICD-10-CM

## 2025-05-19 DIAGNOSIS — C50.919 MALIGNANT NEOPLASM OF BREAST METASTATIC TO BRAIN, UNSPECIFIED LATERALITY (HCC): ICD-10-CM

## 2025-05-19 DIAGNOSIS — C79.31 MALIGNANT NEOPLASM OF BREAST METASTATIC TO BRAIN, UNSPECIFIED LATERALITY (HCC): ICD-10-CM

## 2025-05-19 DIAGNOSIS — C79.31 MALIGNANT NEOPLASM OF BREAST METASTATIC TO BRAIN, UNSPECIFIED LATERALITY (HCC): Primary | ICD-10-CM

## 2025-05-19 DIAGNOSIS — C78.01 MALIGNANT NEOPLASM METASTATIC TO BOTH LUNGS (HCC): ICD-10-CM

## 2025-05-19 DIAGNOSIS — C78.02 MALIGNANT NEOPLASM METASTATIC TO BOTH LUNGS (HCC): ICD-10-CM

## 2025-05-19 DIAGNOSIS — C79.51 MALIGNANT NEOPLASM METASTATIC TO BONE (HCC): Primary | ICD-10-CM

## 2025-05-19 DIAGNOSIS — C50.919 MALIGNANT NEOPLASM OF BREAST METASTATIC TO LIVER (HCC): ICD-10-CM

## 2025-05-19 DIAGNOSIS — C78.7 MALIGNANT NEOPLASM OF BREAST METASTATIC TO LIVER (HCC): ICD-10-CM

## 2025-05-19 LAB
ALBUMIN SERPL-MCNC: 3.3 G/DL (ref 3.5–5)
ALBUMIN/GLOB SERPL: 1.1 (ref 1.1–2.2)
ALP SERPL-CCNC: 115 U/L (ref 45–117)
ALT SERPL-CCNC: 54 U/L (ref 12–78)
ANION GAP SERPL CALC-SCNC: 3 MMOL/L (ref 2–12)
AST SERPL-CCNC: 27 U/L (ref 15–37)
BASOPHILS # BLD: 0.05 K/UL (ref 0–0.1)
BASOPHILS NFR BLD: 0.9 % (ref 0–1)
BILIRUB SERPL-MCNC: 0.2 MG/DL (ref 0.2–1)
BUN SERPL-MCNC: 9 MG/DL (ref 6–20)
BUN/CREAT SERPL: 11 (ref 12–20)
CALCIUM SERPL-MCNC: 9 MG/DL (ref 8.5–10.1)
CHLORIDE SERPL-SCNC: 107 MMOL/L (ref 97–108)
CO2 SERPL-SCNC: 31 MMOL/L (ref 21–32)
CREAT SERPL-MCNC: 0.82 MG/DL (ref 0.55–1.02)
DIFFERENTIAL METHOD BLD: ABNORMAL
EOSINOPHIL # BLD: 0.24 K/UL (ref 0–0.4)
EOSINOPHIL NFR BLD: 4.1 % (ref 0–7)
ERYTHROCYTE [DISTWIDTH] IN BLOOD BY AUTOMATED COUNT: 16.3 % (ref 11.5–14.5)
EST. AVERAGE GLUCOSE BLD GHB EST-MCNC: 151 MG/DL
GLOBULIN SER CALC-MCNC: 3 G/DL (ref 2–4)
GLUCOSE SERPL-MCNC: 169 MG/DL (ref 65–100)
HBA1C MFR BLD: 6.9 % (ref 4–5.6)
HCT VFR BLD AUTO: 35 % (ref 35–47)
HGB BLD-MCNC: 10.9 G/DL (ref 11.5–16)
IMM GRANULOCYTES # BLD AUTO: 0.06 K/UL (ref 0–0.04)
IMM GRANULOCYTES NFR BLD AUTO: 1 % (ref 0–0.5)
LYMPHOCYTES # BLD: 1.91 K/UL (ref 0.8–3.5)
LYMPHOCYTES NFR BLD: 33 % (ref 12–49)
MCH RBC QN AUTO: 27.7 PG (ref 26–34)
MCHC RBC AUTO-ENTMCNC: 31.1 G/DL (ref 30–36.5)
MCV RBC AUTO: 89.1 FL (ref 80–99)
MONOCYTES # BLD: 0.75 K/UL (ref 0–1)
MONOCYTES NFR BLD: 13 % (ref 5–13)
NEUTS SEG # BLD: 2.79 K/UL (ref 1.8–8)
NEUTS SEG NFR BLD: 48 % (ref 32–75)
NRBC # BLD: 0 K/UL (ref 0–0.01)
NRBC BLD-RTO: 0 PER 100 WBC
PLATELET # BLD AUTO: 123 K/UL (ref 150–400)
POTASSIUM SERPL-SCNC: 4.1 MMOL/L (ref 3.5–5.1)
PROT SERPL-MCNC: 6.3 G/DL (ref 6.4–8.2)
RBC # BLD AUTO: 3.93 M/UL (ref 3.8–5.2)
RBC MORPH BLD: ABNORMAL
SODIUM SERPL-SCNC: 141 MMOL/L (ref 136–145)
WBC # BLD AUTO: 5.8 K/UL (ref 3.6–11)

## 2025-05-19 PROCEDURE — 80053 COMPREHEN METABOLIC PANEL: CPT

## 2025-05-19 PROCEDURE — 2500000003 HC RX 250 WO HCPCS: Performed by: INTERNAL MEDICINE

## 2025-05-19 PROCEDURE — 96413 CHEMO IV INFUSION 1 HR: CPT

## 2025-05-19 PROCEDURE — 85025 COMPLETE CBC W/AUTO DIFF WBC: CPT

## 2025-05-19 PROCEDURE — 2580000003 HC RX 258: Performed by: INTERNAL MEDICINE

## 2025-05-19 PROCEDURE — 99215 OFFICE O/P EST HI 40 MIN: CPT | Performed by: INTERNAL MEDICINE

## 2025-05-19 PROCEDURE — 83036 HEMOGLOBIN GLYCOSYLATED A1C: CPT

## 2025-05-19 PROCEDURE — 6360000002 HC RX W HCPCS: Performed by: INTERNAL MEDICINE

## 2025-05-19 PROCEDURE — 96375 TX/PRO/DX INJ NEW DRUG ADDON: CPT

## 2025-05-19 RX ORDER — DEXTROSE MONOHYDRATE 50 MG/ML
5-250 INJECTION, SOLUTION INTRAVENOUS PRN
Status: DISCONTINUED | OUTPATIENT
Start: 2025-05-19 | End: 2025-05-20 | Stop reason: HOSPADM

## 2025-05-19 RX ORDER — OXYCODONE HYDROCHLORIDE 20 MG/1
40 TABLET ORAL EVERY 4 HOURS PRN
Qty: 168 TABLET | Refills: 0 | Status: SHIPPED | OUTPATIENT
Start: 2025-05-19 | End: 2025-06-02

## 2025-05-19 RX ORDER — PALONOSETRON 0.05 MG/ML
0.25 INJECTION, SOLUTION INTRAVENOUS ONCE
Status: COMPLETED | OUTPATIENT
Start: 2025-05-19 | End: 2025-05-19

## 2025-05-19 RX ORDER — DEXAMETHASONE SODIUM PHOSPHATE 10 MG/ML
8 INJECTION, SOLUTION INTRA-ARTICULAR; INTRALESIONAL; INTRAMUSCULAR; INTRAVENOUS; SOFT TISSUE ONCE
Status: COMPLETED | OUTPATIENT
Start: 2025-05-19 | End: 2025-05-19

## 2025-05-19 RX ORDER — SODIUM CHLORIDE 0.9 % (FLUSH) 0.9 %
5-40 SYRINGE (ML) INJECTION PRN
Status: DISCONTINUED | OUTPATIENT
Start: 2025-05-19 | End: 2025-05-20 | Stop reason: HOSPADM

## 2025-05-19 RX ADMIN — SODIUM CHLORIDE, PRESERVATIVE FREE 20 ML: 5 INJECTION INTRAVENOUS at 16:06

## 2025-05-19 RX ADMIN — DEXAMETHASONE SODIUM PHOSPHATE 8 MG: 10 INJECTION INTRAMUSCULAR; INTRAVENOUS at 14:24

## 2025-05-19 RX ADMIN — SODIUM CHLORIDE 150 MG: 0.9 INJECTION, SOLUTION INTRAVENOUS at 14:31

## 2025-05-19 RX ADMIN — FAM-TRASTUZUMAB DERUXTECAN-NXKI 534 MG: 100 INJECTION, POWDER, LYOPHILIZED, FOR SOLUTION INTRAVENOUS at 15:32

## 2025-05-19 RX ADMIN — DEXTROSE 25 ML/HR: 5 SOLUTION INTRAVENOUS at 14:21

## 2025-05-19 RX ADMIN — PALONOSETRON 0.25 MG: 0.05 INJECTION, SOLUTION INTRAVENOUS at 14:24

## 2025-05-19 ASSESSMENT — PATIENT HEALTH QUESTIONNAIRE - PHQ9
SUM OF ALL RESPONSES TO PHQ QUESTIONS 1-9: 0
2. FEELING DOWN, DEPRESSED OR HOPELESS: NOT AT ALL
SUM OF ALL RESPONSES TO PHQ QUESTIONS 1-9: 0
SUM OF ALL RESPONSES TO PHQ QUESTIONS 1-9: 0
1. LITTLE INTEREST OR PLEASURE IN DOING THINGS: NOT AT ALL
SUM OF ALL RESPONSES TO PHQ QUESTIONS 1-9: 0

## 2025-05-19 ASSESSMENT — PAIN SCALES - GENERAL: PAINLEVEL_OUTOF10: 0

## 2025-05-19 NOTE — PROGRESS NOTES
Millie King is a 40 y.o. female is here today for a follow up.    1. Have you been to the ER, urgent care clinic since your last visit?  Hospitalized since your last visit?No    2. Have you seen or consulted any other health care providers outside of the [facility] since your last visit?  Include any pap smears or colon screening. No        
nodularity seen along the lower  lobes of the lungs.  4. Postsurgical changes are seen of the RIGHT femur. No new bony lytic or  blastic lesions are identified.  5. No lymphadenopathy is seen in the chest, abdomen, or pelvis.  6. No CT evidence of breast mass. Consider baseline mammograms    Bone Scan 4/5/23  IMPRESSION:  Stable activity right sacrum. Improved left posterior rib activity.  No new scintigraphic evidence to suggest metastatic disease.    04/17/23  TRANSTHORACIC ECHOCARDIOGRAM (TTE) COMPLETE (CONTRAST/BUBBLE/3D PRN) 04/17/2023  9:55 PM (Final)  Narrative  This is a summary report. The complete report is available in the patient's medical record. If you cannot access the medical record, please contact the sending organization for a detailed fax or copy.    Left Ventricle: Normal left ventricular systolic function with a visually estimated EF of 55 - 60%. Left ventricle size is normal. Normal wall thickness. Normal wall motion. Normal diastolic function.    Signed by: Martell Stringer MD on 4/17/2023  9:55 PM    XR Hip 4/25/23  IMPRESSION:  No significant change in alignment of healing proximal femur fracture. Internal  fixation hardware appears intact.    CTA Chest 4/25/23  IMPRESSION:  Right upper lobe infiltrate. Small bilateral pleural effusions with underlying  atelectasis. No evidence of pulmonary embolism. Stable appearance of the small  hepatic lesions.    CT Right Hip 5/22/23  IMPRESSION:  Fracture of the right femoral neck and proximal stem again demonstrated. These  demonstrate partial healing. Lytic lesion at the site of the basicervical  fracture is now sclerotic which may represent posttreatment response. There is  no new lytic lesion demonstrated    Brain MRI 6/13/23  IMPRESSION:  No significant abnormality or acute process    ECHO 7/14/23  Left Ventricle: Normal left ventricular systolic function with a visually estimated EF of 60 - 65%. Left ventricle size is normal. Normal wall

## 2025-05-19 NOTE — TELEPHONE ENCOUNTER
Palliative Medicine Clinic   [place]: [phone])    Patient Name: Millie King  YOB: 1985    Medication Refill Request    Patient is scheduled for follow up:  [x]  YES  []   NO  Next [facility] Visit: 06/02/25    PDMP reviewed:  [x] YES   []  System down / Unable  []  NO- Patient fills out of state    Medication:oxyCODONE (ROXICODONE) 20 MG immediate release tablet   Dose and directions:Take 2 tablets by mouth every 4 hours as needed for Pain for up to 14 days. Max Daily Amount: 240 mg   Number dispensed:168  Date filled (PDMP or Pharmacy):05/02/25  # left:unknown        Appropriate for refill:  [x]  YES  []  Early Request - Requires MD/NP Review      Other pertinent information for prescriber:

## 2025-05-19 NOTE — PROGRESS NOTES
[facility] Progress Note    Date: May 19, 2025    Name: Millie King    MRN: [id number]         : 1985    Ms. King Arrived ambulatory and in no distress for C4D1 of Enhertu Regimen.  Assessment was completed, no acute issues at this time, no new complaints voiced.  Chest wall port accessed without difficulty, labs drawn & sent for processing.      Patient proceed to appointment with Dr. Prasad.    Ms. King's vitals were reviewed.  Vitals:    25 1045   BP: (!) 146/90   Pulse: 95   Resp: 18   Temp: 97.2 °F (36.2 °C)   SpO2: 92%       Lab results were obtained and reviewed.  Recent Results (from the past 12 hours)   Comprehensive metabolic panel    Collection Time: 25 11:04 AM   Result Value Ref Range    Sodium 141 136 - 145 mmol/L    Potassium 4.1 3.5 - 5.1 mmol/L    Chloride 107 97 - 108 mmol/L    CO2 31 21 - 32 mmol/L    Anion Gap 3 2 - 12 mmol/L    Glucose 169 (H) 65 - 100 mg/dL    BUN 9 6 - 20 MG/DL    Creatinine 0.82 0.55 - 1.02 MG/DL    BUN/Creatinine Ratio 11 (L) 12 - 20      Est, Glom Filt Rate >90 >60 ml/min/1.73m2    Calcium 9.0 8.5 - 10.1 MG/DL    Total Bilirubin 0.2 0.2 - 1.0 MG/DL    ALT 54 12 - 78 U/L    AST 27 15 - 37 U/L    Alk Phosphatase 115 45 - 117 U/L    Total Protein 6.3 (L) 6.4 - 8.2 g/dL    Albumin 3.3 (L) 3.5 - 5.0 g/dL    Globulin 3.0 2.0 - 4.0 g/dL    Albumin/Globulin Ratio 1.1 1.1 - 2.2     Hemoglobin A1C    Collection Time: 25 11:04 AM   Result Value Ref Range    Hemoglobin A1C 6.9 (H) 4.0 - 5.6 %    Estimated Avg Glucose 151 mg/dL   CBC with Auto Differential    Collection Time: 25 12:37 PM   Result Value Ref Range    WBC 5.8 3.6 - 11.0 K/uL    RBC 3.93 3.80 - 5.20 M/uL    Hemoglobin 10.9 (L) 11.5 - 16.0 g/dL    Hematocrit 35.0 35.0 - 47.0 %    MCV 89.1 80.0 - 99.0 FL    MCH 27.7 26.0 - 34.0 PG    MCHC 31.1 30.0 - 36.5 g/dL    RDW 16.3 (H) 11.5 - 14.5 %    Platelets 123 (L) 150 - 400 K/uL    Nucleated RBCs 0.0 0  WBC    nRBC 0.00 0.00 - 0.01 K/uL

## 2025-05-27 ENCOUNTER — APPOINTMENT (OUTPATIENT)
Facility: HOSPITAL | Age: 40
End: 2025-05-27
Payer: COMMERCIAL

## 2025-06-02 ENCOUNTER — APPOINTMENT (OUTPATIENT)
Facility: HOSPITAL | Age: 40
End: 2025-06-02
Payer: COMMERCIAL

## 2025-06-02 ENCOUNTER — TELEMEDICINE (OUTPATIENT)
Age: 40
End: 2025-06-02
Payer: COMMERCIAL

## 2025-06-02 DIAGNOSIS — T45.1X5A CHEMOTHERAPY-INDUCED NEUROPATHY: ICD-10-CM

## 2025-06-02 DIAGNOSIS — G89.3 CANCER RELATED PAIN: Primary | ICD-10-CM

## 2025-06-02 DIAGNOSIS — G62.0 CHEMOTHERAPY-INDUCED NEUROPATHY: ICD-10-CM

## 2025-06-02 DIAGNOSIS — R53.0 NEOPLASTIC (MALIGNANT) RELATED FATIGUE: ICD-10-CM

## 2025-06-02 DIAGNOSIS — C78.7 MALIGNANT NEOPLASM OF BREAST METASTATIC TO LIVER (HCC): ICD-10-CM

## 2025-06-02 DIAGNOSIS — G47.30 SLEEP APNEA, UNSPECIFIED TYPE: ICD-10-CM

## 2025-06-02 DIAGNOSIS — C79.31 METASTASIS TO BRAIN (HCC): ICD-10-CM

## 2025-06-02 DIAGNOSIS — C50.919 MALIGNANT NEOPLASM OF BREAST METASTATIC TO LIVER (HCC): ICD-10-CM

## 2025-06-02 DIAGNOSIS — F43.22 ADJUSTMENT DISORDER WITH ANXIOUS MOOD: ICD-10-CM

## 2025-06-02 DIAGNOSIS — Z79.891 ENCOUNTER FOR LONG-TERM OPIATE ANALGESIC USE: ICD-10-CM

## 2025-06-02 PROCEDURE — 99214 OFFICE O/P EST MOD 30 MIN: CPT | Performed by: INTERNAL MEDICINE

## 2025-06-02 RX ORDER — OXYCODONE HYDROCHLORIDE 20 MG/1
40 TABLET ORAL EVERY 4 HOURS PRN
Qty: 168 TABLET | Refills: 0 | Status: SHIPPED | OUTPATIENT
Start: 2025-06-03 | End: 2025-06-17

## 2025-06-02 RX ORDER — EPINEPHRINE 1 MG/ML
0.3 INJECTION, SOLUTION INTRAMUSCULAR; SUBCUTANEOUS PRN
Status: CANCELLED | OUTPATIENT
Start: 2025-06-09

## 2025-06-02 RX ORDER — ACETAMINOPHEN 325 MG/1
650 TABLET ORAL
Status: CANCELLED | OUTPATIENT
Start: 2025-06-09

## 2025-06-02 RX ORDER — PREGABALIN 150 MG/1
150 CAPSULE ORAL 3 TIMES DAILY
Qty: 90 CAPSULE | Refills: 0 | Status: SHIPPED | OUTPATIENT
Start: 2025-06-02 | End: 2025-07-02

## 2025-06-02 RX ORDER — SODIUM CHLORIDE 9 MG/ML
5-250 INJECTION, SOLUTION INTRAVENOUS PRN
Status: CANCELLED | OUTPATIENT
Start: 2025-06-09

## 2025-06-02 RX ORDER — PROCHLORPERAZINE EDISYLATE 5 MG/ML
5 INJECTION INTRAMUSCULAR; INTRAVENOUS
Status: CANCELLED | OUTPATIENT
Start: 2025-06-09

## 2025-06-02 RX ORDER — OXYCODONE HCL 40 MG/1
40 TABLET, FILM COATED, EXTENDED RELEASE ORAL EVERY 12 HOURS
Qty: 60 EACH | Refills: 0 | Status: SHIPPED | OUTPATIENT
Start: 2025-06-04 | End: 2025-06-02

## 2025-06-02 RX ORDER — ONDANSETRON 2 MG/ML
8 INJECTION INTRAMUSCULAR; INTRAVENOUS
Status: CANCELLED | OUTPATIENT
Start: 2025-06-09

## 2025-06-02 RX ORDER — HEPARIN 100 UNIT/ML
500 SYRINGE INTRAVENOUS PRN
Status: CANCELLED | OUTPATIENT
Start: 2025-06-09

## 2025-06-02 RX ORDER — SODIUM CHLORIDE 9 MG/ML
INJECTION, SOLUTION INTRAVENOUS CONTINUOUS
Status: CANCELLED | OUTPATIENT
Start: 2025-06-09

## 2025-06-02 RX ORDER — DIPHENHYDRAMINE HYDROCHLORIDE 50 MG/ML
50 INJECTION, SOLUTION INTRAMUSCULAR; INTRAVENOUS
Status: CANCELLED | OUTPATIENT
Start: 2025-06-09

## 2025-06-02 RX ORDER — OXYCODONE HCL 40 MG/1
40 TABLET, FILM COATED, EXTENDED RELEASE ORAL EVERY 12 HOURS
Qty: 60 EACH | Refills: 0 | Status: SHIPPED | OUTPATIENT
Start: 2025-06-03 | End: 2025-07-03

## 2025-06-02 RX ORDER — ALBUTEROL SULFATE 90 UG/1
4 INHALANT RESPIRATORY (INHALATION) PRN
Status: CANCELLED | OUTPATIENT
Start: 2025-06-09

## 2025-06-02 RX ORDER — FAMOTIDINE 10 MG/ML
20 INJECTION, SOLUTION INTRAVENOUS
Status: CANCELLED | OUTPATIENT
Start: 2025-06-09

## 2025-06-02 RX ORDER — PREGABALIN 150 MG/1
150 CAPSULE ORAL 3 TIMES DAILY
Qty: 90 CAPSULE | Refills: 0 | Status: CANCELLED | OUTPATIENT
Start: 2025-06-02 | End: 2025-07-02

## 2025-06-02 RX ORDER — OXYCODONE HYDROCHLORIDE 20 MG/1
40 TABLET ORAL EVERY 4 HOURS PRN
Qty: 168 TABLET | Refills: 0 | Status: SHIPPED | OUTPATIENT
Start: 2025-06-06 | End: 2025-06-02

## 2025-06-02 RX ORDER — HYDROCORTISONE SODIUM SUCCINATE 100 MG/2ML
100 INJECTION INTRAMUSCULAR; INTRAVENOUS
Status: CANCELLED | OUTPATIENT
Start: 2025-06-09

## 2025-06-02 NOTE — PROGRESS NOTES
Palliative Medicine Outpatient Clinic  Nurse Check in Note  [phone])    Patient Name: Millie Coelho  YOB: 1985      Date of Visit: 06/02/2025  Visit Location:  [facility] Virtual Visit     Nurse verified patient is located in [place] for today's visit: Yes    Chief patient or family concern today:     Patient's Last Palliative Medicine Clinic Visit Date:  4/25/2025    Have you been to an ER or urgent care center since your last visit?  No  Have you been hospitalized since your last visit? No  Have you seen or consulted any health care providers outside of the [facility] system since your last visit?  No  If Yes, alert PSR to request appropriate records from non-[facility] offices    Medications:  Med reconciliation was performed with:  Patient    Requested refills:  Yes- pended for clinician    If prescribed an opioid, does patient have access to naloxone at home?:  YES  If No, pend naloxone nasal spray    Function and Symptoms:  Use of assist devices:  Cane    Palliative Performance Status (PPS):   Palliative Performance Scale (PPS)  PPS: 70    ESAS:  Modified-Hudson Symptom Assessment Scale (ESAS)  Tiredness Score: 6  Drowsiness Score: 3  Depression Score: Not depressed  Pain Score: 4  Anxiety Score: 2  Nausea Score: 6  Appetite Score: Best appetite  Dyspnea Score: No shortness of breath  Constipation: Yes  Wellbeing Score: 8  Other Problem Score: Best possible response    Constipation?  Yes  Last BM: 6/2/2025    Advance Care Planning:  Currently listed healthcare agent:    Primary Decision Maker: JAVY COELHO - Spouse - [phone]    Is there an ACP Note within the past 12 months?  NO  If No, Alert Clinician and/or Social Work      Sadia Pelaez LPN

## 2025-06-02 NOTE — PATIENT INSTRUCTIONS
Dear Millie King ,    It was a pleasure seeing you today at [facility].   Return in about 6 weeks (around 7/14/2025).    If labs or imaging tests have been ordered for you today, please call the office  at [phone] 48 hours after completion to obtain the results.      This is the plan we talked about:    No changes in your opioid regimen today.    The Oxycontin and oxycodone are both working well.    I sent in refills for Oxycontin 40 mg every 12 hours and oxycodone 40 mg every 4 hours as needed.     I recommend getting on a routine with the senna, start with 2 tablets every night.       The Palliative Medicine Team is here to support you and your family.       Sincerely,  Sonia Nieves MD

## 2025-06-02 NOTE — PROGRESS NOTES
Palliative Medicine Outpatient Services  [place]: [phone] ([phone])    Patient Name: Millie King  YOB: 1985    Date of Current Visit: 06/02/25  Location of Current Visit:    [] [facility] Office  [] [facility] Office  [] [facility] Office  [] Home  [x] Synchronous real-time A/V virtual visit    Millie King, was evaluated through a synchronous (real-time) audio-video encounter. The patient (or guardian if applicable) is aware that this is a billable service, which includes applicable co-pays. This Virtual Visit was conducted with patient's (and/or legal guardian's) consent. Patient identification was verified, and a caregiver was present when appropriate.   The patient was located at Home: [address] [place], [place] [address]  Provider was located at Facility (Appt Dept): [address] [place] [address] [address]  [place] [address]  Confirm you are appropriately licensed, registered, or certified to deliver care in the [facility] where the patient is located as indicated above. If you are not or unsure, please re-schedule the visit: Yes, I confirm.      Total time spent for this encounter: Not billed by time    --Sonia Nieves MD on 6/2/2025 at 9:20 AM    An electronic signature was used to authenticate this note.    Date of Initial Palliative Medicine Visit: 6/8/23   Referral from: Dr. Manrique  Primary oncologist:  Dr. Prasad as of 3/5/25    REASON FOR VISIT:   40 year old woman with MBC seen for follow up pain and symptom management.     FOLLOW UP:   Return in about 6 weeks (around 7/14/2025).    ORDERS PLACED THIS ENCOUNTER:     Orders Placed This Encounter    oxyCODONE (OXYCONTIN) 40 MG extended release tablet     Sig: Take 1 tablet by mouth in the morning and 1 tablet in the evening. Do all this for 30 days. Max Daily Amount: 80 mg.     Dispense:  60 each     Refill:  0     Fentanyl patch causing a skin reaction- discontinuing.    oxyCODONE (ROXICODONE) 20 MG immediate release tablet     Sig: Take 2 tablets by

## 2025-06-09 ENCOUNTER — OFFICE VISIT (OUTPATIENT)
Age: 40
End: 2025-06-09
Payer: COMMERCIAL

## 2025-06-09 ENCOUNTER — HOSPITAL ENCOUNTER (OUTPATIENT)
Facility: HOSPITAL | Age: 40
Setting detail: INFUSION SERIES
Discharge: HOME | End: 2025-06-09
Payer: COMMERCIAL

## 2025-06-09 VITALS
OXYGEN SATURATION: 92 % | TEMPERATURE: 98.1 F | HEIGHT: 62 IN | HEART RATE: 106 BPM | BODY MASS INDEX: 41.96 KG/M2 | WEIGHT: 228 LBS | DIASTOLIC BLOOD PRESSURE: 82 MMHG | SYSTOLIC BLOOD PRESSURE: 128 MMHG | RESPIRATION RATE: 18 BRPM

## 2025-06-09 VITALS
RESPIRATION RATE: 18 BRPM | DIASTOLIC BLOOD PRESSURE: 65 MMHG | WEIGHT: 228 LBS | HEART RATE: 90 BPM | OXYGEN SATURATION: 92 % | BODY MASS INDEX: 41.96 KG/M2 | TEMPERATURE: 98.1 F | SYSTOLIC BLOOD PRESSURE: 116 MMHG | HEIGHT: 62 IN

## 2025-06-09 DIAGNOSIS — C78.01 MALIGNANT NEOPLASM METASTATIC TO BOTH LUNGS (HCC): ICD-10-CM

## 2025-06-09 DIAGNOSIS — C78.7 MALIGNANT NEOPLASM OF BREAST METASTATIC TO LIVER (HCC): ICD-10-CM

## 2025-06-09 DIAGNOSIS — C78.02 MALIGNANT NEOPLASM METASTATIC TO BOTH LUNGS (HCC): ICD-10-CM

## 2025-06-09 DIAGNOSIS — C50.919 MALIGNANT NEOPLASM OF BREAST METASTATIC TO LIVER (HCC): ICD-10-CM

## 2025-06-09 DIAGNOSIS — C79.31 MALIGNANT NEOPLASM OF BREAST METASTATIC TO BRAIN, UNSPECIFIED LATERALITY (HCC): ICD-10-CM

## 2025-06-09 DIAGNOSIS — C79.31 MALIGNANT NEOPLASM OF BREAST METASTATIC TO BRAIN, UNSPECIFIED LATERALITY (HCC): Primary | ICD-10-CM

## 2025-06-09 DIAGNOSIS — C50.919 MALIGNANT NEOPLASM OF BREAST METASTATIC TO BRAIN, UNSPECIFIED LATERALITY (HCC): Primary | ICD-10-CM

## 2025-06-09 DIAGNOSIS — C79.51 MALIGNANT NEOPLASM METASTATIC TO BONE (HCC): Primary | ICD-10-CM

## 2025-06-09 DIAGNOSIS — C50.919 MALIGNANT NEOPLASM OF BREAST METASTATIC TO BRAIN, UNSPECIFIED LATERALITY (HCC): ICD-10-CM

## 2025-06-09 LAB
ALBUMIN SERPL-MCNC: 3.4 G/DL (ref 3.5–5)
ALBUMIN/GLOB SERPL: 1.2 (ref 1.1–2.2)
ALP SERPL-CCNC: 96 U/L (ref 45–117)
ALT SERPL-CCNC: 49 U/L (ref 12–78)
ANION GAP SERPL CALC-SCNC: 6 MMOL/L (ref 2–12)
AST SERPL-CCNC: 34 U/L (ref 15–37)
BASOPHILS # BLD: 0.05 K/UL (ref 0–0.1)
BASOPHILS NFR BLD: 1.3 % (ref 0–1)
BILIRUB SERPL-MCNC: 0.6 MG/DL (ref 0.2–1)
BUN SERPL-MCNC: 12 MG/DL (ref 6–20)
BUN/CREAT SERPL: 13 (ref 12–20)
CALCIUM SERPL-MCNC: 8.8 MG/DL (ref 8.5–10.1)
CHLORIDE SERPL-SCNC: 107 MMOL/L (ref 97–108)
CO2 SERPL-SCNC: 27 MMOL/L (ref 21–32)
CREAT SERPL-MCNC: 0.9 MG/DL (ref 0.55–1.02)
DIFFERENTIAL METHOD BLD: ABNORMAL
EOSINOPHIL # BLD: 0.21 K/UL (ref 0–0.4)
EOSINOPHIL NFR BLD: 5.3 % (ref 0–7)
ERYTHROCYTE [DISTWIDTH] IN BLOOD BY AUTOMATED COUNT: 15.9 % (ref 11.5–14.5)
GLOBULIN SER CALC-MCNC: 2.9 G/DL (ref 2–4)
GLUCOSE SERPL-MCNC: 110 MG/DL (ref 65–100)
HCT VFR BLD AUTO: 35.1 % (ref 35–47)
HGB BLD-MCNC: 11 G/DL (ref 11.5–16)
IMM GRANULOCYTES # BLD AUTO: 0.01 K/UL (ref 0–0.04)
IMM GRANULOCYTES NFR BLD AUTO: 0.3 % (ref 0–0.5)
LYMPHOCYTES # BLD: 1.65 K/UL (ref 0.8–3.5)
LYMPHOCYTES NFR BLD: 41.7 % (ref 12–49)
MCH RBC QN AUTO: 27.9 PG (ref 26–34)
MCHC RBC AUTO-ENTMCNC: 31.3 G/DL (ref 30–36.5)
MCV RBC AUTO: 89.1 FL (ref 80–99)
MONOCYTES # BLD: 0.56 K/UL (ref 0–1)
MONOCYTES NFR BLD: 14.1 % (ref 5–13)
NEUTS SEG # BLD: 1.48 K/UL (ref 1.8–8)
NEUTS SEG NFR BLD: 37.3 % (ref 32–75)
NRBC # BLD: 0 K/UL (ref 0–0.01)
NRBC BLD-RTO: 0 PER 100 WBC
PLATELET # BLD AUTO: 305 K/UL (ref 150–400)
PMV BLD AUTO: 9.5 FL (ref 8.9–12.9)
POTASSIUM SERPL-SCNC: 3.7 MMOL/L (ref 3.5–5.1)
PROT SERPL-MCNC: 6.3 G/DL (ref 6.4–8.2)
RBC # BLD AUTO: 3.94 M/UL (ref 3.8–5.2)
SODIUM SERPL-SCNC: 140 MMOL/L (ref 136–145)
WBC # BLD AUTO: 4 K/UL (ref 3.6–11)

## 2025-06-09 PROCEDURE — 96367 TX/PROPH/DG ADDL SEQ IV INF: CPT

## 2025-06-09 PROCEDURE — 96375 TX/PRO/DX INJ NEW DRUG ADDON: CPT

## 2025-06-09 PROCEDURE — 85025 COMPLETE CBC W/AUTO DIFF WBC: CPT

## 2025-06-09 PROCEDURE — 2580000003 HC RX 258: Performed by: INTERNAL MEDICINE

## 2025-06-09 PROCEDURE — 96413 CHEMO IV INFUSION 1 HR: CPT

## 2025-06-09 PROCEDURE — 99215 OFFICE O/P EST HI 40 MIN: CPT | Performed by: INTERNAL MEDICINE

## 2025-06-09 PROCEDURE — 6360000002 HC RX W HCPCS: Performed by: INTERNAL MEDICINE

## 2025-06-09 PROCEDURE — 80053 COMPREHEN METABOLIC PANEL: CPT

## 2025-06-09 PROCEDURE — 2500000003 HC RX 250 WO HCPCS: Performed by: INTERNAL MEDICINE

## 2025-06-09 RX ORDER — DEXTROSE MONOHYDRATE 50 MG/ML
5-250 INJECTION, SOLUTION INTRAVENOUS PRN
Status: DISCONTINUED | OUTPATIENT
Start: 2025-06-09 | End: 2025-06-10 | Stop reason: HOSPADM

## 2025-06-09 RX ORDER — METFORMIN HYDROCHLORIDE 500 MG/1
500 TABLET, EXTENDED RELEASE ORAL
COMMUNITY
Start: 2025-05-20

## 2025-06-09 RX ORDER — SODIUM CHLORIDE 0.9 % (FLUSH) 0.9 %
5-40 SYRINGE (ML) INJECTION PRN
Status: DISCONTINUED | OUTPATIENT
Start: 2025-06-09 | End: 2025-06-10 | Stop reason: HOSPADM

## 2025-06-09 RX ORDER — PALONOSETRON 0.05 MG/ML
0.25 INJECTION, SOLUTION INTRAVENOUS ONCE
Status: COMPLETED | OUTPATIENT
Start: 2025-06-09 | End: 2025-06-09

## 2025-06-09 RX ORDER — DEXAMETHASONE SODIUM PHOSPHATE 10 MG/ML
8 INJECTION, SOLUTION INTRA-ARTICULAR; INTRALESIONAL; INTRAMUSCULAR; INTRAVENOUS; SOFT TISSUE ONCE
Status: COMPLETED | OUTPATIENT
Start: 2025-06-09 | End: 2025-06-09

## 2025-06-09 RX ADMIN — DEXAMETHASONE SODIUM PHOSPHATE 8 MG: 10 INJECTION INTRAMUSCULAR; INTRAVENOUS at 12:43

## 2025-06-09 RX ADMIN — DEXTROSE MONOHYDRATE 222 ML/HR: 50 INJECTION, SOLUTION INTRAVENOUS at 12:42

## 2025-06-09 RX ADMIN — FAM-TRASTUZUMAB DERUXTECAN-NXKI 540 MG: 100 INJECTION, POWDER, LYOPHILIZED, FOR SOLUTION INTRAVENOUS at 14:00

## 2025-06-09 RX ADMIN — SODIUM CHLORIDE, PRESERVATIVE FREE 20 ML: 5 INJECTION INTRAVENOUS at 14:42

## 2025-06-09 RX ADMIN — PALONOSETRON 0.25 MG: 0.05 INJECTION, SOLUTION INTRAVENOUS at 12:42

## 2025-06-09 RX ADMIN — SODIUM CHLORIDE 150 MG: 9 INJECTION, SOLUTION INTRAVENOUS at 12:56

## 2025-06-09 ASSESSMENT — PATIENT HEALTH QUESTIONNAIRE - PHQ9
SUM OF ALL RESPONSES TO PHQ QUESTIONS 1-9: 0
1. LITTLE INTEREST OR PLEASURE IN DOING THINGS: NOT AT ALL
SUM OF ALL RESPONSES TO PHQ QUESTIONS 1-9: 0
2. FEELING DOWN, DEPRESSED OR HOPELESS: NOT AT ALL
SUM OF ALL RESPONSES TO PHQ QUESTIONS 1-9: 0
SUM OF ALL RESPONSES TO PHQ QUESTIONS 1-9: 0

## 2025-06-09 ASSESSMENT — PAIN SCALES - GENERAL: PAINLEVEL_OUTOF10: 0

## 2025-06-09 NOTE — PROGRESS NOTES
[facility] Chemo Progress Note    Date: June 9, 2025        1100: Ms. King Arrived to [facility] for  Enhertu C5 D1 ambulatory in stable condition.  Assessment was completed and port accessed by Leola MURGUIA. Labs drawn and sent for processing. Went to provider appointment with Medical Oncology.    Labs reviewed. Criteria for treatment was met.    Ms. King's vitals were reviewed.  Patient Vitals for the past 12 hrs:   Temp Pulse Resp BP SpO2   06/09/25 1441 -- 90 -- 116/65 --   06/09/25 1100 98.1 °F (36.7 °C) (!) 106 18 128/82 92 %         Lab results were obtained and reviewed.  Recent Results (from the past 12 hours)   Comprehensive metabolic panel    Collection Time: 06/09/25 11:12 AM   Result Value Ref Range    Sodium 140 136 - 145 mmol/L    Potassium 3.7 3.5 - 5.1 mmol/L    Chloride 107 97 - 108 mmol/L    CO2 27 21 - 32 mmol/L    Anion Gap 6 2 - 12 mmol/L    Glucose 110 (H) 65 - 100 mg/dL    BUN 12 6 - 20 MG/DL    Creatinine 0.90 0.55 - 1.02 MG/DL    BUN/Creatinine Ratio 13 12 - 20      Est, Glom Filt Rate 83 >60 ml/min/1.73m2    Calcium 8.8 8.5 - 10.1 MG/DL    Total Bilirubin 0.6 0.2 - 1.0 MG/DL    ALT 49 12 - 78 U/L    AST 34 15 - 37 U/L    Alk Phosphatase 96 45 - 117 U/L    Total Protein 6.3 (L) 6.4 - 8.2 g/dL    Albumin 3.4 (L) 3.5 - 5.0 g/dL    Globulin 2.9 2.0 - 4.0 g/dL    Albumin/Globulin Ratio 1.2 1.1 - 2.2     CBC With Auto Differential    Collection Time: 06/09/25 11:12 AM   Result Value Ref Range    WBC 4.0 3.6 - 11.0 K/uL    RBC 3.94 3.80 - 5.20 M/uL    Hemoglobin 11.0 (L) 11.5 - 16.0 g/dL    Hematocrit 35.1 35.0 - 47.0 %    MCV 89.1 80.0 - 99.0 FL    MCH 27.9 26.0 - 34.0 PG    MCHC 31.3 30.0 - 36.5 g/dL    RDW 15.9 (H) 11.5 - 14.5 %    Platelets 305 150 - 400 K/uL    MPV 9.5 8.9 - 12.9 FL    Nucleated RBCs 0.0 0  WBC    nRBC 0.00 0.00 - 0.01 K/uL    Neutrophils % 37.3 32.0 - 75.0 %    Lymphocytes % 41.7 12.0 - 49.0 %    Monocytes % 14.1 (H) 5.0 - 13.0 %    Eosinophils % 5.3 0.0 - 7.0 %    Basophils

## 2025-06-09 NOTE — PROGRESS NOTES
1. Have you been to the ER, urgent care clinic since your last visit?  Hospitalized since your last visit?No    2. Have you seen or consulted any other health care providers outside of the [facility] since your last visit?  Include any pap smears or colon screening. 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         
There is expected intense activity at the right hip fracture site. There is focal intense activity in the right sacrum, with correlating subtle osseous lesion on CT. There is small  focal mild activity in the posterior approximate left sixth rib without CT correlate. There is mild activity of the distal right femoral diametaphysis with no radiographic correlate  XRT to Right Femur and Sacrum 2/6/23 - 2/10/23  Xgeva for bones 2/6/23 - Current   CT C/A/P 4/5/23: Continued improvement in size and number of multiple hepatic masses with some small masses which have resolved. A mass in segment 7 of the liver now measures 17 x 16 mm compared to 20 x 15 mm previously. A mass in segment 6 of the liver measures 6 x 9 mm compared to 13 x 12 mm previously. There are a few scattered residual punctate nodules of the lungs. No new lung nodules are identified. There is stable, mild pleural thickening and nodularity seen along the lower lobes of the lungs. Postsurgical changes are seen of the RIGHT femur. No new bony lytic or blastic lesions are identified. No lymphadenopathy is seen in the chest, abdomen, or pelvis.No CT evidence of breast mass. Consider baseline mammograms  Bone Scan 4/5/23: Stable activity right sacrum. Improved left posterior rib activity. No new scintigraphic evidence to suggest metastatic disease  CTA Chest 4/25/23: Right upper lobe infiltrate. Small bilateral pleural effusions with underlying atelectasis. No evidence of pulmonary embolism. Stable appearance of the small hepatic lesions  CT Right Hip 5/22/23: Fracture of the right femoral neck and proximal stem again demonstrated. These demonstrate partial healing. Lytic lesion at the site of the basi cervical fracture is now sclerotic which may represent posttreatment response. There is no new lytic lesion demonstrated  Breast MRI 5/23/23: RIGHT BREAST: Background parenchymal enhancement: Mild. There is no suspicious masslike or nonmasslike enhancement within

## 2025-06-10 ENCOUNTER — HOSPITAL ENCOUNTER (OUTPATIENT)
Facility: HOSPITAL | Age: 40
Discharge: HOME | End: 2025-06-13
Attending: INTERNAL MEDICINE
Payer: COMMERCIAL

## 2025-06-10 DIAGNOSIS — C79.31 MALIGNANT NEOPLASM OF BREAST METASTATIC TO BRAIN, UNSPECIFIED LATERALITY (HCC): ICD-10-CM

## 2025-06-10 DIAGNOSIS — C50.919 MALIGNANT NEOPLASM OF BREAST METASTATIC TO BRAIN, UNSPECIFIED LATERALITY (HCC): ICD-10-CM

## 2025-06-10 PROCEDURE — 74177 CT ABD & PELVIS W/CONTRAST: CPT

## 2025-06-10 PROCEDURE — 6360000004 HC RX CONTRAST MEDICATION: Performed by: INTERNAL MEDICINE

## 2025-06-10 PROCEDURE — 78306 BONE IMAGING WHOLE BODY: CPT | Performed by: INTERNAL MEDICINE

## 2025-06-10 PROCEDURE — A9503 TC99M MEDRONATE: HCPCS | Performed by: INTERNAL MEDICINE

## 2025-06-10 PROCEDURE — 3430000000 HC RX DIAGNOSTIC RADIOPHARMACEUTICAL: Performed by: INTERNAL MEDICINE

## 2025-06-10 RX ORDER — TC 99M MEDRONATE 20 MG/10ML
21.3 INJECTION, POWDER, LYOPHILIZED, FOR SOLUTION INTRAVENOUS
Status: COMPLETED | OUTPATIENT
Start: 2025-06-10 | End: 2025-06-10

## 2025-06-10 RX ORDER — IOPAMIDOL 755 MG/ML
100 INJECTION, SOLUTION INTRAVASCULAR
Status: COMPLETED | OUTPATIENT
Start: 2025-06-10 | End: 2025-06-10

## 2025-06-10 RX ADMIN — IOPAMIDOL 100 ML: 755 INJECTION, SOLUTION INTRAVENOUS at 11:04

## 2025-06-10 RX ADMIN — TC 99M MEDRONATE 21.3 MILLICURIE: 20 INJECTION, POWDER, LYOPHILIZED, FOR SOLUTION INTRAVENOUS at 10:15

## 2025-06-16 ENCOUNTER — RESULTS FOLLOW-UP (OUTPATIENT)
Age: 40
End: 2025-06-16

## 2025-06-16 ENCOUNTER — APPOINTMENT (OUTPATIENT)
Facility: HOSPITAL | Age: 40
End: 2025-06-16
Payer: COMMERCIAL

## 2025-06-18 DIAGNOSIS — G89.3 CANCER RELATED PAIN: ICD-10-CM

## 2025-06-18 DIAGNOSIS — C79.31 MALIGNANT NEOPLASM OF BREAST METASTATIC TO BRAIN, UNSPECIFIED LATERALITY (HCC): ICD-10-CM

## 2025-06-18 DIAGNOSIS — C50.919 MALIGNANT NEOPLASM OF BREAST METASTATIC TO BRAIN, UNSPECIFIED LATERALITY (HCC): ICD-10-CM

## 2025-06-18 RX ORDER — OXYCODONE HCL 40 MG/1
40 TABLET, FILM COATED, EXTENDED RELEASE ORAL EVERY 12 HOURS
Qty: 60 EACH | Refills: 0 | Status: CANCELLED | OUTPATIENT
Start: 2025-07-04 | End: 2025-08-03

## 2025-06-18 RX ORDER — OXYCODONE HYDROCHLORIDE 20 MG/1
40 TABLET ORAL EVERY 4 HOURS PRN
Qty: 168 TABLET | Refills: 0 | Status: SHIPPED | OUTPATIENT
Start: 2025-06-19 | End: 2025-07-03

## 2025-06-18 NOTE — TELEPHONE ENCOUNTER
Palliative Medicine Clinic   [place]: [phone] ([phone])    Patient Name: Millie King  YOB: 1985    Medication Refill Request    Patient is scheduled for follow up:  [x]  YES  []   NO  Next [facility] Visit: 07/17/25    PDMP reviewed:  [x] YES   []  System down / Unable  []  NO- Patient fills out of state    Medication:oxyCODONE (OXYCONTIN) 40 MG extended release tablet   Dose and directions:Take 1 tablet by mouth in the morning and 1 tablet in the evening. Do all this for 30 days. Max Daily Amount: 80 mg   Number dispensed:60  Date filled (PDMP or Pharmacy):06/05/25  # left:unknown    Medication:oxyCODONE (ROXICODONE) 20 MG immediate release tablet   Dose and directions:Take 2 tablets by mouth every 4 hours as needed for Pain for up to 14 days. Max Daily Amount: 240 mg,   Number dispensed:168  Date filled (PDMP or Pharmacy):06/05/25  #left:unknown    Appropriate for refill:  []  YES  [x]  Early Request - Requires MD/NP Review      Other pertinent information for prescriber:

## 2025-06-19 DIAGNOSIS — C50.919 MALIGNANT NEOPLASM OF BREAST METASTATIC TO BRAIN, UNSPECIFIED LATERALITY (HCC): ICD-10-CM

## 2025-06-19 DIAGNOSIS — C79.31 MALIGNANT NEOPLASM OF BREAST METASTATIC TO BRAIN, UNSPECIFIED LATERALITY (HCC): ICD-10-CM

## 2025-06-19 RX ORDER — PROCHLORPERAZINE MALEATE 5 MG/1
5 TABLET ORAL EVERY 6 HOURS PRN
Qty: 120 TABLET | Refills: 3 | Status: SHIPPED | OUTPATIENT
Start: 2025-06-19 | End: 2025-07-19

## 2025-06-20 RX ORDER — ALBUTEROL SULFATE 90 UG/1
4 INHALANT RESPIRATORY (INHALATION) PRN
Status: CANCELLED | OUTPATIENT
Start: 2025-06-30

## 2025-06-20 RX ORDER — FAMOTIDINE 10 MG/ML
20 INJECTION, SOLUTION INTRAVENOUS
Status: CANCELLED | OUTPATIENT
Start: 2025-06-30

## 2025-06-20 RX ORDER — PROCHLORPERAZINE EDISYLATE 5 MG/ML
5 INJECTION INTRAMUSCULAR; INTRAVENOUS
Status: CANCELLED | OUTPATIENT
Start: 2025-06-30

## 2025-06-20 RX ORDER — ACETAMINOPHEN 325 MG/1
650 TABLET ORAL
Status: CANCELLED | OUTPATIENT
Start: 2025-06-30

## 2025-06-20 RX ORDER — DIPHENHYDRAMINE HYDROCHLORIDE 50 MG/ML
50 INJECTION, SOLUTION INTRAMUSCULAR; INTRAVENOUS
Status: CANCELLED | OUTPATIENT
Start: 2025-06-30

## 2025-06-20 RX ORDER — SODIUM CHLORIDE 9 MG/ML
5-250 INJECTION, SOLUTION INTRAVENOUS PRN
Status: CANCELLED | OUTPATIENT
Start: 2025-06-30

## 2025-06-20 RX ORDER — EPINEPHRINE 1 MG/ML
0.3 INJECTION, SOLUTION INTRAMUSCULAR; SUBCUTANEOUS PRN
Status: CANCELLED | OUTPATIENT
Start: 2025-06-30

## 2025-06-20 RX ORDER — ONDANSETRON 8 MG/1
8 TABLET, FILM COATED ORAL EVERY 8 HOURS PRN
Qty: 60 TABLET | Refills: 3 | Status: SHIPPED | OUTPATIENT
Start: 2025-06-20

## 2025-06-20 RX ORDER — SODIUM CHLORIDE 9 MG/ML
INJECTION, SOLUTION INTRAVENOUS CONTINUOUS
Status: CANCELLED | OUTPATIENT
Start: 2025-06-30

## 2025-06-20 RX ORDER — OLANZAPINE 2.5 MG/1
2.5 TABLET, FILM COATED ORAL NIGHTLY
Qty: 90 TABLET | Refills: 2 | Status: SHIPPED | OUTPATIENT
Start: 2025-06-20

## 2025-06-20 RX ORDER — HEPARIN 100 UNIT/ML
500 SYRINGE INTRAVENOUS PRN
Status: CANCELLED | OUTPATIENT
Start: 2025-06-30

## 2025-06-20 RX ORDER — ONDANSETRON 2 MG/ML
8 INJECTION INTRAMUSCULAR; INTRAVENOUS
Status: CANCELLED | OUTPATIENT
Start: 2025-06-30

## 2025-06-20 RX ORDER — HYDROCORTISONE SODIUM SUCCINATE 100 MG/2ML
100 INJECTION INTRAMUSCULAR; INTRAVENOUS
Status: CANCELLED | OUTPATIENT
Start: 2025-06-30

## 2025-06-23 RX ORDER — SODIUM CHLORIDE 9 MG/ML
INJECTION, SOLUTION INTRAVENOUS CONTINUOUS
Status: CANCELLED | OUTPATIENT
Start: 2025-06-30

## 2025-06-23 RX ORDER — EPINEPHRINE 1 MG/ML
0.3 INJECTION, SOLUTION INTRAMUSCULAR; SUBCUTANEOUS PRN
Status: CANCELLED | OUTPATIENT
Start: 2025-06-30

## 2025-06-23 RX ORDER — HYDROCORTISONE SODIUM SUCCINATE 100 MG/2ML
100 INJECTION INTRAMUSCULAR; INTRAVENOUS
Status: CANCELLED | OUTPATIENT
Start: 2025-06-30

## 2025-06-23 RX ORDER — ALBUTEROL SULFATE 90 UG/1
4 INHALANT RESPIRATORY (INHALATION) PRN
Status: CANCELLED | OUTPATIENT
Start: 2025-06-30

## 2025-06-23 RX ORDER — FAMOTIDINE 10 MG/ML
20 INJECTION, SOLUTION INTRAVENOUS
Status: CANCELLED | OUTPATIENT
Start: 2025-06-30

## 2025-06-23 RX ORDER — DIPHENHYDRAMINE HYDROCHLORIDE 50 MG/ML
50 INJECTION, SOLUTION INTRAMUSCULAR; INTRAVENOUS
Status: CANCELLED | OUTPATIENT
Start: 2025-06-30

## 2025-06-23 RX ORDER — ONDANSETRON 2 MG/ML
8 INJECTION INTRAMUSCULAR; INTRAVENOUS
Status: CANCELLED | OUTPATIENT
Start: 2025-06-30

## 2025-06-23 RX ORDER — ACETAMINOPHEN 325 MG/1
650 TABLET ORAL
Status: CANCELLED | OUTPATIENT
Start: 2025-06-30

## 2025-06-30 ENCOUNTER — HOSPITAL ENCOUNTER (OUTPATIENT)
Facility: HOSPITAL | Age: 40
Setting detail: INFUSION SERIES
Discharge: HOME | End: 2025-06-30
Payer: COMMERCIAL

## 2025-06-30 ENCOUNTER — OFFICE VISIT (OUTPATIENT)
Age: 40
End: 2025-06-30

## 2025-06-30 VITALS
TEMPERATURE: 97.7 F | HEIGHT: 62 IN | DIASTOLIC BLOOD PRESSURE: 65 MMHG | BODY MASS INDEX: 41.77 KG/M2 | OXYGEN SATURATION: 93 % | WEIGHT: 227 LBS | SYSTOLIC BLOOD PRESSURE: 103 MMHG | RESPIRATION RATE: 18 BRPM | HEART RATE: 91 BPM

## 2025-06-30 VITALS
DIASTOLIC BLOOD PRESSURE: 73 MMHG | HEART RATE: 96 BPM | OXYGEN SATURATION: 100 % | BODY MASS INDEX: 41.77 KG/M2 | SYSTOLIC BLOOD PRESSURE: 122 MMHG | HEIGHT: 62 IN | TEMPERATURE: 98.3 F | WEIGHT: 227 LBS | RESPIRATION RATE: 17 BRPM

## 2025-06-30 DIAGNOSIS — C50.919 MALIGNANT NEOPLASM OF BREAST METASTATIC TO LIVER (HCC): ICD-10-CM

## 2025-06-30 DIAGNOSIS — C50.919 MALIGNANT NEOPLASM OF BREAST METASTATIC TO BRAIN, UNSPECIFIED LATERALITY (HCC): Primary | ICD-10-CM

## 2025-06-30 DIAGNOSIS — C79.31 MALIGNANT NEOPLASM OF BREAST METASTATIC TO BRAIN, UNSPECIFIED LATERALITY (HCC): ICD-10-CM

## 2025-06-30 DIAGNOSIS — C50.919 MALIGNANT NEOPLASM OF BREAST METASTATIC TO BRAIN, UNSPECIFIED LATERALITY (HCC): ICD-10-CM

## 2025-06-30 DIAGNOSIS — C78.7 MALIGNANT NEOPLASM OF BREAST METASTATIC TO LIVER (HCC): ICD-10-CM

## 2025-06-30 DIAGNOSIS — C79.51 MALIGNANT NEOPLASM METASTATIC TO BONE (HCC): Primary | ICD-10-CM

## 2025-06-30 DIAGNOSIS — C79.31 MALIGNANT NEOPLASM OF BREAST METASTATIC TO BRAIN, UNSPECIFIED LATERALITY (HCC): Primary | ICD-10-CM

## 2025-06-30 DIAGNOSIS — C78.02 MALIGNANT NEOPLASM METASTATIC TO BOTH LUNGS (HCC): ICD-10-CM

## 2025-06-30 DIAGNOSIS — T45.1X5A CHEMOTHERAPY INDUCED NAUSEA AND VOMITING: ICD-10-CM

## 2025-06-30 DIAGNOSIS — R11.2 CHEMOTHERAPY INDUCED NAUSEA AND VOMITING: ICD-10-CM

## 2025-06-30 DIAGNOSIS — F43.22 ADJUSTMENT DISORDER WITH ANXIOUS MOOD: ICD-10-CM

## 2025-06-30 DIAGNOSIS — R53.83 CHEMOTHERAPY-INDUCED FATIGUE: ICD-10-CM

## 2025-06-30 DIAGNOSIS — C78.01 MALIGNANT NEOPLASM METASTATIC TO BOTH LUNGS (HCC): ICD-10-CM

## 2025-06-30 DIAGNOSIS — T45.1X5A CHEMOTHERAPY-INDUCED FATIGUE: ICD-10-CM

## 2025-06-30 LAB
ALBUMIN SERPL-MCNC: 3.2 G/DL (ref 3.5–5)
ALBUMIN/GLOB SERPL: 1 (ref 1.1–2.2)
ALP SERPL-CCNC: 96 U/L (ref 45–117)
ALT SERPL-CCNC: 58 U/L (ref 12–78)
ANION GAP SERPL CALC-SCNC: 7 MMOL/L (ref 2–12)
AST SERPL-CCNC: 42 U/L (ref 15–37)
BASOPHILS # BLD: 0.04 K/UL (ref 0–0.1)
BASOPHILS NFR BLD: 0.9 % (ref 0–1)
BILIRUB SERPL-MCNC: 0.3 MG/DL (ref 0.2–1)
BUN SERPL-MCNC: 8 MG/DL (ref 6–20)
BUN/CREAT SERPL: 11 (ref 12–20)
CALCIUM SERPL-MCNC: 8.7 MG/DL (ref 8.5–10.1)
CHLORIDE SERPL-SCNC: 105 MMOL/L (ref 97–108)
CO2 SERPL-SCNC: 27 MMOL/L (ref 21–32)
CREAT SERPL-MCNC: 0.72 MG/DL (ref 0.55–1.02)
DIFFERENTIAL METHOD BLD: ABNORMAL
EOSINOPHIL # BLD: 0.21 K/UL (ref 0–0.4)
EOSINOPHIL NFR BLD: 4.8 % (ref 0–7)
ERYTHROCYTE [DISTWIDTH] IN BLOOD BY AUTOMATED COUNT: 16 % (ref 11.5–14.5)
GLOBULIN SER CALC-MCNC: 3.1 G/DL (ref 2–4)
GLUCOSE SERPL-MCNC: 137 MG/DL (ref 65–100)
HCT VFR BLD AUTO: 34.5 % (ref 35–47)
HGB BLD-MCNC: 11 G/DL (ref 11.5–16)
IMM GRANULOCYTES # BLD AUTO: 0.02 K/UL (ref 0–0.04)
IMM GRANULOCYTES NFR BLD AUTO: 0.5 % (ref 0–0.5)
LYMPHOCYTES # BLD: 1.72 K/UL (ref 0.8–3.5)
LYMPHOCYTES NFR BLD: 39.4 % (ref 12–49)
MAGNESIUM SERPL-MCNC: 2.1 MG/DL (ref 1.6–2.4)
MCH RBC QN AUTO: 28.1 PG (ref 26–34)
MCHC RBC AUTO-ENTMCNC: 31.9 G/DL (ref 30–36.5)
MCV RBC AUTO: 88.2 FL (ref 80–99)
MONOCYTES # BLD: 0.75 K/UL (ref 0–1)
MONOCYTES NFR BLD: 17.2 % (ref 5–13)
NEUTS SEG # BLD: 1.63 K/UL (ref 1.8–8)
NEUTS SEG NFR BLD: 37.2 % (ref 32–75)
NRBC # BLD: 0 K/UL (ref 0–0.01)
NRBC BLD-RTO: 0 PER 100 WBC
PHOSPHATE SERPL-MCNC: 3 MG/DL (ref 2.6–4.7)
PLATELET # BLD AUTO: 340 K/UL (ref 150–400)
PMV BLD AUTO: 9.9 FL (ref 8.9–12.9)
POTASSIUM SERPL-SCNC: 3.7 MMOL/L (ref 3.5–5.1)
PROT SERPL-MCNC: 6.3 G/DL (ref 6.4–8.2)
RBC # BLD AUTO: 3.91 M/UL (ref 3.8–5.2)
SODIUM SERPL-SCNC: 139 MMOL/L (ref 136–145)
WBC # BLD AUTO: 4.4 K/UL (ref 3.6–11)

## 2025-06-30 PROCEDURE — 96375 TX/PRO/DX INJ NEW DRUG ADDON: CPT

## 2025-06-30 PROCEDURE — 6360000002 HC RX W HCPCS: Performed by: INTERNAL MEDICINE

## 2025-06-30 PROCEDURE — 80053 COMPREHEN METABOLIC PANEL: CPT

## 2025-06-30 PROCEDURE — 96365 THER/PROPH/DIAG IV INF INIT: CPT

## 2025-06-30 PROCEDURE — 96372 THER/PROPH/DIAG INJ SC/IM: CPT

## 2025-06-30 PROCEDURE — 84100 ASSAY OF PHOSPHORUS: CPT

## 2025-06-30 PROCEDURE — 85025 COMPLETE CBC W/AUTO DIFF WBC: CPT

## 2025-06-30 PROCEDURE — 83735 ASSAY OF MAGNESIUM: CPT

## 2025-06-30 PROCEDURE — 2500000003 HC RX 250 WO HCPCS: Performed by: INTERNAL MEDICINE

## 2025-06-30 PROCEDURE — 96413 CHEMO IV INFUSION 1 HR: CPT

## 2025-06-30 PROCEDURE — 2580000003 HC RX 258: Performed by: INTERNAL MEDICINE

## 2025-06-30 RX ORDER — DEXTROSE MONOHYDRATE 50 MG/ML
5-250 INJECTION, SOLUTION INTRAVENOUS PRN
Status: DISCONTINUED | OUTPATIENT
Start: 2025-06-30 | End: 2025-07-01 | Stop reason: HOSPADM

## 2025-06-30 RX ORDER — ONDANSETRON 8 MG/1
8 TABLET, FILM COATED ORAL EVERY 8 HOURS PRN
Qty: 120 TABLET | Refills: 2 | Status: SHIPPED | OUTPATIENT
Start: 2025-06-30

## 2025-06-30 RX ORDER — EPINEPHRINE 1 MG/ML
0.3 INJECTION, SOLUTION INTRAMUSCULAR; SUBCUTANEOUS PRN
OUTPATIENT
Start: 2025-09-22

## 2025-06-30 RX ORDER — SODIUM CHLORIDE 9 MG/ML
INJECTION, SOLUTION INTRAVENOUS CONTINUOUS
OUTPATIENT
Start: 2025-09-22

## 2025-06-30 RX ORDER — SODIUM CHLORIDE 0.9 % (FLUSH) 0.9 %
5-40 SYRINGE (ML) INJECTION PRN
Status: DISCONTINUED | OUTPATIENT
Start: 2025-06-30 | End: 2025-07-01 | Stop reason: HOSPADM

## 2025-06-30 RX ORDER — BUPROPION HYDROCHLORIDE 300 MG/1
300 TABLET ORAL DAILY
Qty: 90 TABLET | Refills: 1 | Status: SHIPPED | OUTPATIENT
Start: 2025-06-30

## 2025-06-30 RX ORDER — ALBUTEROL SULFATE 90 UG/1
4 INHALANT RESPIRATORY (INHALATION) PRN
OUTPATIENT
Start: 2025-09-22

## 2025-06-30 RX ORDER — DEXAMETHASONE SODIUM PHOSPHATE 10 MG/ML
8 INJECTION, SOLUTION INTRA-ARTICULAR; INTRALESIONAL; INTRAMUSCULAR; INTRAVENOUS; SOFT TISSUE ONCE
Status: COMPLETED | OUTPATIENT
Start: 2025-06-30 | End: 2025-06-30

## 2025-06-30 RX ORDER — DIPHENHYDRAMINE HYDROCHLORIDE 50 MG/ML
50 INJECTION, SOLUTION INTRAMUSCULAR; INTRAVENOUS
OUTPATIENT
Start: 2025-09-22

## 2025-06-30 RX ORDER — OLANZAPINE 5 MG/1
5 TABLET, FILM COATED ORAL NIGHTLY
Qty: 90 TABLET | Refills: 1 | Status: SHIPPED | OUTPATIENT
Start: 2025-06-30

## 2025-06-30 RX ORDER — PALONOSETRON 0.05 MG/ML
0.25 INJECTION, SOLUTION INTRAVENOUS ONCE
Status: COMPLETED | OUTPATIENT
Start: 2025-06-30 | End: 2025-06-30

## 2025-06-30 RX ORDER — METHYLPHENIDATE HYDROCHLORIDE 5 MG/1
5 TABLET ORAL 2 TIMES DAILY
Qty: 60 TABLET | Refills: 0 | Status: SHIPPED | OUTPATIENT
Start: 2025-06-30 | End: 2025-07-30

## 2025-06-30 RX ORDER — HYDROCORTISONE SODIUM SUCCINATE 100 MG/2ML
100 INJECTION INTRAMUSCULAR; INTRAVENOUS
OUTPATIENT
Start: 2025-09-22

## 2025-06-30 RX ORDER — FAMOTIDINE 10 MG/ML
20 INJECTION, SOLUTION INTRAVENOUS
OUTPATIENT
Start: 2025-09-22

## 2025-06-30 RX ORDER — ONDANSETRON 2 MG/ML
8 INJECTION INTRAMUSCULAR; INTRAVENOUS
OUTPATIENT
Start: 2025-09-22

## 2025-06-30 RX ORDER — ACETAMINOPHEN 325 MG/1
650 TABLET ORAL
OUTPATIENT
Start: 2025-09-22

## 2025-06-30 RX ADMIN — DEXTROSE 222 ML/HR: 5 SOLUTION INTRAVENOUS at 11:55

## 2025-06-30 RX ADMIN — FOSAPREPITANT DIMEGLUMINE 150 MG: 150 INJECTION, POWDER, LYOPHILIZED, FOR SOLUTION INTRAVENOUS at 12:09

## 2025-06-30 RX ADMIN — SODIUM CHLORIDE, PRESERVATIVE FREE 20 ML: 5 INJECTION INTRAVENOUS at 13:46

## 2025-06-30 RX ADMIN — DEXAMETHASONE SODIUM PHOSPHATE 8 MG: 10 INJECTION INTRAMUSCULAR; INTRAVENOUS at 11:57

## 2025-06-30 RX ADMIN — FAM-TRASTUZUMAB DERUXTECAN-NXKI 540 MG: 100 INJECTION, POWDER, LYOPHILIZED, FOR SOLUTION INTRAVENOUS at 13:16

## 2025-06-30 RX ADMIN — DENOSUMAB 120 MG: 120 INJECTION SUBCUTANEOUS at 13:49

## 2025-06-30 RX ADMIN — PALONOSETRON 0.25 MG: 0.05 INJECTION, SOLUTION INTRAVENOUS at 11:56

## 2025-06-30 ASSESSMENT — PAIN SCALES - GENERAL: PAINLEVEL_OUTOF10: 0

## 2025-06-30 ASSESSMENT — PATIENT HEALTH QUESTIONNAIRE - PHQ9
SUM OF ALL RESPONSES TO PHQ QUESTIONS 1-9: 0
SUM OF ALL RESPONSES TO PHQ QUESTIONS 1-9: 0
1. LITTLE INTEREST OR PLEASURE IN DOING THINGS: NOT AT ALL
SUM OF ALL RESPONSES TO PHQ QUESTIONS 1-9: 0
2. FEELING DOWN, DEPRESSED OR HOPELESS: NOT AT ALL
SUM OF ALL RESPONSES TO PHQ QUESTIONS 1-9: 0

## 2025-06-30 NOTE — PROGRESS NOTES
Millie King is a 40 y.o. female is here today for a follow up.    1. Have you been to the ER, urgent care clinic since your last visit?  Hospitalized since your last visit?No    2. Have you seen or consulted any other health care providers outside of the [facility] since your last visit?  Include any pap smears or colon screening. No

## 2025-06-30 NOTE — PROGRESS NOTES
[facility] [place] at [facility]  [phone]      Reason for Visit:   Millie King is a 40 y.o. female seen today in office for follow up of Metastatic Breast Cancer.    Treatment History:     Liver Biopsy 10/4/22: PATH - Metastatic adenocarcinoma, consistent with breast primary  ER negative, WI negative, Her 2 positive at IHC  3+, ki67 60%  Palliative Taxol+Herceptin+Perjeta 10/17/22 - 10/31/22  Switched to Palliative Taxotere+Herceptin+Perjeta on 11/7/22 - 1/22/24    Taxotere to 60 mg/m2 with Cycle 8  Right Hip IMN with Ortho Dr Bermudez   XRT to Right Femur and Sacrum 2/6/23 - 2/10/23  Xgeva for bones 2/6/23 - Current   Gamma Knife 1/31/24 with Dr Martinez to 9 lesions  Tucatinib 300 mg PO BID + Xeloda 2,000 mg PO BID Days 1-14 of 21 Day Cycle + Herceptin Q3W started on 2/13/24  Cycle 2 started on 3/4/24  Tucatinib held starting 3/21/24 due to severe diarrhea; resumed on 3/25/24  Cycle 3 started on 3/25/24  Cycle 4 started on 4/15/24   Xeloda to 1500 mg PO BID    Tucatinib to 250 mg PO BID  Stopped Tucatinib on 4/30/24 due to severe diarrhea   HOLD oral chemo with Cycle 5 - Herceptin only this cycle  DPD negative  Restarted meds on 5/13/24 but did not take week off   Xeloda Cycle 6 started on 5/28/24 (took for 1 week only, then week off)  Xeloda Cycle 7 started on 6/17/24  Xeloda Cycle 8 started on 7/8/24   Xeloda Cycle 9 started on 8/5/24  Xeloda Cycle 10 started on 8/26/24  Xeloda Cycle 11 started on 9/23/24  Xeloda Cycle 12 started on 10/14/24  Skipped cycle due to hip surgery at [facility]  Xeloda Cycle 13 started on 11/25/24  Xeloda Cycle 14 started on 12/16/24  Xeloda Cycle 15 started on 1/13/25  Xeloda Cycle 16 started on 2/3/25; stopping trastuzumab/cape/tucatinib 3/5/25 due to CNS progression in [place] 4/4/25 genetic testing negative  T-Dxd 3/10/25- current    History of Present Illness:   Millie King is a 40 y.o. female seen today in office for follow up of MBC

## 2025-06-30 NOTE — PROGRESS NOTES
[facility] Chemo Progress Note    Date: June 30, 2025        1000: Ms. King Arrived to [facility] for  Enhertu + Xgeva ambulatory in stable condition.  Assessment was completed and port accessed by Leola MURGUIA. Labs drawn and sent for processing. Went to provider appointment with Medical Oncology.    Returned from provider appointment. Labs reviewed. Criteria for treatment was met.    Ms. King's vitals were reviewed.  Patient Vitals for the past 12 hrs:   Temp Pulse Resp BP SpO2   06/30/25 1348 -- 91 -- 103/65 --   06/30/25 1000 97.7 °F (36.5 °C) 95 18 (!) 145/98 93 %         Lab results were obtained and reviewed.  Recent Results (from the past 12 hours)   Phosphorus    Collection Time: 06/30/25 10:06 AM   Result Value Ref Range    Phosphorus 3.0 2.6 - 4.7 MG/DL   Magnesium    Collection Time: 06/30/25 10:06 AM   Result Value Ref Range    Magnesium 2.1 1.6 - 2.4 mg/dL   Comprehensive metabolic panel    Collection Time: 06/30/25 10:06 AM   Result Value Ref Range    Sodium 139 136 - 145 mmol/L    Potassium 3.7 3.5 - 5.1 mmol/L    Chloride 105 97 - 108 mmol/L    CO2 27 21 - 32 mmol/L    Anion Gap 7 2 - 12 mmol/L    Glucose 137 (H) 65 - 100 mg/dL    BUN 8 6 - 20 MG/DL    Creatinine 0.72 0.55 - 1.02 MG/DL    BUN/Creatinine Ratio 11 (L) 12 - 20      Est, Glom Filt Rate >90 >60 ml/min/1.73m2    Calcium 8.7 8.5 - 10.1 MG/DL    Total Bilirubin 0.3 0.2 - 1.0 MG/DL    ALT 58 12 - 78 U/L    AST 42 (H) 15 - 37 U/L    Alk Phosphatase 96 45 - 117 U/L    Total Protein 6.3 (L) 6.4 - 8.2 g/dL    Albumin 3.2 (L) 3.5 - 5.0 g/dL    Globulin 3.1 2.0 - 4.0 g/dL    Albumin/Globulin Ratio 1.0 (L) 1.1 - 2.2     CBC With Auto Differential    Collection Time: 06/30/25 10:06 AM   Result Value Ref Range    WBC 4.4 3.6 - 11.0 K/uL    RBC 3.91 3.80 - 5.20 M/uL    Hemoglobin 11.0 (L) 11.5 - 16.0 g/dL    Hematocrit 34.5 (L) 35.0 - 47.0 %    MCV 88.2 80.0 - 99.0 FL    MCH 28.1 26.0 - 34.0 PG    MCHC 31.9 30.0 - 36.5 g/dL    RDW 16.0 (H) 11.5 - 14.5 %

## 2025-07-03 DIAGNOSIS — G62.0 CHEMOTHERAPY-INDUCED NEUROPATHY: ICD-10-CM

## 2025-07-03 DIAGNOSIS — T45.1X5A CHEMOTHERAPY-INDUCED NEUROPATHY: ICD-10-CM

## 2025-07-03 RX ORDER — PREGABALIN 150 MG/1
150 CAPSULE ORAL 3 TIMES DAILY
Qty: 90 CAPSULE | Refills: 2 | Status: SHIPPED | OUTPATIENT
Start: 2025-07-03 | End: 2025-10-01

## 2025-07-03 NOTE — TELEPHONE ENCOUNTER
Palliative Medicine Clinic   [place]: [phone] ([phone])    Patient Name: Millie King  YOB: 1985    Medication Refill Request    Patient is scheduled for follow up:  [x]  YES  []   NO  Next [facility] Visit: 7/17/25    PDMP reviewed:  [x] YES   []  System down / Unable  []  NO- Patient fills out of state    Medication:Lyrica 150 mg   Dose and directions:1 tablet three times daily  Number dispensed:90; 30 days  Date filled (PDMP or Pharmacy):6/2/25    Appropriate for refill:  [x]  YES  []  Early Request - Requires MD/NP Review      Other pertinent information for prescriber:  Pend to Dr. Nieves for approval.

## 2025-07-06 DIAGNOSIS — G89.3 CANCER RELATED PAIN: ICD-10-CM

## 2025-07-07 ENCOUNTER — APPOINTMENT (OUTPATIENT)
Facility: HOSPITAL | Age: 40
End: 2025-07-07
Payer: COMMERCIAL

## 2025-07-07 RX ORDER — OXYCODONE HCL 40 MG/1
40 TABLET, FILM COATED, EXTENDED RELEASE ORAL EVERY 12 HOURS
Qty: 60 EACH | Refills: 0 | Status: SHIPPED | OUTPATIENT
Start: 2025-07-07 | End: 2025-08-06

## 2025-07-07 RX ORDER — OXYCODONE HYDROCHLORIDE 20 MG/1
40 TABLET ORAL EVERY 4 HOURS PRN
Qty: 168 TABLET | Refills: 0 | Status: SHIPPED | OUTPATIENT
Start: 2025-07-07 | End: 2025-07-21

## 2025-07-07 NOTE — TELEPHONE ENCOUNTER
Palliative Medicine Clinic   [place]: [phone] ([phone])    Patient Name: Millie King  YOB: 1985    Medication Refill Request    Patient is scheduled for follow up:  [x]  YES  []   NO  Next [facility] Visit: 07/07/25    PDMP reviewed:  [x] YES   []  System down / Unable  []  NO- Patient fills out of state    Medication:oxyCODONE (OXYCONTIN) 40 MG extended release tablet   Dose and directions:Take 1 tablet by mouth in the morning and 1 tablet in the evening. Do all this for 30 days. Max Daily Amount: 80 mg   Number dispensed:60  Date filled (PDMP or Pharmacy):06/05/25  # left:unknown    Medication:oxyCODONE (ROXICODONE) 20 MG immediate release tablet   Dose and directions:Take 2 tablets by mouth every 4 hours as needed for Pain for up to 14 days. Max Daily Amount: 240 mg   Number dispensed:168  Date filled (PDMP or Pharmacy):06/20/25  #left:unknown    Appropriate for refill:  [x]  YES  []  Early Request - Requires MD/NP Review      Other pertinent information for prescriber:

## 2025-07-08 ENCOUNTER — TELEPHONE (OUTPATIENT)
Age: 40
End: 2025-07-08

## 2025-07-08 NOTE — TELEPHONE ENCOUNTER
Called patient to advise/confirm upcoming vv appt with Dr. Nieves on 07/17/2025 at 2:00  at virtual.  Spoke with Millie King and confirmed appointment.

## 2025-07-09 ENCOUNTER — HOSPITAL ENCOUNTER (OUTPATIENT)
Facility: HOSPITAL | Age: 40
Discharge: HOME | End: 2025-07-09
Attending: STUDENT IN AN ORGANIZED HEALTH CARE EDUCATION/TRAINING PROGRAM | Admitting: STUDENT IN AN ORGANIZED HEALTH CARE EDUCATION/TRAINING PROGRAM
Payer: COMMERCIAL

## 2025-07-09 VITALS — BODY MASS INDEX: 41.06 KG/M2 | HEIGHT: 62 IN | WEIGHT: 223.1 LBS

## 2025-07-09 DIAGNOSIS — C50.919 MALIGNANT NEOPLASM OF BREAST METASTATIC TO BRAIN, UNSPECIFIED LATERALITY (HCC): ICD-10-CM

## 2025-07-09 DIAGNOSIS — C79.31 MALIGNANT NEOPLASM OF BREAST METASTATIC TO BRAIN, UNSPECIFIED LATERALITY (HCC): ICD-10-CM

## 2025-07-09 PROCEDURE — 6360000002 HC RX W HCPCS: Performed by: STUDENT IN AN ORGANIZED HEALTH CARE EDUCATION/TRAINING PROGRAM

## 2025-07-09 PROCEDURE — 36598 INJ W/FLUOR EVAL CV DEVICE: CPT

## 2025-07-09 PROCEDURE — 6360000004 HC RX CONTRAST MEDICATION: Performed by: STUDENT IN AN ORGANIZED HEALTH CARE EDUCATION/TRAINING PROGRAM

## 2025-07-09 RX ORDER — HEPARIN 100 UNIT/ML
500 SYRINGE INTRAVENOUS PRN
Status: DISCONTINUED | OUTPATIENT
Start: 2025-07-09 | End: 2025-07-09 | Stop reason: HOSPADM

## 2025-07-09 RX ORDER — IOPAMIDOL 612 MG/ML
INJECTION, SOLUTION INTRAVASCULAR PRN
Status: COMPLETED | OUTPATIENT
Start: 2025-07-09 | End: 2025-07-09

## 2025-07-09 RX ADMIN — HEPARIN 500 UNITS: 100 SYRINGE at 09:30

## 2025-07-09 RX ADMIN — IOPAMIDOL 10 ML: 612 INJECTION, SOLUTION INTRAVENOUS at 09:21

## 2025-07-09 NOTE — PROGRESS NOTES
TRANSFER - OUT REPORT:    Verbal report given to mikaela marquez(name) on Millie King being [facility] for routine post-op       Report consisted of patient's Situation, Background, Assessment and   Recommendations(SBAR).     Information from the following report(s) Nurse Handoff Report was reviewed with the receiving nurse.    Opportunity for questions and clarification was provided.      Patient transported with:   Registered Nurse     44 year old male who underwent on 7/27/18 he underwent a R VATS, R thoracotomy, drainage of empyema, R lung decortication.  On 8/6/18 he underwent a R thoracotomy, evacuation of hemothorax, FB, BAL.  He presents with a non healing thoracic wound, he was also noted to have an elevated white cell count   CT of chest reviewed and reported findings of right chest wall abscess/pneumonia  with OM of rib   he was given in the ED vancomycin and zosyn, but his chart reports that he is allergic to vancomycin and his wound culture is growing MRSA and viridans Streptococcus     Plan   start Zyvox 600 mg IV q12 for MRSA coverage and viridans streptococcus coverage   Management of wound care and surgical intervention as per CT Surgery

## 2025-07-09 NOTE — PROGRESS NOTES
Received patient from waiting area. Armband and allergies verbally confirmed with patient.  Procedure explained and consents signed.    0900: TRANSFER - OUT REPORT:    Verbal report given to Felicia  mikel King  being transferred to angio lab for routine progression of patient care       Report consisted of patient's Situation, Background, Assessment and   Recommendations(SBAR).     Information from the following report(s) Nurse Handoff Report was reviewed with the receiving nurse.           Lines:   Implantable Port Left Subclavian (Active)       Implantable Port 04/18/25 Right Chest (Active)        Opportunity for questions and clarification was provided.      Patient transported with:  Tech

## 2025-07-09 NOTE — PROGRESS NOTES
0925    Patient received from Angio lab. Patient with C port site clean, dry and intact. No hematoma. Patient with no complaints at this time.      0930  Port de-accessed and heparin locked.    0935  Reviewed discharge, Pt verbalized understanding.    0937  Pt ambulated out with mother in law, without incident.

## 2025-07-10 LAB — ECHO BSA: 2.1 M2

## 2025-07-11 ENCOUNTER — PATIENT MESSAGE (OUTPATIENT)
Age: 40
End: 2025-07-11

## 2025-07-11 DIAGNOSIS — C50.919 MALIGNANT NEOPLASM OF BREAST METASTATIC TO BRAIN, UNSPECIFIED LATERALITY (HCC): Primary | ICD-10-CM

## 2025-07-11 DIAGNOSIS — C79.31 MALIGNANT NEOPLASM OF BREAST METASTATIC TO BRAIN, UNSPECIFIED LATERALITY (HCC): Primary | ICD-10-CM

## 2025-07-11 RX ORDER — ONDANSETRON 2 MG/ML
8 INJECTION INTRAMUSCULAR; INTRAVENOUS
Status: CANCELLED | OUTPATIENT
Start: 2025-07-21

## 2025-07-11 RX ORDER — DEXAMETHASONE 0.5 MG/5ML
0.5 SOLUTION ORAL 4 TIMES DAILY
Qty: 200 ML | Refills: 1 | Status: SHIPPED | OUTPATIENT
Start: 2025-07-11 | End: 2025-07-31

## 2025-07-11 RX ORDER — SODIUM CHLORIDE 9 MG/ML
5-250 INJECTION, SOLUTION INTRAVENOUS PRN
Status: CANCELLED | OUTPATIENT
Start: 2025-07-21

## 2025-07-11 RX ORDER — ACETAMINOPHEN 325 MG/1
650 TABLET ORAL
Status: CANCELLED | OUTPATIENT
Start: 2025-07-21

## 2025-07-11 RX ORDER — SODIUM CHLORIDE 0.9 % (FLUSH) 0.9 %
5-40 SYRINGE (ML) INJECTION PRN
Status: CANCELLED | OUTPATIENT
Start: 2025-07-21

## 2025-07-11 RX ORDER — DEXAMETHASONE SODIUM PHOSPHATE 10 MG/ML
8 INJECTION, SOLUTION INTRA-ARTICULAR; INTRALESIONAL; INTRAMUSCULAR; INTRAVENOUS; SOFT TISSUE ONCE
Status: CANCELLED | OUTPATIENT
Start: 2025-07-21 | End: 2025-07-21

## 2025-07-11 RX ORDER — FAMOTIDINE 10 MG/ML
20 INJECTION, SOLUTION INTRAVENOUS
Status: CANCELLED | OUTPATIENT
Start: 2025-07-21

## 2025-07-11 RX ORDER — DIPHENHYDRAMINE HYDROCHLORIDE 50 MG/ML
50 INJECTION, SOLUTION INTRAMUSCULAR; INTRAVENOUS
Status: CANCELLED | OUTPATIENT
Start: 2025-07-21

## 2025-07-11 RX ORDER — EPINEPHRINE 1 MG/ML
0.3 INJECTION, SOLUTION INTRAMUSCULAR; SUBCUTANEOUS PRN
Status: CANCELLED | OUTPATIENT
Start: 2025-07-21

## 2025-07-11 RX ORDER — HYDROCORTISONE SODIUM SUCCINATE 100 MG/2ML
100 INJECTION INTRAMUSCULAR; INTRAVENOUS
Status: CANCELLED | OUTPATIENT
Start: 2025-07-21

## 2025-07-11 RX ORDER — SODIUM CHLORIDE 9 MG/ML
INJECTION, SOLUTION INTRAVENOUS CONTINUOUS
Status: CANCELLED | OUTPATIENT
Start: 2025-07-21

## 2025-07-11 RX ORDER — PALONOSETRON 0.05 MG/ML
0.25 INJECTION, SOLUTION INTRAVENOUS ONCE
Status: CANCELLED | OUTPATIENT
Start: 2025-07-21 | End: 2025-07-21

## 2025-07-11 RX ORDER — DEXTROSE MONOHYDRATE 50 MG/ML
5-250 INJECTION, SOLUTION INTRAVENOUS PRN
Status: CANCELLED | OUTPATIENT
Start: 2025-07-21

## 2025-07-11 RX ORDER — ALBUTEROL SULFATE 90 UG/1
4 INHALANT RESPIRATORY (INHALATION) PRN
Status: CANCELLED | OUTPATIENT
Start: 2025-07-21

## 2025-07-11 RX ORDER — HEPARIN 100 UNIT/ML
500 SYRINGE INTRAVENOUS PRN
Status: CANCELLED | OUTPATIENT
Start: 2025-07-21

## 2025-07-11 RX ORDER — PROCHLORPERAZINE EDISYLATE 5 MG/ML
5 INJECTION INTRAMUSCULAR; INTRAVENOUS
Status: CANCELLED | OUTPATIENT
Start: 2025-07-21

## 2025-07-14 ENCOUNTER — HOSPITAL ENCOUNTER (OUTPATIENT)
Facility: HOSPITAL | Age: 40
Discharge: HOME | End: 2025-07-17
Payer: COMMERCIAL

## 2025-07-14 DIAGNOSIS — C50.919 MALIGNANT NEOPLASM OF BREAST METASTATIC TO BRAIN, UNSPECIFIED LATERALITY (HCC): ICD-10-CM

## 2025-07-14 DIAGNOSIS — C79.31 MALIGNANT NEOPLASM OF BREAST METASTATIC TO BRAIN, UNSPECIFIED LATERALITY (HCC): ICD-10-CM

## 2025-07-14 PROCEDURE — 6360000004 HC RX CONTRAST MEDICATION: Performed by: NURSE PRACTITIONER

## 2025-07-14 PROCEDURE — 74177 CT ABD & PELVIS W/CONTRAST: CPT

## 2025-07-14 RX ORDER — IOPAMIDOL 755 MG/ML
100 INJECTION, SOLUTION INTRAVASCULAR
Status: COMPLETED | OUTPATIENT
Start: 2025-07-14 | End: 2025-07-14

## 2025-07-14 RX ADMIN — IOPAMIDOL 100 ML: 755 INJECTION, SOLUTION INTRAVENOUS at 17:12

## 2025-07-17 ENCOUNTER — TELEMEDICINE (OUTPATIENT)
Age: 40
End: 2025-07-17
Payer: COMMERCIAL

## 2025-07-17 DIAGNOSIS — T45.1X5A CHEMOTHERAPY-INDUCED NEUROPATHY: ICD-10-CM

## 2025-07-17 DIAGNOSIS — C78.7 MALIGNANT NEOPLASM OF BREAST METASTATIC TO LIVER (HCC): ICD-10-CM

## 2025-07-17 DIAGNOSIS — R53.0 NEOPLASTIC (MALIGNANT) RELATED FATIGUE: ICD-10-CM

## 2025-07-17 DIAGNOSIS — C50.919 MALIGNANT NEOPLASM OF BREAST METASTATIC TO LIVER (HCC): ICD-10-CM

## 2025-07-17 DIAGNOSIS — G62.0 CHEMOTHERAPY-INDUCED NEUROPATHY: ICD-10-CM

## 2025-07-17 DIAGNOSIS — G89.3 CANCER RELATED PAIN: Primary | ICD-10-CM

## 2025-07-17 DIAGNOSIS — F11.90 CHRONIC, CONTINUOUS USE OF OPIOIDS: ICD-10-CM

## 2025-07-17 DIAGNOSIS — C79.31 METASTASIS TO BRAIN (HCC): ICD-10-CM

## 2025-07-17 DIAGNOSIS — F43.22 ADJUSTMENT DISORDER WITH ANXIOUS MOOD: ICD-10-CM

## 2025-07-17 DIAGNOSIS — G47.30 SLEEP APNEA, UNSPECIFIED TYPE: ICD-10-CM

## 2025-07-17 PROCEDURE — 99214 OFFICE O/P EST MOD 30 MIN: CPT | Performed by: INTERNAL MEDICINE

## 2025-07-17 RX ORDER — OXYCODONE HYDROCHLORIDE 20 MG/1
40 TABLET ORAL EVERY 4 HOURS PRN
Qty: 168 TABLET | Refills: 0 | Status: SHIPPED | OUTPATIENT
Start: 2025-07-22 | End: 2025-08-05

## 2025-07-17 NOTE — PROGRESS NOTES
Palliative Medicine Outpatient Clinic  Nurse Check in Note  [phone]    Patient Name: Millie Coelho  YOB: 1985      Date of Visit: 07/17/2025  Visit Location:  [facility] Virtual Visit     Nurse verified patient is located in [place] for today's visit: Yes    Chief patient or family concern today: follow up    Patient's Last Palliative Medicine Clinic Visit Date:  6/2/2025    Have you been to an ER or urgent care center since your last visit?  No  Have you been hospitalized since your last visit? No  Have you seen or consulted any health care providers outside of the [facility] system since your last visit?  No  If Yes, alert PSR to request appropriate records from non-[facility] offices    Medications:  Med reconciliation was performed with:  Patient    Requested refills:  Yes- pended for clinician    If prescribed an opioid, does patient have access to naloxone at home?:  YES  If No, pend naloxone nasal spray    Function and Symptoms:  Use of assist devices:  None    Palliative Performance Status (PPS):   Palliative Performance Scale (PPS)  PPS: 80    ESAS:  [place] Symptom Assessment Scale (ESAS)  Tiredness Score: 5  Drowsiness Score: Not drowsy  Depression Score: Not depressed  Pain Score: No pain  Anxiety Score: Not anxious  Nausea Score: 7  Appetite Score: 5  Dyspnea Score: No shortness of breath  Constipation: No  Wellbeing Score: Best feeling of wellbeing    Constipation?  No  Last BM: 07/16/25    Advance Care Planning:  Currently listed healthcare agent:    Primary Decision Maker: JAVY COELHO - Spouse - [phone]    Is there an ACP Note within the past 12 months?  YES  If No, Alert Clinician and/or Social Work      Divya Simms LPN

## 2025-07-18 PROBLEM — F11.90 CHRONIC, CONTINUOUS USE OF OPIOIDS: Status: ACTIVE | Noted: 2025-07-18

## 2025-07-18 NOTE — PROGRESS NOTES
Palliative Medicine Outpatient Services  [place]: [phone] ([phone])    Patient Name: Millie King  YOB: 1985    Date of Current Visit: 07/18/25  Location of Current Visit:    [] [facility] Office  [] [facility] Office  [] [facility] Office  [] Home  [x] Synchronous real-time A/V virtual visit    Millie King, was evaluated through a synchronous (real-time) audio-video encounter. The patient (or guardian if applicable) is aware that this is a billable service, which includes applicable co-pays. This Virtual Visit was conducted with patient's (and/or legal guardian's) consent. Patient identification was verified, and a caregiver was present when appropriate.   The patient was located at Home: [address] [place] [address]  Provider was located at Facility (Appt Dept): [address] [place] [address] [address]  [place] [address]  Confirm you are appropriately licensed, registered, or certified to deliver care in the [facility] where the patient is located as indicated above. If you are not or unsure, please re-schedule the visit: Yes, I confirm.      Total time spent for this encounter: Not billed by time    --Sonia Nieves MD on 7/18/2025 at 10:46 AM    An electronic signature was used to authenticate this note.    Date of Initial Palliative Medicine Visit: 6/8/23   Referral from: Dr. Manrique  Primary oncologist:  Dr. Prasad as of 3/5/25    REASON FOR VISIT:   40 year old woman with MBC seen for follow up pain and symptom management.     FOLLOW UP:   Return in about 8 weeks (around 9/11/2025).    ORDERS PLACED THIS ENCOUNTER:     Orders Placed This Encounter    oxyCODONE (ROXICODONE) 20 MG immediate release tablet     Sig: Take 2 tablets by mouth every 4 hours as needed for Pain for up to 14 days. Max Daily Amount: 240 mg     Dispense:  168 tablet     Refill:  0     ASSESSMENT AND PLAN:     Primary malignant neoplasm of breast with metastasis (HCC)  - with lung, liver and brain mets  - management now per

## 2025-07-21 ENCOUNTER — CLINICAL DOCUMENTATION (OUTPATIENT)
Facility: HOSPITAL | Age: 40
End: 2025-07-21

## 2025-07-21 ENCOUNTER — OFFICE VISIT (OUTPATIENT)
Age: 40
End: 2025-07-21
Payer: COMMERCIAL

## 2025-07-21 ENCOUNTER — HOSPITAL ENCOUNTER (OUTPATIENT)
Facility: HOSPITAL | Age: 40
Setting detail: INFUSION SERIES
Discharge: HOME | End: 2025-07-21
Payer: COMMERCIAL

## 2025-07-21 VITALS — BODY MASS INDEX: 41.59 KG/M2 | HEIGHT: 62 IN | WEIGHT: 226 LBS

## 2025-07-21 VITALS
DIASTOLIC BLOOD PRESSURE: 92 MMHG | WEIGHT: 226.5 LBS | TEMPERATURE: 97.6 F | SYSTOLIC BLOOD PRESSURE: 121 MMHG | HEART RATE: 88 BPM | HEIGHT: 62 IN | RESPIRATION RATE: 18 BRPM | OXYGEN SATURATION: 95 % | BODY MASS INDEX: 41.68 KG/M2

## 2025-07-21 DIAGNOSIS — R53.83 CHEMOTHERAPY-INDUCED FATIGUE: ICD-10-CM

## 2025-07-21 DIAGNOSIS — C79.51 MALIGNANT NEOPLASM METASTATIC TO BONE (HCC): Primary | ICD-10-CM

## 2025-07-21 DIAGNOSIS — C50.919 MALIGNANT NEOPLASM OF BREAST METASTATIC TO BRAIN, UNSPECIFIED LATERALITY (HCC): Primary | ICD-10-CM

## 2025-07-21 DIAGNOSIS — C78.02 MALIGNANT NEOPLASM METASTATIC TO BOTH LUNGS (HCC): ICD-10-CM

## 2025-07-21 DIAGNOSIS — C79.31 MALIGNANT NEOPLASM OF BREAST METASTATIC TO BRAIN, UNSPECIFIED LATERALITY (HCC): ICD-10-CM

## 2025-07-21 DIAGNOSIS — C50.919 MALIGNANT NEOPLASM OF BREAST METASTATIC TO LIVER (HCC): ICD-10-CM

## 2025-07-21 DIAGNOSIS — C78.7 MALIGNANT NEOPLASM OF BREAST METASTATIC TO LIVER (HCC): ICD-10-CM

## 2025-07-21 DIAGNOSIS — C78.01 MALIGNANT NEOPLASM METASTATIC TO BOTH LUNGS (HCC): ICD-10-CM

## 2025-07-21 DIAGNOSIS — C79.31 MALIGNANT NEOPLASM OF BREAST METASTATIC TO BRAIN, UNSPECIFIED LATERALITY (HCC): Primary | ICD-10-CM

## 2025-07-21 DIAGNOSIS — T45.1X5A CHEMOTHERAPY-INDUCED FATIGUE: ICD-10-CM

## 2025-07-21 DIAGNOSIS — C50.919 MALIGNANT NEOPLASM OF BREAST METASTATIC TO BRAIN, UNSPECIFIED LATERALITY (HCC): ICD-10-CM

## 2025-07-21 LAB
ALBUMIN SERPL-MCNC: 3.2 G/DL (ref 3.5–5)
ALBUMIN/GLOB SERPL: 1 (ref 1.1–2.2)
ALP SERPL-CCNC: 97 U/L (ref 45–117)
ALT SERPL-CCNC: 75 U/L (ref 12–78)
ANION GAP SERPL CALC-SCNC: 4 MMOL/L (ref 2–12)
AST SERPL-CCNC: 69 U/L (ref 15–37)
BASOPHILS # BLD: 0.06 K/UL (ref 0–0.1)
BASOPHILS NFR BLD: 1.3 % (ref 0–1)
BILIRUB SERPL-MCNC: 0.4 MG/DL (ref 0.2–1)
BUN SERPL-MCNC: 5 MG/DL (ref 6–20)
BUN/CREAT SERPL: 7 (ref 12–20)
CALCIUM SERPL-MCNC: 8.8 MG/DL (ref 8.5–10.1)
CHLORIDE SERPL-SCNC: 106 MMOL/L (ref 97–108)
CO2 SERPL-SCNC: 28 MMOL/L (ref 21–32)
CREAT SERPL-MCNC: 0.72 MG/DL (ref 0.55–1.02)
DIFFERENTIAL METHOD BLD: ABNORMAL
EOSINOPHIL # BLD: 0.18 K/UL (ref 0–0.4)
EOSINOPHIL NFR BLD: 3.9 % (ref 0–7)
ERYTHROCYTE [DISTWIDTH] IN BLOOD BY AUTOMATED COUNT: 16.2 % (ref 11.5–14.5)
GLOBULIN SER CALC-MCNC: 3.2 G/DL (ref 2–4)
GLUCOSE SERPL-MCNC: 108 MG/DL (ref 65–100)
HCT VFR BLD AUTO: 35.9 % (ref 35–47)
HGB BLD-MCNC: 11.1 G/DL (ref 11.5–16)
IMM GRANULOCYTES # BLD AUTO: 0.01 K/UL (ref 0–0.04)
IMM GRANULOCYTES NFR BLD AUTO: 0.2 % (ref 0–0.5)
LYMPHOCYTES # BLD: 1.55 K/UL (ref 0.8–3.5)
LYMPHOCYTES NFR BLD: 33.9 % (ref 12–49)
MCH RBC QN AUTO: 27.8 PG (ref 26–34)
MCHC RBC AUTO-ENTMCNC: 30.9 G/DL (ref 30–36.5)
MCV RBC AUTO: 90 FL (ref 80–99)
MONOCYTES # BLD: 0.64 K/UL (ref 0–1)
MONOCYTES NFR BLD: 14 % (ref 5–13)
NEUTS SEG # BLD: 2.13 K/UL (ref 1.8–8)
NEUTS SEG NFR BLD: 46.7 % (ref 32–75)
NRBC # BLD: 0 K/UL (ref 0–0.01)
NRBC BLD-RTO: 0 PER 100 WBC
PLATELET # BLD AUTO: 360 K/UL (ref 150–400)
PMV BLD AUTO: 9.9 FL (ref 8.9–12.9)
POTASSIUM SERPL-SCNC: 3.7 MMOL/L (ref 3.5–5.1)
PROT SERPL-MCNC: 6.4 G/DL (ref 6.4–8.2)
RBC # BLD AUTO: 3.99 M/UL (ref 3.8–5.2)
SODIUM SERPL-SCNC: 138 MMOL/L (ref 136–145)
WBC # BLD AUTO: 4.6 K/UL (ref 3.6–11)

## 2025-07-21 PROCEDURE — 99215 OFFICE O/P EST HI 40 MIN: CPT | Performed by: INTERNAL MEDICINE

## 2025-07-21 PROCEDURE — 85025 COMPLETE CBC W/AUTO DIFF WBC: CPT

## 2025-07-21 PROCEDURE — 36592 COLLECT BLOOD FROM PICC: CPT

## 2025-07-21 PROCEDURE — 80053 COMPREHEN METABOLIC PANEL: CPT

## 2025-07-21 RX ORDER — PREDNISONE 20 MG/1
60 TABLET ORAL DAILY
Qty: 90 TABLET | Refills: 3 | Status: SHIPPED | OUTPATIENT
Start: 2025-07-21

## 2025-07-21 RX ORDER — METHYLPHENIDATE HYDROCHLORIDE 5 MG/1
5 TABLET ORAL 2 TIMES DAILY
Qty: 60 TABLET | Refills: 0 | Status: SHIPPED | OUTPATIENT
Start: 2025-07-21 | End: 2025-08-20

## 2025-07-21 ASSESSMENT — PAIN SCALES - GENERAL: PAINLEVEL_OUTOF10: 0
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
## 2025-07-21 NOTE — PROGRESS NOTES
Oral Chemotherapy     Millie King is a 40 y.o.female seen for consultation for pharmacist oral chemotherapy management.     Diagnosis: breast cancer    Medication name:   1) ado-trastuzumab emtansine  2) tucatinib 300 mg q12h     An overview was given of the role of the pharmacist in their care. Dose, schedule, and mechanism of action of the prescribed therapy were discussed in detail. Some of the side effects discussed include, but are not limited to, anemia, thrombocytopenia, nausea/vomiting, fatigue, hand foot syndrome, diarrhea, bleeding, skin rash, hepatotoxicity, nephrotoxicity, and mucositis. Proper storage and handling instructions were also discussed. Patient demonstrated comprehension and understanding throughout the education session. A packet containing the information was provided to the patient.    Ms King has had tucatinib in the past and tolerated it well.    Prescribed medication was reviewed for appropriate indication and dosing. Medication list was reviewed for potential drug interactions.   - Tucatinib may increase effects and serum concentration of oxycodone  - Tucatinib may increase effects and serum concentration of metformin  Neither interaction warrants therapy changes at this time. Monitoring recommended.    Chemotherapy/Immunotherapy education and consent discussed with the patient and the patient denied any questions or concerns.  A hard copy of the chemotherapy consent was offered to the patient and the patient declined (uploaded to [employer]).    The patient was assessed for their ability to open a pill bottle and swallow oral medications through a verbal discussion of how they currently take their medications.    Barriers to adherence: none    The patient's medication adherence was assessed today during visit. Plan to continue to check in with the patient once every cycle or every other cycle to continue to monitor and support adherence.    Ms. King verbalized understanding of

## 2025-07-25 ENCOUNTER — CLINICAL DOCUMENTATION (OUTPATIENT)
Age: 40
End: 2025-07-25

## 2025-07-25 DIAGNOSIS — C79.51 MALIGNANT NEOPLASM METASTATIC TO BONE (HCC): Primary | ICD-10-CM

## 2025-07-25 DIAGNOSIS — C50.919 MALIGNANT NEOPLASM OF BREAST METASTATIC TO LIVER (HCC): ICD-10-CM

## 2025-07-25 DIAGNOSIS — C50.919 MALIGNANT NEOPLASM OF BREAST METASTATIC TO BRAIN, UNSPECIFIED LATERALITY (HCC): ICD-10-CM

## 2025-07-25 DIAGNOSIS — C79.31 MALIGNANT NEOPLASM OF BREAST METASTATIC TO BRAIN, UNSPECIFIED LATERALITY (HCC): ICD-10-CM

## 2025-07-25 DIAGNOSIS — C78.02 MALIGNANT NEOPLASM METASTATIC TO BOTH LUNGS (HCC): ICD-10-CM

## 2025-07-25 DIAGNOSIS — C78.7 MALIGNANT NEOPLASM OF BREAST METASTATIC TO LIVER (HCC): ICD-10-CM

## 2025-07-25 DIAGNOSIS — C78.01 MALIGNANT NEOPLASM METASTATIC TO BOTH LUNGS (HCC): ICD-10-CM

## 2025-07-25 RX ORDER — ACETAMINOPHEN 325 MG/1
650 TABLET ORAL
OUTPATIENT
Start: 2025-07-28

## 2025-07-25 RX ORDER — SODIUM CHLORIDE 9 MG/ML
INJECTION, SOLUTION INTRAVENOUS PRN
OUTPATIENT
Start: 2025-07-28

## 2025-07-25 RX ORDER — ONDANSETRON 2 MG/ML
8 INJECTION INTRAMUSCULAR; INTRAVENOUS ONCE
OUTPATIENT
Start: 2025-07-28 | End: 2025-07-28

## 2025-07-25 RX ORDER — PROCHLORPERAZINE EDISYLATE 5 MG/ML
5 INJECTION INTRAMUSCULAR; INTRAVENOUS
OUTPATIENT
Start: 2025-07-28

## 2025-07-25 RX ORDER — SODIUM CHLORIDE 9 MG/ML
5-250 INJECTION, SOLUTION INTRAVENOUS PRN
OUTPATIENT
Start: 2025-07-28

## 2025-07-25 RX ORDER — HEPARIN SODIUM (PORCINE) LOCK FLUSH IV SOLN 100 UNIT/ML 100 UNIT/ML
500 SOLUTION INTRAVENOUS PRN
OUTPATIENT
Start: 2025-07-28

## 2025-07-25 RX ORDER — LORAZEPAM 2 MG/ML
0.5 INJECTION INTRAMUSCULAR
OUTPATIENT
Start: 2025-07-28

## 2025-07-25 RX ORDER — FAMOTIDINE 10 MG/ML
20 INJECTION, SOLUTION INTRAVENOUS
OUTPATIENT
Start: 2025-07-28

## 2025-07-25 RX ORDER — HYDROCORTISONE SODIUM SUCCINATE 100 MG/2ML
100 INJECTION INTRAMUSCULAR; INTRAVENOUS
OUTPATIENT
Start: 2025-07-28

## 2025-07-25 RX ORDER — SODIUM CHLORIDE 0.9 % (FLUSH) 0.9 %
5-40 SYRINGE (ML) INJECTION PRN
OUTPATIENT
Start: 2025-07-28

## 2025-07-25 RX ORDER — DIPHENHYDRAMINE HYDROCHLORIDE 50 MG/ML
50 INJECTION, SOLUTION INTRAMUSCULAR; INTRAVENOUS
OUTPATIENT
Start: 2025-07-28

## 2025-07-25 RX ORDER — ONDANSETRON 2 MG/ML
8 INJECTION INTRAMUSCULAR; INTRAVENOUS
OUTPATIENT
Start: 2025-07-28

## 2025-07-25 RX ORDER — ALBUTEROL SULFATE 90 UG/1
4 INHALANT RESPIRATORY (INHALATION) PRN
OUTPATIENT
Start: 2025-07-28

## 2025-07-25 RX ORDER — EPINEPHRINE 1 MG/ML
0.3 INJECTION, SOLUTION, CONCENTRATE INTRAVENOUS PRN
OUTPATIENT
Start: 2025-07-28

## 2025-07-25 NOTE — PROGRESS NOTES
Received denial for combination T-DM1 and tucatinib from [facility] today. Fax back urgent request to reconsider and sent [id number] trial [employer] abstract with letter explaining that this doublet treatment has increased PFS over T-DM1 alone. Follow up 7/28 (DOS).

## 2025-07-28 ENCOUNTER — OFFICE VISIT (OUTPATIENT)
Age: 40
End: 2025-07-28
Payer: COMMERCIAL

## 2025-07-28 ENCOUNTER — APPOINTMENT (OUTPATIENT)
Facility: HOSPITAL | Age: 40
End: 2025-07-28
Payer: COMMERCIAL

## 2025-07-28 ENCOUNTER — HOSPITAL ENCOUNTER (OUTPATIENT)
Facility: HOSPITAL | Age: 40
Setting detail: INFUSION SERIES
Discharge: HOME | End: 2025-07-28
Payer: COMMERCIAL

## 2025-07-28 VITALS
RESPIRATION RATE: 18 BRPM | OXYGEN SATURATION: 96 % | DIASTOLIC BLOOD PRESSURE: 87 MMHG | TEMPERATURE: 97.7 F | WEIGHT: 222 LBS | HEART RATE: 77 BPM | BODY MASS INDEX: 40.85 KG/M2 | SYSTOLIC BLOOD PRESSURE: 139 MMHG | HEIGHT: 62 IN

## 2025-07-28 VITALS
WEIGHT: 222.8 LBS | TEMPERATURE: 97.7 F | SYSTOLIC BLOOD PRESSURE: 114 MMHG | RESPIRATION RATE: 18 BRPM | DIASTOLIC BLOOD PRESSURE: 73 MMHG | HEART RATE: 76 BPM | HEIGHT: 62 IN | BODY MASS INDEX: 41 KG/M2 | OXYGEN SATURATION: 96 %

## 2025-07-28 DIAGNOSIS — C50.919 MALIGNANT NEOPLASM OF BREAST METASTATIC TO BRAIN, UNSPECIFIED LATERALITY (HCC): ICD-10-CM

## 2025-07-28 DIAGNOSIS — C78.7 MALIGNANT NEOPLASM OF BREAST METASTATIC TO LIVER (HCC): Primary | ICD-10-CM

## 2025-07-28 DIAGNOSIS — C79.31 MALIGNANT NEOPLASM OF BREAST METASTATIC TO BRAIN, UNSPECIFIED LATERALITY (HCC): Primary | ICD-10-CM

## 2025-07-28 DIAGNOSIS — C78.01 MALIGNANT NEOPLASM METASTATIC TO BOTH LUNGS (HCC): ICD-10-CM

## 2025-07-28 DIAGNOSIS — C79.31 MALIGNANT NEOPLASM OF BREAST METASTATIC TO BRAIN, UNSPECIFIED LATERALITY (HCC): ICD-10-CM

## 2025-07-28 DIAGNOSIS — C50.919 MALIGNANT NEOPLASM OF BREAST METASTATIC TO BRAIN, UNSPECIFIED LATERALITY (HCC): Primary | ICD-10-CM

## 2025-07-28 DIAGNOSIS — C79.51 MALIGNANT NEOPLASM METASTATIC TO BONE (HCC): ICD-10-CM

## 2025-07-28 DIAGNOSIS — C50.919 MALIGNANT NEOPLASM OF BREAST METASTATIC TO LIVER (HCC): Primary | ICD-10-CM

## 2025-07-28 DIAGNOSIS — C78.02 MALIGNANT NEOPLASM METASTATIC TO BOTH LUNGS (HCC): ICD-10-CM

## 2025-07-28 LAB
ALBUMIN SERPL-MCNC: 3.7 G/DL (ref 3.5–5)
ALBUMIN/GLOB SERPL: 1.2 (ref 1.1–2.2)
ALP SERPL-CCNC: 93 U/L (ref 45–117)
ALT SERPL-CCNC: 56 U/L (ref 12–78)
ANION GAP SERPL CALC-SCNC: 6 MMOL/L (ref 2–12)
AST SERPL-CCNC: 27 U/L (ref 15–37)
BASOPHILS # BLD: 0.06 K/UL (ref 0–0.1)
BASOPHILS NFR BLD: 0.6 % (ref 0–1)
BILIRUB SERPL-MCNC: 0.4 MG/DL (ref 0.2–1)
BUN SERPL-MCNC: 16 MG/DL (ref 6–20)
BUN/CREAT SERPL: 20 (ref 12–20)
CALCIUM SERPL-MCNC: 9.2 MG/DL (ref 8.5–10.1)
CHLORIDE SERPL-SCNC: 102 MMOL/L (ref 97–108)
CO2 SERPL-SCNC: 29 MMOL/L (ref 21–32)
CREAT SERPL-MCNC: 0.82 MG/DL (ref 0.55–1.02)
DIFFERENTIAL METHOD BLD: ABNORMAL
EOSINOPHIL # BLD: 0.17 K/UL (ref 0–0.4)
EOSINOPHIL NFR BLD: 1.6 % (ref 0–7)
ERYTHROCYTE [DISTWIDTH] IN BLOOD BY AUTOMATED COUNT: 16.1 % (ref 11.5–14.5)
GLOBULIN SER CALC-MCNC: 3.2 G/DL (ref 2–4)
GLUCOSE SERPL-MCNC: 140 MG/DL (ref 65–100)
HCT VFR BLD AUTO: 37.2 % (ref 35–47)
HGB BLD-MCNC: 11.8 G/DL (ref 11.5–16)
IMM GRANULOCYTES # BLD AUTO: 0.17 K/UL (ref 0–0.04)
IMM GRANULOCYTES NFR BLD AUTO: 1.6 % (ref 0–0.5)
LYMPHOCYTES # BLD: 1.82 K/UL (ref 0.8–3.5)
LYMPHOCYTES NFR BLD: 17.3 % (ref 12–49)
MCH RBC QN AUTO: 28.6 PG (ref 26–34)
MCHC RBC AUTO-ENTMCNC: 31.7 G/DL (ref 30–36.5)
MCV RBC AUTO: 90.3 FL (ref 80–99)
MONOCYTES # BLD: 0.56 K/UL (ref 0–1)
MONOCYTES NFR BLD: 5.3 % (ref 5–13)
NEUTS SEG # BLD: 7.73 K/UL (ref 1.8–8)
NEUTS SEG NFR BLD: 73.6 % (ref 32–75)
NRBC # BLD: 0 K/UL (ref 0–0.01)
NRBC BLD-RTO: 0 PER 100 WBC
PLATELET # BLD AUTO: 257 K/UL (ref 150–400)
PMV BLD AUTO: 10.1 FL (ref 8.9–12.9)
POTASSIUM SERPL-SCNC: 3.7 MMOL/L (ref 3.5–5.1)
PROT SERPL-MCNC: 6.9 G/DL (ref 6.4–8.2)
RBC # BLD AUTO: 4.12 M/UL (ref 3.8–5.2)
SODIUM SERPL-SCNC: 137 MMOL/L (ref 136–145)
WBC # BLD AUTO: 10.5 K/UL (ref 3.6–11)

## 2025-07-28 PROCEDURE — 6360000002 HC RX W HCPCS: Performed by: INTERNAL MEDICINE

## 2025-07-28 PROCEDURE — 96375 TX/PRO/DX INJ NEW DRUG ADDON: CPT

## 2025-07-28 PROCEDURE — 80053 COMPREHEN METABOLIC PANEL: CPT

## 2025-07-28 PROCEDURE — 96413 CHEMO IV INFUSION 1 HR: CPT

## 2025-07-28 PROCEDURE — 85025 COMPLETE CBC W/AUTO DIFF WBC: CPT

## 2025-07-28 PROCEDURE — 2500000003 HC RX 250 WO HCPCS: Performed by: INTERNAL MEDICINE

## 2025-07-28 PROCEDURE — 2580000003 HC RX 258: Performed by: INTERNAL MEDICINE

## 2025-07-28 PROCEDURE — 99215 OFFICE O/P EST HI 40 MIN: CPT | Performed by: INTERNAL MEDICINE

## 2025-07-28 RX ORDER — SODIUM CHLORIDE 0.9 % (FLUSH) 0.9 %
5-40 SYRINGE (ML) INJECTION PRN
Status: DISCONTINUED | OUTPATIENT
Start: 2025-07-28 | End: 2025-07-29 | Stop reason: HOSPADM

## 2025-07-28 RX ORDER — ONDANSETRON 2 MG/ML
8 INJECTION INTRAMUSCULAR; INTRAVENOUS ONCE
Status: COMPLETED | OUTPATIENT
Start: 2025-07-28 | End: 2025-07-28

## 2025-07-28 RX ORDER — ACETAMINOPHEN 325 MG/1
650 TABLET ORAL
Status: DISCONTINUED | OUTPATIENT
Start: 2025-07-28 | End: 2025-07-29 | Stop reason: HOSPADM

## 2025-07-28 RX ORDER — ALBUTEROL SULFATE 90 UG/1
4 INHALANT RESPIRATORY (INHALATION) PRN
Status: DISCONTINUED | OUTPATIENT
Start: 2025-07-28 | End: 2025-07-29 | Stop reason: HOSPADM

## 2025-07-28 RX ORDER — SODIUM CHLORIDE 9 MG/ML
5-250 INJECTION, SOLUTION INTRAVENOUS PRN
Status: DISCONTINUED | OUTPATIENT
Start: 2025-07-28 | End: 2025-07-29 | Stop reason: HOSPADM

## 2025-07-28 RX ORDER — DIPHENHYDRAMINE HYDROCHLORIDE 50 MG/ML
50 INJECTION, SOLUTION INTRAMUSCULAR; INTRAVENOUS
Status: DISCONTINUED | OUTPATIENT
Start: 2025-07-28 | End: 2025-07-29 | Stop reason: HOSPADM

## 2025-07-28 RX ORDER — EPINEPHRINE 1 MG/ML
0.3 INJECTION, SOLUTION INTRAMUSCULAR; SUBCUTANEOUS PRN
Status: DISCONTINUED | OUTPATIENT
Start: 2025-07-28 | End: 2025-07-29 | Stop reason: HOSPADM

## 2025-07-28 RX ORDER — HYDROCORTISONE SODIUM SUCCINATE 100 MG/2ML
100 INJECTION INTRAMUSCULAR; INTRAVENOUS
Status: DISCONTINUED | OUTPATIENT
Start: 2025-07-28 | End: 2025-07-29 | Stop reason: HOSPADM

## 2025-07-28 RX ORDER — LORAZEPAM 2 MG/ML
0.5 INJECTION INTRAMUSCULAR
Status: DISCONTINUED | OUTPATIENT
Start: 2025-07-28 | End: 2025-07-29 | Stop reason: HOSPADM

## 2025-07-28 RX ORDER — ONDANSETRON 2 MG/ML
8 INJECTION INTRAMUSCULAR; INTRAVENOUS
Status: DISCONTINUED | OUTPATIENT
Start: 2025-07-28 | End: 2025-07-29 | Stop reason: HOSPADM

## 2025-07-28 RX ORDER — PROCHLORPERAZINE EDISYLATE 5 MG/ML
5 INJECTION INTRAMUSCULAR; INTRAVENOUS
Status: DISCONTINUED | OUTPATIENT
Start: 2025-07-28 | End: 2025-07-29 | Stop reason: HOSPADM

## 2025-07-28 RX ADMIN — SODIUM CHLORIDE, PRESERVATIVE FREE 20 ML: 5 INJECTION INTRAVENOUS at 13:40

## 2025-07-28 RX ADMIN — ONDANSETRON 8 MG: 2 INJECTION, SOLUTION INTRAMUSCULAR; INTRAVENOUS at 10:58

## 2025-07-28 RX ADMIN — ADO-TRASTUZUMAB EMTANSINE 370 MG: 20 INJECTION, POWDER, LYOPHILIZED, FOR SOLUTION INTRAVENOUS at 12:03

## 2025-07-28 RX ADMIN — SODIUM CHLORIDE 25 ML/HR: 0.9 INJECTION, SOLUTION INTRAVENOUS at 10:57

## 2025-07-28 ASSESSMENT — PATIENT HEALTH QUESTIONNAIRE - PHQ9
1. LITTLE INTEREST OR PLEASURE IN DOING THINGS: NOT AT ALL
SUM OF ALL RESPONSES TO PHQ QUESTIONS 1-9: 0
2. FEELING DOWN, DEPRESSED OR HOPELESS: NOT AT ALL
SUM OF ALL RESPONSES TO PHQ QUESTIONS 1-9: 0

## 2025-07-28 ASSESSMENT — PAIN SCALES - GENERAL: PAINLEVEL_OUTOF10: 0

## 2025-07-28 NOTE — PROGRESS NOTES
Millie King is a 40 y.o. female is here today for a follow up.    1. Have you been to the ER, urgent care clinic since your last visit?  Hospitalized since your last visit?No    2. Have you seen or consulted any other health care providers outside of the [facility] since your last visit?  Include any pap smears or colon screening. No     7/28/2025  9:21 AM   Vitals    SYSTOLIC 139    DIASTOLIC 87    BP Site    Patient Position    BP Cuff Size    Pulse 77    Temp 97.7 °F (36.5 °C)    Respirations 18    SpO2 96 %       7/28/2025  9:19 AM   Vitals    Weight - Scale 222 lb 12.8 oz    Height 5' 2\"    Body Mass Index 40.75 kg/m2 (H)    Pain Score    Pain Loc    Pain Level 0    Neck (Inches)       Legend:  (H) High    
of acute infarction.    CT C/A/P 10/17/24  IMPRESSION:  Nodular hepatic contour likely related to treated metastasis/cirrhotic change.     There is no evidence of recurrent/metastatic disease in the chest, abdomen or  pelvis.     There is no acute intrathoracic or intra-abdominal process.    Bone Scan 10/17/24  IMPRESSION:  Improved postoperative activity right femur. Otherwise stable  whole-body bone scan, no new scintigraphic evidence of osseous metastatic  disease.    MRI Result (most recent):  MRI BRAIN W WO CONTRAST 05/14/2025    Narrative  INDICATION:  Secondary malignant neoplasm of brain (HCC)    COMPARISON: March 6, 2025, January 2, 2025    TECHNIQUE:  Multiplanar multisequence MRI acquisition without and with IV  contrast of the brain. Imaging was performed on an open low field strength MRI.    FINDINGS:    Ventricles:  Midline, no hydrocephalus.  Brain Parenchyma/Brainstem: Several small foci of FLAIR/T2 hyperintensity in the  white matter of the superior frontal and left parietal lobe similar to prior  study. No vasogenic edema or mass effect. No acute infarction.  Intracranial Hemorrhage:  None.  Basal Cisterns:  Normal.  Flow Voids:  Normal.  Post Contrast: Punctate focus of enhancement in the right caudate is unchanged  (16:22, 15:40). No additional foci of abnormal enhancement.  Additional Comments: Paranasal sinus mucosal thickening and trace right mastoid  fluid.    Impression  Solitary punctate focus of enhancement within the right caudate is unchanged  compared to prior 2 examinations. No new areas of abnormal enhancement or acute  process.          Electronically signed by Marco Antonio Gillette    CT Result (most recent):  CT CHEST ABDOMEN PELVIS W CONTRAST 07/14/2025    Narrative  INDICATION: Malignant neoplasm of unspecified site of unspecified female breast  (HCC); Secondary malignant neoplasm of brain (HCC)    COMPARISON: Marissa 10, 2025    TECHNIQUE:  Following the uneventful intravenous

## 2025-07-28 NOTE — PROGRESS NOTES
Outpatient Infusion Center - Chemotherapy Progress Note    Pt admit to [facility] for C1D1 TDM-1 in stable condition. Assessment completed.  PAC with positive blood return (pt recommends she recline to achieve +BR). Labs were drawn and sent for processing. Pt proceeded to scheduled medical oncology appt.    No new concerns voiced.    Last echo done 4/30/25.  EF=57    VS  Vitals:    07/28/25 0919 07/28/25 0921 07/28/25 1332   BP:  139/87 114/73   Pulse:  77 76   Resp:  18    Temp:  97.7 °F (36.5 °C)    SpO2:  96%    Weight: 101.1 kg (222 lb 12.8 oz)     Height: 1.575 m (5' 2\")           Pt denies pregnancy    Medications:  Medications Administered         0.9 % sodium chloride infusion Admin Date  07/28/2025 Action  New Bag Dose  25 mL/hr Rate  25 mL/hr Route  IntraVENous Documented By  Cindy Garibay RN        ADO-trastuzumab emtansine (KADCYLA) 370 mg in sodium chloride 0.9 % 250 mL chemo infusion Admin Date  07/28/2025 Action  New Bag Dose  370 mg Rate  195.7 mL/hr Route  IntraVENous Documented By  Cindy Garibay RN        ondansetron (ZOFRAN) injection 8 mg Admin Date  07/28/2025 Action  Given Dose  8 mg Rate   Route  IntraVENous Documented By  Cindy Garibay RN        sodium chloride flush 0.9 % injection 5-40 mL Admin Date  07/28/2025 Action  Given Dose  20 mL Rate   Route  IntraVENous Documented By  Cindy Garibay RN              Two nurses verified prior to administering:  drug name, drug dose, infusion volume or drug volume when prepared in a syringe, rate of administration, route of administration, expiration dates and/or times, appearance and physical integrity of the drugs, rate set on infusion pump, when used, sequencing of drug administration    Potential side effects and signs/symptoms of reaction related to the treatment regimen were discussed.  Additionally, guidance was provided on when the patient should call/notify their MD regarding any concerning symptoms.        PAC maintained positive blood return throughout treatment, flushed with positive blood return at conclusion.   Port flushed, and de accessed per protocol.    Pt tolerated treatment well  and declined to remain for post infusion observation - stated she was very anxious to get home. D/c home in no distress. Pt aware of next appointment scheduled for 8/18.    Labs  Vitals:    07/28/25 0919 07/28/25 0921 07/28/25 1332   BP:  139/87 114/73   Pulse:  77 76   Resp:  18    Temp:  97.7 °F (36.5 °C)    SpO2:  96%    Weight: 101.1 kg (222 lb 12.8 oz)     Height: 1.575 m (5' 2\")

## 2025-07-29 ENCOUNTER — RESULTS FOLLOW-UP (OUTPATIENT)
Age: 40
End: 2025-07-29

## 2025-07-29 ENCOUNTER — HOSPITAL ENCOUNTER (OUTPATIENT)
Facility: HOSPITAL | Age: 40
Discharge: HOME | End: 2025-08-01
Attending: INTERNAL MEDICINE
Payer: COMMERCIAL

## 2025-07-29 VITALS — BODY MASS INDEX: 40.6 KG/M2 | WEIGHT: 222 LBS

## 2025-07-29 DIAGNOSIS — C79.31 MALIGNANT NEOPLASM OF BREAST METASTATIC TO BRAIN, UNSPECIFIED LATERALITY (HCC): ICD-10-CM

## 2025-07-29 DIAGNOSIS — C50.919 MALIGNANT NEOPLASM OF BREAST METASTATIC TO BRAIN, UNSPECIFIED LATERALITY (HCC): ICD-10-CM

## 2025-07-29 DIAGNOSIS — G89.3 CANCER RELATED PAIN: ICD-10-CM

## 2025-07-29 PROCEDURE — 70553 MRI BRAIN STEM W/O & W/DYE: CPT

## 2025-07-29 PROCEDURE — 6360000004 HC RX CONTRAST MEDICATION: Performed by: RADIOLOGY

## 2025-07-29 PROCEDURE — A9579 GAD-BASE MR CONTRAST NOS,1ML: HCPCS | Performed by: RADIOLOGY

## 2025-07-29 RX ORDER — GADOTERIDOL 279.3 MG/ML
20 INJECTION INTRAVENOUS
Status: COMPLETED | OUTPATIENT
Start: 2025-07-29 | End: 2025-07-29

## 2025-07-29 RX ORDER — OXYCODONE HCL 40 MG/1
40 TABLET, FILM COATED, EXTENDED RELEASE ORAL EVERY 12 HOURS
Qty: 60 EACH | Refills: 0 | Status: SHIPPED | OUTPATIENT
Start: 2025-08-06 | End: 2025-09-05

## 2025-07-29 RX ADMIN — GADOTERIDOL 20 ML: 279.3 INJECTION, SOLUTION INTRAVENOUS at 13:52

## 2025-07-29 NOTE — TELEPHONE ENCOUNTER
Palliative Medicine Clinic   [place]: [phone] ([phone])    Patient Name: Millie King  YOB: 1985    Medication Refill Request    Patient is scheduled for follow up:  []  YES  [x]   NO  Next [facility] Med Clinic Visit: none    PDMP reviewed:  [x] YES   []  System down / Unable  []  NO- Patient fills out of state    Medication:oxyCODONE (OXYCONTIN) 40 MG extended release tablet   Dose and directions: Take 1 tablet by mouth in the morning and 1 tablet in the evening. Do all this for 30 days. Max Daily Amount: 80 mg   Number dispensed:60  Date filled (PDMP or Pharmacy):07/08/25  # left:unknown      Appropriate for refill:  [x]  YES  []  Early Request - Requires MD/NP Review      Other pertinent information for prescriber:

## 2025-07-30 NOTE — TELEPHONE ENCOUNTER
----- Message from Dr. Carlton Prasad MD sent at 7/29/2025  5:04 PM EDT -----  Please let her know I discussed this with Michelle and he feels this is stable.  He will put it through his planning machine to make sure, but things look good at this time

## 2025-07-30 NOTE — TELEPHONE ENCOUNTER
[facility] [place] at [facility]  [phone]    7/30/2025 0830 Call placed to patient regarding MRI results. Per Dr. Prasad, he discussed results with Dr. Martinez and scan looks stable at this time. Informed Dr. Martinez will look into it further to verify and we will let her know if there are any changes. Patient stated understanding and has no further questions at this time.

## 2025-08-04 DIAGNOSIS — G62.0 CHEMOTHERAPY-INDUCED NEUROPATHY: ICD-10-CM

## 2025-08-04 DIAGNOSIS — T45.1X5A CHEMOTHERAPY-INDUCED NEUROPATHY: ICD-10-CM

## 2025-08-04 RX ORDER — PREGABALIN 150 MG/1
150 CAPSULE ORAL 3 TIMES DAILY
Qty: 90 CAPSULE | Refills: 2 | Status: SHIPPED | OUTPATIENT
Start: 2025-08-04 | End: 2025-08-05 | Stop reason: SDUPTHER

## 2025-08-05 ENCOUNTER — PATIENT MESSAGE (OUTPATIENT)
Age: 40
End: 2025-08-05

## 2025-08-05 DIAGNOSIS — T45.1X5A CHEMOTHERAPY-INDUCED NEUROPATHY: ICD-10-CM

## 2025-08-05 DIAGNOSIS — G89.3 CANCER RELATED PAIN: ICD-10-CM

## 2025-08-05 DIAGNOSIS — G62.0 CHEMOTHERAPY-INDUCED NEUROPATHY: ICD-10-CM

## 2025-08-06 RX ORDER — OXYCODONE HYDROCHLORIDE 30 MG/1
30 TABLET ORAL EVERY 4 HOURS PRN
Qty: 90 TABLET | Refills: 0 | Status: SHIPPED | OUTPATIENT
Start: 2025-08-06 | End: 2025-08-21

## 2025-08-06 RX ORDER — OXYCODONE HCL 40 MG/1
40 TABLET, FILM COATED, EXTENDED RELEASE ORAL EVERY 12 HOURS
Qty: 60 EACH | Refills: 0 | Status: SHIPPED | OUTPATIENT
Start: 2025-08-06 | End: 2025-09-05

## 2025-08-06 RX ORDER — OXYCODONE HYDROCHLORIDE 20 MG/1
40 TABLET ORAL EVERY 4 HOURS PRN
Qty: 168 TABLET | Refills: 0 | Status: CANCELLED | OUTPATIENT
Start: 2025-08-06 | End: 2025-08-20

## 2025-08-06 RX ORDER — PREGABALIN 150 MG/1
150 CAPSULE ORAL 3 TIMES DAILY
Qty: 90 CAPSULE | Refills: 2 | Status: ON HOLD | OUTPATIENT
Start: 2025-08-06 | End: 2025-08-08 | Stop reason: SDUPTHER

## 2025-08-08 ENCOUNTER — HOSPITAL ENCOUNTER (OUTPATIENT)
Facility: HOSPITAL | Age: 40
Discharge: HOME | End: 2025-08-08
Attending: RADIOLOGY | Admitting: RADIOLOGY
Payer: COMMERCIAL

## 2025-08-08 VITALS
HEIGHT: 62 IN | HEART RATE: 91 BPM | TEMPERATURE: 97.7 F | SYSTOLIC BLOOD PRESSURE: 116 MMHG | WEIGHT: 226.9 LBS | OXYGEN SATURATION: 90 % | DIASTOLIC BLOOD PRESSURE: 92 MMHG | BODY MASS INDEX: 41.75 KG/M2 | RESPIRATION RATE: 14 BRPM

## 2025-08-08 DIAGNOSIS — C79.31 MALIGNANT NEOPLASM OF BREAST METASTATIC TO BRAIN, UNSPECIFIED LATERALITY (HCC): ICD-10-CM

## 2025-08-08 DIAGNOSIS — C50.919 MALIGNANT NEOPLASM OF BREAST METASTATIC TO BRAIN, UNSPECIFIED LATERALITY (HCC): ICD-10-CM

## 2025-08-08 LAB — ECHO BSA: 2.12 M2

## 2025-08-08 PROCEDURE — 2500000003 HC RX 250 WO HCPCS

## 2025-08-08 PROCEDURE — C1893 INTRO/SHEATH, FIXED,NON-PEEL: HCPCS

## 2025-08-08 PROCEDURE — 2709999900 HC NON-CHARGEABLE SUPPLY

## 2025-08-08 PROCEDURE — 99152 MOD SED SAME PHYS/QHP 5/>YRS: CPT

## 2025-08-08 PROCEDURE — 99153 MOD SED SAME PHYS/QHP EA: CPT

## 2025-08-08 PROCEDURE — C1769 GUIDE WIRE: HCPCS

## 2025-08-08 PROCEDURE — C1788 PORT, INDWELLING, IMP: HCPCS

## 2025-08-08 PROCEDURE — 6360000002 HC RX W HCPCS

## 2025-08-08 PROCEDURE — 77001 FLUOROGUIDE FOR VEIN DEVICE: CPT

## 2025-08-08 RX ORDER — MIDAZOLAM HYDROCHLORIDE 1 MG/ML
INJECTION, SOLUTION INTRAMUSCULAR; INTRAVENOUS PRN
Status: COMPLETED | OUTPATIENT
Start: 2025-08-08 | End: 2025-08-08

## 2025-08-08 RX ORDER — LIDOCAINE HYDROCHLORIDE AND EPINEPHRINE BITARTRATE 20; .01 MG/ML; MG/ML
INJECTION, SOLUTION SUBCUTANEOUS PRN
Status: COMPLETED | OUTPATIENT
Start: 2025-08-08 | End: 2025-08-08

## 2025-08-08 RX ORDER — DIPHENHYDRAMINE HYDROCHLORIDE 50 MG/ML
INJECTION, SOLUTION INTRAMUSCULAR; INTRAVENOUS PRN
Status: COMPLETED | OUTPATIENT
Start: 2025-08-08 | End: 2025-08-08

## 2025-08-08 RX ORDER — PREGABALIN 150 MG/1
150 CAPSULE ORAL 3 TIMES DAILY
Qty: 90 CAPSULE | Refills: 2 | Status: SHIPPED | OUTPATIENT
Start: 2025-08-08 | End: 2025-11-06

## 2025-08-08 RX ORDER — HEPARIN 100 UNIT/ML
SYRINGE INTRAVENOUS PRN
Status: COMPLETED | OUTPATIENT
Start: 2025-08-08 | End: 2025-08-08

## 2025-08-08 RX ORDER — FENTANYL CITRATE 50 UG/ML
INJECTION, SOLUTION INTRAMUSCULAR; INTRAVENOUS PRN
Status: COMPLETED | OUTPATIENT
Start: 2025-08-08 | End: 2025-08-08

## 2025-08-08 RX ADMIN — LIDOCAINE HYDROCHLORIDE,EPINEPHRINE BITARTRATE 10 ML: 20; .01 INJECTION, SOLUTION INFILTRATION; PERINEURAL at 11:46

## 2025-08-08 RX ADMIN — FENTANYL CITRATE 25 MCG: 50 INJECTION, SOLUTION INTRAMUSCULAR; INTRAVENOUS at 11:53

## 2025-08-08 RX ADMIN — DIPHENHYDRAMINE HYDROCHLORIDE 25 MG: 50 INJECTION, SOLUTION INTRAMUSCULAR; INTRAVENOUS at 11:33

## 2025-08-08 RX ADMIN — CEFAZOLIN SODIUM 2000 MG: 1 POWDER, FOR SOLUTION INTRAMUSCULAR; INTRAVENOUS at 11:31

## 2025-08-08 RX ADMIN — MIDAZOLAM HYDROCHLORIDE 1 MG: 1 INJECTION, SOLUTION INTRAMUSCULAR; INTRAVENOUS at 11:43

## 2025-08-08 RX ADMIN — FENTANYL CITRATE 50 MCG: 50 INJECTION, SOLUTION INTRAMUSCULAR; INTRAVENOUS at 11:43

## 2025-08-08 RX ADMIN — MIDAZOLAM HYDROCHLORIDE 1 MG: 1 INJECTION, SOLUTION INTRAMUSCULAR; INTRAVENOUS at 11:53

## 2025-08-08 RX ADMIN — HEPARIN 500 UNITS: 100 SYRINGE at 12:13

## 2025-08-08 RX ADMIN — DIPHENHYDRAMINE HYDROCHLORIDE 25 MG: 50 INJECTION, SOLUTION INTRAMUSCULAR; INTRAVENOUS at 11:43

## 2025-08-11 ENCOUNTER — APPOINTMENT (OUTPATIENT)
Facility: HOSPITAL | Age: 40
End: 2025-08-11
Payer: COMMERCIAL

## 2025-08-11 ENCOUNTER — PATIENT MESSAGE (OUTPATIENT)
Age: 40
End: 2025-08-11

## 2025-08-11 RX ORDER — ALBUTEROL SULFATE 90 UG/1
4 INHALANT RESPIRATORY (INHALATION) PRN
Status: CANCELLED | OUTPATIENT
Start: 2025-08-18

## 2025-08-11 RX ORDER — DIPHENHYDRAMINE HYDROCHLORIDE 50 MG/ML
50 INJECTION, SOLUTION INTRAMUSCULAR; INTRAVENOUS
Status: CANCELLED | OUTPATIENT
Start: 2025-08-18

## 2025-08-11 RX ORDER — EPINEPHRINE 1 MG/ML
0.3 INJECTION, SOLUTION INTRAMUSCULAR; SUBCUTANEOUS PRN
Status: CANCELLED | OUTPATIENT
Start: 2025-08-18

## 2025-08-11 RX ORDER — HEPARIN 100 UNIT/ML
500 SYRINGE INTRAVENOUS PRN
Status: CANCELLED | OUTPATIENT
Start: 2025-08-18

## 2025-08-11 RX ORDER — PROCHLORPERAZINE EDISYLATE 5 MG/ML
5 INJECTION INTRAMUSCULAR; INTRAVENOUS
Status: CANCELLED | OUTPATIENT
Start: 2025-08-18

## 2025-08-11 RX ORDER — SODIUM CHLORIDE 0.9 % (FLUSH) 0.9 %
5-40 SYRINGE (ML) INJECTION PRN
Status: CANCELLED | OUTPATIENT
Start: 2025-08-18

## 2025-08-11 RX ORDER — SODIUM CHLORIDE 9 MG/ML
5-250 INJECTION, SOLUTION INTRAVENOUS PRN
Status: CANCELLED | OUTPATIENT
Start: 2025-08-18

## 2025-08-11 RX ORDER — HYDROCORTISONE SODIUM SUCCINATE 100 MG/2ML
100 INJECTION INTRAMUSCULAR; INTRAVENOUS
Status: CANCELLED | OUTPATIENT
Start: 2025-08-18

## 2025-08-11 RX ORDER — ACETAMINOPHEN 325 MG/1
650 TABLET ORAL
Status: CANCELLED | OUTPATIENT
Start: 2025-08-18

## 2025-08-11 RX ORDER — LORAZEPAM 2 MG/ML
0.5 INJECTION INTRAMUSCULAR
Status: CANCELLED | OUTPATIENT
Start: 2025-08-18

## 2025-08-11 RX ORDER — SODIUM CHLORIDE 9 MG/ML
INJECTION, SOLUTION INTRAVENOUS PRN
Status: CANCELLED | OUTPATIENT
Start: 2025-08-18

## 2025-08-11 RX ORDER — METHYLPREDNISOLONE 4 MG/1
TABLET ORAL
Qty: 21 TABLET | Refills: 0 | Status: SHIPPED | OUTPATIENT
Start: 2025-08-11 | End: 2025-08-17

## 2025-08-11 RX ORDER — ONDANSETRON 2 MG/ML
8 INJECTION INTRAMUSCULAR; INTRAVENOUS
Status: CANCELLED | OUTPATIENT
Start: 2025-08-18

## 2025-08-18 ENCOUNTER — OFFICE VISIT (OUTPATIENT)
Age: 40
End: 2025-08-18
Payer: COMMERCIAL

## 2025-08-18 ENCOUNTER — HOSPITAL ENCOUNTER (OUTPATIENT)
Facility: HOSPITAL | Age: 40
Setting detail: INFUSION SERIES
Discharge: HOME | End: 2025-08-18
Payer: COMMERCIAL

## 2025-08-18 VITALS
OXYGEN SATURATION: 93 % | BODY MASS INDEX: 41.99 KG/M2 | WEIGHT: 228.2 LBS | TEMPERATURE: 97.9 F | HEART RATE: 124 BPM | SYSTOLIC BLOOD PRESSURE: 135 MMHG | HEIGHT: 62 IN | DIASTOLIC BLOOD PRESSURE: 90 MMHG | RESPIRATION RATE: 18 BRPM

## 2025-08-18 VITALS
RESPIRATION RATE: 18 BRPM | BODY MASS INDEX: 41.99 KG/M2 | HEART RATE: 110 BPM | WEIGHT: 228.2 LBS | HEIGHT: 62 IN | SYSTOLIC BLOOD PRESSURE: 135 MMHG | DIASTOLIC BLOOD PRESSURE: 90 MMHG | TEMPERATURE: 97.9 F

## 2025-08-18 DIAGNOSIS — C78.01 MALIGNANT NEOPLASM METASTATIC TO BOTH LUNGS (HCC): ICD-10-CM

## 2025-08-18 DIAGNOSIS — C78.02 MALIGNANT NEOPLASM METASTATIC TO BOTH LUNGS (HCC): ICD-10-CM

## 2025-08-18 DIAGNOSIS — C79.31 MALIGNANT NEOPLASM OF BREAST METASTATIC TO BRAIN, UNSPECIFIED LATERALITY (HCC): ICD-10-CM

## 2025-08-18 DIAGNOSIS — C79.31 MALIGNANT NEOPLASM OF BREAST METASTATIC TO BRAIN, UNSPECIFIED LATERALITY (HCC): Primary | ICD-10-CM

## 2025-08-18 DIAGNOSIS — C50.919 MALIGNANT NEOPLASM OF BREAST METASTATIC TO BRAIN, UNSPECIFIED LATERALITY (HCC): Primary | ICD-10-CM

## 2025-08-18 DIAGNOSIS — C50.919 MALIGNANT NEOPLASM OF BREAST METASTATIC TO LIVER (HCC): ICD-10-CM

## 2025-08-18 DIAGNOSIS — C79.51 MALIGNANT NEOPLASM METASTATIC TO BONE (HCC): Primary | ICD-10-CM

## 2025-08-18 DIAGNOSIS — C50.919 MALIGNANT NEOPLASM OF BREAST METASTATIC TO BRAIN, UNSPECIFIED LATERALITY (HCC): ICD-10-CM

## 2025-08-18 DIAGNOSIS — C78.7 MALIGNANT NEOPLASM OF BREAST METASTATIC TO LIVER (HCC): ICD-10-CM

## 2025-08-18 LAB
ALBUMIN SERPL-MCNC: 4 G/DL (ref 3.5–5.2)
ALBUMIN/GLOB SERPL: 1.3 (ref 1.1–2.2)
ALP SERPL-CCNC: 116 U/L (ref 35–104)
ALT SERPL-CCNC: 58 U/L (ref 10–35)
ANION GAP SERPL CALC-SCNC: 15 MMOL/L (ref 2–14)
AST SERPL-CCNC: 36 U/L (ref 10–35)
BASOPHILS # BLD: 0.06 K/UL (ref 0–0.1)
BASOPHILS NFR BLD: 0.6 % (ref 0–1)
BILIRUB SERPL-MCNC: 0.3 MG/DL (ref 0–1.2)
BUN SERPL-MCNC: 15 MG/DL (ref 6–20)
BUN/CREAT SERPL: 15 (ref 12–20)
CALCIUM SERPL-MCNC: 9.8 MG/DL (ref 8.6–10)
CHLORIDE SERPL-SCNC: 98 MMOL/L (ref 98–107)
CO2 SERPL-SCNC: 29 MMOL/L (ref 20–29)
CREAT SERPL-MCNC: 0.98 MG/DL (ref 0.6–1)
DIFFERENTIAL METHOD BLD: ABNORMAL
EOSINOPHIL # BLD: 0.23 K/UL (ref 0–0.4)
EOSINOPHIL NFR BLD: 2.4 % (ref 0–7)
ERYTHROCYTE [DISTWIDTH] IN BLOOD BY AUTOMATED COUNT: 16.1 % (ref 11.5–14.5)
GLOBULIN SER CALC-MCNC: 3 G/DL (ref 2–4)
GLUCOSE SERPL-MCNC: 123 MG/DL (ref 65–100)
HCT VFR BLD AUTO: 41.3 % (ref 35–47)
HGB BLD-MCNC: 13.2 G/DL (ref 11.5–16)
IMM GRANULOCYTES # BLD AUTO: 0.13 K/UL (ref 0–0.04)
IMM GRANULOCYTES NFR BLD AUTO: 1.4 % (ref 0–0.5)
LYMPHOCYTES # BLD: 2.92 K/UL (ref 0.8–3.5)
LYMPHOCYTES NFR BLD: 30.4 % (ref 12–49)
MCH RBC QN AUTO: 28.3 PG (ref 26–34)
MCHC RBC AUTO-ENTMCNC: 32 G/DL (ref 30–36.5)
MCV RBC AUTO: 88.6 FL (ref 80–99)
MONOCYTES # BLD: 0.99 K/UL (ref 0–1)
MONOCYTES NFR BLD: 10.3 % (ref 5–13)
NEUTS SEG # BLD: 5.27 K/UL (ref 1.8–8)
NEUTS SEG NFR BLD: 54.9 % (ref 32–75)
NRBC # BLD: 0 K/UL (ref 0–0.01)
NRBC BLD-RTO: 0 PER 100 WBC
PLATELET # BLD AUTO: 377 K/UL (ref 150–400)
PMV BLD AUTO: 9.6 FL (ref 8.9–12.9)
POTASSIUM SERPL-SCNC: 3.6 MMOL/L (ref 3.5–5.1)
PROT SERPL-MCNC: 7 G/DL (ref 6.4–8.3)
RBC # BLD AUTO: 4.66 M/UL (ref 3.8–5.2)
RBC MORPH BLD: ABNORMAL
SODIUM SERPL-SCNC: 142 MMOL/L (ref 136–145)
WBC # BLD AUTO: 9.6 K/UL (ref 3.6–11)

## 2025-08-18 PROCEDURE — 96409 CHEMO IV PUSH SNGL DRUG: CPT

## 2025-08-18 PROCEDURE — 99215 OFFICE O/P EST HI 40 MIN: CPT | Performed by: INTERNAL MEDICINE

## 2025-08-18 PROCEDURE — 80053 COMPREHEN METABOLIC PANEL: CPT

## 2025-08-18 PROCEDURE — 85025 COMPLETE CBC W/AUTO DIFF WBC: CPT

## 2025-08-18 PROCEDURE — 2580000003 HC RX 258: Performed by: INTERNAL MEDICINE

## 2025-08-18 PROCEDURE — 96375 TX/PRO/DX INJ NEW DRUG ADDON: CPT

## 2025-08-18 PROCEDURE — 6360000002 HC RX W HCPCS: Performed by: INTERNAL MEDICINE

## 2025-08-18 RX ORDER — SODIUM CHLORIDE 9 MG/ML
5-250 INJECTION, SOLUTION INTRAVENOUS PRN
Status: DISCONTINUED | OUTPATIENT
Start: 2025-08-18 | End: 2025-08-19 | Stop reason: HOSPADM

## 2025-08-18 RX ORDER — ONDANSETRON 2 MG/ML
8 INJECTION INTRAMUSCULAR; INTRAVENOUS ONCE
Status: COMPLETED | OUTPATIENT
Start: 2025-08-18 | End: 2025-08-18

## 2025-08-18 RX ORDER — OXYBUTYNIN CHLORIDE 10 MG/1
10 TABLET, EXTENDED RELEASE ORAL DAILY
Qty: 30 TABLET | Refills: 5 | Status: SHIPPED | OUTPATIENT
Start: 2025-08-18

## 2025-08-18 RX ADMIN — ADO-TRASTUZUMAB EMTANSINE 370 MG: 20 INJECTION, POWDER, LYOPHILIZED, FOR SOLUTION INTRAVENOUS at 11:50

## 2025-08-18 RX ADMIN — SODIUM CHLORIDE 25 ML/HR: 0.9 INJECTION, SOLUTION INTRAVENOUS at 10:41

## 2025-08-18 RX ADMIN — ONDANSETRON 8 MG: 2 INJECTION, SOLUTION INTRAMUSCULAR; INTRAVENOUS at 10:42

## 2025-08-18 ASSESSMENT — PAIN SCALES - GENERAL: PAINLEVEL_OUTOF10: 0

## 2025-08-20 ENCOUNTER — TELEPHONE (OUTPATIENT)
Age: 40
End: 2025-08-20

## 2025-08-20 ENCOUNTER — PATIENT MESSAGE (OUTPATIENT)
Age: 40
End: 2025-08-20

## 2025-08-20 DIAGNOSIS — C79.31 MALIGNANT NEOPLASM OF BREAST METASTATIC TO BRAIN, UNSPECIFIED LATERALITY (HCC): Primary | ICD-10-CM

## 2025-08-20 DIAGNOSIS — C50.919 MALIGNANT NEOPLASM OF BREAST METASTATIC TO BRAIN, UNSPECIFIED LATERALITY (HCC): Primary | ICD-10-CM

## 2025-08-20 RX ORDER — NYSTATIN 100000 [USP'U]/G
POWDER TOPICAL
Qty: 30 G | Refills: 1 | Status: SHIPPED | OUTPATIENT
Start: 2025-08-20

## 2025-08-25 DIAGNOSIS — C78.7 MALIGNANT NEOPLASM OF BREAST METASTATIC TO LIVER (HCC): Primary | ICD-10-CM

## 2025-08-25 DIAGNOSIS — C50.919 MALIGNANT NEOPLASM OF BREAST METASTATIC TO LIVER (HCC): Primary | ICD-10-CM

## 2025-08-25 DIAGNOSIS — G89.3 CANCER RELATED PAIN: ICD-10-CM

## 2025-08-25 DIAGNOSIS — Z51.5 PALLIATIVE CARE BY SPECIALIST: ICD-10-CM

## 2025-08-25 RX ORDER — OXYCODONE HYDROCHLORIDE 30 MG/1
30 TABLET ORAL EVERY 4 HOURS PRN
Qty: 90 TABLET | Refills: 0 | Status: SHIPPED | OUTPATIENT
Start: 2025-08-25 | End: 2025-09-09

## 2025-08-25 RX ORDER — OXYCODONE HCL 40 MG/1
40 TABLET, FILM COATED, EXTENDED RELEASE ORAL EVERY 12 HOURS
Qty: 60 EACH | Refills: 0 | Status: SHIPPED | OUTPATIENT
Start: 2025-09-06 | End: 2025-10-06

## 2025-08-27 DIAGNOSIS — C79.31 METASTASIS TO BRAIN (HCC): ICD-10-CM

## 2025-08-27 RX ORDER — LEVETIRACETAM 500 MG/1
500 TABLET ORAL 2 TIMES DAILY
Qty: 180 TABLET | Refills: 1 | Status: SHIPPED | OUTPATIENT
Start: 2025-08-27

## 2025-09-02 ENCOUNTER — APPOINTMENT (OUTPATIENT)
Facility: HOSPITAL | Age: 40
End: 2025-09-02
Payer: COMMERCIAL

## 2025-09-04 ENCOUNTER — PATIENT MESSAGE (OUTPATIENT)
Age: 40
End: 2025-09-04

## 2025-09-04 DIAGNOSIS — G89.3 CANCER RELATED PAIN: ICD-10-CM

## 2025-09-04 DIAGNOSIS — Z51.5 PALLIATIVE CARE BY SPECIALIST: ICD-10-CM

## 2025-09-04 DIAGNOSIS — C78.7 MALIGNANT NEOPLASM OF BREAST METASTATIC TO LIVER (HCC): ICD-10-CM

## 2025-09-04 DIAGNOSIS — C50.919 MALIGNANT NEOPLASM OF BREAST METASTATIC TO LIVER (HCC): ICD-10-CM

## 2025-09-05 RX ORDER — OXYCODONE HCL 40 MG/1
40 TABLET, FILM COATED, EXTENDED RELEASE ORAL EVERY 12 HOURS
Qty: 60 EACH | Refills: 0 | Status: SHIPPED | OUTPATIENT
Start: 2025-09-06 | End: 2025-09-05

## 2025-09-05 RX ORDER — OXYCODONE HCL 40 MG/1
40 TABLET, FILM COATED, EXTENDED RELEASE ORAL EVERY 12 HOURS
Qty: 60 EACH | Refills: 0 | Status: SHIPPED | OUTPATIENT
Start: 2025-09-05 | End: 2025-10-05

## 2025-10-20 ENCOUNTER — APPOINTMENT (OUTPATIENT)
Facility: HOSPITAL | Age: 40
End: 2025-10-20
Payer: COMMERCIAL

## 2025-11-10 ENCOUNTER — APPOINTMENT (OUTPATIENT)
Facility: HOSPITAL | Age: 40
End: 2025-11-10
Payer: COMMERCIAL